# Patient Record
Sex: FEMALE | HISPANIC OR LATINO | URBAN - METROPOLITAN AREA
[De-identification: names, ages, dates, MRNs, and addresses within clinical notes are randomized per-mention and may not be internally consistent; named-entity substitution may affect disease eponyms.]

---

## 2017-04-13 ENCOUNTER — EMERGENCY (EMERGENCY)
Facility: HOSPITAL | Age: 57
LOS: 1 days | Discharge: HOME | End: 2017-04-13
Attending: EMERGENCY MEDICINE | Admitting: EMERGENCY MEDICINE
Payer: COMMERCIAL

## 2017-04-13 VITALS — WEIGHT: 220.02 LBS

## 2017-04-13 DIAGNOSIS — M79.601 PAIN IN RIGHT ARM: ICD-10-CM

## 2017-04-13 DIAGNOSIS — M25.431 EFFUSION, RIGHT WRIST: ICD-10-CM

## 2017-04-13 LAB
ALBUMIN SERPL ELPH-MCNC: 3.7 G/DL — SIGNIFICANT CHANGE UP (ref 3.3–5)
ALP SERPL-CCNC: 95 U/L — SIGNIFICANT CHANGE UP (ref 40–120)
ALT FLD-CCNC: 29 U/L — SIGNIFICANT CHANGE UP (ref 12–78)
ANION GAP SERPL CALC-SCNC: 8 MMOL/L — SIGNIFICANT CHANGE UP (ref 5–17)
AST SERPL-CCNC: 18 U/L — SIGNIFICANT CHANGE UP (ref 15–37)
BASOPHILS # BLD AUTO: 0.1 K/UL — SIGNIFICANT CHANGE UP (ref 0–0.2)
BASOPHILS NFR BLD AUTO: 1.3 % — SIGNIFICANT CHANGE UP (ref 0–2)
BILIRUB SERPL-MCNC: 0.4 MG/DL — SIGNIFICANT CHANGE UP (ref 0.2–1.2)
BUN SERPL-MCNC: 28 MG/DL — HIGH (ref 7–23)
CALCIUM SERPL-MCNC: 9.2 MG/DL — SIGNIFICANT CHANGE UP (ref 8.5–10.1)
CHLORIDE SERPL-SCNC: 103 MMOL/L — SIGNIFICANT CHANGE UP (ref 96–108)
CO2 SERPL-SCNC: 28 MMOL/L — SIGNIFICANT CHANGE UP (ref 22–31)
CREAT SERPL-MCNC: 0.82 MG/DL — SIGNIFICANT CHANGE UP (ref 0.5–1.3)
EOSINOPHIL # BLD AUTO: 0.1 K/UL — SIGNIFICANT CHANGE UP (ref 0–0.5)
EOSINOPHIL NFR BLD AUTO: 0.9 % — SIGNIFICANT CHANGE UP (ref 0–6)
GLUCOSE SERPL-MCNC: 117 MG/DL — HIGH (ref 70–99)
HCT VFR BLD CALC: 33.2 % — LOW (ref 34.5–45)
HGB BLD-MCNC: 11 G/DL — LOW (ref 11.5–15.5)
LYMPHOCYTES # BLD AUTO: 2.8 K/UL — SIGNIFICANT CHANGE UP (ref 1–3.3)
LYMPHOCYTES # BLD AUTO: 26.1 % — SIGNIFICANT CHANGE UP (ref 13–44)
MCHC RBC-ENTMCNC: 27.4 PG — SIGNIFICANT CHANGE UP (ref 27–34)
MCHC RBC-ENTMCNC: 33.1 GM/DL — SIGNIFICANT CHANGE UP (ref 32–36)
MCV RBC AUTO: 82.7 FL — SIGNIFICANT CHANGE UP (ref 80–100)
MONOCYTES # BLD AUTO: 1 K/UL — HIGH (ref 0–0.9)
MONOCYTES NFR BLD AUTO: 9.6 % — SIGNIFICANT CHANGE UP (ref 2–14)
NEUTROPHILS # BLD AUTO: 6.6 K/UL — SIGNIFICANT CHANGE UP (ref 1.8–7.4)
NEUTROPHILS NFR BLD AUTO: 62 % — SIGNIFICANT CHANGE UP (ref 43–77)
PLATELET # BLD AUTO: 218 K/UL — SIGNIFICANT CHANGE UP (ref 150–400)
POTASSIUM SERPL-MCNC: 4.5 MMOL/L — SIGNIFICANT CHANGE UP (ref 3.5–5.3)
POTASSIUM SERPL-SCNC: 4.5 MMOL/L — SIGNIFICANT CHANGE UP (ref 3.5–5.3)
PROT SERPL-MCNC: 7.8 GM/DL — SIGNIFICANT CHANGE UP (ref 6–8.3)
RBC # BLD: 4.01 M/UL — SIGNIFICANT CHANGE UP (ref 3.8–5.2)
RBC # FLD: 13.6 % — SIGNIFICANT CHANGE UP (ref 10.3–14.5)
SODIUM SERPL-SCNC: 139 MMOL/L — SIGNIFICANT CHANGE UP (ref 135–145)
WBC # BLD: 10.7 K/UL — HIGH (ref 3.8–10.5)
WBC # FLD AUTO: 10.7 K/UL — HIGH (ref 3.8–10.5)

## 2017-04-13 PROCEDURE — 93971 EXTREMITY STUDY: CPT | Mod: 26,RT

## 2017-04-13 PROCEDURE — 99285 EMERGENCY DEPT VISIT HI MDM: CPT | Mod: 25

## 2017-04-13 PROCEDURE — 73110 X-RAY EXAM OF WRIST: CPT | Mod: 26,RT

## 2017-04-13 RX ORDER — KETOROLAC TROMETHAMINE 30 MG/ML
15 SYRINGE (ML) INJECTION ONCE
Qty: 0 | Refills: 0 | Status: DISCONTINUED | OUTPATIENT
Start: 2017-04-13 | End: 2017-04-13

## 2017-04-13 RX ORDER — MORPHINE SULFATE 50 MG/1
4 CAPSULE, EXTENDED RELEASE ORAL ONCE
Qty: 0 | Refills: 0 | Status: DISCONTINUED | OUTPATIENT
Start: 2017-04-13 | End: 2017-04-13

## 2017-04-13 RX ORDER — SODIUM CHLORIDE 9 MG/ML
1000 INJECTION INTRAMUSCULAR; INTRAVENOUS; SUBCUTANEOUS ONCE
Qty: 0 | Refills: 0 | Status: COMPLETED | OUTPATIENT
Start: 2017-04-13 | End: 2017-04-13

## 2017-04-13 RX ADMIN — MORPHINE SULFATE 4 MILLIGRAM(S): 50 CAPSULE, EXTENDED RELEASE ORAL at 23:18

## 2017-04-13 RX ADMIN — Medication 15 MILLIGRAM(S): at 23:19

## 2017-04-13 RX ADMIN — MORPHINE SULFATE 4 MILLIGRAM(S): 50 CAPSULE, EXTENDED RELEASE ORAL at 23:35

## 2017-04-13 RX ADMIN — Medication 15 MILLIGRAM(S): at 23:35

## 2017-04-13 RX ADMIN — SODIUM CHLORIDE 1000 MILLILITER(S): 9 INJECTION INTRAMUSCULAR; INTRAVENOUS; SUBCUTANEOUS at 23:18

## 2017-04-13 NOTE — ED PROVIDER NOTE - DETAILS:
I, Jj Lu DO,  performed the initial face to face bedside interview with this patient regarding history of present illness, review of symptoms and relevant past medical, social and family history.  I completed an independent physical examination.  I was the initial provider who evaluated this patient.    The history, relevant review of systems, past medical and surgical history, medical decision making, and physical examination was documented by the scribe in my presence and I attest to the accuracy of the documentation. I, Jj Lu DO,  performed the initial face to face bedside interview with this patient regarding history of present illness, review of symptoms and relevant past medical, social and family history.  I completed an independent physical examination.  I was the initial provider who evaluated this patient.  I agree with scribe documentation -Dr. Hankins   The history, relevant review of systems, past medical and surgical history, medical decision making, and physical examination was documented by the scribe in my presence and I attest to the accuracy of the documentation. I, Dr. Hankins, agree with the scribe documentation on my behalf of this chart

## 2017-04-13 NOTE — ED PROVIDER NOTE - OBJECTIVE STATEMENT
58 y/o female with a h/o PM, DM, CAD, c/o right arm pain x2 days, atraumatic. Pt reports chills and feeling hot. She states the pain in her RUE is mostly in her wrist down to her fingers.

## 2017-04-13 NOTE — ED PROVIDER NOTE - PROGRESS NOTE DETAILS
ED attending Dr. Hankins evaluated patient at bedside. Pt c/o right hand pain x 2 days, unable to sleep. On exam pt is exquisitely tender from the wrist to the fingers. I, Go Jeffries, was the scribe for and in the presence of Dr. Hankins for this patient. Ct with a distal radius lesion, dicsussed case with Dr. White from ortho who states no immediate action but agrees concerning for CA, and pt needs Onc FU.  Pt without complaints otherwise.  Will give pain meds.  Pt resting but with severe pain in the right forearm.  Will plan to DC with pain meds and pt will FU with PMD and Onc. Pt stable for DC home, understands the severity of her findings and will FU with onc and return for any change or worsening.  Stable for DC home. Pt stable for DC home, understands the severity of her findings and will FU with onc and return for any change or worsening.  Stable for DC home.  at bedside to take the pt home and will help with FU Yumiko: I, Mae Calderon, performed the initial face to face bedside interview with this patient regarding history of present illness, review of symptoms and relevant past medical, social and family history.  I completed an independent physical examination.  I was the initial provider who evaluated this patient in Intake and transferred patient to main ED for further evaluation.  The history, relevant review of systems, past medical and surgical history, medical decision making, and physical examination was documented by the scribe in my presence and I attest to the accuracy of the documentation.

## 2017-04-13 NOTE — ED PROVIDER NOTE - NS ED MD SCRIBE ATTENDING SCRIBE SECTIONS
REVIEW OF SYSTEMS/PROGRESS NOTE/PAST MEDICAL/SURGICAL/SOCIAL HISTORY/RESULTS/PHYSICAL EXAM/HISTORY OF PRESENT ILLNESS/DISPOSITION

## 2017-04-14 VITALS
RESPIRATION RATE: 15 BRPM | TEMPERATURE: 98 F | OXYGEN SATURATION: 100 % | SYSTOLIC BLOOD PRESSURE: 130 MMHG | DIASTOLIC BLOOD PRESSURE: 86 MMHG | HEART RATE: 64 BPM

## 2017-04-14 LAB
CRP SERPL-MCNC: 0.76 MG/DL — HIGH (ref 0–0.4)
ERYTHROCYTE [SEDIMENTATION RATE] IN BLOOD: 19 MM/HR — SIGNIFICANT CHANGE UP (ref 0–20)

## 2017-04-14 PROCEDURE — 73200 CT UPPER EXTREMITY W/O DYE: CPT | Mod: 26,RT

## 2017-04-14 RX ORDER — OXYCODONE HYDROCHLORIDE 5 MG/1
5 TABLET ORAL ONCE
Qty: 0 | Refills: 0 | Status: DISCONTINUED | OUTPATIENT
Start: 2017-04-14 | End: 2017-04-14

## 2017-04-14 RX ORDER — OXYCODONE HYDROCHLORIDE 5 MG/1
1 TABLET ORAL
Qty: 10 | Refills: 0 | OUTPATIENT
Start: 2017-04-14

## 2017-04-14 RX ADMIN — OXYCODONE HYDROCHLORIDE 5 MILLIGRAM(S): 5 TABLET ORAL at 05:57

## 2017-04-14 RX ADMIN — OXYCODONE HYDROCHLORIDE 5 MILLIGRAM(S): 5 TABLET ORAL at 06:45

## 2017-04-14 NOTE — ED ADULT NURSE REASSESSMENT NOTE - NS ED NURSE REASSESS COMMENT FT1
Patient taken to US
patient resting comfortably. communicated with [facility] for CT results to be faxed. VSS. will continue to monitor.
patient resting comfortably. pain managed. no distress noted. patient updated on plan of care by Dr. Lu. will continue to monitor.
patient states " my pain is better". VSS. CT scan pending. will continue to monitor.
patient taken to CT scan

## 2017-04-18 ENCOUNTER — APPOINTMENT (OUTPATIENT)
Dept: ORTHOPEDIC SURGERY | Facility: CLINIC | Age: 57
End: 2017-04-18

## 2017-04-18 DIAGNOSIS — Z78.9 OTHER SPECIFIED HEALTH STATUS: ICD-10-CM

## 2017-04-18 DIAGNOSIS — Z86.39 PERSONAL HISTORY OF OTHER ENDOCRINE, NUTRITIONAL AND METABOLIC DISEASE: ICD-10-CM

## 2017-04-18 DIAGNOSIS — Z87.09 PERSONAL HISTORY OF OTHER DISEASES OF THE RESPIRATORY SYSTEM: ICD-10-CM

## 2017-04-18 DIAGNOSIS — Z80.9 FAMILY HISTORY OF MALIGNANT NEOPLASM, UNSPECIFIED: ICD-10-CM

## 2017-04-18 DIAGNOSIS — Z87.39 PERSONAL HISTORY OF OTHER DISEASES OF THE MUSCULOSKELETAL SYSTEM AND CONNECTIVE TISSUE: ICD-10-CM

## 2017-04-18 DIAGNOSIS — E78.00 PURE HYPERCHOLESTEROLEMIA, UNSPECIFIED: ICD-10-CM

## 2017-05-05 ENCOUNTER — APPOINTMENT (OUTPATIENT)
Dept: ORTHOPEDIC SURGERY | Facility: CLINIC | Age: 57
End: 2017-05-05

## 2017-05-05 DIAGNOSIS — M89.9 DISORDER OF BONE, UNSPECIFIED: ICD-10-CM

## 2017-05-30 ENCOUNTER — EMERGENCY (EMERGENCY)
Facility: HOSPITAL | Age: 57
LOS: 0 days | Discharge: HOME | End: 2017-05-30
Attending: EMERGENCY MEDICINE | Admitting: EMERGENCY MEDICINE
Payer: COMMERCIAL

## 2017-05-30 VITALS
HEART RATE: 66 BPM | RESPIRATION RATE: 18 BRPM | DIASTOLIC BLOOD PRESSURE: 68 MMHG | TEMPERATURE: 98 F | OXYGEN SATURATION: 98 % | SYSTOLIC BLOOD PRESSURE: 104 MMHG

## 2017-05-30 VITALS — WEIGHT: 199.96 LBS | HEIGHT: 60 IN

## 2017-05-30 DIAGNOSIS — M79.603 PAIN IN ARM, UNSPECIFIED: ICD-10-CM

## 2017-05-30 DIAGNOSIS — J45.909 UNSPECIFIED ASTHMA, UNCOMPLICATED: ICD-10-CM

## 2017-05-30 DIAGNOSIS — Z95.0 PRESENCE OF CARDIAC PACEMAKER: Chronic | ICD-10-CM

## 2017-05-30 DIAGNOSIS — M79.601 PAIN IN RIGHT ARM: ICD-10-CM

## 2017-05-30 DIAGNOSIS — I25.10 ATHEROSCLEROTIC HEART DISEASE OF NATIVE CORONARY ARTERY WITHOUT ANGINA PECTORIS: ICD-10-CM

## 2017-05-30 DIAGNOSIS — I10 ESSENTIAL (PRIMARY) HYPERTENSION: ICD-10-CM

## 2017-05-30 DIAGNOSIS — E78.5 HYPERLIPIDEMIA, UNSPECIFIED: ICD-10-CM

## 2017-05-30 DIAGNOSIS — Z95.0 PRESENCE OF CARDIAC PACEMAKER: ICD-10-CM

## 2017-05-30 DIAGNOSIS — E11.9 TYPE 2 DIABETES MELLITUS WITHOUT COMPLICATIONS: ICD-10-CM

## 2017-05-30 PROCEDURE — 99283 EMERGENCY DEPT VISIT LOW MDM: CPT | Mod: 25

## 2017-05-30 RX ORDER — TRAMADOL HYDROCHLORIDE 50 MG/1
50 TABLET ORAL ONCE
Qty: 0 | Refills: 0 | Status: DISCONTINUED | OUTPATIENT
Start: 2017-05-30 | End: 2017-05-30

## 2017-05-30 RX ORDER — TRAMADOL HYDROCHLORIDE 50 MG/1
1 TABLET ORAL
Qty: 12 | Refills: 0 | OUTPATIENT
Start: 2017-05-30 | End: 2017-06-03

## 2017-05-30 RX ADMIN — TRAMADOL HYDROCHLORIDE 50 MILLIGRAM(S): 50 TABLET ORAL at 23:35

## 2017-05-30 NOTE — ED STATDOCS - OBJECTIVE STATEMENT
56 yo female c/o right wrist pain, had been here and seen for right wrist pain in april and had a ct showing a lesion suspsious for cancer.  the remeinder of eloina was obtained with  #[id number]. Pt states she followed up with a dcotor in [place] but does not know his name and was told to come back inn 6 months, not given any medication. she could not have an mri due to her ppm. called  to confirm this, spoke to  and he does not know doctors name but he assures me he knows about the possibility of cancer but he was told it was not cancer and that she will follow up . Pt states she had too many doctors to remember, dm,  Dr. Campbell,  cardiology Dr. Stevens, PPM, Dr. Singer.

## 2017-05-30 NOTE — ED STATDOCS - MEDICAL DECISION MAKING DETAILS
pain,meds will follow up with her miguel for her arm pain,meds will follow up with her miguel for her arm. spoke to her  via telephone to make certain he and family and pt understand need for follow up

## 2017-05-30 NOTE — ED ADULT TRIAGE NOTE - CHIEF COMPLAINT QUOTE
right arm pain x 3 days. Difficulty sleeping. Reports treated for same issue in past. Has not taken any meds for pain. Reports need for surgery in 6 months.

## 2017-06-05 ENCOUNTER — EMERGENCY (EMERGENCY)
Facility: HOSPITAL | Age: 57
LOS: 0 days | Discharge: HOME | End: 2017-06-06
Attending: EMERGENCY MEDICINE | Admitting: EMERGENCY MEDICINE
Payer: COMMERCIAL

## 2017-06-05 VITALS
OXYGEN SATURATION: 100 % | HEART RATE: 81 BPM | WEIGHT: 164.91 LBS | TEMPERATURE: 99 F | SYSTOLIC BLOOD PRESSURE: 104 MMHG | DIASTOLIC BLOOD PRESSURE: 73 MMHG | RESPIRATION RATE: 16 BRPM | HEIGHT: 60 IN

## 2017-06-05 DIAGNOSIS — Z95.0 PRESENCE OF CARDIAC PACEMAKER: Chronic | ICD-10-CM

## 2017-06-05 LAB
ALBUMIN SERPL ELPH-MCNC: 3.7 G/DL — SIGNIFICANT CHANGE UP (ref 3.3–5)
ALP SERPL-CCNC: 102 U/L — SIGNIFICANT CHANGE UP (ref 40–120)
ALT FLD-CCNC: 33 U/L — SIGNIFICANT CHANGE UP (ref 12–78)
ANION GAP SERPL CALC-SCNC: 5 MMOL/L — SIGNIFICANT CHANGE UP (ref 5–17)
APTT BLD: 29.7 SEC — SIGNIFICANT CHANGE UP (ref 27.5–37.4)
AST SERPL-CCNC: 25 U/L — SIGNIFICANT CHANGE UP (ref 15–37)
BASOPHILS # BLD AUTO: 0.2 K/UL — SIGNIFICANT CHANGE UP (ref 0–0.2)
BASOPHILS NFR BLD AUTO: 1.4 % — SIGNIFICANT CHANGE UP (ref 0–2)
BILIRUB SERPL-MCNC: 0.7 MG/DL — SIGNIFICANT CHANGE UP (ref 0.2–1.2)
BUN SERPL-MCNC: 19 MG/DL — SIGNIFICANT CHANGE UP (ref 7–23)
CALCIUM SERPL-MCNC: 9 MG/DL — SIGNIFICANT CHANGE UP (ref 8.5–10.1)
CHLORIDE SERPL-SCNC: 105 MMOL/L — SIGNIFICANT CHANGE UP (ref 96–108)
CO2 SERPL-SCNC: 30 MMOL/L — SIGNIFICANT CHANGE UP (ref 22–31)
CREAT SERPL-MCNC: 0.97 MG/DL — SIGNIFICANT CHANGE UP (ref 0.5–1.3)
EOSINOPHIL # BLD AUTO: 0.6 K/UL — HIGH (ref 0–0.5)
EOSINOPHIL NFR BLD AUTO: 5.1 % — SIGNIFICANT CHANGE UP (ref 0–6)
GLUCOSE SERPL-MCNC: 121 MG/DL — HIGH (ref 70–99)
HCT VFR BLD CALC: 43.2 % — SIGNIFICANT CHANGE UP (ref 34.5–45)
HGB BLD-MCNC: 13.3 G/DL — SIGNIFICANT CHANGE UP (ref 11.5–15.5)
INR BLD: 1.07 RATIO — SIGNIFICANT CHANGE UP (ref 0.88–1.16)
LYMPHOCYTES # BLD AUTO: 2.9 K/UL — SIGNIFICANT CHANGE UP (ref 1–3.3)
LYMPHOCYTES # BLD AUTO: 25.6 % — SIGNIFICANT CHANGE UP (ref 13–44)
MCHC RBC-ENTMCNC: 26.7 PG — LOW (ref 27–34)
MCHC RBC-ENTMCNC: 30.9 GM/DL — LOW (ref 32–36)
MCV RBC AUTO: 86.5 FL — SIGNIFICANT CHANGE UP (ref 80–100)
MONOCYTES # BLD AUTO: 1.1 K/UL — HIGH (ref 0–0.9)
MONOCYTES NFR BLD AUTO: 9.8 % — SIGNIFICANT CHANGE UP (ref 2–14)
NEUTROPHILS # BLD AUTO: 6.4 K/UL — SIGNIFICANT CHANGE UP (ref 1.8–7.4)
NEUTROPHILS NFR BLD AUTO: 57.6 % — SIGNIFICANT CHANGE UP (ref 43–77)
NT-PROBNP SERPL-SCNC: 1180 PG/ML — HIGH (ref 0–125)
PLATELET # BLD AUTO: 266 K/UL — SIGNIFICANT CHANGE UP (ref 150–400)
POTASSIUM SERPL-MCNC: 4.1 MMOL/L — SIGNIFICANT CHANGE UP (ref 3.5–5.3)
POTASSIUM SERPL-SCNC: 4.1 MMOL/L — SIGNIFICANT CHANGE UP (ref 3.5–5.3)
PROT SERPL-MCNC: 7.4 GM/DL — SIGNIFICANT CHANGE UP (ref 6–8.3)
PROTHROM AB SERPL-ACNC: 11.6 SEC — SIGNIFICANT CHANGE UP (ref 9.8–12.7)
RBC # BLD: 4.99 M/UL — SIGNIFICANT CHANGE UP (ref 3.8–5.2)
RBC # FLD: 14.5 % — SIGNIFICANT CHANGE UP (ref 10.3–14.5)
SODIUM SERPL-SCNC: 140 MMOL/L — SIGNIFICANT CHANGE UP (ref 135–145)
TROPONIN I SERPL-MCNC: <0.015 NG/ML — SIGNIFICANT CHANGE UP (ref 0.01–0.04)
WBC # BLD: 11.1 K/UL — HIGH (ref 3.8–10.5)
WBC # FLD AUTO: 11.1 K/UL — HIGH (ref 3.8–10.5)

## 2017-06-05 PROCEDURE — 99285 EMERGENCY DEPT VISIT HI MDM: CPT | Mod: 25

## 2017-06-05 PROCEDURE — 93010 ELECTROCARDIOGRAM REPORT: CPT

## 2017-06-05 PROCEDURE — 71010: CPT | Mod: 26

## 2017-06-05 RX ORDER — FUROSEMIDE 40 MG
80 TABLET ORAL ONCE
Qty: 0 | Refills: 0 | Status: COMPLETED | OUTPATIENT
Start: 2017-06-05 | End: 2017-06-05

## 2017-06-05 RX ADMIN — Medication 80 MILLIGRAM(S): at 22:50

## 2017-06-05 NOTE — ED ADULT NURSE REASSESSMENT NOTE - NS ED NURSE REASSESS COMMENT FT1
Pt updated as to current plan of care and results. Pt medicated as per MD order. Pt no long appears in respiratory distress. Pt has no cough at this time. Call bell within reach and pt laying in stretcehr watching TV. Pt on bedside monitor. Pt given blanket and pillow. Pt has no questions at this time. WIll continue to monitor.

## 2017-06-05 NOTE — ED PROVIDER NOTE - MEDICAL DECISION MAKING DETAILS
patient with 2 urinary voids in ED, feels well and is requesting discharge home. patient states she is no longer orthopneic. she will f/u with her PMD in the morning. no active chest pain. precautions when to return ot the ED given and understood.

## 2017-06-05 NOTE — ED PROVIDER NOTE - OBJECTIVE STATEMENT
58 y/o F with a h/o CAD, DM, CHF on lasix, c/o SOB since yesterday evening. Denies CP. +orthopnea. 56 y/o F with a h/o CAD, DM, CHF on lasix, c/o SOB since yesterday evening. Denies CP. +orthopnea. Dr. Stevens (cardio).

## 2017-06-05 NOTE — ED PROVIDER NOTE - NS ED MD SCRIBE ATTENDING SCRIBE SECTIONS
PHYSICAL EXAM/RESULTS/REVIEW OF SYSTEMS/PAST MEDICAL/SURGICAL/SOCIAL HISTORY/DISPOSITION/HISTORY OF PRESENT ILLNESS/PROGRESS NOTE

## 2017-06-05 NOTE — ED ADULT NURSE NOTE - OBJECTIVE STATEMENT
Pt presents to the ED with complaints of shortness of breath x2 days. Pt states she has a h/o CHF and feels like she is overloaded, pt also has a h/o asthma. Upon assessment wheezes and crackles are heard at the bases bilaterally. Pt has a nonproductive cough and states she feels pain when she coughs intermittently.

## 2017-06-06 ENCOUNTER — INPATIENT (INPATIENT)
Facility: HOSPITAL | Age: 57
LOS: 0 days | Discharge: HOME | End: 2017-06-07
Attending: INTERNAL MEDICINE | Admitting: HOSPITALIST
Payer: COMMERCIAL

## 2017-06-06 VITALS — HEIGHT: 61 IN | WEIGHT: 229.94 LBS

## 2017-06-06 VITALS
OXYGEN SATURATION: 100 % | HEART RATE: 81 BPM | DIASTOLIC BLOOD PRESSURE: 63 MMHG | TEMPERATURE: 98 F | RESPIRATION RATE: 16 BRPM | SYSTOLIC BLOOD PRESSURE: 110 MMHG

## 2017-06-06 DIAGNOSIS — Z95.0 PRESENCE OF CARDIAC PACEMAKER: Chronic | ICD-10-CM

## 2017-06-06 LAB
ADD ON TEST-SPECIMEN IN LAB: SIGNIFICANT CHANGE UP
ALBUMIN SERPL ELPH-MCNC: 3.5 G/DL — SIGNIFICANT CHANGE UP (ref 3.3–5)
ALP SERPL-CCNC: 102 U/L — SIGNIFICANT CHANGE UP (ref 40–120)
ALT FLD-CCNC: 27 U/L — SIGNIFICANT CHANGE UP (ref 12–78)
ANION GAP SERPL CALC-SCNC: 6 MMOL/L — SIGNIFICANT CHANGE UP (ref 5–17)
AST SERPL-CCNC: 23 U/L — SIGNIFICANT CHANGE UP (ref 15–37)
BASOPHILS # BLD AUTO: 0.1 K/UL — SIGNIFICANT CHANGE UP (ref 0–0.2)
BASOPHILS NFR BLD AUTO: 0.7 % — SIGNIFICANT CHANGE UP (ref 0–2)
BILIRUB SERPL-MCNC: 1.3 MG/DL — HIGH (ref 0.2–1.2)
BUN SERPL-MCNC: 15 MG/DL — SIGNIFICANT CHANGE UP (ref 7–23)
CALCIUM SERPL-MCNC: 8.8 MG/DL — SIGNIFICANT CHANGE UP (ref 8.5–10.1)
CHLORIDE SERPL-SCNC: 104 MMOL/L — SIGNIFICANT CHANGE UP (ref 96–108)
CO2 SERPL-SCNC: 32 MMOL/L — HIGH (ref 22–31)
CREAT SERPL-MCNC: 0.92 MG/DL — SIGNIFICANT CHANGE UP (ref 0.5–1.3)
EOSINOPHIL # BLD AUTO: 0.3 K/UL — SIGNIFICANT CHANGE UP (ref 0–0.5)
EOSINOPHIL NFR BLD AUTO: 1.9 % — SIGNIFICANT CHANGE UP (ref 0–6)
GLUCOSE SERPL-MCNC: 121 MG/DL — HIGH (ref 70–99)
HCT VFR BLD CALC: 42.9 % — SIGNIFICANT CHANGE UP (ref 34.5–45)
HGB BLD-MCNC: 13.7 G/DL — SIGNIFICANT CHANGE UP (ref 11.5–15.5)
LYMPHOCYTES # BLD AUTO: 1.8 K/UL — SIGNIFICANT CHANGE UP (ref 1–3.3)
LYMPHOCYTES # BLD AUTO: 12.9 % — LOW (ref 13–44)
MAGNESIUM SERPL-MCNC: 2.3 MG/DL — SIGNIFICANT CHANGE UP (ref 1.6–2.6)
MAGNESIUM SERPL-MCNC: 2.4 MG/DL — SIGNIFICANT CHANGE UP (ref 1.6–2.6)
MCHC RBC-ENTMCNC: 27 PG — SIGNIFICANT CHANGE UP (ref 27–34)
MCHC RBC-ENTMCNC: 31.9 GM/DL — LOW (ref 32–36)
MCV RBC AUTO: 84.8 FL — SIGNIFICANT CHANGE UP (ref 80–100)
MONOCYTES # BLD AUTO: 1.2 K/UL — HIGH (ref 0–0.9)
MONOCYTES NFR BLD AUTO: 8.1 % — SIGNIFICANT CHANGE UP (ref 2–14)
NEUTROPHILS # BLD AUTO: 10.9 K/UL — HIGH (ref 1.8–7.4)
NEUTROPHILS NFR BLD AUTO: 76.3 % — SIGNIFICANT CHANGE UP (ref 43–77)
NT-PROBNP SERPL-SCNC: 1667 PG/ML — HIGH (ref 0–125)
PLATELET # BLD AUTO: 249 K/UL — SIGNIFICANT CHANGE UP (ref 150–400)
POTASSIUM SERPL-MCNC: 3.9 MMOL/L — SIGNIFICANT CHANGE UP (ref 3.5–5.3)
POTASSIUM SERPL-SCNC: 3.9 MMOL/L — SIGNIFICANT CHANGE UP (ref 3.5–5.3)
PROT SERPL-MCNC: 7.4 GM/DL — SIGNIFICANT CHANGE UP (ref 6–8.3)
RBC # BLD: 5.06 M/UL — SIGNIFICANT CHANGE UP (ref 3.8–5.2)
RBC # FLD: 14 % — SIGNIFICANT CHANGE UP (ref 10.3–14.5)
SODIUM SERPL-SCNC: 142 MMOL/L — SIGNIFICANT CHANGE UP (ref 135–145)
TROPONIN I SERPL-MCNC: <0.015 NG/ML — SIGNIFICANT CHANGE UP (ref 0.01–0.04)
WBC # BLD: 14.3 K/UL — HIGH (ref 3.8–10.5)
WBC # FLD AUTO: 14.3 K/UL — HIGH (ref 3.8–10.5)

## 2017-06-06 PROCEDURE — 71010: CPT | Mod: 26

## 2017-06-06 PROCEDURE — 99285 EMERGENCY DEPT VISIT HI MDM: CPT

## 2017-06-06 PROCEDURE — 93010 ELECTROCARDIOGRAM REPORT: CPT

## 2017-06-06 RX ORDER — IPRATROPIUM/ALBUTEROL SULFATE 18-103MCG
3 AEROSOL WITH ADAPTER (GRAM) INHALATION EVERY 6 HOURS
Qty: 0 | Refills: 0 | Status: DISCONTINUED | OUTPATIENT
Start: 2017-06-06 | End: 2017-06-07

## 2017-06-06 RX ORDER — INSULIN LISPRO 100/ML
VIAL (ML) SUBCUTANEOUS
Qty: 0 | Refills: 0 | Status: DISCONTINUED | OUTPATIENT
Start: 2017-06-06 | End: 2017-06-07

## 2017-06-06 RX ORDER — LISINOPRIL 2.5 MG/1
40 TABLET ORAL DAILY
Qty: 0 | Refills: 0 | Status: DISCONTINUED | OUTPATIENT
Start: 2017-06-06 | End: 2017-06-06

## 2017-06-06 RX ORDER — FUROSEMIDE 40 MG
40 TABLET ORAL ONCE
Qty: 0 | Refills: 0 | Status: COMPLETED | OUTPATIENT
Start: 2017-06-06 | End: 2017-06-06

## 2017-06-06 RX ORDER — HEPARIN SODIUM 5000 [USP'U]/ML
5000 INJECTION INTRAVENOUS; SUBCUTANEOUS EVERY 8 HOURS
Qty: 0 | Refills: 0 | Status: DISCONTINUED | OUTPATIENT
Start: 2017-06-06 | End: 2017-06-07

## 2017-06-06 RX ORDER — INSULIN LISPRO 100/ML
VIAL (ML) SUBCUTANEOUS AT BEDTIME
Qty: 0 | Refills: 0 | Status: DISCONTINUED | OUTPATIENT
Start: 2017-06-06 | End: 2017-06-07

## 2017-06-06 RX ORDER — FUROSEMIDE 40 MG
40 TABLET ORAL
Qty: 0 | Refills: 0 | Status: DISCONTINUED | OUTPATIENT
Start: 2017-06-06 | End: 2017-06-07

## 2017-06-06 RX ORDER — IPRATROPIUM/ALBUTEROL SULFATE 18-103MCG
3 AEROSOL WITH ADAPTER (GRAM) INHALATION
Qty: 0 | Refills: 0 | Status: COMPLETED | OUTPATIENT
Start: 2017-06-06 | End: 2017-06-06

## 2017-06-06 RX ORDER — GLUCAGON INJECTION, SOLUTION 0.5 MG/.1ML
1 INJECTION, SOLUTION SUBCUTANEOUS ONCE
Qty: 0 | Refills: 0 | Status: DISCONTINUED | OUTPATIENT
Start: 2017-06-06 | End: 2017-06-07

## 2017-06-06 RX ORDER — SODIUM CHLORIDE 9 MG/ML
1000 INJECTION, SOLUTION INTRAVENOUS
Qty: 0 | Refills: 0 | Status: DISCONTINUED | OUTPATIENT
Start: 2017-06-06 | End: 2017-06-07

## 2017-06-06 RX ORDER — POTASSIUM CHLORIDE 20 MEQ
20 PACKET (EA) ORAL DAILY
Qty: 0 | Refills: 0 | Status: DISCONTINUED | OUTPATIENT
Start: 2017-06-06 | End: 2017-06-07

## 2017-06-06 RX ORDER — DEXTROSE 50 % IN WATER 50 %
25 SYRINGE (ML) INTRAVENOUS ONCE
Qty: 0 | Refills: 0 | Status: DISCONTINUED | OUTPATIENT
Start: 2017-06-06 | End: 2017-06-07

## 2017-06-06 RX ORDER — SIMVASTATIN 20 MG/1
40 TABLET, FILM COATED ORAL AT BEDTIME
Qty: 0 | Refills: 0 | Status: DISCONTINUED | OUTPATIENT
Start: 2017-06-06 | End: 2017-06-07

## 2017-06-06 RX ORDER — CARVEDILOL PHOSPHATE 80 MG/1
25 CAPSULE, EXTENDED RELEASE ORAL EVERY 12 HOURS
Qty: 0 | Refills: 0 | Status: DISCONTINUED | OUTPATIENT
Start: 2017-06-06 | End: 2017-06-07

## 2017-06-06 RX ORDER — DEXTROSE 50 % IN WATER 50 %
12.5 SYRINGE (ML) INTRAVENOUS ONCE
Qty: 0 | Refills: 0 | Status: DISCONTINUED | OUTPATIENT
Start: 2017-06-06 | End: 2017-06-07

## 2017-06-06 RX ORDER — DEXTROSE 50 % IN WATER 50 %
1 SYRINGE (ML) INTRAVENOUS ONCE
Qty: 0 | Refills: 0 | Status: DISCONTINUED | OUTPATIENT
Start: 2017-06-06 | End: 2017-06-07

## 2017-06-06 RX ADMIN — Medication 10 MILLIGRAM(S): at 16:00

## 2017-06-06 RX ADMIN — Medication 100 MILLIGRAM(S): at 01:06

## 2017-06-06 RX ADMIN — Medication: at 18:26

## 2017-06-06 RX ADMIN — Medication 40 MILLIGRAM(S): at 21:43

## 2017-06-06 RX ADMIN — HEPARIN SODIUM 5000 UNIT(S): 5000 INJECTION INTRAVENOUS; SUBCUTANEOUS at 21:43

## 2017-06-06 RX ADMIN — Medication 3 MILLILITER(S): at 12:30

## 2017-06-06 RX ADMIN — SIMVASTATIN 40 MILLIGRAM(S): 20 TABLET, FILM COATED ORAL at 22:03

## 2017-06-06 RX ADMIN — Medication 3 MILLILITER(S): at 12:50

## 2017-06-06 RX ADMIN — Medication 1: at 21:43

## 2017-06-06 RX ADMIN — Medication 50 MILLIGRAM(S): at 13:06

## 2017-06-06 RX ADMIN — Medication 3 MILLILITER(S): at 20:05

## 2017-06-06 RX ADMIN — Medication 3 MILLILITER(S): at 13:20

## 2017-06-06 NOTE — ED ADULT NURSE NOTE - CHPI ED SYMPTOMS NEG
no dizziness/no numbness/no weakness/no fever/no tingling/no decreased eating/drinking/no vomiting/no pain/no chills

## 2017-06-06 NOTE — ED PROVIDER NOTE - PMH
Asthma    CAD (coronary artery disease)    DM (diabetes mellitus)    HLD (hyperlipidemia)    HTN (hypertension)

## 2017-06-06 NOTE — H&P ADULT - NSHPPHYSICALEXAM_GEN_ALL_CORE
PHYSICAL EXAM:    Constitutional: NAD, awake and alert, well-developed  HEENT: PERR, EOMI, Normal Hearing, MMM  Neck: Soft and supple, No LAD, No JVD  Respiratory: expiratory wheezing and rales at bases  Cardiovascular: S1 and S2, regular rate and rhythm, no Murmurs, gallops or rubs  Gastrointestinal: Bowel Sounds present, soft, nontender, nondistended, no guarding, no rebound  Extremities: + pitting edema  Vascular: 2+ peripheral pulses  Neurological: A/O x 3, no focal deficits  Musculoskeletal: 5/5 strength b/l upper and lower extremities  Skin: No rashes

## 2017-06-06 NOTE — H&P ADULT - HISTORY OF PRESENT ILLNESS
56yo female with pmh CAD, NIIDM, hyperlipidemia, PPM, asthma, CHF (type unknown) presented 2 days ago to ED with respiratory distress and was dc from ED. Now presenting with wheezing, dyspnea 4 pillow orthopnea, lower ext edema for 3 days. Not using some of her meds because she ran out. Also admits to dietary indiscretions Not on any maintenance or rescue inhalers. Denies any sick contacts. No recent travel.

## 2017-06-06 NOTE — ED ADULT NURSE REASSESSMENT NOTE - NS ED NURSE REASSESS COMMENT FT1
received pt at 1215 from [facility], pt aaox4, pt was here yesterday with same symptoms, asthma exacerbations, denies fever, chills, cp, nvd, no s/sx of distress noted.  iv initiated, blood obtained and sent, ekg done, pt on monitor- nsr, pt tolerated po meds, holding lasix d/t low bp, 101/73, notified md subramanian, agreed with the plan, will con't to monitor
notified md maria with low bp, and PVCs from cardiac monitor, no orders received.

## 2017-06-06 NOTE — H&P ADULT - NSHPREVIEWOFSYSTEMS_GEN_ALL_CORE
REVIEW OF SYSTEMS:    CONSTITUTIONAL: No weakness, fevers or chills  EYES/ENT: No visual changes;  No vertigo or throat pain   NECK: No pain or stiffness  RESPIRATORY: sob  CARDIOVASCULAR: No chest pain or palpitations  GASTROINTESTINAL: No abdominal or epigastric pain. No nausea, vomiting, or hematemesis; No diarrhea or constipation. No melena or hematochezia.  GENITOURINARY: No dysuria, frequency or hematuria  NEUROLOGICAL: No numbness or weakness  SKIN: No itching, burning, rashes, or lesions   All other review of systems is negative unless indicated above

## 2017-06-06 NOTE — ED PROVIDER NOTE - NS ED MD SCRIBE ATTENDING SCRIBE SECTIONS
DISPOSITION/HISTORY OF PRESENT ILLNESS/REVIEW OF SYSTEMS/PAST MEDICAL/SURGICAL/SOCIAL HISTORY/PHYSICAL EXAM/VITAL SIGNS( Pullset)/PROGRESS NOTE/RESULTS

## 2017-06-06 NOTE — H&P ADULT - ASSESSMENT
56 yo F with hx above presenting with sob/wheezing and elevated bnp      #Acute hypoxic resp failure  #Acute asthma exacerbation  #acute decompensated CHF (EF unknown) - mild  admit to tele for continuous O2  Nebs  Solumedrol  Will cont PO lasix for now- needs diet counseling for her indescretions - low sodium diet  Cont coreg/ACE-I  ISS, check A1C    DVT px

## 2017-06-06 NOTE — ED PROVIDER NOTE - OBJECTIVE STATEMENT
56 y/o female with PMHx of HLD, on Lasix, HTN, DM, CAD with pacemaker, asthma, presents to the ED c/o SOB, wheezing and dry cough. Pt states it feels like previous asthma attacks. Pt was here yesterday and was d/c after feeling better. Denies fever, pleuritic CP. Pt is not on home oxygen.

## 2017-06-06 NOTE — ED STATDOCS - MEDICAL DECISION MAKING DETAILS
CAD, CHF, PPM, presenting with increased SOB, cough.  Was here yesterday for same, d/c home.  Worsening today.  Associated recent weight gain, lower extremity edema.  Not urinating well with Lasix.  Hypoxic to 93% here, with crackles/wheezing.  Will sent to [facility] for further evaluation. 58 yo F hx of CAD, CHF, PPM, asthma, presenting with increased SOB, cough.  Was here yesterday for same, d/c home.  Worsening today.  Associated recent weight gain, lower extremity edema.  Not urinating well with Lasix.  Hypoxic to 93% here, with crackles/wheezing.  Will sent to [facility] for further evaluation.

## 2017-06-06 NOTE — H&P ADULT - NSHPLABSRESULTS_GEN_ALL_CORE
LABS: All Labs Reviewed:                        13.7   14.3  )-----------( 249      ( 06 Jun 2017 12:49 )             42.9     06-06    142  |  104  |  15  ----------------------------<  121<H>  3.9   |  32<H>  |  0.92    Ca    8.8      06 Jun 2017 12:49  Mg     2.3     06-06    TPro  7.4  /  Alb  3.5  /  TBili  1.3<H>  /  DBili  x   /  AST  23  /  ALT  27  /  AlkPhos  102  06-06    PT/INR - ( 05 Jun 2017 21:34 )   PT: 11.6 sec;   INR: 1.07 ratio         PTT - ( 05 Jun 2017 21:34 )  PTT:29.7 sec  CARDIAC MARKERS ( 06 Jun 2017 15:29 )  <0.015 ng/mL / x     / x     / x     / x      CARDIAC MARKERS ( 06 Jun 2017 12:49 )  <0.015 ng/mL / x     / x     / x     / x      CARDIAC MARKERS ( 05 Jun 2017 21:34 )  <0.015 ng/mL / x     / x     / x     / x              Blood Culture:           CXR:  IMPRESSION:     There is no acute consolidation.  There are no pleural effusion. There is   cardiomegaly and subsegmental atelectasis left lung base.   Pacemaker/defibrillator with 2 leads in the region of right ventricle   unchanged.    There are degenerative changes.

## 2017-06-07 VITALS — WEIGHT: 170.2 LBS

## 2017-06-07 DIAGNOSIS — I50.9 HEART FAILURE, UNSPECIFIED: ICD-10-CM

## 2017-06-07 DIAGNOSIS — I10 ESSENTIAL (PRIMARY) HYPERTENSION: ICD-10-CM

## 2017-06-07 DIAGNOSIS — Z95.0 PRESENCE OF CARDIAC PACEMAKER: ICD-10-CM

## 2017-06-07 DIAGNOSIS — E78.5 HYPERLIPIDEMIA, UNSPECIFIED: ICD-10-CM

## 2017-06-07 DIAGNOSIS — I25.10 ATHEROSCLEROTIC HEART DISEASE OF NATIVE CORONARY ARTERY WITHOUT ANGINA PECTORIS: ICD-10-CM

## 2017-06-07 LAB — HBA1C BLD-MCNC: 6.5 % — HIGH (ref 4–5.6)

## 2017-06-07 PROCEDURE — 93306 TTE W/DOPPLER COMPLETE: CPT | Mod: 26

## 2017-06-07 PROCEDURE — 93010 ELECTROCARDIOGRAM REPORT: CPT

## 2017-06-07 RX ORDER — ALBUTEROL 90 UG/1
2 AEROSOL, METERED ORAL
Qty: 1 | Refills: 0 | OUTPATIENT
Start: 2017-06-07

## 2017-06-07 RX ORDER — LISINOPRIL 2.5 MG/1
1 TABLET ORAL
Qty: 0 | Refills: 0 | COMMUNITY

## 2017-06-07 RX ORDER — BUDESONIDE AND FORMOTEROL FUMARATE DIHYDRATE 160; 4.5 UG/1; UG/1
1 AEROSOL RESPIRATORY (INHALATION)
Qty: 60 | Refills: 0 | OUTPATIENT
Start: 2017-06-07

## 2017-06-07 RX ORDER — CARVEDILOL PHOSPHATE 80 MG/1
1 CAPSULE, EXTENDED RELEASE ORAL
Qty: 0 | Refills: 0 | COMMUNITY

## 2017-06-07 RX ORDER — LISINOPRIL 2.5 MG/1
1 TABLET ORAL
Qty: 30 | Refills: 0 | OUTPATIENT
Start: 2017-06-07

## 2017-06-07 RX ORDER — CARVEDILOL PHOSPHATE 80 MG/1
1 CAPSULE, EXTENDED RELEASE ORAL
Qty: 60 | Refills: 0 | OUTPATIENT
Start: 2017-06-07

## 2017-06-07 RX ADMIN — CARVEDILOL PHOSPHATE 25 MILLIGRAM(S): 80 CAPSULE, EXTENDED RELEASE ORAL at 05:58

## 2017-06-07 RX ADMIN — Medication 40 MILLIGRAM(S): at 05:58

## 2017-06-07 RX ADMIN — Medication 1: at 08:10

## 2017-06-07 RX ADMIN — Medication 20 MILLIEQUIVALENT(S): at 11:41

## 2017-06-07 RX ADMIN — Medication 2: at 11:41

## 2017-06-07 RX ADMIN — Medication 40 MILLIGRAM(S): at 14:28

## 2017-06-07 RX ADMIN — HEPARIN SODIUM 5000 UNIT(S): 5000 INJECTION INTRAVENOUS; SUBCUTANEOUS at 05:58

## 2017-06-07 RX ADMIN — Medication 3 MILLILITER(S): at 02:12

## 2017-06-07 RX ADMIN — Medication 3 MILLILITER(S): at 13:28

## 2017-06-07 NOTE — CONSULT NOTE ADULT - SUBJECTIVE AND OBJECTIVE BOX
Chief complaint of dyspnea    HPI:  56yo female with pmh CAD, NIIDM, hyperlipidemia, PPM, asthma, CHF (type unknown) presented 2 days ago to ED with respiratory distress and was dc from ED after initial treatment. She had been out of her asthma medications. Now presenting with wheezing, dyspnea and 4 pillow orthopnea, lower ext edema for 3 days. Not using some of her meds because she ran out. Also admits to dietary indiscretions.      PAST MEDICAL & SURGICAL HISTORY:  HTN (hypertension)  HLD (hyperlipidemia)  Asthma  DM (diabetes mellitus)  CAD (coronary artery disease) - non obstructive  Chr systolic CHF  Non ischemic CMP    ALLERGIES:    No Known Allergies       MEDICATIONS  (STANDING):  simvastatin 40milliGRAM(s) Oral at bedtime  carvedilol 25milliGRAM(s) Oral every 12 hours  potassium chloride    Tablet ER 20milliEquivalent(s) Oral daily  heparin  Injectable 5000Unit(s) SubCutaneous every 8 hours  insulin lispro (HumaLOG) corrective regimen sliding scale  SubCutaneous three times a day before meals  insulin lispro (HumaLOG) corrective regimen sliding scale  SubCutaneous at bedtime  dextrose 5%. 1000milliLiter(s) IV Continuous <Continuous>  dextrose 50% Injectable 12.5Gram(s) IV Push once  dextrose 50% Injectable 25Gram(s) IV Push once  dextrose 50% Injectable 25Gram(s) IV Push once  methylPREDNISolone sodium succinate Injectable 40milliGRAM(s) IV Push three times a day  ALBUTerol/ipratropium for Nebulization 3milliLiter(s) Nebulizer every 6 hours  furosemide    Tablet 40milliGRAM(s) Oral two times a day    MEDICATIONS  (PRN):  dextrose Gel 1Dose(s) Oral once PRN Blood Glucose LESS THAN 70 milliGRAM(s)/deciliter  glucagon  Injectable 1milliGRAM(s) IntraMuscular once PRN Glucose LESS THAN 70 milligrams/deciliter      FAMILY HISTORY:  No pertinent family history in first degree relatives        SOCIAL HISTORY:  Nonsmoker. No ETOH abuse. No illicit drugs.     ROS:     Detailed ten system ROS was performed and was negative except for history as eluded to above.    no fever  no chills  no nausea  no vomiting  no diarrhea  no constipation  no melena  no hematochezia  no chest pain  no palpitations  + sob  + dyspnea  no cough  + wheezing  no anorexia  no headache  no dizziness  no syncope  no weakness  no myalgia  no dysuria  no polyuria  no hematuria      Vital Signs Last 24 Hrs  T(C): 36.8, Max: 37.4 (06-06 @ 12:19)  T(F): 98.2, Max: 99.3 (06-06 @ 12:19)  HR: 81 (74 - 91)  BP: 108/60 (100/49 - 113/91)  BP(mean): --  RR: 18 (16 - 18)  SpO2: 93% (93% - 100%)    I&O's Summary      PHYSICAL EXAM:    General Appearance:    Comfortable, AAO X 3, in no distress.   HEENT:                       Atraumatic, PERRLA, EOMI, conjunctiva clear.   Neck:                          Supple, no adenopathy, no thyromegaly, no JVD, no carotid bruit  Back:                          Symmetric, no CV angle fullness or tenderness, no soft tissue tenderness.  Chest:                         + wheezing, symmetric air entry, no tachypnea, retractions or cyanosis  Heart:                          S1, S2 normal, no gallop, no murmur.  Abdomen:                    Soft, non-tender, bowel sounds active. No palpable masses.   Extremities:                 no cyanosis, no edema, no joint swelling. Peripheral pulses normal  Skin:                           Skin color, texture normal. No rashes   Neurologic:                  Grossly cranial nerves intact, sensory and motor normal.      TELEMETRY:  Normal sinus rhythm with no tachy or lillian events     ECG:  NSR, no ST T changes of ischemia or infarction.     LABS:                          13.7   14.3  )-----------( 249      ( 06 Jun 2017 12:49 )             42.9     06-06    142  |  104  |  15  ----------------------------<  121<H>  3.9   |  32<H>  |  0.92    Ca    8.8      06 Jun 2017 12:49  Mg     2.4     06-06    TPro  7.4  /  Alb  3.5  /  TBili  1.3<H>  /  DBili  x   /  AST  23  /  ALT  27  /  AlkPhos  102  06-06 06-06 @ 15:29  Trop-I  <0.015  CK      --  CK-MB   --    06-06 @ 12:49  Trop-I  <0.015  CK      --  CK-MB   --    06-05 @ 21:34  Trop-I  <0.015  CK      --  CK-MB   --    Pro BNP  1667 06-06 @ 12:49  D Dimer  -- 06-06 @ 12:49  Pro BNP  1180 06-05 @ 21:34  D Dimer  -- 06-05 @ 21:34    PT/INR - ( 05 Jun 2017 21:34 )   PT: 11.6 sec;   INR: 1.07 ratio         PTT - ( 05 Jun 2017 21:34 )  PTT:29.7 sec    RADIOLOGY & ADDITIONAL STUDIES:    CXR: Cardiomegaly, subsegmental atelectasis left lung base.

## 2017-06-07 NOTE — CONSULT NOTE ADULT - ASSESSMENT
Asthma exacerbation sec to non compliance to meds  Ch sys CHF with acute exacerbation sec to dietary non compliance  Non ischemic cardiomyopathy  Obesity  DM    Suggest:    Resume asthma medications. compliance to medications    Low sodium, low cholesterol, ADA diet.  Fluid restriction 1.5 lit/day  monitor Intake & output  Daily weights.  Follow up electrolytes, renal function.   Out pt echocardiogram to evaluate left ventricular ejection fraction and valves.  Follow up cardiac enzymes  Treat with Diuretics, ACEi/ARBs and beta blcokers.    Cardiac status is stable for discharge.

## 2017-06-07 NOTE — DISCHARGE NOTE ADULT - PLAN OF CARE
Improvement in breathing Follow up with your Cardiologist (PCP) in 1 week of discharge. Follow up with your Cardiologist (PCP) in 1 week of discharge.  appointment with Makenna Sandra NP at June 14 at 11 am

## 2017-06-07 NOTE — DISCHARGE NOTE ADULT - CARE PROVIDER_API CALL
Juanita Stevens), Cardiology; Interventional Cardiology  [address] [place] [address]  Phone: [phone]  Fax: [phone]

## 2017-06-07 NOTE — DISCHARGE NOTE ADULT - MEDICATION SUMMARY - MEDICATIONS TO TAKE
I will START or STAY ON the medications listed below when I get home from the hospital:    predniSONE 10 mg oral tablet  -- Ioana 3 tabletas para un ladi then dos tabletas X un ladi y maisha tableta para un ladi. (Take 3 tabs X 1 day, then 2 tabs X1 day then 1 tabX 1 day  -- It is very important that you take or use this exactly as directed.  Do not skip doses or discontinue unless directed by your doctor.  Obtain medical advice before taking any non-prescription drugs as some may affect the action of this medication.  Take with food or milk.    -- Indication: For Asthma    lisinopril 40 mg oral tablet  -- 1 tab(s) by mouth once a day  -- Indication: For HTN (hypertension)    metFORMIN 500 mg oral tablet  -- 1 tab(s) by mouth 2 times a day  -- Indication: For Diabetes    simvastatin 40 mg oral tablet  -- 1 tab(s) by mouth once a day (at bedtime)  -- Indication: For High cholesterol    carvedilol 25 mg oral tablet  -- 1 tab(s) by mouth 2 times a day  -- Indication: For HTN (hypertension)    budesonide-formoterol 160 mcg-4.5 mcg/inh inhalation aerosol  -- 1 puff(s) inhaled 2 times a day for asthma  -- Check with your doctor before becoming pregnant.  For inhalation only.  Rinse mouth thoroughly after use.    -- Indication: For Asthma- take this even if your breathing is well    albuterol 90 mcg/inh inhalation aerosol  -- 2 puff(s) inhaled 4 times a day, As Needed for asthma shortness of breath or wheezing  -- For inhalation only.  It is very important that you take or use this exactly as directed.  Do not skip doses or discontinue unless directed by your doctor.  Obtain medical advice before taking any non-prescription drugs as some may affect the action of this medication.  Shake well before use.    -- Indication: For Asthma as needed     furosemide 40 mg oral tablet  -- 1 tab(s) by mouth once a day  -- Indication: For CHF    potassium chloride 20 mEq oral granule, extended release  -- 20 milliequivalent(s) by mouth once a day  -- Indication: For Potassium

## 2017-06-07 NOTE — DISCHARGE NOTE ADULT - PATIENT PORTAL LINK FT
“You can access the [employer] Patient Portal, offered by [facility], by registering with the following website: [email or web address]”

## 2017-06-07 NOTE — DISCHARGE NOTE ADULT - HOSPITAL COURSE
CC: asthma    HPI: This is a 58yo female with pmh CAD, NIIDM, hyperlipidemia, PPM, asthma, CHF (type unknown) presented 2 days ago to ED with respiratory distress and was dc from ED then admitted for asthma exacerbation upon 2nd presentation. Patient has a hx of noncompliance and does not have bronchodilators at home.     Patient feeling better. Less dyspneic. Ambulatory pulse ox 90%, no CP. Eager for discharge.       Vital Signs Last 24 Hrs  T(C): 36.8, Max: 37 (06-06 @ 19:24)  T(F): 98.2, Max: 98.6 (06-06 @ 19:24)  HR: 81 (74 - 91)  BP: 108/60 (100/49 - 113/91)  BP(mean): --  RR: 18 (16 - 18)  SpO2: 90% (90% - 100%)    PHYSICAL EXAM:  Constitutional: NAD, awake and alert, well-developed comfortable.   HEENT: PERR, EOMI, Normal Hearing, MMM  Neck: Soft and supple, No LAD, No JVD  Respiratory: Breath sounds are clear bilaterally, mild wheeze no rhonchi.   Cardiovascular: S1 and S2, regular rate and rhythm, no Murmurs, gallops or rubs  Gastrointestinal: Bowel Sounds present, soft, nontender, nondistended, no guarding, no rebound  Extremities: No peripheral edema  Vascular: 2+ peripheral pulses  Neurological: A/O x 3, no focal deficits  Musculoskeletal: 5/5 strength b/l upper and lower extremities  Skin: No rashes    MEDICATIONS  (STANDING):  simvastatin 40milliGRAM(s) Oral at bedtime  carvedilol 25milliGRAM(s) Oral every 12 hours  potassium chloride    Tablet ER 20milliEquivalent(s) Oral daily  heparin  Injectable 5000Unit(s) SubCutaneous every 8 hours  insulin lispro (HumaLOG) corrective regimen sliding scale  SubCutaneous three times a day before meals  insulin lispro (HumaLOG) corrective regimen sliding scale  SubCutaneous at bedtime  dextrose 5%. 1000milliLiter(s) IV Continuous <Continuous>  dextrose 50% Injectable 12.5Gram(s) IV Push once  dextrose 50% Injectable 25Gram(s) IV Push once  dextrose 50% Injectable 25Gram(s) IV Push once  methylPREDNISolone sodium succinate Injectable 40milliGRAM(s) IV Push three times a day  ALBUTerol/ipratropium for Nebulization 3milliLiter(s) Nebulizer every 6 hours  furosemide    Tablet 40milliGRAM(s) Oral two times a day      LABS: All Labs Reviewed:                        13.7   14.3  )-----------( 249      ( 06 Jun 2017 12:49 )             42.9     06-06    142  |  104  |  15  ----------------------------<  121<H>  3.9   |  32<H>  |  0.92    Ca    8.8      06 Jun 2017 12:49  Mg     2.4     06-06    TPro  7.4  /  Alb  3.5  /  TBili  1.3<H>  /  DBili  x   /  AST  23  /  ALT  27  /  AlkPhos  102  06-06    PT/INR - ( 05 Jun 2017 21:34 )   PT: 11.6 sec;   INR: 1.07 ratio         PTT - ( 05 Jun 2017 21:34 )  PTT:29.7 sec  CARDIAC MARKERS ( 06 Jun 2017 15:29 )  <0.015 ng/mL / x     / x     / x     / x      CARDIAC MARKERS ( 06 Jun 2017 12:49 )  <0.015 ng/mL / x     / x     / x     / x      CARDIAC MARKERS ( 05 Jun 2017 21:34 )  <0.015 ng/mL / x     / x     / x     / x            CXR: There is no acute consolidation.  There are no pleural effusion. There is   cardiomegaly and subsegmental atelectasis left lung base.   Pacemaker/defibrillator with 2 leads in the region of right ventricle   unchanged.    There are degenerative changes.       58 yo F with hx above presenting with sob/wheezing admitted for acute asthma exacerbation:       #Acute hypoxic resp failure  #Acute asthma exacerbation  #acute decompensated CHF (EF unknown) - mild - s/p lasix in ED IV -> back on PO.   - overall improved, not hypoxic on ambulation.   - will dc home on Symbicort for maintenance therapy, Albuterol inhaler prn.   - refilled Cardiac meds: carvedilol and Lisinopril.     # Diabetes - continue metformin.     # CAD, PPM - stable, seen by PCP Dr. Stevens this admission.    Total time > 35 mins.

## 2017-06-07 NOTE — DISCHARGE NOTE ADULT - CARE PLAN
Principal Discharge DX:	Uncomplicated asthma, unspecified asthma severity  Goal:	Improvement in breathing  Instructions for follow-up, activity and diet:	Follow up with your Cardiologist (PCP) in 1 week of discharge. Principal Discharge DX:	Uncomplicated asthma, unspecified asthma severity  Goal:	Improvement in breathing  Instructions for follow-up, activity and diet:	Follow up with your Cardiologist (PCP) in 1 week of discharge.  appointment with Makenna Sandra NP at June 14 at 11 am

## 2017-06-10 DIAGNOSIS — I25.10 ATHEROSCLEROTIC HEART DISEASE OF NATIVE CORONARY ARTERY WITHOUT ANGINA PECTORIS: ICD-10-CM

## 2017-06-10 DIAGNOSIS — E11.9 TYPE 2 DIABETES MELLITUS WITHOUT COMPLICATIONS: ICD-10-CM

## 2017-06-10 DIAGNOSIS — E78.5 HYPERLIPIDEMIA, UNSPECIFIED: ICD-10-CM

## 2017-06-10 DIAGNOSIS — J96.01 ACUTE RESPIRATORY FAILURE WITH HYPOXIA: ICD-10-CM

## 2017-06-10 DIAGNOSIS — I42.8 OTHER CARDIOMYOPATHIES: ICD-10-CM

## 2017-06-10 DIAGNOSIS — I11.0 HYPERTENSIVE HEART DISEASE WITH HEART FAILURE: ICD-10-CM

## 2017-06-10 DIAGNOSIS — Z95.0 PRESENCE OF CARDIAC PACEMAKER: ICD-10-CM

## 2017-06-10 DIAGNOSIS — Z91.14 PATIENT'S OTHER NONCOMPLIANCE WITH MEDICATION REGIMEN: ICD-10-CM

## 2017-06-10 DIAGNOSIS — I50.23 ACUTE ON CHRONIC SYSTOLIC (CONGESTIVE) HEART FAILURE: ICD-10-CM

## 2017-06-10 DIAGNOSIS — Z91.11 PATIENT'S NONCOMPLIANCE WITH DIETARY REGIMEN: ICD-10-CM

## 2017-06-10 DIAGNOSIS — R06.2 WHEEZING: ICD-10-CM

## 2017-06-10 DIAGNOSIS — E66.9 OBESITY, UNSPECIFIED: ICD-10-CM

## 2017-06-10 DIAGNOSIS — J45.901 UNSPECIFIED ASTHMA WITH (ACUTE) EXACERBATION: ICD-10-CM

## 2017-08-03 PROBLEM — I10 ESSENTIAL (PRIMARY) HYPERTENSION: Chronic | Status: ACTIVE | Noted: 2017-06-06

## 2017-08-03 PROBLEM — J45.909 UNSPECIFIED ASTHMA, UNCOMPLICATED: Chronic | Status: ACTIVE | Noted: 2017-06-06

## 2017-08-03 PROBLEM — E78.5 HYPERLIPIDEMIA, UNSPECIFIED: Chronic | Status: ACTIVE | Noted: 2017-06-06

## 2017-08-03 NOTE — ED ADULT NURSE NOTE - NS ED NURSE DISCH DISPOSITION
Problem: Patient Care Overview (Adult)  Goal: Plan of Care Review  Outcome: Ongoing (interventions implemented as appropriate)    08/03/17 1550   Coping/Psychosocial Response Interventions   Plan Of Care Reviewed With patient   Outcome Evaluation   Outcome Summary/Follow up Plan OT evaluation completed. Pt is able to manage bed mobility with conditional independence (using bed rails). Pt is able to manage adl activity from bed with supervision after set up assistance (with exception of assistance for Right foot due to external fixator). Pt is strict non-weight bearing on RLE which has been difficult to manage per patient due to his BLE neuropathy. Therefore, the recommendation is to continue with adl performance from bed level to decrease risk of non-compliance with the NWB status. Pt can benefit from education with use of a wheelchair to increase independence with mobility within his room and on the unit. Rec OT services 3x/week x 1 week for education with w/c safety/mobility and for education with BUE strengthening program.          Problem: Inpatient Occupational Therapy  Goal: Patient Education Goal STG- OT  Outcome: Ongoing (interventions implemented as appropriate)    08/03/17 0930   Patient Education OT STG   Patient Education OT STG, Date Established 08/03/17   Patient Education OT STG, Time to Achieve 1 wk   Patient Education OT STG, Education Type HEP  (theraband )   Patient Education OT STG, Education Understanding demonstrates adequately;independent       Goal: Functional Mobility Goal STG- OT  Outcome: Ongoing (interventions implemented as appropriate)    08/03/17 0930   Functional Mobility OT STG   Functional Mobility Goal OT STG, Date Established 08/03/17   Functional Mobility Goal OT STG, Time to Achieve 1 wk   Functional Mobility Goal OT STG, [place] Level supervision   Functional Mobility Goal OT STG, Assist Device (wheelchair)   Functional Mobility Goal OT STG, Additional Goal Pt to verbalize  w/c safety and demonstrate ability to manuever w/c within his room and on the unit.            Discharged

## 2017-08-11 ENCOUNTER — APPOINTMENT (OUTPATIENT)
Dept: ELECTROPHYSIOLOGY | Facility: CLINIC | Age: 57
End: 2017-08-11

## 2017-08-21 ENCOUNTER — INPATIENT (INPATIENT)
Facility: HOSPITAL | Age: 57
LOS: 2 days | Discharge: HOME | End: 2017-08-24
Attending: STUDENT IN AN ORGANIZED HEALTH CARE EDUCATION/TRAINING PROGRAM | Admitting: FAMILY MEDICINE
Payer: COMMERCIAL

## 2017-08-21 VITALS
OXYGEN SATURATION: 99 % | WEIGHT: 173.06 LBS | RESPIRATION RATE: 16 BRPM | SYSTOLIC BLOOD PRESSURE: 113 MMHG | HEIGHT: 66 IN | DIASTOLIC BLOOD PRESSURE: 62 MMHG | TEMPERATURE: 99 F | HEART RATE: 87 BPM

## 2017-08-21 DIAGNOSIS — Z95.0 PRESENCE OF CARDIAC PACEMAKER: Chronic | ICD-10-CM

## 2017-08-21 LAB
ALBUMIN SERPL ELPH-MCNC: 3.6 G/DL — SIGNIFICANT CHANGE UP (ref 3.3–5)
ALP SERPL-CCNC: 109 U/L — SIGNIFICANT CHANGE UP (ref 40–120)
ALT FLD-CCNC: 29 U/L — SIGNIFICANT CHANGE UP (ref 12–78)
ANION GAP SERPL CALC-SCNC: 7 MMOL/L — SIGNIFICANT CHANGE UP (ref 5–17)
APPEARANCE UR: CLEAR — SIGNIFICANT CHANGE UP
AST SERPL-CCNC: 29 U/L — SIGNIFICANT CHANGE UP (ref 15–37)
BILIRUB SERPL-MCNC: 0.6 MG/DL — SIGNIFICANT CHANGE UP (ref 0.2–1.2)
BILIRUB UR-MCNC: NEGATIVE — SIGNIFICANT CHANGE UP
BUN SERPL-MCNC: 32 MG/DL — HIGH (ref 7–23)
CALCIUM SERPL-MCNC: 9.4 MG/DL — SIGNIFICANT CHANGE UP (ref 8.5–10.1)
CHLORIDE SERPL-SCNC: 98 MMOL/L — SIGNIFICANT CHANGE UP (ref 96–108)
CO2 SERPL-SCNC: 32 MMOL/L — HIGH (ref 22–31)
COLOR SPEC: YELLOW — SIGNIFICANT CHANGE UP
CREAT SERPL-MCNC: 1.14 MG/DL — SIGNIFICANT CHANGE UP (ref 0.5–1.3)
DIFF PNL FLD: NEGATIVE — SIGNIFICANT CHANGE UP
GLUCOSE SERPL-MCNC: 186 MG/DL — HIGH (ref 70–99)
GLUCOSE UR QL: NEGATIVE MG/DL — SIGNIFICANT CHANGE UP
HCT VFR BLD CALC: 43.5 % — SIGNIFICANT CHANGE UP (ref 34.5–45)
HGB BLD-MCNC: 14.1 G/DL — SIGNIFICANT CHANGE UP (ref 11.5–15.5)
KETONES UR-MCNC: NEGATIVE — SIGNIFICANT CHANGE UP
LEUKOCYTE ESTERASE UR-ACNC: NEGATIVE — SIGNIFICANT CHANGE UP
MCHC RBC-ENTMCNC: 26.8 PG — LOW (ref 27–34)
MCHC RBC-ENTMCNC: 32.4 GM/DL — SIGNIFICANT CHANGE UP (ref 32–36)
MCV RBC AUTO: 82.6 FL — SIGNIFICANT CHANGE UP (ref 80–100)
NITRITE UR-MCNC: NEGATIVE — SIGNIFICANT CHANGE UP
PH UR: 6.5 — SIGNIFICANT CHANGE UP (ref 5–8)
PLATELET # BLD AUTO: 190 K/UL — SIGNIFICANT CHANGE UP (ref 150–400)
POTASSIUM SERPL-MCNC: 3.6 MMOL/L — SIGNIFICANT CHANGE UP (ref 3.5–5.3)
POTASSIUM SERPL-SCNC: 3.6 MMOL/L — SIGNIFICANT CHANGE UP (ref 3.5–5.3)
PROT SERPL-MCNC: 7.7 GM/DL — SIGNIFICANT CHANGE UP (ref 6–8.3)
PROT UR-MCNC: 30 MG/DL
RBC # BLD: 5.26 M/UL — HIGH (ref 3.8–5.2)
RBC # FLD: 14.4 % — SIGNIFICANT CHANGE UP (ref 10.3–14.5)
SODIUM SERPL-SCNC: 137 MMOL/L — SIGNIFICANT CHANGE UP (ref 135–145)
SP GR SPEC: 1.01 — SIGNIFICANT CHANGE UP (ref 1.01–1.02)
TROPONIN I SERPL-MCNC: 0.02 NG/ML — SIGNIFICANT CHANGE UP (ref 0.01–0.04)
UROBILINOGEN FLD QL: 1 MG/DL
WBC # BLD: 9.4 K/UL — SIGNIFICANT CHANGE UP (ref 3.8–10.5)
WBC # FLD AUTO: 9.4 K/UL — SIGNIFICANT CHANGE UP (ref 3.8–10.5)

## 2017-08-21 PROCEDURE — 99291 CRITICAL CARE FIRST HOUR: CPT

## 2017-08-21 PROCEDURE — 93010 ELECTROCARDIOGRAM REPORT: CPT

## 2017-08-21 RX ORDER — SODIUM CHLORIDE 9 MG/ML
3 INJECTION INTRAMUSCULAR; INTRAVENOUS; SUBCUTANEOUS EVERY 8 HOURS
Qty: 0 | Refills: 0 | Status: DISCONTINUED | OUTPATIENT
Start: 2017-08-21 | End: 2017-08-24

## 2017-08-21 RX ORDER — MECLIZINE HCL 12.5 MG
25 TABLET ORAL ONCE
Qty: 0 | Refills: 0 | Status: COMPLETED | OUTPATIENT
Start: 2017-08-21 | End: 2017-08-21

## 2017-08-21 RX ORDER — SODIUM CHLORIDE 9 MG/ML
1000 INJECTION INTRAMUSCULAR; INTRAVENOUS; SUBCUTANEOUS
Qty: 0 | Refills: 0 | Status: DISCONTINUED | OUTPATIENT
Start: 2017-08-21 | End: 2017-08-22

## 2017-08-21 RX ADMIN — SODIUM CHLORIDE 3 MILLILITER(S): 9 INJECTION INTRAMUSCULAR; INTRAVENOUS; SUBCUTANEOUS at 19:08

## 2017-08-21 RX ADMIN — SODIUM CHLORIDE 100 MILLILITER(S): 9 INJECTION INTRAMUSCULAR; INTRAVENOUS; SUBCUTANEOUS at 19:08

## 2017-08-21 RX ADMIN — Medication 25 MILLIGRAM(S): at 19:08

## 2017-08-21 NOTE — ED PROVIDER NOTE - MEDICAL DECISION MAKING DETAILS
56 y/o female with dizziness. Will EKG, Meds, labs, monitor, likely admit and consult Dr. Singer for pacemaker interrogation.

## 2017-08-21 NOTE — ED ADULT NURSE NOTE - OBJECTIVE STATEMENT
presents to the ED s/p syncope. states that she felt dizzy prior to the event. reports having a similar episode a few years back where she had a pacemaker placed. IVL placed, labs drawn. placed on cardiac monitor. EKG done. no complaints at this time. will monitor.

## 2017-08-21 NOTE — ED PROVIDER NOTE - MUSCULOSKELETAL, MLM
Spine appears normal, range of motion is not limited, no muscle or joint tenderness no obvious signs of trauma or injuries.

## 2017-08-21 NOTE — ED PROVIDER NOTE - PROGRESS NOTE DETAILS
Shanelle Umanzor, on behalf of Attending, Dr. Rothman. Paged Dr. Singer. Pt was seen will need EP to interrogate pace as pt was having episodes of Vtach.

## 2017-08-22 DIAGNOSIS — Z90.49 ACQUIRED ABSENCE OF OTHER SPECIFIED PARTS OF DIGESTIVE TRACT: Chronic | ICD-10-CM

## 2017-08-22 DIAGNOSIS — J45.909 UNSPECIFIED ASTHMA, UNCOMPLICATED: ICD-10-CM

## 2017-08-22 DIAGNOSIS — E78.5 HYPERLIPIDEMIA, UNSPECIFIED: ICD-10-CM

## 2017-08-22 DIAGNOSIS — R55 SYNCOPE AND COLLAPSE: ICD-10-CM

## 2017-08-22 DIAGNOSIS — Z95.0 PRESENCE OF CARDIAC PACEMAKER: ICD-10-CM

## 2017-08-22 DIAGNOSIS — I25.10 ATHEROSCLEROTIC HEART DISEASE OF NATIVE CORONARY ARTERY WITHOUT ANGINA PECTORIS: ICD-10-CM

## 2017-08-22 DIAGNOSIS — I10 ESSENTIAL (PRIMARY) HYPERTENSION: ICD-10-CM

## 2017-08-22 DIAGNOSIS — I42.9 CARDIOMYOPATHY, UNSPECIFIED: ICD-10-CM

## 2017-08-22 DIAGNOSIS — E11.9 TYPE 2 DIABETES MELLITUS WITHOUT COMPLICATIONS: ICD-10-CM

## 2017-08-22 DIAGNOSIS — Z29.9 ENCOUNTER FOR PROPHYLACTIC MEASURES, UNSPECIFIED: ICD-10-CM

## 2017-08-22 LAB
ANION GAP SERPL CALC-SCNC: 5 MMOL/L — SIGNIFICANT CHANGE UP (ref 5–17)
BUN SERPL-MCNC: 23 MG/DL — SIGNIFICANT CHANGE UP (ref 7–23)
CALCIUM SERPL-MCNC: 8.7 MG/DL — SIGNIFICANT CHANGE UP (ref 8.5–10.1)
CHLORIDE SERPL-SCNC: 101 MMOL/L — SIGNIFICANT CHANGE UP (ref 96–108)
CK SERPL-CCNC: 82 U/L — SIGNIFICANT CHANGE UP (ref 26–192)
CO2 SERPL-SCNC: 31 MMOL/L — SIGNIFICANT CHANGE UP (ref 22–31)
CREAT SERPL-MCNC: 0.82 MG/DL — SIGNIFICANT CHANGE UP (ref 0.5–1.3)
DIGOXIN SERPL-MCNC: 0.66 NG/ML — LOW (ref 0.8–2)
GLUCOSE SERPL-MCNC: 114 MG/DL — HIGH (ref 70–99)
HCT VFR BLD CALC: 42.5 % — SIGNIFICANT CHANGE UP (ref 34.5–45)
HGB BLD-MCNC: 13.7 G/DL — SIGNIFICANT CHANGE UP (ref 11.5–15.5)
MCHC RBC-ENTMCNC: 26.4 PG — LOW (ref 27–34)
MCHC RBC-ENTMCNC: 32.2 GM/DL — SIGNIFICANT CHANGE UP (ref 32–36)
MCV RBC AUTO: 82 FL — SIGNIFICANT CHANGE UP (ref 80–100)
PLATELET # BLD AUTO: 184 K/UL — SIGNIFICANT CHANGE UP (ref 150–400)
POTASSIUM SERPL-MCNC: 3.4 MMOL/L — LOW (ref 3.5–5.3)
POTASSIUM SERPL-SCNC: 3.4 MMOL/L — LOW (ref 3.5–5.3)
RBC # BLD: 5.19 M/UL — SIGNIFICANT CHANGE UP (ref 3.8–5.2)
RBC # FLD: 14.6 % — HIGH (ref 10.3–14.5)
SODIUM SERPL-SCNC: 137 MMOL/L — SIGNIFICANT CHANGE UP (ref 135–145)
TROPONIN I SERPL-MCNC: 0.03 NG/ML — SIGNIFICANT CHANGE UP (ref 0.01–0.04)
TROPONIN I SERPL-MCNC: 0.04 NG/ML — SIGNIFICANT CHANGE UP (ref 0.01–0.04)
WBC # BLD: 10.3 K/UL — SIGNIFICANT CHANGE UP (ref 3.8–10.5)
WBC # FLD AUTO: 10.3 K/UL — SIGNIFICANT CHANGE UP (ref 3.8–10.5)

## 2017-08-22 PROCEDURE — 71010: CPT | Mod: 26

## 2017-08-22 PROCEDURE — 70450 CT HEAD/BRAIN W/O DYE: CPT | Mod: 26

## 2017-08-22 PROCEDURE — 93282 PRGRMG EVAL IMPLANTABLE DFB: CPT | Mod: 26

## 2017-08-22 RX ORDER — HEPARIN SODIUM 5000 [USP'U]/ML
5000 INJECTION INTRAVENOUS; SUBCUTANEOUS EVERY 8 HOURS
Qty: 0 | Refills: 0 | Status: DISCONTINUED | OUTPATIENT
Start: 2017-08-22 | End: 2017-08-24

## 2017-08-22 RX ORDER — SIMVASTATIN 20 MG/1
40 TABLET, FILM COATED ORAL AT BEDTIME
Qty: 0 | Refills: 0 | Status: DISCONTINUED | OUTPATIENT
Start: 2017-08-22 | End: 2017-08-23

## 2017-08-22 RX ORDER — BUDESONIDE AND FORMOTEROL FUMARATE DIHYDRATE 160; 4.5 UG/1; UG/1
1 AEROSOL RESPIRATORY (INHALATION)
Qty: 0 | Refills: 0 | Status: DISCONTINUED | OUTPATIENT
Start: 2017-08-22 | End: 2017-08-24

## 2017-08-22 RX ORDER — SENNA PLUS 8.6 MG/1
2 TABLET ORAL AT BEDTIME
Qty: 0 | Refills: 0 | Status: DISCONTINUED | OUTPATIENT
Start: 2017-08-22 | End: 2017-08-24

## 2017-08-22 RX ORDER — ACETAMINOPHEN 500 MG
650 TABLET ORAL ONCE
Qty: 0 | Refills: 0 | Status: COMPLETED | OUTPATIENT
Start: 2017-08-22 | End: 2017-08-22

## 2017-08-22 RX ORDER — IBUPROFEN 200 MG
400 TABLET ORAL EVERY 6 HOURS
Qty: 0 | Refills: 0 | Status: DISCONTINUED | OUTPATIENT
Start: 2017-08-22 | End: 2017-08-24

## 2017-08-22 RX ORDER — FUROSEMIDE 40 MG
40 TABLET ORAL DAILY
Qty: 0 | Refills: 0 | Status: DISCONTINUED | OUTPATIENT
Start: 2017-08-22 | End: 2017-08-22

## 2017-08-22 RX ORDER — DEXTROSE 50 % IN WATER 50 %
12.5 SYRINGE (ML) INTRAVENOUS ONCE
Qty: 0 | Refills: 0 | Status: DISCONTINUED | OUTPATIENT
Start: 2017-08-22 | End: 2017-08-24

## 2017-08-22 RX ORDER — ALBUTEROL 90 UG/1
2 AEROSOL, METERED ORAL EVERY 6 HOURS
Qty: 0 | Refills: 0 | Status: DISCONTINUED | OUTPATIENT
Start: 2017-08-22 | End: 2017-08-24

## 2017-08-22 RX ORDER — POTASSIUM CHLORIDE 20 MEQ
20 PACKET (EA) ORAL
Qty: 0 | Refills: 0 | Status: COMPLETED | OUTPATIENT
Start: 2017-08-22 | End: 2017-08-22

## 2017-08-22 RX ORDER — DEXTROSE 50 % IN WATER 50 %
1 SYRINGE (ML) INTRAVENOUS ONCE
Qty: 0 | Refills: 0 | Status: DISCONTINUED | OUTPATIENT
Start: 2017-08-22 | End: 2017-08-24

## 2017-08-22 RX ORDER — ZOLPIDEM TARTRATE 10 MG/1
5 TABLET ORAL AT BEDTIME
Qty: 0 | Refills: 0 | Status: DISCONTINUED | OUTPATIENT
Start: 2017-08-22 | End: 2017-08-24

## 2017-08-22 RX ORDER — METOPROLOL TARTRATE 50 MG
50 TABLET ORAL
Qty: 0 | Refills: 0 | Status: DISCONTINUED | OUTPATIENT
Start: 2017-08-22 | End: 2017-08-24

## 2017-08-22 RX ORDER — LISINOPRIL 2.5 MG/1
20 TABLET ORAL DAILY
Qty: 0 | Refills: 0 | Status: DISCONTINUED | OUTPATIENT
Start: 2017-08-22 | End: 2017-08-22

## 2017-08-22 RX ORDER — INSULIN LISPRO 100/ML
VIAL (ML) SUBCUTANEOUS
Qty: 0 | Refills: 0 | Status: DISCONTINUED | OUTPATIENT
Start: 2017-08-22 | End: 2017-08-24

## 2017-08-22 RX ORDER — AMIODARONE HYDROCHLORIDE 400 MG/1
400 TABLET ORAL
Qty: 0 | Refills: 0 | Status: DISCONTINUED | OUTPATIENT
Start: 2017-08-22 | End: 2017-08-24

## 2017-08-22 RX ORDER — SODIUM CHLORIDE 9 MG/ML
1000 INJECTION, SOLUTION INTRAVENOUS
Qty: 0 | Refills: 0 | Status: DISCONTINUED | OUTPATIENT
Start: 2017-08-22 | End: 2017-08-24

## 2017-08-22 RX ORDER — ASPIRIN/CALCIUM CARB/MAGNESIUM 324 MG
325 TABLET ORAL ONCE
Qty: 0 | Refills: 0 | Status: COMPLETED | OUTPATIENT
Start: 2017-08-22 | End: 2017-08-22

## 2017-08-22 RX ORDER — SPIRONOLACTONE 25 MG/1
25 TABLET, FILM COATED ORAL DAILY
Qty: 0 | Refills: 0 | Status: DISCONTINUED | OUTPATIENT
Start: 2017-08-22 | End: 2017-08-24

## 2017-08-22 RX ORDER — CARVEDILOL PHOSPHATE 80 MG/1
25 CAPSULE, EXTENDED RELEASE ORAL EVERY 12 HOURS
Qty: 0 | Refills: 0 | Status: DISCONTINUED | OUTPATIENT
Start: 2017-08-22 | End: 2017-08-22

## 2017-08-22 RX ORDER — LISINOPRIL 2.5 MG/1
40 TABLET ORAL DAILY
Qty: 0 | Refills: 0 | Status: DISCONTINUED | OUTPATIENT
Start: 2017-08-22 | End: 2017-08-22

## 2017-08-22 RX ORDER — POTASSIUM CHLORIDE 20 MEQ
20 PACKET (EA) ORAL DAILY
Qty: 0 | Refills: 0 | Status: DISCONTINUED | OUTPATIENT
Start: 2017-08-22 | End: 2017-08-22

## 2017-08-22 RX ORDER — ACETAMINOPHEN 500 MG
650 TABLET ORAL EVERY 6 HOURS
Qty: 0 | Refills: 0 | Status: DISCONTINUED | OUTPATIENT
Start: 2017-08-22 | End: 2017-08-24

## 2017-08-22 RX ORDER — GLUCAGON INJECTION, SOLUTION 0.5 MG/.1ML
1 INJECTION, SOLUTION SUBCUTANEOUS ONCE
Qty: 0 | Refills: 0 | Status: DISCONTINUED | OUTPATIENT
Start: 2017-08-22 | End: 2017-08-24

## 2017-08-22 RX ADMIN — Medication 325 MILLIGRAM(S): at 12:42

## 2017-08-22 RX ADMIN — AMIODARONE HYDROCHLORIDE 400 MILLIGRAM(S): 400 TABLET ORAL at 18:13

## 2017-08-22 RX ADMIN — ZOLPIDEM TARTRATE 5 MILLIGRAM(S): 10 TABLET ORAL at 23:11

## 2017-08-22 RX ADMIN — SODIUM CHLORIDE 100 MILLILITER(S): 9 INJECTION INTRAMUSCULAR; INTRAVENOUS; SUBCUTANEOUS at 07:12

## 2017-08-22 RX ADMIN — Medication 20 MILLIEQUIVALENT(S): at 18:13

## 2017-08-22 RX ADMIN — SPIRONOLACTONE 25 MILLIGRAM(S): 25 TABLET, FILM COATED ORAL at 09:18

## 2017-08-22 RX ADMIN — SODIUM CHLORIDE 3 MILLILITER(S): 9 INJECTION INTRAMUSCULAR; INTRAVENOUS; SUBCUTANEOUS at 23:14

## 2017-08-22 RX ADMIN — Medication 40 MILLIGRAM(S): at 06:49

## 2017-08-22 RX ADMIN — HEPARIN SODIUM 5000 UNIT(S): 5000 INJECTION INTRAVENOUS; SUBCUTANEOUS at 06:49

## 2017-08-22 RX ADMIN — SIMVASTATIN 40 MILLIGRAM(S): 20 TABLET, FILM COATED ORAL at 23:11

## 2017-08-22 RX ADMIN — BUDESONIDE AND FORMOTEROL FUMARATE DIHYDRATE 1 PUFF(S): 160; 4.5 AEROSOL RESPIRATORY (INHALATION) at 08:01

## 2017-08-22 RX ADMIN — HEPARIN SODIUM 5000 UNIT(S): 5000 INJECTION INTRAVENOUS; SUBCUTANEOUS at 23:11

## 2017-08-22 RX ADMIN — Medication 650 MILLIGRAM(S): at 00:35

## 2017-08-22 RX ADMIN — HEPARIN SODIUM 5000 UNIT(S): 5000 INJECTION INTRAVENOUS; SUBCUTANEOUS at 13:13

## 2017-08-22 RX ADMIN — LISINOPRIL 20 MILLIGRAM(S): 2.5 TABLET ORAL at 09:18

## 2017-08-22 RX ADMIN — CARVEDILOL PHOSPHATE 25 MILLIGRAM(S): 80 CAPSULE, EXTENDED RELEASE ORAL at 06:49

## 2017-08-22 RX ADMIN — BUDESONIDE AND FORMOTEROL FUMARATE DIHYDRATE 1 PUFF(S): 160; 4.5 AEROSOL RESPIRATORY (INHALATION) at 19:44

## 2017-08-22 RX ADMIN — SODIUM CHLORIDE 3 MILLILITER(S): 9 INJECTION INTRAMUSCULAR; INTRAVENOUS; SUBCUTANEOUS at 06:49

## 2017-08-22 RX ADMIN — Medication 20 MILLIEQUIVALENT(S): at 09:18

## 2017-08-22 NOTE — PROCEDURE NOTE - ADDITIONAL PROCEDURE DETAILS
Multiple episodes of NSVT recorded with one shock 25 J on 8/21/17 when pt c/o dizziness and had LOC.   If pt cannot tolerate beta blockers with asthma she may be candidate for antiarrhythmics. Multiple episodes of NSVT recorded with one shock 25 J on 8/21/17 when pt c/o dizziness and had LOC.   If pt cannot tolerate beta blockers with asthma she may be candidate for antiarrhythmics.  Addendum:  VT.  Plan: Start Amiodarone 400 mg PO BID with meals.  For cardiac cath with Dr. Tucker.  Plan d/w Dr. Singer.

## 2017-08-22 NOTE — H&P ADULT - PROBLEM SELECTOR PLAN 5
Continue home medications - carvedilol, lasix, lisinopril, Continue home medications - carvedilol, lasix, lisinopril  Will monitor BUN/Cr and decrease lasix if signs of worsening kidney injury

## 2017-08-22 NOTE — PROVIDER CONTACT NOTE (OTHER) - SITUATION
Syncope w/ CHF and bivent ICD    left message with answering service;  aware of consult
Spoke with Twila
Consult called in to service. Spoke with Dylon
Consult called in to service. Spoke with Kathi

## 2017-08-22 NOTE — CONSULT NOTE ADULT - SUBJECTIVE AND OBJECTIVE BOX
Patient is a 57y old  Female who presents with a chief complaint of Dizziness and loss of consciousness (22 Aug 2017 02:29)  HPI:  58yo female with pmh  non obstructive CAD, NIIDM, hyperlipidemia,s/p AICD placement, asthma, systolic  CHF LVEF 20% by an echocardiogram in 2017 presented with complains of dizziness and syncope. Patient states she stood up and she felt dizzy and passed out briefly. She denies any trauma, no tongue bite, fecal or urinary incontinence. She is not sure how she fell on the floor. Since admission to the hospital she has been alert and oriented. She denies any chest pain, headache, blurred vision, orthopnea, PND or ankle edema. She is in normal sinus rhythm on telemetry with frequent premature ventricle beats and ventricular couplets. She denies any recent nausea, vomiting, diarrhea, dysuria or fever. Currently she is alert and oriented and in no acute distress. She is being followed up by Dr Stevens.      PAST MEDICAL & SURGICAL HISTORY:  HTN (hypertension)  HLD (hyperlipidemia)  Asthma  DM (diabetes mellitus)  CAD (coronary artery disease) - non obstructive  Chr systolic CHF  Non ischemic CMPHPI:  58yo female with pmh CAD, NIIDM, hyperlipidemia, PPM, asthma, CHF (type unknown) presented 2 days ago to ED with respiratory distress and was dc from ED after initial treatment. She had been out of her asthma medications. Now presenting with wheezing, dyspnea and 4 pillow orthopnea, lower ext edema for 3 days. Not using some of her meds because she ran out. Also admits to dietary indiscretions.        Echo < from: Transthoracic Echocardiogram (17 @ 09:49) >   Summary     The left ventricle is severely dilated with severe global hypokinesis.   Estimated left ventricular ejection fraction is 20 % via bi-plane method.   Left atriumis moderately dilated.   The right atrium appears moderately dilated. A device wire is seen in the   RV and RA.   The right ventricle is normal in size and contractility.   The aortic valve appears normal.   There is thickening of both mitral valve leaflet tips. The leaflet   opening   is normal. Moderate mitral regurgitation is present based on a calculated   ERO of .32cm2.   The tricuspid valve leaflets are thin and pliable. Moderate (2+)   tricuspid   valve regurgitation is present. Moderate pulmonary hypertension.   Trace pericardial effusion is present.            PAST MEDICAL & SURGICAL HISTORY:  HTN (hypertension)  HLD (hyperlipidemia)  Asthma  DM (diabetes mellitus)  CAD (coronary artery disease) - non obstructive  Chr systolic CHF  Non ischemic CMPHPI:  Severe LV dysfunction LVEF 20%.      MEDICATIONS  (STANDING):  sodium chloride 0.9% lock flush 3 milliLiter(s) IV Push every 8 hours  simvastatin 40 milliGRAM(s) Oral at bedtime  buDESOnide 160 MICROgram(s)/formoterol 4.5 MICROgram(s) Inhaler 1 Puff(s) Inhalation two times a day  heparin  Injectable 5000 Unit(s) SubCutaneous every 8 hours  insulin lispro (HumaLOG) corrective regimen sliding scale   SubCutaneous three times a day before meals  dextrose 5%. 1000 milliLiter(s) (50 mL/Hr) IV Continuous <Continuous>  dextrose 50% Injectable 12.5 Gram(s) IV Push once  aspirin 325 milliGRAM(s) Oral once  lisinopril 20 milliGRAM(s) Oral daily  potassium chloride    Tablet ER 20 milliEquivalent(s) Oral two times a day  spironolactone 25 milliGRAM(s) Oral daily    MEDICATIONS  (PRN):  ALBUTerol    90 MICROgram(s) HFA Inhaler 2 Puff(s) Inhalation every 6 hours PRN Shortness of Breath  acetaminophen   Tablet. 650 milliGRAM(s) Oral every 6 hours PRN Moderate Pain (4 - 6)  ibuprofen  Tablet 400 milliGRAM(s) Oral every 6 hours PRN Mild pain  senna 2 Tablet(s) Oral at bedtime PRN Constipation  zolpidem 5 milliGRAM(s) Oral at bedtime PRN Insomnia  dextrose Gel 1 Dose(s) Oral once PRN Blood Glucose LESS THAN 70 milliGRAM(s)/deciliter  glucagon  Injectable 1 milliGRAM(s) IntraMuscular once PRN Glucose LESS THAN 70 milligrams/deciliter      FAMILY HISTORY:  Family history of other heart disease (Mother)  Father is 75    SOCIAL HISTORY:  she denies any history of smoking or any alcohol consumption.    REVIEW OF SYSTEM:  Pertinent items are noted in HPI.  Constitutional	Negative for chills, fevers, sweats.    Eyes: 	Negative for visual disturbance.  Ears, nose, mouth, throat, and face: Negative for epistaxis, nasal congestion, sore throat and tinnitus.  Neck:	Negative for enlargement, pain and difficulty in swallowing  Respiration : Negative for cough,  pleuritic chest pain and wheezing , she has dyspnea on exertion.  Cardiovascular: Negative for chest pain  and palpitations    Gastrointestinal : Negative for abdominal pain, diarrhea, nausea and vomiting  Genitourinary: Negative for dysuria, frequency and urinary incontinence .  Skin: Negative for  rash, pruritus, swelling, dryness .  	  Hematologic/lymphatic: Negative for bleeding and easy bruising  Musculoskeletal: Negative for arthralgias, back pain and muscle weakness.  Neurological: Negative for  headaches, seizures and tremors . she had dizziness and an episode of syncope.  Behavioral/Psych: Negative for mood change, depression.  Endocrine:	Negative for blurry vision, polydipsia and polyuria, diaphoresis.   Allergic/Immunologic:	Negative for anaphylaxis, angioedema and urticaria.      Vital Signs Last 24 Hrs  T(C): 37 (22 Aug 2017 09:48), Max: 37.2 (21 Aug 2017 18:06)  T(F): 98.6 (22 Aug 2017 09:48), Max: 98.9 (21 Aug 2017 18:06)  HR: 82 (22 Aug 2017 09:15) (69 - 87)  BP: 105/72 (22 Aug 2017 09:15) (88/49 - 122/49)  BP(mean): 60 (21 Aug 2017 19:04) (60 - 60)  RR: 17 (22 Aug 2017 04:13) (16 - 26)  SpO2: 98% (22 Aug 2017 04:13) (96% - 99%)        PHYSICAL EXAM  General Appearance: cooperative, no acute distress,   HEENT: PERRL, conjunctiva clear, EOM's intact, non injected pharynx, no exudate.  Neck: Supple, , no adenopathy, thyroid: not enlarged, no carotid bruit or JVD  Back: Symmetric, no  tenderness,no soft tissue tenderness  Lungs: Clear to auscultation bilateral,no adventitious breath sounds, normal   expiratory phase  Heart: Regular rate and rhythm, S1, S2 normal, grade 1/6 holosystolic  murmur,no  rub or gallop  Abdomen: Soft, non-tender, bowel sounds active , no hepatosplenomegaly  Extremities: no cyanosis or edema, no joint swelling  Skin: Skin color, texture normal, no rashes   Neurologic: Alert and oriented X3 , cranial nerves intact, sensory and motor normal,        INTERPRETATION OF TELEMETRY: NSR PVCS    ECG:NSR PVCs IVCD , PRWP        LABS:                          13.7   10.3  )-----------( 184      ( 22 Aug 2017 04:48 )             42.5         137  |  101  |  23  ----------------------------<  114<H>  3.4<L>   |  31  |  0.82    Ca    8.7      22 Aug 2017 04:48    TPro  7.7  /  Alb  3.6  /  TBili  0.6  /  DBili  x   /  AST  29  /  ALT  29  /  AlkPhos  109  08-21    CARDIAC MARKERS ( 22 Aug 2017 07:15 )  0.034 ng/mL / x     / 82 U/L / x     / x      CARDIAC MARKERS ( 22 Aug 2017 04:48 )  0.040 ng/mL / x     / x     / x     / x      CARDIAC MARKERS ( 21 Aug 2017 18:59 )  0.023 ng/mL / x     / x     / x     / x                Urinalysis Basic - ( 21 Aug 2017 18:59 )    Color: Yellow / Appearance: Clear / S.010 / pH: x  Gluc: x / Ketone: Negative  / Bili: Negative / Urobili: 1 mg/dL   Blood: x / Protein: 30 mg/dL / Nitrite: Negative   Leuk Esterase: Negative / RBC: Negative /HPF / WBC 0-2   Sq Epi: x / Non Sq Epi: x / Bacteria: x            RADIOLOGY & ADDITIONAL STUDIES:

## 2017-08-22 NOTE — CONSULT NOTE ADULT - ASSESSMENT
Syncope, probably due to dehydration and medications.  Nonischemic cardiomyopathy with severe LV dysfunction left ventricular ejection fraction 20% by an echocardiogram in June 2017.  Nonobstructive coronary artery disease.  Chronic systolic congestive heart failure.  Hypertension.  Cardiac arrhythmias status post AICD placement in the past.  Suggest  Observe on telemetry.  Due to dizziness and syncope decrease diuretics and decrease lisinopril to 20 mg by mouth once daily.  Will check AICD.  Check for orthostasis.  Follow-up with electrolytes and digoxin level.  Gradual ambulation.  Discussed with Hospitalist.

## 2017-08-22 NOTE — H&P ADULT - NSHPPHYSICALEXAM_GEN_ALL_CORE
Vital Signs Last 24 Hrs  T(F): 98.1 (08-21-17 @ 19:26), Max: 98.9 (08-21-17 @ 18:06)  HR: 85 (08-22-17 @ 00:39) (85 - 87)  BP: 122/49 (08-22-17 @ 00:39)  BP(mean): 60 (08-21-17 @ 19:04) (60 - 60)  RR: 23 (08-22-17 @ 00:39) (16 - 26)  SpO2: 97% (08-22-17 @ 00:39) (96% - 99%)

## 2017-08-22 NOTE — H&P ADULT - PROBLEM SELECTOR PLAN 8
DVT prophylaxis - SCDs, hep SQ  DASH diet  OOB/ambulate with assistance  PT evaluation DVT prophylaxis - SCDs, hep SQ  DASH diet w/ 1L fluid restriction  OOB/ambulate with assistance  PT evaluation

## 2017-08-22 NOTE — H&P ADULT - RS GEN PE MLT RESP DETAILS PC
clear to auscultation bilaterally/no wheezes/no rales/good air movement/no rhonchi/airway patent/respirations non-labored/breath sounds equal

## 2017-08-22 NOTE — H&P ADULT - PROBLEM SELECTOR PLAN 1
Likely due to PPM malfunction vs CHF exacerbation.    Admit to telemetry  Cardiology consult for pacemaker interrogation  Fall precautions  AM BNP level Likely due to ICD malfunction vs CHF exacerbation. Should rule out intracranial pathology given neurological finding of pinpoint pupils    Admit to telemetry  Stat head CT non-con  Cardiology and Cardiac EP consults for ICD evaluation  Fall precautions  f/u stat trop/ck, repeat q3 x2 R/O Arhythmia/PPM malfunction vs r/o vertebrobasilar TIA which is less likely ,other differential includes seizure although less likely     Admit to telemetry  Stat head CT non-con- negative for acute pathology  Cardiology and Cardiac EP consults for ppm interrogation  ARNOLDO,continue statin  Patient not on antiplatelet agents at home -reason unknown- consult with cardiology  Fall precautions  neuro consult   [id number] po x1

## 2017-08-22 NOTE — H&P ADULT - ASSESSMENT
57F w/ PMH as above comes in w/ dizziness and syncope. Given patients extensive cardiac history likely due to CHF exacerbation or ICD malfunction. Troponin negative, less likely ischemic event.

## 2017-08-22 NOTE — H&P ADULT - HISTORY OF PRESENT ILLNESS
57F w/ PMH of HTN, HLD, DM, multiple MIs, systolic HF (EF 20% June 2017) s/p AICD comes in with episode of dizziness and loss of consciousness. Pt reports she was at home when she suddenly felt dizzy and short of breath, then fell down, and doesn't remember what happened next. She woke up on the floor. When she woke up her tongue hurt, but her breathing was improved and nothing else was bothering her. Patient had similar episode in the past in which her doctors told her "she was down for 10 minutes and the only reason she's alive is bc of the PPM". She has orthopnea and intermittent b/l lower extremity swelling. Pt can walk 2 city blocks before having to stop bc she gets tired. She denies blurry vision, history of seizures, chest pain, fever/chills, nausea/vomiting, change in bowel habits or urinary habits.

## 2017-08-22 NOTE — H&P ADULT - PROBLEM SELECTOR PLAN 2
For systolic CHF w/ EF 20% as of June 2017.    AICD interrogation to assess for need of battery replacement vs malfunction For systolic CHF w/ EF 20% as of June 2017.    AICD interrogation to assess for need of battery replacement vs malfunction  f/u CXR

## 2017-08-22 NOTE — H&P ADULT - PROBLEM SELECTOR PLAN 9
hx of ischemic cardiomypathy with EF 20%,clinically not in acute heart failure  #may continue po lasix  # monitor BUn/Cr ,and bicarb if any further increase in bicarb or BUn ,may need to hold lasix transiently

## 2017-08-22 NOTE — PROGRESS NOTE ADULT - SUBJECTIVE AND OBJECTIVE BOX
Patient is a 57y old  Female who presents with a chief complaint of Dizziness and loss of consciousness (22 Aug 2017 02:29)    56yo female with pmh  non obstructive CAD, NIIDM, hyperlipidemia,s/p AICD placement, asthma, systolic  CHF LVEF 20% by an echocardiogram in June 2017 presented with complains of dizziness and syncope. Patient states she stood up and she felt dizzy and passed out briefly. Adm to tele AICD interrogation reveled NSVT and shock at the time of the event.    Echo < from: Transthoracic Echocardiogram (06.07.17 @ 09:49) >   Summary     The left ventricle is severely dilated with severe global hypokinesis.   Estimated left ventricular ejection fraction is 20 % via bi-plane method.   Left atriumis moderately dilated.   The right atrium appears moderately dilated. A device wire is seen in the   RV and RA.   The right ventricle is normal in size and contractility.   The aortic valve appears normal.   There is thickening of both mitral valve leaflet tips. The leaflet   opening   is normal. Moderate mitral regurgitation is present based on a calculated   ERO of .32cm2.   The tricuspid valve leaflets are thin and pliable. Moderate (2+)   tricuspid   valve regurgitation is present. Moderate pulmonary hypertension.   Trace pericardial effusion is present.      Vital Signs Last 24 Hrs  T(C): 37 (22 Aug 2017 09:48), Max: 37.2 (21 Aug 2017 18:06)  T(F): 98.6 (22 Aug 2017 09:48), Max: 98.9 (21 Aug 2017 18:06)  HR: 82 (22 Aug 2017 09:15) (69 - 87)  BP: 105/72 (22 Aug 2017 09:15) (88/49 - 122/49)  BP(mean): 60 (21 Aug 2017 19:04) (60 - 60)  RR: 17 (22 Aug 2017 04:13) (16 - 26)  SpO2: 98% (22 Aug 2017 04:13) (96% - 99%)        PHYSICAL EXAM  General Appearance: cooperative, no acute distress,   HEENT: PERRL, conjunctiva clear, EOM's intact, non injected pharynx, no exudate.  Neck: Supple, , no adenopathy, thyroid: not enlarged, no carotid bruit or JVD  Back: Symmetric, no  tenderness,no soft tissue tenderness  Lungs: Clear to auscultation bilateral,no adventitious breath sounds, normal   expiratory phase  Heart: Regular rate and rhythm, S1, S2 normal, grade 1/6 holosystolic  murmur,no  rub or gallop  Abdomen: Soft, non-tender, bowel sounds active , no hepatosplenomegaly  Extremities: no cyanosis or edema, no joint swelling  Skin: Skin color, texture normal, no rashes   Neurologic: Alert and oriented X3 , cranial nerves intact, sensory and motor normal,                              13.7   10.3  )-----------( 184      ( 22 Aug 2017 04:48 )             42.5     08-22    137  |  101  |  23  ----------------------------<  114<H>  3.4<L>   |  31  |  0.82    Ca    8.7      22 Aug 2017 04:48    TPro  7.7  /  Alb  3.6  /  TBili  0.6  /  DBili  x   /  AST  29  /  ALT  29  /  AlkPhos  109  08-21    CARDIAC MARKERS ( 22 Aug 2017 07:15 )  0.034 ng/mL / x     / 82 U/L / x     / x      CARDIAC MARKERS ( 22 Aug 2017 04:48 )  0.040 ng/mL / x     / x     / x     / x      CARDIAC MARKERS ( 21 Aug 2017 18:59 )  0.023 ng/mL / x     / x     / x     / x                * Syncope:  - sec to NSVT  - dc coreg and start Metoprolol 50 BID    * Nonischemic cardiomyopathy with severe LV dysfunction left ventricular ejection fraction 20% by an echocardiogram in June 2017.  - dc ACEi and plan to start entresto if BP allows    * Chronic systolic congestive heart failure  - no evid of exacerbation

## 2017-08-22 NOTE — CONSULT NOTE ADULT - SUBJECTIVE AND OBJECTIVE BOX
Neurology Consult requested by:   Patient is a 57y old  Female who presents with a chief complaint of Dizziness and loss of consciousness (22 Aug 2017 02:29)     HPI:  57F w/ PMH of HTN, HLD, DM, multiple MIs, systolic HF (EF 20% June 2017) s/p AICD comes in with episode of dizziness and loss of consciousness. Pt reports she was at home when she suddenly felt dizzy and short of breath, then fell down, and doesn't remember what happened next. She woke up on the floor. When she woke up her tongue hurt, denies unilateral weakness or numbness, no headache. had similar episode previously when she had her second PPM placed. Recently has been feeling intermittently dizzy.  She denies blurry vision, history of seizures, chest pain, fever/chills, nausea/vomiting, change in bowel habits or urinary habits. (22 Aug 2017 02:29)      PAST MEDICAL & SURGICAL HISTORY:  HTN (hypertension)  HLD (hyperlipidemia)  Asthma  DM (diabetes mellitus)  CAD (coronary artery disease)  History of cholecystectomy  Artificial cardiac pacemaker    FAMILY HISTORY:  Family history of other heart disease (Mother)    Social Hx:  Nonsmoker, no drug or alcohol use  Medications and Allergies ReviewedMEDICATIONS  (STANDING):  sodium chloride 0.9% lock flush 3 milliLiter(s) IV Push every 8 hours  simvastatin 40 milliGRAM(s) Oral at bedtime  buDESOnide 160 MICROgram(s)/formoterol 4.5 MICROgram(s) Inhaler 1 Puff(s) Inhalation two times a day  heparin  Injectable 5000 Unit(s) SubCutaneous every 8 hours  insulin lispro (HumaLOG) corrective regimen sliding scale   SubCutaneous three times a day before meals  dextrose 5%. 1000 milliLiter(s) (50 mL/Hr) IV Continuous <Continuous>  dextrose 50% Injectable 12.5 Gram(s) IV Push once  aspirin 325 milliGRAM(s) Oral once  lisinopril 20 milliGRAM(s) Oral daily  potassium chloride    Tablet ER 20 milliEquivalent(s) Oral two times a day  spironolactone 25 milliGRAM(s) Oral daily     ROS: Pertinent positives in HPI, all other ROS were reviewed and are negative.      Examination:   Vital Signs Last 24 Hrs  T(C): 36.5 (22 Aug 2017 10:38), Max: 37.2 (21 Aug 2017 18:06)  T(F): 97.7 (22 Aug 2017 10:38), Max: 98.9 (21 Aug 2017 18:06)  HR: 92 (22 Aug 2017 10:38) (69 - 92)  BP: 103/81 (22 Aug 2017 10:38) (88/49 - 122/49)  BP(mean): 60 (21 Aug 2017 19:04) (60 - 60)  RR: 17 (22 Aug 2017 10:38) (16 - 26)  SpO2: 100% (22 Aug 2017 10:38) (96% - 100%)  General: Cooperative, NAD   NECK: supple, no masses  ENT: Normal hearing   Vascular : no carotid bruits,   Lungs: CTAB  Chest: RRR, no murmurs  Extremities: nontender, no edema  Musculoskeletal: no adventitious movements, no joint stiffness  Skin: no rash    Neurological Examination:  MS: AOx3. Appropriately interactive, normal affect. Speech fluent w/o paraphasic error, repetition, naming is intact   CN: PERLL, EOMI, V1-3 sensation intact, face symmetric, hearing intact, palate elevates symmetrically, tongue midline, SCM equal bilaterally  Motor: normal bulk and tone, no tremor, rigidity or bradykinesia.  5/5 all over   Sens: Intact to light touch.    Reflexes: 1/4 all over, downgoing toes b/l  Coord:  No dysmetria, LEONARDO intact   Gait: not tested    Imaging: Reviewed  < from: CT Head No Cont (08.22.17 @ 04:24) >  FINDINGS:    There is no intracranial hemorrhage, abnormal extra-axial fluid   collection, mass effect, midline shift, or large acute cortical infarct.    Gray-white differentiation is maintained.  The ventricles and   perimesencephalic cisterns are unremarkable.      The mastoid air cells and visualized paranasal sinuses are well aerated.    The visualized osseous structures are unremarkable.    IMPRESSION:    No intracranial hemorrhage, mass effect or large acute cortical infarct.    < end of copied text >

## 2017-08-22 NOTE — CONSULT NOTE ADULT - ASSESSMENT
57 year old woman hx of CAD, arrythmias, s/p PPM. passed out, with quick recovery. non focal CT of head shows no acute findings.  Doubt CNS ischemia, or seizure event.

## 2017-08-22 NOTE — ED ADULT NURSE REASSESSMENT NOTE - NS ED NURSE REASSESS COMMENT FT1
Pt A&O x4, received pt from RODERICK Garibay sitting up in bed with rails up, cardiac monitor in place, IV fluids infusing, VSS. Pending admission orders. safety maintained, will monitor.

## 2017-08-22 NOTE — PHYSICAL THERAPY INITIAL EVALUATION ADULT - PERTINENT HX OF CURRENT PROBLEM, REHAB EVAL
56yo F admotted post syncopal episode at home.  Patient c/o "blurry vision".  Denied other stroke like symptoms.  CT Head (-),

## 2017-08-23 LAB
ANION GAP SERPL CALC-SCNC: 6 MMOL/L — SIGNIFICANT CHANGE UP (ref 5–17)
BUN SERPL-MCNC: 26 MG/DL — HIGH (ref 7–23)
CALCIUM SERPL-MCNC: 9.6 MG/DL — SIGNIFICANT CHANGE UP (ref 8.5–10.1)
CHLORIDE SERPL-SCNC: 102 MMOL/L — SIGNIFICANT CHANGE UP (ref 96–108)
CO2 SERPL-SCNC: 31 MMOL/L — SIGNIFICANT CHANGE UP (ref 22–31)
CREAT SERPL-MCNC: 1 MG/DL — SIGNIFICANT CHANGE UP (ref 0.5–1.3)
GLUCOSE SERPL-MCNC: 162 MG/DL — HIGH (ref 70–99)
HCT VFR BLD CALC: 45.3 % — HIGH (ref 34.5–45)
HGB BLD-MCNC: 14.4 G/DL — SIGNIFICANT CHANGE UP (ref 11.5–15.5)
MCHC RBC-ENTMCNC: 26.2 PG — LOW (ref 27–34)
MCHC RBC-ENTMCNC: 31.8 GM/DL — LOW (ref 32–36)
MCV RBC AUTO: 82.5 FL — SIGNIFICANT CHANGE UP (ref 80–100)
PLATELET # BLD AUTO: 219 K/UL — SIGNIFICANT CHANGE UP (ref 150–400)
POTASSIUM SERPL-MCNC: 4.2 MMOL/L — SIGNIFICANT CHANGE UP (ref 3.5–5.3)
POTASSIUM SERPL-SCNC: 4.2 MMOL/L — SIGNIFICANT CHANGE UP (ref 3.5–5.3)
RBC # BLD: 5.49 M/UL — HIGH (ref 3.8–5.2)
RBC # FLD: 14.4 % — SIGNIFICANT CHANGE UP (ref 10.3–14.5)
SODIUM SERPL-SCNC: 139 MMOL/L — SIGNIFICANT CHANGE UP (ref 135–145)
WBC # BLD: 10.8 K/UL — HIGH (ref 3.8–10.5)
WBC # FLD AUTO: 10.8 K/UL — HIGH (ref 3.8–10.5)

## 2017-08-23 PROCEDURE — 93458 L HRT ARTERY/VENTRICLE ANGIO: CPT | Mod: 26

## 2017-08-23 RX ORDER — ATORVASTATIN CALCIUM 80 MG/1
20 TABLET, FILM COATED ORAL AT BEDTIME
Qty: 0 | Refills: 0 | Status: DISCONTINUED | OUTPATIENT
Start: 2017-08-23 | End: 2017-08-24

## 2017-08-23 RX ADMIN — HEPARIN SODIUM 5000 UNIT(S): 5000 INJECTION INTRAVENOUS; SUBCUTANEOUS at 21:30

## 2017-08-23 RX ADMIN — SODIUM CHLORIDE 3 MILLILITER(S): 9 INJECTION INTRAMUSCULAR; INTRAVENOUS; SUBCUTANEOUS at 18:03

## 2017-08-23 RX ADMIN — ZOLPIDEM TARTRATE 5 MILLIGRAM(S): 10 TABLET ORAL at 21:35

## 2017-08-23 RX ADMIN — AMIODARONE HYDROCHLORIDE 400 MILLIGRAM(S): 400 TABLET ORAL at 06:04

## 2017-08-23 RX ADMIN — ALBUTEROL 2 PUFF(S): 90 AEROSOL, METERED ORAL at 14:02

## 2017-08-23 RX ADMIN — ATORVASTATIN CALCIUM 20 MILLIGRAM(S): 80 TABLET, FILM COATED ORAL at 21:30

## 2017-08-23 RX ADMIN — BUDESONIDE AND FORMOTEROL FUMARATE DIHYDRATE 1 PUFF(S): 160; 4.5 AEROSOL RESPIRATORY (INHALATION) at 20:22

## 2017-08-23 RX ADMIN — Medication 50 MILLIGRAM(S): at 07:43

## 2017-08-23 RX ADMIN — BUDESONIDE AND FORMOTEROL FUMARATE DIHYDRATE 1 PUFF(S): 160; 4.5 AEROSOL RESPIRATORY (INHALATION) at 08:16

## 2017-08-23 RX ADMIN — AMIODARONE HYDROCHLORIDE 400 MILLIGRAM(S): 400 TABLET ORAL at 18:08

## 2017-08-23 RX ADMIN — SODIUM CHLORIDE 3 MILLILITER(S): 9 INJECTION INTRAMUSCULAR; INTRAVENOUS; SUBCUTANEOUS at 06:05

## 2017-08-23 RX ADMIN — Medication 50 MILLIGRAM(S): at 18:08

## 2017-08-23 RX ADMIN — SODIUM CHLORIDE 3 MILLILITER(S): 9 INJECTION INTRAMUSCULAR; INTRAVENOUS; SUBCUTANEOUS at 21:30

## 2017-08-23 NOTE — PROGRESS NOTE ADULT - ASSESSMENT
Syncope, due  to ventricular tachycardia and fibrillation.  Nonischemic cardiomyopathy with severe LV dysfunction left ventricular ejection fraction 20% by an echocardiogram in June 2017. ,  she has normal coronaries by cardiac catheterization yesterday.  Chronic systolic congestive heart failure.  Cardiac arrhythmias status post AICD discharge on admission.  T2DM     - start Entresto    Amiodarone 400 mg y mouth twice a day for 5 days and then 400 mg once daily..  Due to amiodarone digoxin has been stopped.

## 2017-08-23 NOTE — PROGRESS NOTE ADULT - ASSESSMENT
Syncope, due  to ventricular tachycardia and fibrillation.  Nonischemic cardiomyopathy with severe LV dysfunction left ventricular ejection fraction 20% by an echocardiogram in June 2017.  Nonobstructive coronary artery disease.  Chronic systolic congestive heart failure.  Hypertension.  Cardiac arrhythmias status post AICD discharge on admission.  Dyspnea on mild exertion.  Suggest  Observe on telemetry.  Continue current cardiac medications.  We will hold ACE inhibitor today and start Entresto tomorrow.  Due to ventricular tachycardia and fibrillation she was started on amiodarone.  Due to dyspnea on exertion and episodes of ventricular tachycardia and fibrillation patient is for cardiac catheterization today. All risks options discussed at length with patient and her  and they consent for the procedure.  Discussed with interventional cardiologist Dr Medrano & electrophysiologist Dr Wu  Discussed with the hospitalist.  Follow-up with electrolytes/TSH.  Due to amiodarone digoxin has been stopped.

## 2017-08-23 NOTE — PROGRESS NOTE ADULT - SUBJECTIVE AND OBJECTIVE BOX
HPI:  58yo female with pmh  non obstructive CAD, NIIDM, hyperlipidemia,s/p AICD placement, asthma, systolic  CHF LVEF 20% by an echocardiogram in June 2017 presented with complains of dizziness and syncope. Patient's AICD evaluation demonstrated episodes of ventricular tachycardia and ventricular  fibrillation, she is status post AICD discharged on admission. She was evaluated by electrophysiologist and patient was started on amiodarone. Due to history of shortness of breath, episode of ventricular tachycardia and fibrillation  , She underwent cardiac catheterization and she has normal coronaries. She denies any chest pain or shortness of breath. She continues to be normal sinus rhythm with premature ventricular beats on telemetry. She is being loaded with amiodarone.,     8/23: for cath today        PHYSICAL EXAM  General Appearance: cooperative, no acute distress,   HEENT: PERRL, conjunctiva clear, EOM's intact, non injected pharynx, no exudate.  Neck: Supple, , no adenopathy, thyroid: not enlarged, no carotid bruit or JVD  Back: Symmetric, no  tenderness,no soft tissue tenderness  Lungs: Clear to auscultation bilateral,no adventitious breath sounds, normal   expiratory phase  Heart: Regular rate and rhythm, S1, S2 normal, grade 1/6 holosystolic murmur,  no rub or gallop  Abdomen: Soft, non-tender, bowel sounds active , no hepatosplenomegaly  Extremities: no cyanosis or edema, no joint swelling , right wrist normal  Skin: Skin color, texture normal, no rashes   Neurologic: Alert and oriented X3 , cranial nerves intact, sensory and motor normal,        INTERPRETATION OF TELEMETRY: NSR PVCS          LABS:                          14.2   12.2  )-----------( 223      ( 24 Aug 2017 05:56 )             43.6     08-24    139  |  103  |  26<H>  ----------------------------<  148<H>  4.7   |  27  |  0.86    Ca    9.9      24 Aug 2017 05:56

## 2017-08-23 NOTE — CHART NOTE - NSCHARTNOTEFT_GEN_A_CORE
Nurse Practitioner Progress note:     HPI:  57F w/ PMH of HTN, HLD, T2DM, multiple MIs, systolic HF (EF 20% June 2017) s/p AICD comes in with episode of dizziness and loss of consciousness. Pt reports she was at home when she suddenly felt dizzy and short of breath, then fell down, and doesn't remember what happened next. She woke up on the floor. When she woke up her tongue hurt, but her breathing was improved and nothing else was bothering her. Patient had similar episode in the past in which her doctors told her "she was down for 10 minutes and the only reason she's alive is because of the PPM". She has orthopnea and intermittent b/l lower extremity swelling. Pt can walk 2 city blocks before having to stop bc she gets tired. She denies blurry vision, history of seizures, chest pain, fever/chills, nausea/vomiting, change in bowel habits or urinary habits. Pt. now referred for [facility]       T(C): 36.2 (08-23-17 @ 15:43), Max: 36.8 (08-22-17 @ 21:47)  HR: 76 (08-23-17 @ 16:50) (63 - 95)  BP: 124/81 (08-23-17 @ 16:50) (98/81 - 135/87)  RR: 16 (08-23-17 @ 16:50) (15 - 18)  SpO2: 97% (08-23-17 @ 16:50) (81% - 98%)  Wt(kg): --    PHYSICAL EXAM:  Neurologic: Non-focal, AxOx3.  No neuro deficits  Vascular: Peripheral pulses palpable 2+ bilaterally  Procedure Site: Rt. radial band removed manual pressure applied x20 minutes site benign soft no bleeding no hematoma +1 radial pulse pressure dressing applied no c/o numbness/tingling <3sec cap re-fill fingers/hand warm to touch     PROCEDURE RESULTS: S/P normal coronaries very elevated LVEDP    ASSESSMENT/PLAN: 	  -VS, labs, diet, activity as per post cath orders  -IV hydration  -Encourage PO fluids  -Continue current medications  -Plan of care D/W pt. and MD  -Post cath instructions reviewed with pt., pt. verbalizes and understands instructions  -Follow-up with attending

## 2017-08-23 NOTE — PROGRESS NOTE ADULT - SUBJECTIVE AND OBJECTIVE BOX
Patient is a 57y old  Female who presents with a chief complaint of Dizziness and loss of consciousness (22 Aug 2017 02:29)  HPI:  56yo female with pmh  non obstructive CAD, NIIDM, hyperlipidemia,s/p AICD placement, asthma, systolic  CHF LVEF 20% by an echocardiogram in 2017 presented with complains of dizziness and syncope. Patient's AICD evaluation demonstrated episodes of ventricular tachycardia and ventricular  fibrillation, she is status post AICD discharged on admission. She was evaluated by electrophysiologist and patient was started on amiodarone. Due to history of shortness of breath, episode of ventricular tachycardia and fibrillation she is for cardiac catheterization today. I had lengthy discussion with patient and her , risks benefits and nocturnal cardiac catheterization were discussed and the consent for the procedure. Nurse taking care of the patient speaks Hungarian. Patient's  speaks and understands English. Since admission she has been comfortable and is alert, she denies any chest pain. But she gets short of breath with mild exertion. She denies any cough or any fever. She is in normal sinus rhythm on telemetry and has frequent premature ventricular beats.      PAST MEDICAL & SURGICAL HISTORY:  HTN (hypertension)  HLD (hyperlipidemia)  Asthma  DM (diabetes mellitus)  CAD (coronary artery disease) - non obstructive  Chr systolic CHF  Non ischemic CMPHPI:  56yo female with pmh CAD, NIIDM, hyperlipidemia, PPM, asthma, CHF (type unknown) presented 2 days ago to ED with respiratory distress and was dc from ED after initial treatment. She had been out of her asthma medications. Now presenting with wheezing, dyspnea and 4 pillow orthopnea, lower ext edema for 3 days. Not using some of her meds because she ran out. Also admits to dietary indiscretions.        Echo < from: Transthoracic Echocardiogram (17 @ 09:49) >   Summary     The left ventricle is severely dilated with severe global hypokinesis.   Estimated left ventricular ejection fraction is 20 % via bi-plane method.   Left atriumis moderately dilated.   The right atrium appears moderately dilated. A device wire is seen in the   RV and RA.   The right ventricle is normal in size and contractility.   The aortic valve appears normal.   There is thickening of both mitral valve leaflet tips. The leaflet   opening   is normal. Moderate mitral regurgitation is present based on a calculated   ERO of .32cm2.   The tricuspid valve leaflets are thin and pliable. Moderate (2+)   tricuspid   valve regurgitation is present. Moderate pulmonary hypertension.   Trace pericardial effusion is present.      MEDICATIONS  (STANDING):  sodium chloride 0.9% lock flush 3 milliLiter(s) IV Push every 8 hours  buDESOnide 160 MICROgram(s)/formoterol 4.5 MICROgram(s) Inhaler 1 Puff(s) Inhalation two times a day  heparin  Injectable 5000 Unit(s) SubCutaneous every 8 hours  insulin lispro (HumaLOG) corrective regimen sliding scale   SubCutaneous three times a day before meals  dextrose 5%. 1000 milliLiter(s) (50 mL/Hr) IV Continuous <Continuous>  dextrose 50% Injectable 12.5 Gram(s) IV Push once  spironolactone 25 milliGRAM(s) Oral daily  metoprolol 50 milliGRAM(s) Oral two times a day  amiodarone    Tablet 400 milliGRAM(s) Oral two times a day  atorvastatin 20 milliGRAM(s) Oral at bedtime    MEDICATIONS  (PRN):  ALBUTerol    90 MICROgram(s) HFA Inhaler 2 Puff(s) Inhalation every 6 hours PRN Shortness of Breath  acetaminophen   Tablet. 650 milliGRAM(s) Oral every 6 hours PRN Moderate Pain (4 - 6)  ibuprofen  Tablet 400 milliGRAM(s) Oral every 6 hours PRN Mild pain  senna 2 Tablet(s) Oral at bedtime PRN Constipation  zolpidem 5 milliGRAM(s) Oral at bedtime PRN Insomnia  dextrose Gel 1 Dose(s) Oral once PRN Blood Glucose LESS THAN 70 milliGRAM(s)/deciliter  glucagon  Injectable 1 milliGRAM(s) IntraMuscular once PRN Glucose LESS THAN 70 milligrams/deciliter        REVIEW OF SYSTEM:  Pertinent items are noted in HPI.  Constitutional	Negative for chills, fevers, sweats.    Eyes: 	Negative for visual disturbance.  Ears, nose, mouth, throat, and face: Negative for epistaxis, nasal congestion, sore throat and tinnitus.  Neck:	Negative for enlargement, pain and difficulty in swallowing  Respiration : Negative for cough,  pleuritic chest pain and wheezing , she has dyspnea on exertion  Cardiovascular: Negative for chest pain,and palpitations    Gastrointestinal : Negative for abdominal pain, diarrhea, nausea and vomiting  Genitourinary: Negative for dysuria, frequency and urinary incontinence .  Skin: Negative for  rash, pruritus, swelling, dryness .  	  Hematologic/lymphatic: Negative for bleeding and easy bruising  Musculoskeletal: Negative for arthralgias, back pain and muscle weakness.  Neurological: Negative for dizziness, headaches, seizures and tremors  Behavioral/Psych: Negative for mood change, depression.  Endocrine:	Negative for blurry vision, polydipsia and polyuria, diaphoresis.   Allergic/Immunologic:	Negative for anaphylaxis, angioedema and urticaria.      Vital Signs Last 24 Hrs  T(C): 36.4 (23 Aug 2017 10:10), Max: 36.8 (22 Aug 2017 17:01)  T(F): 97.6 (23 Aug 2017 10:10), Max: 98.3 (22 Aug 2017 17:01)  HR: 87 (23 Aug 2017 10:10) (63 - 95)  BP: 104/67 (23 Aug 2017 10:10) (98/81 - 121/83)  BP(mean): --  RR: 16 (23 Aug 2017 10:10) (16 - 18)  SpO2: 98% (23 Aug 2017 10:10) (81% - 98%)        PHYSICAL EXAM  General Appearance: cooperative, no acute distress,   HEENT: PERRL, conjunctiva clear, EOM's intact, non injected pharynx, no exudate.   Neck: Supple, , no adenopathy, thyroid: not enlarged, no carotid bruit or JVD  Back: Symmetric, no  tenderness,no soft tissue tenderness  Lungs: Clear to auscultation bilateral,no adventitious breath sounds, normal   expiratory phase  Heart: Regular rate and rhythm, S1, S2 normal,grade 1/6 holodydtolic   murmur,no  rub or gallop  Abdomen: Soft, non-tender, bowel sounds active , no hepatosplenomegaly  Extremities: no cyanosis or edema, no joint swelling  Skin: Skin color, texture normal, no rashes   Neurologic: Alert and oriented X3 , cranial nerves intact, sensory and motor normal,        INTERPRETATION OF TELEMETRY: NSR PVCs            LABS:                          14.4   10.8  )-----------( 219      ( 23 Aug 2017 09:37 )             45.3     08-    139  |  102  |  26<H>  ----------------------------<  162<H>  4.2   |  31  |  1.00    Ca    9.6      23 Aug 2017 09:37    TPro  7.7  /  Alb  3.6  /  TBili  0.6  /  DBili  x   /  AST  29  /  ALT  29  /  AlkPhos  109  08-21    CARDIAC MARKERS ( 22 Aug 2017 07:15 )  0.034 ng/mL / x     / 82 U/L / x     / x      CARDIAC MARKERS ( 22 Aug 2017 04:48 )  0.040 ng/mL / x     / x     / x     / x      CARDIAC MARKERS ( 21 Aug 2017 18:59 )  0.023 ng/mL / x     / x     / x     / x                Urinalysis Basic - ( 21 Aug 2017 18:59 )    Color: Yellow / Appearance: Clear / S.010 / pH: x  Gluc: x / Ketone: Negative  / Bili: Negative / Urobili: 1 mg/dL   Blood: x / Protein: 30 mg/dL / Nitrite: Negative   Leuk Esterase: Negative / RBC: Negative /HPF / WBC 0-2   Sq Epi: x / Non Sq Epi: x / Bacteria: x

## 2017-08-24 ENCOUNTER — TRANSCRIPTION ENCOUNTER (OUTPATIENT)
Age: 57
End: 2017-08-24

## 2017-08-24 VITALS — OXYGEN SATURATION: 96 %

## 2017-08-24 LAB
ANION GAP SERPL CALC-SCNC: 9 MMOL/L — SIGNIFICANT CHANGE UP (ref 5–17)
BUN SERPL-MCNC: 26 MG/DL — HIGH (ref 7–23)
CALCIUM SERPL-MCNC: 9.9 MG/DL — SIGNIFICANT CHANGE UP (ref 8.5–10.1)
CHLORIDE SERPL-SCNC: 103 MMOL/L — SIGNIFICANT CHANGE UP (ref 96–108)
CO2 SERPL-SCNC: 27 MMOL/L — SIGNIFICANT CHANGE UP (ref 22–31)
CREAT SERPL-MCNC: 0.86 MG/DL — SIGNIFICANT CHANGE UP (ref 0.5–1.3)
GLUCOSE SERPL-MCNC: 148 MG/DL — HIGH (ref 70–99)
HCT VFR BLD CALC: 43.6 % — SIGNIFICANT CHANGE UP (ref 34.5–45)
HGB BLD-MCNC: 14.2 G/DL — SIGNIFICANT CHANGE UP (ref 11.5–15.5)
MCHC RBC-ENTMCNC: 26.9 PG — LOW (ref 27–34)
MCHC RBC-ENTMCNC: 32.6 GM/DL — SIGNIFICANT CHANGE UP (ref 32–36)
MCV RBC AUTO: 82.4 FL — SIGNIFICANT CHANGE UP (ref 80–100)
PLATELET # BLD AUTO: 223 K/UL — SIGNIFICANT CHANGE UP (ref 150–400)
POTASSIUM SERPL-MCNC: 4.7 MMOL/L — SIGNIFICANT CHANGE UP (ref 3.5–5.3)
POTASSIUM SERPL-SCNC: 4.7 MMOL/L — SIGNIFICANT CHANGE UP (ref 3.5–5.3)
RBC # BLD: 5.29 M/UL — HIGH (ref 3.8–5.2)
RBC # FLD: 15.1 % — HIGH (ref 10.3–14.5)
SODIUM SERPL-SCNC: 139 MMOL/L — SIGNIFICANT CHANGE UP (ref 135–145)
WBC # BLD: 12.2 K/UL — HIGH (ref 3.8–10.5)
WBC # FLD AUTO: 12.2 K/UL — HIGH (ref 3.8–10.5)

## 2017-08-24 RX ORDER — SACUBITRIL AND VALSARTAN 24; 26 MG/1; MG/1
1 TABLET, FILM COATED ORAL
Qty: 60 | Refills: 0 | OUTPATIENT
Start: 2017-08-24 | End: 2017-09-23

## 2017-08-24 RX ORDER — METOPROLOL TARTRATE 50 MG
1 TABLET ORAL
Qty: 60 | Refills: 0 | OUTPATIENT
Start: 2017-08-24 | End: 2017-09-23

## 2017-08-24 RX ORDER — SACUBITRIL AND VALSARTAN 24; 26 MG/1; MG/1
1 TABLET, FILM COATED ORAL
Qty: 0 | Refills: 0 | Status: DISCONTINUED | OUTPATIENT
Start: 2017-08-24 | End: 2017-08-24

## 2017-08-24 RX ORDER — AMIODARONE HYDROCHLORIDE 400 MG/1
1 TABLET ORAL
Qty: 30 | Refills: 0 | OUTPATIENT
Start: 2017-08-24 | End: 2017-09-23

## 2017-08-24 RX ORDER — AMIODARONE HYDROCHLORIDE 400 MG/1
2 TABLET ORAL
Qty: 20 | Refills: 0 | OUTPATIENT
Start: 2017-08-24 | End: 2017-08-29

## 2017-08-24 RX ADMIN — BUDESONIDE AND FORMOTEROL FUMARATE DIHYDRATE 1 PUFF(S): 160; 4.5 AEROSOL RESPIRATORY (INHALATION) at 08:28

## 2017-08-24 RX ADMIN — Medication 50 MILLIGRAM(S): at 05:04

## 2017-08-24 RX ADMIN — HEPARIN SODIUM 5000 UNIT(S): 5000 INJECTION INTRAVENOUS; SUBCUTANEOUS at 05:04

## 2017-08-24 RX ADMIN — SPIRONOLACTONE 25 MILLIGRAM(S): 25 TABLET, FILM COATED ORAL at 05:04

## 2017-08-24 RX ADMIN — SODIUM CHLORIDE 3 MILLILITER(S): 9 INJECTION INTRAMUSCULAR; INTRAVENOUS; SUBCUTANEOUS at 13:13

## 2017-08-24 RX ADMIN — AMIODARONE HYDROCHLORIDE 400 MILLIGRAM(S): 400 TABLET ORAL at 05:04

## 2017-08-24 RX ADMIN — HEPARIN SODIUM 5000 UNIT(S): 5000 INJECTION INTRAVENOUS; SUBCUTANEOUS at 13:17

## 2017-08-24 RX ADMIN — SODIUM CHLORIDE 3 MILLILITER(S): 9 INJECTION INTRAMUSCULAR; INTRAVENOUS; SUBCUTANEOUS at 05:05

## 2017-08-24 NOTE — DISCHARGE NOTE ADULT - MEDICATION SUMMARY - MEDICATIONS TO TAKE
I will START or STAY ON the medications listed below when I get home from the hospital:    predniSONE 10 mg oral tablet  -- Ioana 3 tabletas para un ladi then dos tabletas X un ladi y maisha tableta para un ladi. (Take 3 tabs X 1 day, then 2 tabs X1 day then 1 tabX 1 day  -- It is very important that you take or use this exactly as directed.  Do not skip doses or discontinue unless directed by your doctor.  Obtain medical advice before taking any non-prescription drugs as some may affect the action of this medication.  Take with food or milk.    -- Indication: For this is home med not from hospital    sacubitril-valsartan 24 mg-26 mg oral tablet  -- 1 tab(s) by mouth 2 times a day  -- Indication: For new    amiodarone 200 mg oral tablet  -- 2 tab(s) by mouth 2 times a day  -- Indication: For Heart    amiodarone 400 mg oral tablet  -- 1 tab(s) by mouth once a day after you finishe the 5 days   -- Indication: For Heart    metFORMIN 500 mg oral tablet  -- 1 tab(s) by mouth 2 times a day  -- Indication: For .    simvastatin 40 mg oral tablet  -- 1 tab(s) by mouth once a day (at bedtime)  -- Indication: For .    metoprolol tartrate 50 mg oral tablet  -- 1 tab(s) by mouth 2 times a day  -- Indication: For .    budesonide-formoterol 160 mcg-4.5 mcg/inh inhalation aerosol  -- 1 puff(s) inhaled 2 times a day for asthma  -- Check with your doctor before becoming pregnant.  For inhalation only.  Rinse mouth thoroughly after use.    -- Indication: For .    albuterol 90 mcg/inh inhalation aerosol  -- 2 puff(s) inhaled 4 times a day, As Needed for asthma shortness of breath or wheezing  -- For inhalation only.  It is very important that you take or use this exactly as directed.  Do not skip doses or discontinue unless directed by your doctor.  Obtain medical advice before taking any non-prescription drugs as some may affect the action of this medication.  Shake well before use.    -- Indication: For .    furosemide 40 mg oral tablet  -- 1 tab(s) by mouth once a day  -- Indication: For .    potassium chloride 20 mEq oral granule, extended release  -- 20 milliequivalent(s) by mouth once a day  -- Indication: For . I will START or STAY ON the medications listed below when I get home from the hospital:    predniSONE 10 mg oral tablet  -- Ioana 3 tabletas para un ladi then dos tabletas X un ladi y maisha tableta para un ladi. (Take 3 tabs X 1 day, then 2 tabs X1 day then 1 tabX 1 day  -- It is very important that you take or use this exactly as directed.  Do not skip doses or discontinue unless directed by your doctor.  Obtain medical advice before taking any non-prescription drugs as some may affect the action of this medication.  Take with food or milk.    -- Indication: For this is home med not from hospital    sacubitril-valsartan 24 mg-26 mg oral tablet  -- 1 tab(s) by mouth 2 times a day  -- Indication: For new    amiodarone 200 mg oral tablet  -- 2 tab(s) by mouth 2 times a day  -- Indication: For Heart    amiodarone 400 mg oral tablet  -- 1 tab(s) by mouth once a day after you finishe the 5 days   -- Indication: For Heart    metFORMIN 500 mg oral tablet  -- 1 tab(s) by mouth 2 times a day  -- Indication: For resume it on 8/26    simvastatin 40 mg oral tablet  -- 1 tab(s) by mouth once a day (at bedtime)  -- Indication: For .    metoprolol tartrate 50 mg oral tablet  -- 1 tab(s) by mouth 2 times a day  -- Indication: For .    budesonide-formoterol 160 mcg-4.5 mcg/inh inhalation aerosol  -- 1 puff(s) inhaled 2 times a day for asthma  -- Check with your doctor before becoming pregnant.  For inhalation only.  Rinse mouth thoroughly after use.    -- Indication: For .    albuterol 90 mcg/inh inhalation aerosol  -- 2 puff(s) inhaled 4 times a day, As Needed for asthma shortness of breath or wheezing  -- For inhalation only.  It is very important that you take or use this exactly as directed.  Do not skip doses or discontinue unless directed by your doctor.  Obtain medical advice before taking any non-prescription drugs as some may affect the action of this medication.  Shake well before use.    -- Indication: For .    furosemide 40 mg oral tablet  -- 1 tab(s) by mouth once a day  -- Indication: For .    potassium chloride 20 mEq oral granule, extended release  -- 20 milliequivalent(s) by mouth once a day  -- Indication: For .

## 2017-08-24 NOTE — PROVIDER CONTACT NOTE (CHANGE IN STATUS NOTIFICATION) - SITUATION
58yo female c/o dizzyness. cath today negative. upon return, 9 beats V-tach. pt just received her routine doses of lopressor and amiodarone. pt asymptomatic.
Pt  st " hard to Breath'"

## 2017-08-24 NOTE — DISCHARGE NOTE ADULT - CARE PLAN
Principal Discharge DX:	Syncope  Goal:	resolved  Instructions for follow-up, activity and diet:	same as before  Secondary Diagnosis:	Cardiomyopathy  Goal:	stable  Instructions for follow-up, activity and diet:	2 new prescriptions for you to take Principal Discharge DX:	Syncope  Goal:	resolved  Instructions for follow-up, activity and diet:	same as before  Secondary Diagnosis:	Cardiomyopathy  Goal:	stable  Instructions for follow-up, activity and diet:	2 new prescriptions for you to take; strict diabetic diet; off metformin for 2 days because of the contrast you received; if glucose elevated>300 come to ER

## 2017-08-24 NOTE — DISCHARGE NOTE ADULT - MEDICATION SUMMARY - MEDICATIONS TO STOP TAKING
I will STOP taking the medications listed below when I get home from the hospital:    carvedilol 25 mg oral tablet  -- 1 tab(s) by mouth 2 times a day    lisinopril 40 mg oral tablet  -- 1 tab(s) by mouth once a day

## 2017-08-24 NOTE — CHART NOTE - NSCHARTNOTEFT_GEN_A_CORE
MD contacted overnight for pt complaining of SOB. Pt's vitals wnl while on NC. Pt seen and examined bedside, NAD, clear S1/S2, lungs CTA, no wheezes/crackles/rales. Recommended albuterol treatment (already ordered PRN).    Discussed w/ senior resident

## 2017-08-24 NOTE — DISCHARGE NOTE ADULT - CARE PROVIDER_API CALL
Juanita Stevens), Cardiology; Interventional Cardiology  [address] [place] [address]  Phone: [phone]  Fax: [phone]

## 2017-08-24 NOTE — DISCHARGE NOTE ADULT - HOSPITAL COURSE
56yo female with pmh  non obstructive CAD, NIIDM, hyperlipidemia,s/p AICD placement, asthma, systolic  CHF LVEF 20% by an echocardiogram in June 2017 presented with complains of dizziness and syncope. Patient's AICD evaluation demonstrated episodes of ventricular tachycardia and ventricular  fibrillation, she is status post AICD discharged on admission. She was evaluated by electrophysiologist and patient was started on amiodarone. Due to history of shortness of breath, episode of ventricular tachycardia and fibrillation  , She underwent cardiac catheterization and she has normal coronaries. She denies any chest pain or shortness of breath. She continues to be normal sinus rhythm with premature ventricular beats on telemetry. She is being loaded with amiodarone.,         PHYSICAL EXAM  General Appearance: cooperative, no acute distress,   HEENT: PERRL, conjunctiva clear, EOM's intact, non injected pharynx, no exudate.  Neck: Supple, , no adenopathy, thyroid: not enlarged, no carotid bruit or JVD  Back: Symmetric, no  tenderness,no soft tissue tenderness  Lungs: Clear to auscultation bilateral,no adventitious breath sounds, normal   expiratory phase  Heart: Regular rate and rhythm, S1, S2 normal, grade 1/6 holosystolic murmur,  no rub or gallop  Abdomen: Soft, non-tender, bowel sounds active , no hepatosplenomegaly  Extremities: no cyanosis or edema, no joint swelling , right wrist normal  Skin: Skin color, texture normal, no rashes   Neurologic: Alert and oriented X3 , cranial nerves intact, sensory and motor normal,      Syncope, due  to ventricular tachycardia and fibrillation.  Nonischemic cardiomyopathy with severe LV dysfunction left ventricular ejection fraction 20% by an echocardiogram in June 2017. ,  she has normal coronaries by cardiac catheterization yesterday.  Chronic systolic congestive heart failure.  Cardiac arrhythmias status post AICD discharge on admission.   - started on  Entresto    - started on Amiodarone 400 mg y mouth twice a day for 5 days and then 400 mg once daily..  Due to amiodarone digoxin has been stopped.  patient was educated on medication and diet and she was advised to follow-up with Dr. Stevens; has AICD    case d/w ; cleared for dc

## 2017-08-24 NOTE — DISCHARGE NOTE ADULT - PATIENT PORTAL LINK FT
“You can access the [facility] Patient Portal, offered by [facility], by registering with the following website: [email or web address]”

## 2017-08-24 NOTE — PROGRESS NOTE ADULT - ASSESSMENT
Syncope, due  to ventricular tachycardia and fibrillation.  Nonischemic cardiomyopathy with severe LV dysfunction left ventricular ejection fraction 20% by an echocardiogram in June 2017. ,  she has normal coronaries by cardiac catheterization yesterday.  Chronic systolic congestive heart failure.  Cardiac arrhythmias status post AICD discharge on admission.  Dyspnea on mild exertion.  Suggest  Observe on telemetry.  Continue current cardiac medications.   start Entresto  today.  Amiodarone 400 mg y mouth twice a day for 5 days and then 400 mg once daily..  Discussed with the hospitalist.  Follow-up with electrolytes/TSH.  Due to amiodarone digoxin has been stopped.  Gradual ambulation.  patient was educated on medication and diet and she was advised to follow-up with Dr. Stevens  & Dr Wu.

## 2017-08-28 DIAGNOSIS — I25.2 OLD MYOCARDIAL INFARCTION: ICD-10-CM

## 2017-08-28 DIAGNOSIS — Z79.84 LONG TERM (CURRENT) USE OF ORAL HYPOGLYCEMIC DRUGS: ICD-10-CM

## 2017-08-28 DIAGNOSIS — E86.0 DEHYDRATION: ICD-10-CM

## 2017-08-28 DIAGNOSIS — Z90.49 ACQUIRED ABSENCE OF OTHER SPECIFIED PARTS OF DIGESTIVE TRACT: ICD-10-CM

## 2017-08-28 DIAGNOSIS — R55 SYNCOPE AND COLLAPSE: ICD-10-CM

## 2017-08-28 DIAGNOSIS — E11.9 TYPE 2 DIABETES MELLITUS WITHOUT COMPLICATIONS: ICD-10-CM

## 2017-08-28 DIAGNOSIS — I47.2 VENTRICULAR TACHYCARDIA: ICD-10-CM

## 2017-08-28 DIAGNOSIS — Z95.810 PRESENCE OF AUTOMATIC (IMPLANTABLE) CARDIAC DEFIBRILLATOR: ICD-10-CM

## 2017-08-28 DIAGNOSIS — J45.909 UNSPECIFIED ASTHMA, UNCOMPLICATED: ICD-10-CM

## 2017-08-28 DIAGNOSIS — I25.10 ATHEROSCLEROTIC HEART DISEASE OF NATIVE CORONARY ARTERY WITHOUT ANGINA PECTORIS: ICD-10-CM

## 2017-08-28 DIAGNOSIS — E78.5 HYPERLIPIDEMIA, UNSPECIFIED: ICD-10-CM

## 2017-08-28 DIAGNOSIS — I42.8 OTHER CARDIOMYOPATHIES: ICD-10-CM

## 2017-08-28 DIAGNOSIS — I50.22 CHRONIC SYSTOLIC (CONGESTIVE) HEART FAILURE: ICD-10-CM

## 2017-08-28 DIAGNOSIS — I49.01 VENTRICULAR FIBRILLATION: ICD-10-CM

## 2017-08-28 DIAGNOSIS — Z79.52 LONG TERM (CURRENT) USE OF SYSTEMIC STEROIDS: ICD-10-CM

## 2017-08-28 DIAGNOSIS — I11.0 HYPERTENSIVE HEART DISEASE WITH HEART FAILURE: ICD-10-CM

## 2017-09-06 ENCOUNTER — INPATIENT (INPATIENT)
Facility: HOSPITAL | Age: 57
LOS: 5 days | Discharge: HOME | End: 2017-09-12
Attending: INTERNAL MEDICINE | Admitting: INTERNAL MEDICINE
Payer: COMMERCIAL

## 2017-09-06 VITALS
HEART RATE: 59 BPM | OXYGEN SATURATION: 96 % | SYSTOLIC BLOOD PRESSURE: 127 MMHG | RESPIRATION RATE: 16 BRPM | DIASTOLIC BLOOD PRESSURE: 68 MMHG | WEIGHT: 190.04 LBS | TEMPERATURE: 98 F

## 2017-09-06 DIAGNOSIS — Z90.49 ACQUIRED ABSENCE OF OTHER SPECIFIED PARTS OF DIGESTIVE TRACT: Chronic | ICD-10-CM

## 2017-09-06 DIAGNOSIS — Z95.0 PRESENCE OF CARDIAC PACEMAKER: Chronic | ICD-10-CM

## 2017-09-06 LAB
ALBUMIN SERPL ELPH-MCNC: 3.2 G/DL — LOW (ref 3.3–5)
ALP SERPL-CCNC: 103 U/L — SIGNIFICANT CHANGE UP (ref 40–120)
ALT FLD-CCNC: 34 U/L — SIGNIFICANT CHANGE UP (ref 12–78)
ANION GAP SERPL CALC-SCNC: 8 MMOL/L — SIGNIFICANT CHANGE UP (ref 5–17)
APTT BLD: 28.4 SEC — SIGNIFICANT CHANGE UP (ref 27.5–37.4)
AST SERPL-CCNC: 21 U/L — SIGNIFICANT CHANGE UP (ref 15–37)
BASOPHILS # BLD AUTO: 0.1 K/UL — SIGNIFICANT CHANGE UP (ref 0–0.2)
BASOPHILS NFR BLD AUTO: 1.1 % — SIGNIFICANT CHANGE UP (ref 0–2)
BILIRUB SERPL-MCNC: 0.7 MG/DL — SIGNIFICANT CHANGE UP (ref 0.2–1.2)
BUN SERPL-MCNC: 38 MG/DL — HIGH (ref 7–23)
CALCIUM SERPL-MCNC: 9.2 MG/DL — SIGNIFICANT CHANGE UP (ref 8.5–10.1)
CHLORIDE SERPL-SCNC: 94 MMOL/L — LOW (ref 96–108)
CK SERPL-CCNC: 59 U/L — SIGNIFICANT CHANGE UP (ref 26–192)
CO2 SERPL-SCNC: 35 MMOL/L — HIGH (ref 22–31)
CREAT SERPL-MCNC: 1.32 MG/DL — HIGH (ref 0.5–1.3)
EOSINOPHIL # BLD AUTO: 0 K/UL — SIGNIFICANT CHANGE UP (ref 0–0.5)
EOSINOPHIL NFR BLD AUTO: 0 % — SIGNIFICANT CHANGE UP (ref 0–6)
GLUCOSE SERPL-MCNC: 148 MG/DL — HIGH (ref 70–99)
HCT VFR BLD CALC: 38.8 % — SIGNIFICANT CHANGE UP (ref 34.5–45)
HGB BLD-MCNC: 12.5 G/DL — SIGNIFICANT CHANGE UP (ref 11.5–15.5)
INR BLD: 1.17 RATIO — HIGH (ref 0.88–1.16)
LYMPHOCYTES # BLD AUTO: 2.2 K/UL — SIGNIFICANT CHANGE UP (ref 1–3.3)
LYMPHOCYTES # BLD AUTO: 22.7 % — SIGNIFICANT CHANGE UP (ref 13–44)
MAGNESIUM SERPL-MCNC: 2 MG/DL — SIGNIFICANT CHANGE UP (ref 1.6–2.6)
MCHC RBC-ENTMCNC: 26.4 PG — LOW (ref 27–34)
MCHC RBC-ENTMCNC: 32.1 GM/DL — SIGNIFICANT CHANGE UP (ref 32–36)
MCV RBC AUTO: 82.3 FL — SIGNIFICANT CHANGE UP (ref 80–100)
MONOCYTES # BLD AUTO: 0.9 K/UL — SIGNIFICANT CHANGE UP (ref 0–0.9)
MONOCYTES NFR BLD AUTO: 9.8 % — SIGNIFICANT CHANGE UP (ref 2–14)
NEUTROPHILS # BLD AUTO: 6.3 K/UL — SIGNIFICANT CHANGE UP (ref 1.8–7.4)
NEUTROPHILS NFR BLD AUTO: 66.3 % — SIGNIFICANT CHANGE UP (ref 43–77)
NT-PROBNP SERPL-SCNC: 1100 PG/ML — HIGH (ref 0–125)
PHOSPHATE SERPL-MCNC: 3.7 MG/DL — SIGNIFICANT CHANGE UP (ref 2.5–4.5)
PLATELET # BLD AUTO: 227 K/UL — SIGNIFICANT CHANGE UP (ref 150–400)
POTASSIUM SERPL-MCNC: 3.3 MMOL/L — LOW (ref 3.5–5.3)
POTASSIUM SERPL-SCNC: 3.3 MMOL/L — LOW (ref 3.5–5.3)
PROT SERPL-MCNC: 6.8 GM/DL — SIGNIFICANT CHANGE UP (ref 6–8.3)
PROTHROM AB SERPL-ACNC: 12.7 SEC — SIGNIFICANT CHANGE UP (ref 9.8–12.7)
RBC # BLD: 4.72 M/UL — SIGNIFICANT CHANGE UP (ref 3.8–5.2)
RBC # FLD: 15 % — HIGH (ref 10.3–14.5)
SODIUM SERPL-SCNC: 137 MMOL/L — SIGNIFICANT CHANGE UP (ref 135–145)
TROPONIN I SERPL-MCNC: 0.08 NG/ML — HIGH (ref 0.01–0.04)
WBC # BLD: 9.5 K/UL — SIGNIFICANT CHANGE UP (ref 3.8–10.5)
WBC # FLD AUTO: 9.5 K/UL — SIGNIFICANT CHANGE UP (ref 3.8–10.5)

## 2017-09-06 PROCEDURE — 71010: CPT | Mod: 26

## 2017-09-06 PROCEDURE — 93010 ELECTROCARDIOGRAM REPORT: CPT

## 2017-09-06 PROCEDURE — 99291 CRITICAL CARE FIRST HOUR: CPT

## 2017-09-06 RX ORDER — POTASSIUM CHLORIDE 20 MEQ
10 PACKET (EA) ORAL ONCE
Qty: 0 | Refills: 0 | Status: DISCONTINUED | OUTPATIENT
Start: 2017-09-06 | End: 2017-09-06

## 2017-09-06 RX ORDER — POTASSIUM CHLORIDE 20 MEQ
10 PACKET (EA) ORAL
Qty: 0 | Refills: 0 | Status: COMPLETED | OUTPATIENT
Start: 2017-09-06 | End: 2017-09-06

## 2017-09-06 RX ORDER — AMIODARONE HYDROCHLORIDE 400 MG/1
0.5 TABLET ORAL
Qty: 900 | Refills: 0 | Status: COMPLETED | OUTPATIENT
Start: 2017-09-07 | End: 2017-09-07

## 2017-09-06 RX ORDER — SODIUM CHLORIDE 9 MG/ML
3 INJECTION INTRAMUSCULAR; INTRAVENOUS; SUBCUTANEOUS ONCE
Qty: 0 | Refills: 0 | Status: COMPLETED | OUTPATIENT
Start: 2017-09-06 | End: 2017-09-06

## 2017-09-06 RX ORDER — AMIODARONE HYDROCHLORIDE 400 MG/1
1 TABLET ORAL
Qty: 900 | Refills: 0 | Status: DISCONTINUED | OUTPATIENT
Start: 2017-09-06 | End: 2017-09-11

## 2017-09-06 RX ORDER — SODIUM CHLORIDE 9 MG/ML
250 INJECTION INTRAMUSCULAR; INTRAVENOUS; SUBCUTANEOUS ONCE
Qty: 0 | Refills: 0 | Status: COMPLETED | OUTPATIENT
Start: 2017-09-06 | End: 2017-09-06

## 2017-09-06 RX ORDER — ASPIRIN/CALCIUM CARB/MAGNESIUM 324 MG
325 TABLET ORAL ONCE
Qty: 0 | Refills: 0 | Status: COMPLETED | OUTPATIENT
Start: 2017-09-06 | End: 2017-09-06

## 2017-09-06 RX ORDER — AMIODARONE HYDROCHLORIDE 400 MG/1
150 TABLET ORAL ONCE
Qty: 0 | Refills: 0 | Status: COMPLETED | OUTPATIENT
Start: 2017-09-06 | End: 2017-09-06

## 2017-09-06 RX ADMIN — AMIODARONE HYDROCHLORIDE 33.33 MG/MIN: 400 TABLET ORAL at 22:13

## 2017-09-06 RX ADMIN — Medication 100 MILLIEQUIVALENT(S): at 22:23

## 2017-09-06 RX ADMIN — SODIUM CHLORIDE 3 MILLILITER(S): 9 INJECTION INTRAMUSCULAR; INTRAVENOUS; SUBCUTANEOUS at 20:01

## 2017-09-06 RX ADMIN — SODIUM CHLORIDE 250 MILLILITER(S): 9 INJECTION INTRAMUSCULAR; INTRAVENOUS; SUBCUTANEOUS at 22:00

## 2017-09-06 RX ADMIN — AMIODARONE HYDROCHLORIDE 600 MILLIGRAM(S): 400 TABLET ORAL at 22:00

## 2017-09-06 NOTE — ED ADULT TRIAGE NOTE - CHIEF COMPLAINT QUOTE
pt was sitting down and had a syncopal episode approx 20 min PTA, pt was admitted to  1 week ago due to an MI, pt has a PPM and Defib complaints of feeling lightheaded and dizzy now

## 2017-09-06 NOTE — ED PROVIDER NOTE - NS_ ATTENDINGSCRIBEDETAILS _ED_A_ED_FT
I, Juan Carr, performed the initial face to face bedside interview with this patient regarding history of present illness, review of symptoms and relevant past medical, social and family history.  I completed an independent physical examination.  I was the initial provider who evaluated this patient.  The history, relevant review of systems, past medical and surgical history, medical decision making, and physical examination was documented by the scribe in my presence and I attest to the accuracy of the documentation.

## 2017-09-06 NOTE — ED ADULT NURSE NOTE - OBJECTIVE STATEMENT
Pt presents to the ED s/p syncopal episode. Pt states that approx one week ago she had the same incident when she was dizzy, head an extreme headache and then a syncopal episode. Pt had a pacemaker and AICD placed in the hospital last week. Pt denies having any chest pain or palpitations at this time. Pt states she lost consciousness for approx 3 minutes at home as per pt . Pt states she has generalized weakness as well.

## 2017-09-06 NOTE — ED PROVIDER NOTE - OBJECTIVE STATEMENT
(Translate [id number]) 58 y/o F with a PMHx of asthma, HTN, HLD, DM, CAD with PPM AICD presents to the ED c/o lightheadedness, dizziness with syncopal episode that occurred earlier today PTA. Pt states that she was recently here in ED after experiencing dizziness with syncopal episode and released this past Thursday. Pt currently calm, sitting comfortably and denies CP, SOB, NVD or any other acute c/o at this time. (Translate [id number]) 56 y/o F with a PMHx of asthma, HTN, HLD, DM, Non obstructive cardiomyopathy w/ LVEF of 20% with PPM AICD presents to the ED c/o lightheadedness, dizziness with syncopal episode that occurred earlier today PTA. Pt states that she was recently here in ED after experiencing dizziness with syncopal episode and released this past Thursday and says her symptoms are the same. Pt currently calm, sitting comfortably and denies CP, SOB, NVD or any other acute c/o at this time.

## 2017-09-06 NOTE — ED PROVIDER NOTE - MEDICAL DECISION MAKING DETAILS
56 y/o F c/o lightheadedness, dizziness, syncopal episode PTA with plans to receive EKG, labs, CBC, CE, cardio consult and admission. 56 y/o F c/o lightheadedness, dizziness, syncopal episode PTA with plans to receive EKG, labs, CBC, CE, cardio consult and admission.  Monitor shows runs of NS VT.  A lot of ectopy.  BP low but even when pt in sinus rhythm. Possibly from poor EF.  S/w Cardio.

## 2017-09-06 NOTE — ED ADULT NURSE REASSESSMENT NOTE - NS ED NURSE REASSESS COMMENT FT1
Pt updated as to plan of care, pt medicated as per MD order, two EKG strips printed as pt has runs of vtach. MD Aware. Pt updated as to plan for admission. Pt verbalized udnerstadning of plan of care and reason for needing admission. Pt continues to deny having any chest pain at this time, denies shortness of breath. Pt on pacer pads with monitor at bedside as precaution. Pt remains on bedside monitor. Call bell within reach. Pt given pillows for comfort. Pt has no questions at this time. Awaiting hospitalist evaluation and admission orders at this time. Will continue to monitor.

## 2017-09-07 DIAGNOSIS — Z29.9 ENCOUNTER FOR PROPHYLACTIC MEASURES, UNSPECIFIED: ICD-10-CM

## 2017-09-07 DIAGNOSIS — R55 SYNCOPE AND COLLAPSE: ICD-10-CM

## 2017-09-07 DIAGNOSIS — E78.5 HYPERLIPIDEMIA, UNSPECIFIED: ICD-10-CM

## 2017-09-07 DIAGNOSIS — E11.9 TYPE 2 DIABETES MELLITUS WITHOUT COMPLICATIONS: ICD-10-CM

## 2017-09-07 DIAGNOSIS — I50.21 ACUTE SYSTOLIC (CONGESTIVE) HEART FAILURE: ICD-10-CM

## 2017-09-07 DIAGNOSIS — J45.909 UNSPECIFIED ASTHMA, UNCOMPLICATED: ICD-10-CM

## 2017-09-07 DIAGNOSIS — I25.10 ATHEROSCLEROTIC HEART DISEASE OF NATIVE CORONARY ARTERY WITHOUT ANGINA PECTORIS: ICD-10-CM

## 2017-09-07 DIAGNOSIS — I10 ESSENTIAL (PRIMARY) HYPERTENSION: ICD-10-CM

## 2017-09-07 LAB
ALBUMIN SERPL ELPH-MCNC: 3.3 G/DL — SIGNIFICANT CHANGE UP (ref 3.3–5)
ALP SERPL-CCNC: 106 U/L — SIGNIFICANT CHANGE UP (ref 40–120)
ALT FLD-CCNC: 33 U/L — SIGNIFICANT CHANGE UP (ref 12–78)
ANION GAP SERPL CALC-SCNC: 7 MMOL/L — SIGNIFICANT CHANGE UP (ref 5–17)
AST SERPL-CCNC: 21 U/L — SIGNIFICANT CHANGE UP (ref 15–37)
BASOPHILS # BLD AUTO: 0.1 K/UL — SIGNIFICANT CHANGE UP (ref 0–0.2)
BASOPHILS NFR BLD AUTO: 1.2 % — SIGNIFICANT CHANGE UP (ref 0–2)
BILIRUB SERPL-MCNC: 0.8 MG/DL — SIGNIFICANT CHANGE UP (ref 0.2–1.2)
BUN SERPL-MCNC: 33 MG/DL — HIGH (ref 7–23)
CALCIUM SERPL-MCNC: 9.2 MG/DL — SIGNIFICANT CHANGE UP (ref 8.5–10.1)
CHLORIDE SERPL-SCNC: 95 MMOL/L — LOW (ref 96–108)
CO2 SERPL-SCNC: 32 MMOL/L — HIGH (ref 22–31)
CREAT SERPL-MCNC: 1.13 MG/DL — SIGNIFICANT CHANGE UP (ref 0.5–1.3)
EOSINOPHIL # BLD AUTO: 0.1 K/UL — SIGNIFICANT CHANGE UP (ref 0–0.5)
EOSINOPHIL NFR BLD AUTO: 1.1 % — SIGNIFICANT CHANGE UP (ref 0–6)
GLUCOSE SERPL-MCNC: 131 MG/DL — HIGH (ref 70–99)
HBA1C BLD-MCNC: 7.7 % — HIGH (ref 4–5.6)
HCT VFR BLD CALC: 41.9 % — SIGNIFICANT CHANGE UP (ref 34.5–45)
HGB BLD-MCNC: 13.4 G/DL — SIGNIFICANT CHANGE UP (ref 11.5–15.5)
LYMPHOCYTES # BLD AUTO: 2.7 K/UL — SIGNIFICANT CHANGE UP (ref 1–3.3)
LYMPHOCYTES # BLD AUTO: 26.2 % — SIGNIFICANT CHANGE UP (ref 13–44)
MAGNESIUM SERPL-MCNC: 2.4 MG/DL — SIGNIFICANT CHANGE UP (ref 1.6–2.6)
MAGNESIUM SERPL-MCNC: 2.4 MG/DL — SIGNIFICANT CHANGE UP (ref 1.6–2.6)
MCHC RBC-ENTMCNC: 26.8 PG — LOW (ref 27–34)
MCHC RBC-ENTMCNC: 32 GM/DL — SIGNIFICANT CHANGE UP (ref 32–36)
MCV RBC AUTO: 83.6 FL — SIGNIFICANT CHANGE UP (ref 80–100)
MONOCYTES # BLD AUTO: 1.1 K/UL — HIGH (ref 0–0.9)
MONOCYTES NFR BLD AUTO: 11.3 % — SIGNIFICANT CHANGE UP (ref 2–14)
NEUTROPHILS # BLD AUTO: 6.1 K/UL — SIGNIFICANT CHANGE UP (ref 1.8–7.4)
NEUTROPHILS NFR BLD AUTO: 60.2 % — SIGNIFICANT CHANGE UP (ref 43–77)
PHOSPHATE SERPL-MCNC: 4.2 MG/DL — SIGNIFICANT CHANGE UP (ref 2.5–4.5)
PLATELET # BLD AUTO: 246 K/UL — SIGNIFICANT CHANGE UP (ref 150–400)
POTASSIUM SERPL-MCNC: 3.9 MMOL/L — SIGNIFICANT CHANGE UP (ref 3.5–5.3)
POTASSIUM SERPL-MCNC: 3.9 MMOL/L — SIGNIFICANT CHANGE UP (ref 3.5–5.3)
POTASSIUM SERPL-SCNC: 3.9 MMOL/L — SIGNIFICANT CHANGE UP (ref 3.5–5.3)
POTASSIUM SERPL-SCNC: 3.9 MMOL/L — SIGNIFICANT CHANGE UP (ref 3.5–5.3)
PROT SERPL-MCNC: 6.9 GM/DL — SIGNIFICANT CHANGE UP (ref 6–8.3)
RBC # BLD: 5.01 M/UL — SIGNIFICANT CHANGE UP (ref 3.8–5.2)
RBC # FLD: 15.5 % — HIGH (ref 10.3–14.5)
SODIUM SERPL-SCNC: 134 MMOL/L — LOW (ref 135–145)
TROPONIN I SERPL-MCNC: 0.07 NG/ML — HIGH (ref 0.01–0.04)
TSH SERPL-MCNC: 0.84 UIU/ML — SIGNIFICANT CHANGE UP (ref 0.36–3.74)
WBC # BLD: 10.2 K/UL — SIGNIFICANT CHANGE UP (ref 3.8–10.5)
WBC # FLD AUTO: 10.2 K/UL — SIGNIFICANT CHANGE UP (ref 3.8–10.5)

## 2017-09-07 PROCEDURE — 93283 PRGRMG EVAL IMPLANTABLE DFB: CPT | Mod: 26

## 2017-09-07 PROCEDURE — 99223 1ST HOSP IP/OBS HIGH 75: CPT

## 2017-09-07 PROCEDURE — 70450 CT HEAD/BRAIN W/O DYE: CPT | Mod: 26

## 2017-09-07 PROCEDURE — 93010 ELECTROCARDIOGRAM REPORT: CPT

## 2017-09-07 RX ORDER — FUROSEMIDE 40 MG
40 TABLET ORAL DAILY
Qty: 0 | Refills: 0 | Status: DISCONTINUED | OUTPATIENT
Start: 2017-09-07 | End: 2017-09-12

## 2017-09-07 RX ORDER — INSULIN LISPRO 100/ML
VIAL (ML) SUBCUTANEOUS
Qty: 0 | Refills: 0 | Status: DISCONTINUED | OUTPATIENT
Start: 2017-09-07 | End: 2017-09-12

## 2017-09-07 RX ORDER — ACETAMINOPHEN 500 MG
650 TABLET ORAL EVERY 6 HOURS
Qty: 0 | Refills: 0 | Status: DISCONTINUED | OUTPATIENT
Start: 2017-09-07 | End: 2017-09-12

## 2017-09-07 RX ORDER — SACUBITRIL AND VALSARTAN 24; 26 MG/1; MG/1
1 TABLET, FILM COATED ORAL
Qty: 0 | Refills: 0 | Status: DISCONTINUED | OUTPATIENT
Start: 2017-09-07 | End: 2017-09-12

## 2017-09-07 RX ORDER — DEXTROSE 50 % IN WATER 50 %
25 SYRINGE (ML) INTRAVENOUS ONCE
Qty: 0 | Refills: 0 | Status: DISCONTINUED | OUTPATIENT
Start: 2017-09-07 | End: 2017-09-12

## 2017-09-07 RX ORDER — POTASSIUM CHLORIDE 20 MEQ
40 PACKET (EA) ORAL ONCE
Qty: 0 | Refills: 0 | Status: COMPLETED | OUTPATIENT
Start: 2017-09-07 | End: 2017-09-07

## 2017-09-07 RX ORDER — ALBUTEROL 90 UG/1
2 AEROSOL, METERED ORAL EVERY 6 HOURS
Qty: 0 | Refills: 0 | Status: DISCONTINUED | OUTPATIENT
Start: 2017-09-07 | End: 2017-09-12

## 2017-09-07 RX ORDER — SODIUM CHLORIDE 9 MG/ML
1000 INJECTION, SOLUTION INTRAVENOUS
Qty: 0 | Refills: 0 | Status: DISCONTINUED | OUTPATIENT
Start: 2017-09-07 | End: 2017-09-12

## 2017-09-07 RX ORDER — METOPROLOL TARTRATE 50 MG
50 TABLET ORAL
Qty: 0 | Refills: 0 | Status: DISCONTINUED | OUTPATIENT
Start: 2017-09-07 | End: 2017-09-09

## 2017-09-07 RX ORDER — FUROSEMIDE 40 MG
40 TABLET ORAL ONCE
Qty: 0 | Refills: 0 | Status: COMPLETED | OUTPATIENT
Start: 2017-09-07 | End: 2017-09-07

## 2017-09-07 RX ORDER — POTASSIUM CHLORIDE 20 MEQ
40 PACKET (EA) ORAL EVERY 4 HOURS
Qty: 0 | Refills: 0 | Status: COMPLETED | OUTPATIENT
Start: 2017-09-07 | End: 2017-09-07

## 2017-09-07 RX ORDER — DEXTROSE 50 % IN WATER 50 %
1 SYRINGE (ML) INTRAVENOUS ONCE
Qty: 0 | Refills: 0 | Status: DISCONTINUED | OUTPATIENT
Start: 2017-09-07 | End: 2017-09-12

## 2017-09-07 RX ORDER — HEPARIN SODIUM 5000 [USP'U]/ML
5000 INJECTION INTRAVENOUS; SUBCUTANEOUS EVERY 8 HOURS
Qty: 0 | Refills: 0 | Status: DISCONTINUED | OUTPATIENT
Start: 2017-09-07 | End: 2017-09-12

## 2017-09-07 RX ORDER — GLUCAGON INJECTION, SOLUTION 0.5 MG/.1ML
1 INJECTION, SOLUTION SUBCUTANEOUS ONCE
Qty: 0 | Refills: 0 | Status: DISCONTINUED | OUTPATIENT
Start: 2017-09-07 | End: 2017-09-12

## 2017-09-07 RX ORDER — DEXTROSE 50 % IN WATER 50 %
12.5 SYRINGE (ML) INTRAVENOUS ONCE
Qty: 0 | Refills: 0 | Status: DISCONTINUED | OUTPATIENT
Start: 2017-09-07 | End: 2017-09-12

## 2017-09-07 RX ORDER — BUDESONIDE AND FORMOTEROL FUMARATE DIHYDRATE 160; 4.5 UG/1; UG/1
2 AEROSOL RESPIRATORY (INHALATION)
Qty: 0 | Refills: 0 | Status: DISCONTINUED | OUTPATIENT
Start: 2017-09-07 | End: 2017-09-12

## 2017-09-07 RX ORDER — SIMVASTATIN 20 MG/1
40 TABLET, FILM COATED ORAL AT BEDTIME
Qty: 0 | Refills: 0 | Status: DISCONTINUED | OUTPATIENT
Start: 2017-09-07 | End: 2017-09-12

## 2017-09-07 RX ADMIN — BUDESONIDE AND FORMOTEROL FUMARATE DIHYDRATE 2 PUFF(S): 160; 4.5 AEROSOL RESPIRATORY (INHALATION) at 08:26

## 2017-09-07 RX ADMIN — SACUBITRIL AND VALSARTAN 1 TABLET(S): 24; 26 TABLET, FILM COATED ORAL at 06:30

## 2017-09-07 RX ADMIN — SACUBITRIL AND VALSARTAN 1 TABLET(S): 24; 26 TABLET, FILM COATED ORAL at 18:27

## 2017-09-07 RX ADMIN — Medication 40 MILLIEQUIVALENT(S): at 01:45

## 2017-09-07 RX ADMIN — Medication 40 MILLIEQUIVALENT(S): at 18:27

## 2017-09-07 RX ADMIN — Medication 50 MILLIGRAM(S): at 18:27

## 2017-09-07 RX ADMIN — Medication 50 MILLIGRAM(S): at 06:30

## 2017-09-07 RX ADMIN — Medication 1: at 12:01

## 2017-09-07 RX ADMIN — Medication 325 MILLIGRAM(S): at 01:47

## 2017-09-07 RX ADMIN — AMIODARONE HYDROCHLORIDE 16.67 MG/MIN: 400 TABLET ORAL at 23:21

## 2017-09-07 RX ADMIN — SIMVASTATIN 40 MILLIGRAM(S): 20 TABLET, FILM COATED ORAL at 22:09

## 2017-09-07 RX ADMIN — Medication 40 MILLIGRAM(S): at 06:30

## 2017-09-07 RX ADMIN — HEPARIN SODIUM 5000 UNIT(S): 5000 INJECTION INTRAVENOUS; SUBCUTANEOUS at 06:30

## 2017-09-07 RX ADMIN — HEPARIN SODIUM 5000 UNIT(S): 5000 INJECTION INTRAVENOUS; SUBCUTANEOUS at 18:26

## 2017-09-07 RX ADMIN — Medication 40 MILLIEQUIVALENT(S): at 22:09

## 2017-09-07 NOTE — CONSULT NOTE ADULT - ATTENDING COMMENTS
This patient is a 57 yr old, Salvadorean speaking, female w/ a history of cardiomyopathy and EF 20% w/ single chamber ICD who has had recurrent VT.  She had been on Amiodarone but there may have been non-compliance.  She has had documented pause dependent PMVT with ICD shocks.    Her QRS is narrow and she has good conduction to the ventricle from the atrium.    I have recommended continued IV amiodarone over the next couple of days with tentative plan for upgrade to Dual Chamber ICD from Single Chamber.  The risks and benefits were explained to the patient and her daughter in detail and they are willing to proceed.

## 2017-09-07 NOTE — H&P ADULT - PROBLEM SELECTOR PLAN 1
most likely due to ventricular tachyarrhythmia, but given the presence of headache prior to LOC r/o neurologic etiology  Admit to CCU for monitoring for ventricular tachyarrythmia  -continue amiodarone drip  - currently sinus lillian on telemonitor with 8 beats of vtach earlier on arrival.  - consult cardio Dr. Tucker  - consult EP - Maccaro for AICD interrogation  - CXR - cardiomegaly, no acute disease  - CT head - report pending. most likely due to ventricular tachyarrhythmia, but given the presence of headache prior to LOC r/o neurologic etiology  Admit to CCU for monitoring for ventricular tachyarrhythmia  -continue amiodarone drip  - currently sinus lillian on tele monitor with 8 beats of vtach earlier on arrival.  - consult cardio Dr. Tucker   - consult EP - Maccaro for AICD interrogation  - CXR - cardiomegaly, no acute disease  - CT head - report pending. most likely due to ventricular tachyarrhythmia, but given the presence of headache prior to LOC r/o neurologic etiology  Admit to CCU for monitoring for ventricular tachyarrhythmia  -continue amiodarone drip  - currently sinus lillian on tele monitor with 8 beats of vtach earlier on arrival.  - consult cardio Dr. Tucker   - consult EP - Maccaro for AICD interrogation  - CXR - cardiomegaly, no acute disease  - CT head - neg for intracranial hemorrhage A) 8 beat run of VT while in ED x 2  B) syncope at home most likely due to ventricular tachyarrhythmia, but given the presence of headache prior to LOC r/o neurologic etiology  Admit to CCU for monitoring for ventricular tachyarrhythmia  -continue amiodarone drip  - currently sinus lillian on tele monitor with 8 beats of vtach earlier on arrival.  - consult cardio Dr. Tucker   - consult EP - Maccaro for AICD interrogation  - CXR - cardiomegaly, no acute disease  - CT head - neg for intracranial hemorrhage  - keep K >4 ( current replacement changed from IV as too painful to po which was given after 2nd run of VT)  keep Mg >2  Once replaced, check K and Mg again  If repeat runs of VT, give Mg 1 gram IV empirically  pacer pads

## 2017-09-07 NOTE — CONSULT NOTE ADULT - ASSESSMENT
VT, NICM with EF 20%, s/p ICD shocks in the setting of medication non-compliance.  It is unclear when she last took Amiodarone.    Continue CCU monitoring.  Amiodarone IV loading.  ICD lower rate increased to 60 bpm.  Keep lytes repleted.  Eventual change to PO Amio.  Continue current meds.

## 2017-09-07 NOTE — H&P ADULT - ATTENDING COMMENTS
I spoke briefly w pt in [place].  hx and exam and A/P as edited above  CT head neg and CXR + CM    Dizziness now and this c/o dizziness preceded her syncope at home    VT, on IV amio and electrolyte management, check TSH and EP to interrogate device to see if AICD fired ( likely given c/o pain that family responded to)

## 2017-09-07 NOTE — PROCEDURE NOTE - ADDITIONAL PROCEDURE DETAILS
2 episodes of Torsades that were terminated with 24.8 J shock, first episode on 9/6/2017 and the second episode was on 9/7/2017.  Pt is now on Amiodarone drip 2 episodes of Torsades that were terminated with 24.8 J shock Had ATP first with both evetns, first episode on 9/6/2017 and the second episode was on 9/7/2017.  Pt is now on Amiodarone drip

## 2017-09-07 NOTE — H&P ADULT - HISTORY OF PRESENT ILLNESS
57F w/ PMH of HTN, HLD, DM, nonobstructive CAD, systolic HF (EF 20% June 2017) s/p AICD, s/p recent cardiac cath 8/2017 with normal coronaries, comes in with episode of dizziness and loss of consciousness at home earlier today.  Patient states that she was laying in bed and she screamed out "it hurts!." and then lost consciousness.  Her daughter and her  heard her scream and they ran upstairs.  As per the patient they told her that she lost consciousness for approx 3-4 minutes.  She reports that she had a bad headache prior to losing consiousness.  She denies chest pain and sob, but also c/o occasional palpitations.  She thinks that she felt the AICD fire.  Patient was recently admitted and discharged on 8/24 for the the same complaints.  While in the ED, patient was noted on the tele monitor to have 8 beats of vtach. Dr Granados was contacted and the patient was started on an amiodarone drip.  Patient reports that she feels better now, and currently denies headache. 57F w/ PMH of HTN, HLD, DM, nonobstructive CAD, systolic HF (EF 20% June 2017) s/p AICD, s/p recent cardiac cath 8/2017 with normal coronaries, comes in with episode of dizziness and loss of consciousness at home earlier today.    Patient states that she was laying in bed and she screamed out "it hurts!." and then lost consciousness.  Her daughter and her  heard her scream and they ran upstairs.  As per the patient they told her that she lost consciousness for approx 3-4 minutes.  She reports that she had a bad headache prior to losing consciousness.  She denies chest pain and sob, but also c/o occasional palpitations.  She thinks that she felt the AICD fire.  Patient was recently admitted and discharged on 8/24 for the the same complaints.  While in the ED, patient was noted on the tele monitor to have 8 beats of vtach. Dr Granados was contacted and the patient was started on an amiodarone drip.  Patient reports that she feels better now, and currently denies headache.      Later at time of my exam at 1:30 Am she c/o feeling dizzy.  No pain  As we were placing her on oxygen, she had another 8 beat run of VT

## 2017-09-07 NOTE — CONSULT NOTE ADULT - SUBJECTIVE AND OBJECTIVE BOX
Chief complaint of Dizziness, loss of consciousness (07 Sep 2017 00:22)    HPI:  56 yo female with dizziness, syncope and AICD shocks. Similar admit in August, cath was non obstructive. She was non compliant to amiodarone that was started. Today, while examining her  she had similar dizziness VT on monitor and AICD shock.     PAST MEDICAL & SURGICAL HISTORY:  HTN (hypertension)  HLD (hyperlipidemia)  Asthma  DM (diabetes mellitus)  CAD (coronary artery disease)  History of cholecystectomy  Artificial cardiac pacemaker Chief complaint of Dizziness, loss of consciousness (07 Sep 2017 00:22)    HPI:  58 yo female with dizziness, syncope and AICD shocks. Similar admit in August, cath was non obstructive. She was non compliant to amiodarone that was started. Today, while examining her  she had similar dizziness, VT on monitor and AICD shock.     9/7/17: EP asked to consult s/p ICD shock.  Appropriate shock delivered.  TELE: sinus lillian 50's with frequent ectopy, NSVT. See ICD interrogation.    PAST MEDICAL & SURGICAL HISTORY:  cardiomyopathy s/p AICD  HTN (hypertension)  HLD (hyperlipidemia)  Asthma  DM (diabetes mellitus)  CAD (coronary artery disease)    History of cholecystectomy  AICD    MEDICATIONS  (STANDING):  amiodarone Infusion 1 mG/Min (33.333 mL/Hr) IV Continuous <Continuous>  amiodarone Infusion 0.5 mG/Min (16.667 mL/Hr) IV Continuous <Continuous>  sacubitril 24 mG/valsartan 26 mG 1 Tablet(s) Oral two times a day  simvastatin 40 milliGRAM(s) Oral at bedtime  metoprolol 50 milliGRAM(s) Oral two times a day  buDESOnide 160 MICROgram(s)/formoterol 4.5 MICROgram(s) Inhaler 2 Puff(s) Inhalation two times a day  furosemide    Tablet 40 milliGRAM(s) Oral daily  heparin  Injectable 5000 Unit(s) SubCutaneous every 8 hours  insulin lispro (HumaLOG) corrective regimen sliding scale   SubCutaneous three times a day before meals  dextrose 5%. 1000 milliLiter(s) (50 mL/Hr) IV Continuous <Continuous>  dextrose 50% Injectable 12.5 Gram(s) IV Push once  dextrose 50% Injectable 25 Gram(s) IV Push once  dextrose 50% Injectable 25 Gram(s) IV Push once  furosemide   Injectable 40 milliGRAM(s) IV Push once  potassium chloride    Tablet ER 40 milliEquivalent(s) Oral every 4 hours    MEDICATIONS  (PRN):  ALBUTerol    90 MICROgram(s) HFA Inhaler 2 Puff(s) Inhalation every 6 hours PRN Shortness of Breath and/or Wheezing  acetaminophen   Tablet 650 milliGRAM(s) Oral every 6 hours PRN For Temp greater than 38 C (100.4 F)  acetaminophen   Tablet. 650 milliGRAM(s) Oral every 6 hours PRN Mild Pain (1 - 3)  dextrose Gel 1 Dose(s) Oral once PRN Blood Glucose LESS THAN 70 milliGRAM(s)/deciliter  glucagon  Injectable 1 milliGRAM(s) IntraMuscular once PRN Glucose LESS THAN 70 milligrams/deciliter    Allergies    No Known Allergies      PREVIOUS CARDIAC WORKUP:      Echo: 6/2017 Severely reduced LVEF of 20%.    Cardiac Cath:  8/24/17 - Non obstructive    ICU Vital Signs Last 24 Hrs  T(C): 36.5 (07 Sep 2017 08:57), Max: 36.9 (06 Sep 2017 19:19)  T(F): 97.7 (07 Sep 2017 08:57), Max: 98.4 (06 Sep 2017 19:19)  HR: 70 (07 Sep 2017 08:26) (56 - 72)  BP: 153/137 (07 Sep 2017 08:00) (89/74 - 153/137)  BP(mean): 141 (07 Sep 2017 08:00) (76 - 141)  ABP: --  ABP(mean): --  RR: 20 (07 Sep 2017 08:00) (16 - 20)  SpO2: 100% (07 Sep 2017 06:00) (96% - 100%)                          13.4   10.2  )-----------( 246      ( 07 Sep 2017 05:00 )             41.9     09-07    134<L>  |  95<L>  |  33<H>  ----------------------------<  131<H>  3.9   |  32<H>  |  1.13    Ca    9.2      07 Sep 2017 05:00  Phos  4.2     09-07  Mg     2.4     09-07    TPro  6.9  /  Alb  3.3  /  TBili  0.8  /  DBili  x   /  AST  21  /  ALT  33  /  AlkPhos  106  09-07  CARDIAC MARKERS ( 06 Sep 2017 23:40 )  0.075 ng/mL / x     / x     / x     / x      CARDIAC MARKERS ( 06 Sep 2017 20:15 )  0.077 ng/mL / x     / 59 U/L / x     / x            REVIEW OF SYSTEMS:    CONSTITUTIONAL: No weakness, fevers or chills  EYES/ENT: No visual changes;  No vertigo or throat pain   NECK: No pain or stiffness  RESPIRATORY: No cough, wheezing, hemoptysis; +shortness of breath  CARDIOVASCULAR: No chest pain or palpitations  GASTROINTESTINAL: No abdominal or epigastric pain. No nausea, vomiting, or hematemesis; No diarrhea or constipation. No melena or hematochezia.  GENITOURINARY: No dysuria, frequency or hematuria  NEUROLOGICAL: +dizziness,  No numbness or weakness  SKIN: No itching, burning, rashes, or lesions   All other review of systems is negative unless indicated above    PHYSICAL EXAM:  General: WN/WD NAD  HEENT: NC/AT, neck supple, no JVD, EOMI, PERRLA  Neurology: A&Ox3, nonfocal, ROBERTS x 4  Respiratory: CTA B/L  CV: S1S2, no murmurs, rubs or gallops  Abdominal: Soft, NT, ND +BS  Extremities: No edema, + peripheral pulses

## 2017-09-07 NOTE — H&P ADULT - ASSESSMENT
57F w/ PMH of HTN, HLD, DM, nonobstructive CAD, systolic HF (EF 20% June 2017) s/p AICD, s/p recent cardiac cath 8/2017 with normal coronaries, comes in with episode of dizziness and loss of consciousness at home earlier today.

## 2017-09-07 NOTE — H&P ADULT - NSHPPHYSICALEXAM_GEN_ALL_CORE
Vital Signs Last 24 Hrs  T(C): 36.9 (06 Sep 2017 19:19), Max: 36.9 (06 Sep 2017 19:19)  T(F): 98.4 (06 Sep 2017 19:19), Max: 98.4 (06 Sep 2017 19:19)  HR: 57 (06 Sep 2017 22:15) (57 - 67)  BP: 89/74 (06 Sep 2017 22:15) (89/74 - 127/68)  BP(mean): --  RR: 18 (06 Sep 2017 22:15) (16 - 20)  SpO2: 99% (06 Sep 2017 22:15) (96% - 99%)

## 2017-09-07 NOTE — H&P ADULT - PROBLEM SELECTOR PLAN 3
s/p cardiac cath 8/2107 with normal coronaries, no stents placed  hold metoprolol for now secondary to sinus bradycardia

## 2017-09-07 NOTE — CONSULT NOTE ADULT - SUBJECTIVE AND OBJECTIVE BOX
Patient is a 57y old  Female who presents with a chief complaint of Dizziness, loss of consciousness (07 Sep 2017 00:22)      HPI:  57F w/ PMH of HTN, HLD, DM, nonobstructive CAD, systolic HF (EF 20% June 2017) s/p AICD, s/p recent cardiac cath 8/2017 with normal coronaries, comes in with episode of dizziness and loss of consciousness at home earlier today.    Patient states that she was laying in bed and she screamed out "it hurts!." and then lost consciousness.  Her daughter and her  heard her scream and they ran upstairs.  As per the patient they told her that she lost consciousness for approx 3-4 minutes.  She reports that she had a bad headache prior to losing consciousness.  She denies chest pain and sob, but also c/o occasional palpitations.  She thinks that she felt the AICD fire.  Patient was recently admitted and discharged on 8/24 for the the same complaints.  While in the ED, patient was noted on the tele monitor to have 8 beats of vtach. Dr Granados was contacted and the patient was started on an amiodarone drip.  Patient reports that she feels better now, and currently denies headache.      Later at time of my exam at 1:30 Am she c/o feeling dizzy.  No pain  As we were placing her on oxygen, she had another 8 beat run of VT (07 Sep 2017 00:22)      PAST MEDICAL & SURGICAL HISTORY:  HTN (hypertension)  HLD (hyperlipidemia)  Asthma  DM (diabetes mellitus)  CAD (coronary artery disease)  History of cholecystectomy  Artificial cardiac pacemaker      PREVIOUS CARDIAC WORKUP:      Echo:  Stress Test:  Cardiac Cath:    ALLERGIES:    No Known Allergies       MEDICATIONS  (STANDING):  amiodarone Infusion 1 mG/Min (33.333 mL/Hr) IV Continuous <Continuous>  amiodarone Infusion 0.5 mG/Min (16.667 mL/Hr) IV Continuous <Continuous>  sacubitril 24 mG/valsartan 26 mG 1 Tablet(s) Oral two times a day  simvastatin 40 milliGRAM(s) Oral at bedtime  metoprolol 50 milliGRAM(s) Oral two times a day  buDESOnide 160 MICROgram(s)/formoterol 4.5 MICROgram(s) Inhaler 2 Puff(s) Inhalation two times a day  furosemide    Tablet 40 milliGRAM(s) Oral daily  heparin  Injectable 5000 Unit(s) SubCutaneous every 8 hours  insulin lispro (HumaLOG) corrective regimen sliding scale   SubCutaneous three times a day before meals  dextrose 5%. 1000 milliLiter(s) (50 mL/Hr) IV Continuous <Continuous>  dextrose 50% Injectable 12.5 Gram(s) IV Push once  dextrose 50% Injectable 25 Gram(s) IV Push once  dextrose 50% Injectable 25 Gram(s) IV Push once    MEDICATIONS  (PRN):  ALBUTerol    90 MICROgram(s) HFA Inhaler 2 Puff(s) Inhalation every 6 hours PRN Shortness of Breath and/or Wheezing  acetaminophen   Tablet 650 milliGRAM(s) Oral every 6 hours PRN For Temp greater than 38 C (100.4 F)  acetaminophen   Tablet. 650 milliGRAM(s) Oral every 6 hours PRN Mild Pain (1 - 3)  dextrose Gel 1 Dose(s) Oral once PRN Blood Glucose LESS THAN 70 milliGRAM(s)/deciliter  glucagon  Injectable 1 milliGRAM(s) IntraMuscular once PRN Glucose LESS THAN 70 milligrams/deciliter      FAMILY HISTORY:  Family history of other heart disease (Mother)        SOCIAL HISTORY:  .scl     ROS:     .ros2    Vital Signs Last 24 Hrs  T(C): 36.5 (07 Sep 2017 08:57), Max: 36.9 (06 Sep 2017 19:19)  T(F): 97.7 (07 Sep 2017 08:57), Max: 98.4 (06 Sep 2017 19:19)  HR: 70 (07 Sep 2017 08:26) (56 - 72)  BP: 153/137 (07 Sep 2017 08:00) (89/74 - 153/137)  BP(mean): 141 (07 Sep 2017 08:00) (76 - 141)  RR: 20 (07 Sep 2017 08:00) (16 - 20)  SpO2: 100% (07 Sep 2017 06:00) (96% - 100%)    I&O's Summary      PHYSICAL EXAM:    General Appearance:    Comfortable, AAO X 3, in no distress.   HEENT:                       Atraumatic, PERRLA, EOMI, conjunctiva clear.   Neck:                          Supple, no adenopathy, no thyromegaly, no JVD, no carotid bruit  Back:                          Symmetric, no CV angle fullness or tenderness, no soft tissue tenderness.  Chest:                         Clear to auscultation, no wheezes, rales or rhonchi, symmetric air entry, no tachypnea, retractions or cyanosis  Heart:                          S1, S2 normal, no gallop, no murmur.  Abdomen:                    Soft, non-tender, bowel sounds active. No palpable masses.   Extremities:                 no cyanosis, no edema, no joint swelling. Peripheral pulses normal  Skin:                           Skin color, texture normal. No rashes   Neurologic:                  Grossly cranial nerves intact, sensory and motor normal.      TELEMETRY:    ECG:    LABS:                          13.4   10.2  )-----------( 246      ( 07 Sep 2017 05:00 )             41.9     09-07    134<L>  |  95<L>  |  33<H>  ----------------------------<  131<H>  3.9   |  32<H>  |  1.13    Ca    9.2      07 Sep 2017 05:00  Phos  4.2     09-07  Mg     2.4     09-07    TPro  6.9  /  Alb  3.3  /  TBili  0.8  /  DBili  x   /  AST  21  /  ALT  33  /  AlkPhos  106  09-07        09-06 @ 23:40  Trop-I  0.075  CK      --  CK-MB   --    09-06 @ 20:15  Trop-I  0.077  CK      59  CK-MB   --    Pro BNP  1100 09-06 @ 20:15  D Dimer  -- 09-06 @ 20:15    PT/INR - ( 06 Sep 2017 20:15 )   PT: 12.7 sec;   INR: 1.17 ratio         PTT - ( 06 Sep 2017 20:15 )  PTT:28.4 sec          RADIOLOGY & ADDITIONAL STUDIES: Chief complaint of Dizziness, loss of consciousness (07 Sep 2017 00:22)    HPI:  56 yo female with dizziness, syncope and AICD shocks. Similar admit in August, cath was non obstructive. She was non compliant to amiodarone that was started. Today, while examining her  she had similar dizziness VT on monitor and AICD shock.       PAST MEDICAL & SURGICAL HISTORY:  HTN (hypertension)  HLD (hyperlipidemia)  Asthma  DM (diabetes mellitus)  Non obst CAD (coronary artery disease)  History of cholecystectomy  Artificial cardiac pacemaker      PREVIOUS CARDIAC WORKUP:      Echo:  Severe reduced EF    Cardiac Cath:  8/24/17 - Non obstructive    ALLERGIES:    No Known Allergies       MEDICATIONS  (STANDING):  amiodarone Infusion 1 mG/Min (33.333 mL/Hr) IV Continuous <Continuous>  amiodarone Infusion 0.5 mG/Min (16.667 mL/Hr) IV Continuous <Continuous>  sacubitril 24 mG/valsartan 26 mG 1 Tablet(s) Oral two times a day  simvastatin 40 milliGRAM(s) Oral at bedtime  metoprolol 50 milliGRAM(s) Oral two times a day  buDESOnide 160 MICROgram(s)/formoterol 4.5 MICROgram(s) Inhaler 2 Puff(s) Inhalation two times a day  furosemide    Tablet 40 milliGRAM(s) Oral daily  heparin  Injectable 5000 Unit(s) SubCutaneous every 8 hours  insulin lispro (HumaLOG) corrective regimen sliding scale   SubCutaneous three times a day before meals  dextrose 5%. 1000 milliLiter(s) (50 mL/Hr) IV Continuous <Continuous>  dextrose 50% Injectable 12.5 Gram(s) IV Push once  dextrose 50% Injectable 25 Gram(s) IV Push once  dextrose 50% Injectable 25 Gram(s) IV Push once    MEDICATIONS  (PRN):  ALBUTerol    90 MICROgram(s) HFA Inhaler 2 Puff(s) Inhalation every 6 hours PRN Shortness of Breath and/or Wheezing  acetaminophen   Tablet 650 milliGRAM(s) Oral every 6 hours PRN For Temp greater than 38 C (100.4 F)  acetaminophen   Tablet. 650 milliGRAM(s) Oral every 6 hours PRN Mild Pain (1 - 3)  dextrose Gel 1 Dose(s) Oral once PRN Blood Glucose LESS THAN 70 milliGRAM(s)/deciliter  glucagon  Injectable 1 milliGRAM(s) IntraMuscular once PRN Glucose LESS THAN 70 milligrams/deciliter      FAMILY HISTORY:  Family history of other heart disease (Mother)        SOCIAL HISTORY:  Nonsmoker. No ETOH abuse. No illicit drugs.     ROS:     Detailed ten system ROS was performed and was negative except for history as eluded to above.    no fever  no chills  no nausea  no vomiting  no diarrhea  no constipation  no melena  no hematochezia  no chest pain  no palpitations  + sob  + dyspnea  no cough  no wheezing  no anorexia  no headache  + dizziness  + syncope  no weakness  no myalgia  no dysuria  no polyuria  no hematuria       Vital Signs Last 24 Hrs  T(C): 36.5 (07 Sep 2017 08:57), Max: 36.9 (06 Sep 2017 19:19)  T(F): 97.7 (07 Sep 2017 08:57), Max: 98.4 (06 Sep 2017 19:19)  HR: 70 (07 Sep 2017 08:26) (56 - 72)  BP: 153/137 (07 Sep 2017 08:00) (89/74 - 153/137)  BP(mean): 141 (07 Sep 2017 08:00) (76 - 141)  RR: 20 (07 Sep 2017 08:00) (16 - 20)  SpO2: 100% (07 Sep 2017 06:00) (96% - 100%)    I&O's Summary      PHYSICAL EXAM:    General Appearance:    Comfortable, AAO X 3, in no distress. Obese  HEENT:                       Atraumatic, PERRLA, EOMI, conjunctiva clear.   Neck:                          Supple, no adenopathy, no thyromegaly, no JVD, no carotid bruit  Back:                          Symmetric, no CV angle fullness or tenderness, no soft tissue tenderness.  Chest:                         Basal rales or rhonchi, symmetric air entry, no tachypnea, retractions or cyanosis  Heart:                          S1, S2 normal, no gallop, no murmur.  Abdomen:                    Soft, non-tender, bowel sounds active. No palpable masses.   Extremities:                 no cyanosis, no edema, no joint swelling. Peripheral pulses normal  Skin:                           Skin color, texture normal. No rashes   Neurologic:                  Grossly cranial nerves intact, sensory and motor normal.      TELEMETRY:  NSVT    ECG:  NSR, IVCD    LABS:                        13.4   10.2  )-----------( 246      ( 07 Sep 2017 05:00 )             41.9     09-07    134<L>  |  95<L>  |  33<H>  ----------------------------<  131<H>  3.9   |  32<H>  |  1.13    Ca    9.2      07 Sep 2017 05:00  Phos  4.2     09-07  Mg     2.4     09-07    TPro  6.9  /  Alb  3.3  /  TBili  0.8  /  DBili  x   /  AST  21  /  ALT  33  /  AlkPhos  106  09-07 09-06 @ 23:40  Trop-I  0.075  CK      --  CK-MB   --    09-06 @ 20:15  Trop-I  0.077  CK      59  CK-MB   --    Pro BNP  1100 09-06 @ 20:15  D Dimer  -- 09-06 @ 20:15    PT/INR - ( 06 Sep 2017 20:15 )   PT: 12.7 sec;   INR: 1.17 ratio         PTT - ( 06 Sep 2017 20:15 )  PTT:28.4 sec    RADIOLOGY & ADDITIONAL STUDIES:    CXR: Cardiomegaly

## 2017-09-07 NOTE — ED ADULT NURSE REASSESSMENT NOTE - NS ED NURSE REASSESS COMMENT FT1
Hospitalist at pt bedside at this time evaluating pt for admission. Pt aware as to plan for admission and reason for admission. Pt remains on bedside monitor with call bell within reach. Awaiting admission orders at this time. Will continue to monitor.

## 2017-09-07 NOTE — ED ADULT NURSE REASSESSMENT NOTE - NS ED NURSE REASSESS COMMENT FT1
Pt taken to and returned from CT on monitor, pt updated as to plan of care and status, pt on bedside monitor at this time. Pt awaiting admission orders to be completed at this time. Pt has no questions or complaints at this time. Call bell within reach. Will continue to monitor.

## 2017-09-07 NOTE — CONSULT NOTE ADULT - ASSESSMENT
VT  AICD shock  NICM    Suggest:    Amio load  EP f/u  Keep K > 4  Check mag  Compliance to meds VT  AICD shock  NICM    Suggest:    IV Amio load  EP f/u  Keep K > 4  Check mag and replete.  Compliance to meds  D/W  at bedside.

## 2017-09-08 LAB
ANION GAP SERPL CALC-SCNC: 5 MMOL/L — SIGNIFICANT CHANGE UP (ref 5–17)
BUN SERPL-MCNC: 30 MG/DL — HIGH (ref 7–23)
CALCIUM SERPL-MCNC: 9 MG/DL — SIGNIFICANT CHANGE UP (ref 8.5–10.1)
CHLORIDE SERPL-SCNC: 99 MMOL/L — SIGNIFICANT CHANGE UP (ref 96–108)
CO2 SERPL-SCNC: 33 MMOL/L — HIGH (ref 22–31)
CREAT SERPL-MCNC: 0.99 MG/DL — SIGNIFICANT CHANGE UP (ref 0.5–1.3)
GLUCOSE SERPL-MCNC: 144 MG/DL — HIGH (ref 70–99)
MAGNESIUM SERPL-MCNC: 2.4 MG/DL — SIGNIFICANT CHANGE UP (ref 1.6–2.6)
POTASSIUM SERPL-MCNC: 4.3 MMOL/L — SIGNIFICANT CHANGE UP (ref 3.5–5.3)
POTASSIUM SERPL-SCNC: 4.3 MMOL/L — SIGNIFICANT CHANGE UP (ref 3.5–5.3)
SODIUM SERPL-SCNC: 137 MMOL/L — SIGNIFICANT CHANGE UP (ref 135–145)

## 2017-09-08 RX ORDER — AMIODARONE HYDROCHLORIDE 400 MG/1
400 TABLET ORAL EVERY 8 HOURS
Qty: 0 | Refills: 0 | Status: DISCONTINUED | OUTPATIENT
Start: 2017-09-08 | End: 2017-09-12

## 2017-09-08 RX ADMIN — BUDESONIDE AND FORMOTEROL FUMARATE DIHYDRATE 2 PUFF(S): 160; 4.5 AEROSOL RESPIRATORY (INHALATION) at 08:48

## 2017-09-08 RX ADMIN — Medication 1: at 17:52

## 2017-09-08 RX ADMIN — BUDESONIDE AND FORMOTEROL FUMARATE DIHYDRATE 2 PUFF(S): 160; 4.5 AEROSOL RESPIRATORY (INHALATION) at 20:15

## 2017-09-08 RX ADMIN — SIMVASTATIN 40 MILLIGRAM(S): 20 TABLET, FILM COATED ORAL at 21:58

## 2017-09-08 RX ADMIN — SACUBITRIL AND VALSARTAN 1 TABLET(S): 24; 26 TABLET, FILM COATED ORAL at 06:33

## 2017-09-08 RX ADMIN — AMIODARONE HYDROCHLORIDE 400 MILLIGRAM(S): 400 TABLET ORAL at 21:57

## 2017-09-08 RX ADMIN — SACUBITRIL AND VALSARTAN 1 TABLET(S): 24; 26 TABLET, FILM COATED ORAL at 17:35

## 2017-09-08 RX ADMIN — HEPARIN SODIUM 5000 UNIT(S): 5000 INJECTION INTRAVENOUS; SUBCUTANEOUS at 21:57

## 2017-09-08 RX ADMIN — Medication 1: at 12:20

## 2017-09-08 RX ADMIN — Medication 50 MILLIGRAM(S): at 06:33

## 2017-09-08 RX ADMIN — HEPARIN SODIUM 5000 UNIT(S): 5000 INJECTION INTRAVENOUS; SUBCUTANEOUS at 06:33

## 2017-09-08 RX ADMIN — HEPARIN SODIUM 5000 UNIT(S): 5000 INJECTION INTRAVENOUS; SUBCUTANEOUS at 17:34

## 2017-09-08 RX ADMIN — Medication 50 MILLIGRAM(S): at 17:35

## 2017-09-08 NOTE — PROGRESS NOTE ADULT - SUBJECTIVE AND OBJECTIVE BOX
56 yo female with dizziness, syncope and AICD shocks. Similar admit in August, cath was non obstructive. She was non compliant to amiodarone that was started. On 9/7/17, while examining her  she had similar dizziness VT on monitor and AICD shock.     Today, she feels well. No new events. no further VT / AICD shocks.    PAST MEDICAL & SURGICAL HISTORY:  HTN (hypertension)  HLD (hyperlipidemia)  Asthma  DM (diabetes mellitus)  Non obst CAD (coronary artery disease)  History of cholecystectomy  Artificial cardiac pacemaker    PREVIOUS CARDIAC WORKUP:      Echo:  Severe reduced EF    Cardiac Cath:  8/24/17 - Non obstructive    Allergies:   No Known Allergies      MEDICATIONS  (STANDING):  amiodarone Infusion 1 mG/Min (33.333 mL/Hr) IV Continuous <Continuous>  sacubitril 24 mG/valsartan 26 mG 1 Tablet(s) Oral two times a day  simvastatin 40 milliGRAM(s) Oral at bedtime  metoprolol 50 milliGRAM(s) Oral two times a day  buDESOnide 160 MICROgram(s)/formoterol 4.5 MICROgram(s) Inhaler 2 Puff(s) Inhalation two times a day  furosemide    Tablet 40 milliGRAM(s) Oral daily  heparin  Injectable 5000 Unit(s) SubCutaneous every 8 hours  insulin lispro (HumaLOG) corrective regimen sliding scale   SubCutaneous three times a day before meals  dextrose 5%. 1000 milliLiter(s) (50 mL/Hr) IV Continuous <Continuous>  dextrose 50% Injectable 12.5 Gram(s) IV Push once  dextrose 50% Injectable 25 Gram(s) IV Push once  dextrose 50% Injectable 25 Gram(s) IV Push once    MEDICATIONS  (PRN):  ALBUTerol    90 MICROgram(s) HFA Inhaler 2 Puff(s) Inhalation every 6 hours PRN Shortness of Breath and/or Wheezing  acetaminophen   Tablet 650 milliGRAM(s) Oral every 6 hours PRN For Temp greater than 38 C (100.4 F)  acetaminophen   Tablet. 650 milliGRAM(s) Oral every 6 hours PRN Mild Pain (1 - 3)  dextrose Gel 1 Dose(s) Oral once PRN Blood Glucose LESS THAN 70 milliGRAM(s)/deciliter  glucagon  Injectable 1 milliGRAM(s) IntraMuscular once PRN Glucose LESS THAN 70 milligrams/deciliter      ROS:     Detailed ten system ROS was performed and was negative except for history as eluded to above.    no fever  no chills  no nausea  no vomiting  no diarrhea  no constipation  no melena  no hematochezia  no chest pain  no palpitations  no sob  no dyspnea  no cough  no wheezing  no anorexia  no headache  no dizziness  no syncope  no weakness  no myalgia  no dysuria  no polyuria  no hematuria     Vital Signs Last 24 Hrs  T(C): 36.7 (08 Sep 2017 08:54), Max: 36.9 (08 Sep 2017 01:00)  T(F): 98.1 (08 Sep 2017 08:54), Max: 98.4 (08 Sep 2017 01:00)  HR: 60 (08 Sep 2017 08:48) (57 - 66)  BP: 90/61 (08 Sep 2017 08:00) (83/69 - 149/100)  BP(mean): 69 (08 Sep 2017 08:00) (56 - 110)  RR: 20 (08 Sep 2017 08:00) (18 - 26)  SpO2: 96% (08 Sep 2017 08:00) (95% - 100%)    I&O's Summary    07 Sep 2017 07:01  -  08 Sep 2017 07:00  --------------------------------------------------------  IN: 1210 mL / OUT: 3550 mL / NET: -2340 mL    08 Sep 2017 07:01  -  08 Sep 2017 10:26  --------------------------------------------------------  IN: 0 mL / OUT: 200 mL / NET: -200 mL        PHYSICAL EXAM:    General Appearance:    Comfortable, AAO X 3, in no distress. Obese  HEENT:                       Atraumatic, PERRLA, EOMI, conjunctiva clear.   Neck:                          Supple, no adenopathy, no thyromegaly, no JVD, no carotid bruit  Back:                          Symmetric, no CV angle fullness or tenderness, no soft tissue tenderness.  Chest:                         Basal rales or rhonchi, symmetric air entry, no tachypnea, retractions or cyanosis  Heart:                          S1, S2 normal, no gallop, no murmur.  Abdomen:                    Soft, non-tender, bowel sounds active. No palpable masses.   Extremities:                 no cyanosis, no edema, no joint swelling. Peripheral pulses normal  Skin:                           Skin color, texture normal. No rashes   Neurologic:                  Grossly cranial nerves intact, sensory and motor normal.      TELEMETRY:  No further NSVT    ECG:  NSR, IVCD    LABS:                        13.4   10.2  )-----------( 246      ( 07 Sep 2017 05:00 )             41.9     09-08    137  |  99  |  30<H>  ----------------------------<  144<H>  4.3   |  33<H>  |  0.99    Ca    9.0      08 Sep 2017 05:42  Phos  4.2     09-07  Mg     2.4     09-08    TPro  6.9  /  Alb  3.3  /  TBili  0.8  /  DBili  x   /  AST  21  /  ALT  33  /  AlkPhos  106  09-07        PT/INR - ( 06 Sep 2017 20:15 )   PT: 12.7 sec;   INR: 1.17 ratio       PTT - ( 06 Sep 2017 20:15 )  PTT:28.4 sec      RADIOLOGY & ADDITIONAL STUDIES:    CXR: Cardiomegaly

## 2017-09-08 NOTE — PROGRESS NOTE ADULT - SUBJECTIVE AND OBJECTIVE BOX
58 yo female with PMH of HTN, HLD, DM, nonobstructive CAD, systolic HF (EF 20% June 2017) s/p AICD, s/p recent cardiac cath 8/2017 with normal coronaries, comes in with episode of dizziness and loss of consciousness at home earlier today.    Patient states that she was laying in bed and she screamed out "it hurts!." and then lost consciousness for approx 3-4 minutes.    While in the ED, patient was noted on the tele monitor to have 8 beats of VT. Patient has been non compliant with her Amiodarone treatment.  -started on Amiodarone loading IV    9.8: no distress  dyspnea improved, no shocks    REVIEW OF SYSTEMS:    CONSTITUTIONAL: No weakness, No fevers or chills  ENT: No ear ache, No sorethroat  NECK: No pain, No stiffness  RESPIRATORY: No cough, No wheezing, No hemoptysis; No dyspnea  CARDIOVASCULAR: No chest pain, No palpitations  GASTROINTESTINAL: No abd pain, No nausea, No vomiting, No hematemesis, No diarrhea or constipation. No melena, No hematochezia.  GENITOURINARY: No dysuria, No  hematuria  NEUROLOGICAL: No diplopia, No paresthesia, No motor dysfunction  MUSCULOSKELETAL: No arthralgia, No myalgia  SKIN: No rashes, or lesions   PSYCH: no anxiety, no suicidal ideation    All other review of systems is negative unless indicated above    Vital Signs Last 24 Hrs  T(C): 37.3 (08 Sep 2017 15:37), Max: 37.3 (08 Sep 2017 15:37)  T(F): 99.2 (08 Sep 2017 15:37), Max: 99.2 (08 Sep 2017 15:37)  HR: 63 (08 Sep 2017 15:17) (60 - 66)  BP: 104/71 (08 Sep 2017 15:17) (86/46 - 108/63)  BP(mean): 78 (08 Sep 2017 15:17) (54 - 78)  RR: 23 (08 Sep 2017 15:17) (16 - 26)  SpO2: 98% (08 Sep 2017 11:07) (95% - 98%)    PHYSICAL EXAM:    GENERAL: NAD, Well nourished  HEENT:  NC/AT, EOMI, PERRLA, No scleral icterus, Moist mucous membranes  NECK: Supple, No JVD  CNS:  Alert & Oriented X3, Motor Strength 5/5 B/L upper and lower extremities; DTRs 2+ intact   LUNG: Normal Breath sounds, +mild rales, No rhonchi, No wheezing  HEART: RRR; No murmurs, No rubs  ABDOMEN: +BS, ST/ND/NT  GENITOURINARY: Voiding, Bladder not distended  EXTREMITIES:  2+ Peripheral Pulses, No clubbing, No cyanosis, No tibial edema  MUSCULOSKELTAL: Joints normal ROM, No TTP, No effusion  VAGINAL: deferred  SKIN: no rashes  RECTAL: deferred, not indicated  BREAST: deferred                          13.4   10.2  )-----------( 246      ( 07 Sep 2017 05:00 )             41.9     09-08    137  |  99  |  30<H>  ----------------------------<  144<H>  4.3   |  33<H>  |  0.99    Ca    9.0      08 Sep 2017 05:42  Phos  4.2     09-07  Mg     2.4     09-08    TPro  6.9  /  Alb  3.3  /  TBili  0.8  /  DBili  x   /  AST  21  /  ALT  33  /  AlkPhos  106  09-07      MEDICATIONS  (STANDING):  amiodarone Infusion 1 mG/Min (33.333 mL/Hr) IV Continuous <Continuous>  sacubitril 24 mG/valsartan 26 mG 1 Tablet(s) Oral two times a day  simvastatin 40 milliGRAM(s) Oral at bedtime  metoprolol 50 milliGRAM(s) Oral two times a day  buDESOnide 160 MICROgram(s)/formoterol 4.5 MICROgram(s) Inhaler 2 Puff(s) Inhalation two times a day  furosemide    Tablet 40 milliGRAM(s) Oral daily  heparin  Injectable 5000 Unit(s) SubCutaneous every 8 hours  insulin lispro (HumaLOG) corrective regimen sliding scale   SubCutaneous three times a day before meals    MEDICATIONS  (PRN):  ALBUTerol    90 MICROgram(s) HFA Inhaler 2 Puff(s) Inhalation every 6 hours PRN Shortness of Breath and/or Wheezing  acetaminophen   Tablet 650 milliGRAM(s) Oral every 6 hours PRN For Temp greater than 38 C (100.4 F)  acetaminophen   Tablet. 650 milliGRAM(s) Oral every 6 hours PRN Mild Pain (1 - 3)  dextrose Gel 1 Dose(s) Oral once PRN Blood Glucose LESS THAN 70 milliGRAM(s)/deciliter  glucagon  Injectable 1 milliGRAM(s) IntraMuscular once PRN Glucose LESS THAN 70 milligrams/deciliter      all labs reviewed  all imaging reviewed    Assessment and Plan:    1. VT:  s/p AICD shocks  Amiodarone reloading  EP eval noted: for BIV upgrade monday    2. Acute on chronic systolic left Chf:  Lasix IV given  cw oral Lasix  cw BBlockers and ARB    3. DM:   Novolog SS    4. Asthma: stable
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
## 2017-09-09 RX ORDER — METOPROLOL TARTRATE 50 MG
25 TABLET ORAL EVERY 8 HOURS
Qty: 0 | Refills: 0 | Status: DISCONTINUED | OUTPATIENT
Start: 2017-09-10 | End: 2017-09-12

## 2017-09-09 RX ADMIN — Medication: at 13:07

## 2017-09-09 RX ADMIN — AMIODARONE HYDROCHLORIDE 400 MILLIGRAM(S): 400 TABLET ORAL at 13:14

## 2017-09-09 RX ADMIN — SACUBITRIL AND VALSARTAN 1 TABLET(S): 24; 26 TABLET, FILM COATED ORAL at 12:38

## 2017-09-09 RX ADMIN — Medication 50 MILLIGRAM(S): at 17:42

## 2017-09-09 RX ADMIN — AMIODARONE HYDROCHLORIDE 400 MILLIGRAM(S): 400 TABLET ORAL at 12:37

## 2017-09-09 RX ADMIN — Medication 50 MILLIGRAM(S): at 12:38

## 2017-09-09 RX ADMIN — BUDESONIDE AND FORMOTEROL FUMARATE DIHYDRATE 2 PUFF(S): 160; 4.5 AEROSOL RESPIRATORY (INHALATION) at 19:36

## 2017-09-09 RX ADMIN — SIMVASTATIN 40 MILLIGRAM(S): 20 TABLET, FILM COATED ORAL at 22:24

## 2017-09-09 RX ADMIN — HEPARIN SODIUM 5000 UNIT(S): 5000 INJECTION INTRAVENOUS; SUBCUTANEOUS at 22:24

## 2017-09-09 RX ADMIN — HEPARIN SODIUM 5000 UNIT(S): 5000 INJECTION INTRAVENOUS; SUBCUTANEOUS at 06:00

## 2017-09-09 RX ADMIN — AMIODARONE HYDROCHLORIDE 400 MILLIGRAM(S): 400 TABLET ORAL at 22:24

## 2017-09-09 RX ADMIN — SACUBITRIL AND VALSARTAN 1 TABLET(S): 24; 26 TABLET, FILM COATED ORAL at 17:41

## 2017-09-09 RX ADMIN — HEPARIN SODIUM 5000 UNIT(S): 5000 INJECTION INTRAVENOUS; SUBCUTANEOUS at 13:13

## 2017-09-09 NOTE — PROGRESS NOTE ADULT - SUBJECTIVE AND OBJECTIVE BOX
c/c: dizziness, loc    HPI: 58 yo female with PMH of HTN, HLD, DM, nonobstructive CAD, systolic HF (EF 20% June 2017) s/p AICD, s/p recent cardiac cath 8/2017 with normal coronaries, comes in with episode of dizziness and loss of consciousness at home earlier today.    She had AICD shocks and then lost consciousness for approx 3-4 minutes.    While in the ED, patient was noted on the tele monitor to have 8 beats of VT. Patient has been non compliant with her Amiodarone treatment.  -started on Amiodarone loading IV      9/9: pt seen and examined this am. Above reviewed. Was feeling dizzy. SBP was 80. No cp. No sob.    REVIEW OF SYSTEMS:all 10 systems reviewed and is as above otherwise negative.  Vital Signs Last 24 Hrs  T(C): 37.3 (08 Sep 2017 15:37), Max: 37.3 (08 Sep 2017 15:37)  T(F): 99.2 (08 Sep 2017 15:37), Max: 99.2 (08 Sep 2017 15:37)  HR: 61 (08 Sep 2017 20:16) (60 - 66)  BP: 107/58 (08 Sep 2017 20:00) (91/57 - 107/58)  BP(mean): 70 (08 Sep 2017 20:00) (61 - 78)  RR: 27 (08 Sep 2017 20:00) (17 - 27)  SpO2: 98% (08 Sep 2017 18:00) (98% - 98%)SpO2: 98% (08 Sep 2017 11:07) (95% - 98%)    PHYSICAL EXAM:    GENERAL: NAD, Well nourished  HEENT:  NC/AT, EOMI, PERRLA, No scleral icterus, Moist mucous membranes  NECK: Supple, No JVD  CNS:  Alert & Oriented X3, Motor Strength 5/5 B/L upper and lower extremities; DTRs 2+ intact   LUNG: Normal Breath sounds,decreased bs @ bases  HEART: RRR; No murmurs, No rubs  ABDOMEN: +BS, ST/ND/NT  GENITOURINARY: Voiding, Bladder not distended  EXTREMITIES:  2+ Peripheral Pulses, No clubbing, No cyanosis, No tibial edema  MUSCULOSKELTAL: Joints normal ROM, No TTP, No effusion  VAGINAL: deferred  SKIN: no rashes  RECTAL: deferred, not indicated  BREAST: deferred                          13.4   10.2  )-----------( 246      ( 07 Sep 2017 05:00 )             41.9     09-08    137  |  99  |  30<H>  ----------------------------<  144<H>  4.3   |  33<H>  |  0.99    Ca    9.0      08 Sep 2017 05:42  Phos  4.2     09-07  Mg     2.4     09-08    TPro  6.9  /  Alb  3.3  /  TBili  0.8  /  DBili  x   /  AST  21  /  ALT  33  /  AlkPhos  106  09-07      MEDICATIONS  (STANDING):  amiodarone Infusion 1 mG/Min (33.333 mL/Hr) IV Continuous <Continuous>  sacubitril 24 mG/valsartan 26 mG 1 Tablet(s) Oral two times a day  simvastatin 40 milliGRAM(s) Oral at bedtime  metoprolol 50 milliGRAM(s) Oral two times a day  buDESOnide 160 MICROgram(s)/formoterol 4.5 MICROgram(s) Inhaler 2 Puff(s) Inhalation two times a day  furosemide    Tablet 40 milliGRAM(s) Oral daily  heparin  Injectable 5000 Unit(s) SubCutaneous every 8 hours  insulin lispro (HumaLOG) corrective regimen sliding scale   SubCutaneous three times a day before meals    MEDICATIONS  (PRN):  ALBUTerol    90 MICROgram(s) HFA Inhaler 2 Puff(s) Inhalation every 6 hours PRN Shortness of Breath and/or Wheezing  acetaminophen   Tablet 650 milliGRAM(s) Oral every 6 hours PRN For Temp greater than 38 C (100.4 F)  acetaminophen   Tablet. 650 milliGRAM(s) Oral every 6 hours PRN Mild Pain (1 - 3)  dextrose Gel 1 Dose(s) Oral once PRN Blood Glucose LESS THAN 70 milliGRAM(s)/deciliter  glucagon  Injectable 1 milliGRAM(s) IntraMuscular once PRN Glucose LESS THAN 70 milligrams/deciliter      all labs reviewed  all imaging reviewed    Assessment and Plan:    1. VT:  s/p AICD shocks  Amiodarone reloading  EP eval noted: for BIV upgrade monday    2. Acute on chronic systolic left Chf:  -lasix held today for low bp  -overall seems to have improved  -d/w cardiology, will decrease bb to 25 tid due to symptomatic hypotension  -continue entresto    3. DM:   Novolog SS    4. Asthma: stable

## 2017-09-10 LAB
ANION GAP SERPL CALC-SCNC: 8 MMOL/L — SIGNIFICANT CHANGE UP (ref 5–17)
BUN SERPL-MCNC: 24 MG/DL — HIGH (ref 7–23)
CALCIUM SERPL-MCNC: 8.7 MG/DL — SIGNIFICANT CHANGE UP (ref 8.5–10.1)
CHLORIDE SERPL-SCNC: 100 MMOL/L — SIGNIFICANT CHANGE UP (ref 96–108)
CO2 SERPL-SCNC: 28 MMOL/L — SIGNIFICANT CHANGE UP (ref 22–31)
CREAT SERPL-MCNC: 0.93 MG/DL — SIGNIFICANT CHANGE UP (ref 0.5–1.3)
GLUCOSE SERPL-MCNC: 138 MG/DL — HIGH (ref 70–99)
MAGNESIUM SERPL-MCNC: 2.4 MG/DL — SIGNIFICANT CHANGE UP (ref 1.6–2.6)
PHOSPHATE SERPL-MCNC: 3.4 MG/DL — SIGNIFICANT CHANGE UP (ref 2.5–4.5)
POTASSIUM SERPL-MCNC: 4.3 MMOL/L — SIGNIFICANT CHANGE UP (ref 3.5–5.3)
POTASSIUM SERPL-SCNC: 4.3 MMOL/L — SIGNIFICANT CHANGE UP (ref 3.5–5.3)
SODIUM SERPL-SCNC: 136 MMOL/L — SIGNIFICANT CHANGE UP (ref 135–145)

## 2017-09-10 RX ORDER — DOCUSATE SODIUM 100 MG
100 CAPSULE ORAL THREE TIMES A DAY
Qty: 0 | Refills: 0 | Status: DISCONTINUED | OUTPATIENT
Start: 2017-09-10 | End: 2017-09-12

## 2017-09-10 RX ORDER — MUPIROCIN 20 MG/G
1 OINTMENT TOPICAL
Qty: 0 | Refills: 0 | Status: DISCONTINUED | OUTPATIENT
Start: 2017-09-10 | End: 2017-09-12

## 2017-09-10 RX ADMIN — Medication 100 MILLIGRAM(S): at 06:11

## 2017-09-10 RX ADMIN — Medication 25 MILLIGRAM(S): at 06:12

## 2017-09-10 RX ADMIN — Medication 100 MILLIGRAM(S): at 15:47

## 2017-09-10 RX ADMIN — Medication 650 MILLIGRAM(S): at 23:13

## 2017-09-10 RX ADMIN — AMIODARONE HYDROCHLORIDE 400 MILLIGRAM(S): 400 TABLET ORAL at 22:35

## 2017-09-10 RX ADMIN — MUPIROCIN 1 APPLICATION(S): 20 OINTMENT TOPICAL at 22:34

## 2017-09-10 RX ADMIN — Medication 25 MILLIGRAM(S): at 15:46

## 2017-09-10 RX ADMIN — Medication 25 MILLIGRAM(S): at 22:35

## 2017-09-10 RX ADMIN — HEPARIN SODIUM 5000 UNIT(S): 5000 INJECTION INTRAVENOUS; SUBCUTANEOUS at 15:46

## 2017-09-10 RX ADMIN — SACUBITRIL AND VALSARTAN 1 TABLET(S): 24; 26 TABLET, FILM COATED ORAL at 18:02

## 2017-09-10 RX ADMIN — HEPARIN SODIUM 5000 UNIT(S): 5000 INJECTION INTRAVENOUS; SUBCUTANEOUS at 06:12

## 2017-09-10 RX ADMIN — AMIODARONE HYDROCHLORIDE 400 MILLIGRAM(S): 400 TABLET ORAL at 06:11

## 2017-09-10 RX ADMIN — Medication 100 MILLIGRAM(S): at 22:35

## 2017-09-10 RX ADMIN — HEPARIN SODIUM 5000 UNIT(S): 5000 INJECTION INTRAVENOUS; SUBCUTANEOUS at 22:35

## 2017-09-10 RX ADMIN — SIMVASTATIN 40 MILLIGRAM(S): 20 TABLET, FILM COATED ORAL at 22:35

## 2017-09-10 RX ADMIN — BUDESONIDE AND FORMOTEROL FUMARATE DIHYDRATE 2 PUFF(S): 160; 4.5 AEROSOL RESPIRATORY (INHALATION) at 20:07

## 2017-09-10 RX ADMIN — SACUBITRIL AND VALSARTAN 1 TABLET(S): 24; 26 TABLET, FILM COATED ORAL at 06:11

## 2017-09-10 RX ADMIN — AMIODARONE HYDROCHLORIDE 400 MILLIGRAM(S): 400 TABLET ORAL at 15:45

## 2017-09-10 RX ADMIN — Medication 40 MILLIGRAM(S): at 11:08

## 2017-09-10 NOTE — PROGRESS NOTE ADULT - SUBJECTIVE AND OBJECTIVE BOX
c/c: dizziness, loc    HPI: 58 yo female with PMH of HTN, HLD, DM, nonobstructive CAD, systolic HF (EF 20% June 2017) s/p AICD, s/p recent cardiac cath 8/2017 with normal coronaries, comes in with episode of dizziness and loss of consciousness at home earlier today.    She had AICD shocks and then lost consciousness for approx 3-4 minutes.    While in the ED, patient was noted on the tele monitor to have 8 beats of VT. Patient has been non compliant with her Amiodarone treatment.  -started on Amiodarone loading IV  9/10: pt seen and examined this am. Dizziness improvement with improvement in bp  no acute overnight events. no cp/sob.    REVIEW OF SYSTEMS:all 10 systems reviewed and is as above otherwise negative.    Vital Signs Last 24 Hrs  T(C): 36.9 (10 Sep 2017 09:20), Max: 36.9 (09 Sep 2017 21:11)  T(F): 98.4 (10 Sep 2017 09:20), Max: 98.5 (09 Sep 2017 21:11)  HR: 60 (10 Sep 2017 11:00) (60 - 70)  BP: 139/108 (10 Sep 2017 08:00) (89/52 - 155/75)  BP(mean): 116 (10 Sep 2017 08:00) (58 - 116)  RR: 18 (10 Sep 2017 11:00) (18 - 24)  SpO2: 95% (10 Sep 2017 11:00) (92% - 100%)  PHYSICAL EXAM:    GENERAL: NAD, Well nourished  HEENT:  NC/AT, EOMI, PERRLA, No scleral icterus, Moist mucous membranes  NECK: Supple, No JVD  CNS:  Alert & Oriented X3, Motor Strength 5/5 B/L upper and lower extremities; DTRs 2+ intact   LUNG: Normal Breath sounds,decreased bs @ bases  HEART: RRR; No murmurs, No rubs  ABDOMEN: +BS, ST/ND/NT  GENITOURINARY: Voiding, Bladder not distended  EXTREMITIES:  2+ Peripheral Pulses, No clubbing, No cyanosis, No tibial edema  MUSCULOSKELTAL: Joints normal ROM, No TTP, No effusion  VAGINAL: deferred  SKIN: no rashes  RECTAL: deferred, not indicated  BREAST: deferred                  LABS:    09-10    136  |  100  |  24<H>  ----------------------------<  138<H>  4.3   |  28  |  0.93    Ca    8.7      10 Sep 2017 05:22  Phos  3.4     09-10  Mg     2.4     09-10        MEDICATIONS  (STANDING):  amiodarone Infusion 1 mG/Min (33.333 mL/Hr) IV Continuous <Continuous>  sacubitril 24 mG/valsartan 26 mG 1 Tablet(s) Oral two times a day  simvastatin 40 milliGRAM(s) Oral at bedtime  metoprolol 50 milliGRAM(s) Oral two times a day  buDESOnide 160 MICROgram(s)/formoterol 4.5 MICROgram(s) Inhaler 2 Puff(s) Inhalation two times a day  furosemide    Tablet 40 milliGRAM(s) Oral daily  heparin  Injectable 5000 Unit(s) SubCutaneous every 8 hours  insulin lispro (HumaLOG) corrective regimen sliding scale   SubCutaneous three times a day before meals    MEDICATIONS  (PRN):  ALBUTerol    90 MICROgram(s) HFA Inhaler 2 Puff(s) Inhalation every 6 hours PRN Shortness of Breath and/or Wheezing  acetaminophen   Tablet 650 milliGRAM(s) Oral every 6 hours PRN For Temp greater than 38 C (100.4 F)  acetaminophen   Tablet. 650 milliGRAM(s) Oral every 6 hours PRN Mild Pain (1 - 3)  dextrose Gel 1 Dose(s) Oral once PRN Blood Glucose LESS THAN 70 milliGRAM(s)/deciliter  glucagon  Injectable 1 milliGRAM(s) IntraMuscular once PRN Glucose LESS THAN 70 milligrams/deciliter      all labs reviewed  all imaging reviewed    Assessment and Plan:    1. VT:  s/p AICD shocks  Amiodarone reloading  EP eval noted: for BIV upgrade monday    2. Acute on chronic systolic left Chf:  -overall seems to have improved  -d/w cardiology, decreased bb to 25 tid due to symptomatic hypotension  -continue entresto    3. DM:   Novolog SS    4. Asthma: stable

## 2017-09-10 NOTE — PROGRESS NOTE ADULT - SUBJECTIVE AND OBJECTIVE BOX
HPI:  58yo female with pmh  non ischemic cardiomyopathy  type 2 DM hyperlipidemia, s/p AICD placement, asthma, systolic  CHF LVEF 20% by an echocardiogram in June 2017 she was readmitted with V Tach. Since admission she has been on Amiodarone. She is now comfortable & is in NSR . No further V tach.  at bedside. Discussed again with pt to be compliant with meds & diet.      PAST MEDICAL & SURGICAL HISTORY:  HTN (hypertension)  HLD (hyperlipidemia)  Asthma  DM (diabetes mellitus)  CAD (coronary artery disease) - non obstructive  Chr systolic CHF       Cardiac Cath Lab - Adult 08.23.17    Angiographic Findings     Cardiac Arteries and Lesion Findings    LMCA: Normal.    LAD: Normal.    LCx: Normal.    RCA: Normal.     Impression     Diagnostic Conclusions     Normal coronaries. Very elevated LVEDP    < end of copied text >      Echo  from: Transthoracic Echocardiogram 06.07.17    Summary     The left ventricle is severely dilated with severe global hypokinesis.   Estimated left ventricular ejection fraction is 20 % via bi-plane method.   Left atriumis moderately dilated.   The right atrium appears moderately dilated. A device wire is seen in the   RV and RA.   The right ventricle is normal in size and contractility.   The aortic valve appears normal.    MEDICATIONS  (STANDING):  amiodarone Infusion 1 mG/Min (33.333 mL/Hr) IV Continuous <Continuous>  sacubitril 24 mG/valsartan 26 mG 1 Tablet(s) Oral two times a day  simvastatin 40 milliGRAM(s) Oral at bedtime  buDESOnide 160 MICROgram(s)/formoterol 4.5 MICROgram(s) Inhaler 2 Puff(s) Inhalation two times a day  furosemide    Tablet 40 milliGRAM(s) Oral daily  heparin  Injectable 5000 Unit(s) SubCutaneous every 8 hours  insulin lispro (HumaLOG) corrective regimen sliding scale   SubCutaneous three times a day before meals  dextrose 5%. 1000 milliLiter(s) (50 mL/Hr) IV Continuous <Continuous>  dextrose 50% Injectable 12.5 Gram(s) IV Push once  dextrose 50% Injectable 25 Gram(s) IV Push once  dextrose 50% Injectable 25 Gram(s) IV Push once  amiodarone    Tablet 400 milliGRAM(s) Oral every 8 hours  metoprolol 25 milliGRAM(s) Oral every 8 hours  docusate sodium 100 milliGRAM(s) Oral three times a day    MEDICATIONS  (PRN):  ALBUTerol    90 MICROgram(s) HFA Inhaler 2 Puff(s) Inhalation every 6 hours PRN Shortness of Breath and/or Wheezing  acetaminophen   Tablet 650 milliGRAM(s) Oral every 6 hours PRN For Temp greater than 38 C (100.4 F)  acetaminophen   Tablet. 650 milliGRAM(s) Oral every 6 hours PRN Mild Pain (1 - 3)  dextrose Gel 1 Dose(s) Oral once PRN Blood Glucose LESS THAN 70 milliGRAM(s)/deciliter  glucagon  Injectable 1 milliGRAM(s) IntraMuscular once PRN Glucose LESS THAN 70 milligrams/deciliter          REVIEW OF SYSTEM:  Pertinent items are noted in HPI.  Constitutional	Negative for chills, fevers, sweats.    Eyes: 	Negative for visual disturbance.  Ears, nose, mouth, throat, and face: Negative for epistaxis, nasal congestion, sore throat and tinnitus.  Neck:	Negative for enlargement, pain and difficulty in swallowing  Respiration : Negative for cough, dyspnea on exertion, pleuritic chest pain and wheezing  Cardiovascular: Negative for chest pain, dyspnea and palpitations    Gastrointestinal : Negative for abdominal pain, diarrhea, nausea and vomiting  Genitourinary: Negative for dysuria, frequency and urinary incontinence .  Skin: Negative for  rash, pruritus, swelling, dryness .  	  Hematologic/lymphatic: Negative for bleeding and easy bruising  Musculoskeletal: Negative for arthralgias, back pain and muscle weakness.  Neurological: Negative for dizziness, headaches, seizures and tremors  Behavioral/Psych: Negative for mood change, depression.  Endocrine:	Negative for blurry vision, polydipsia and polyuria, diaphoresis.   Allergic/Immunologic:	Negative for anaphylaxis, angioedema and urticaria.      Vital Signs Last 24 Hrs  T(C): 36.4 (10 Sep 2017 06:00), Max: 36.9 (09 Sep 2017 21:11)  T(F): 97.6 (10 Sep 2017 06:00), Max: 98.5 (09 Sep 2017 21:11)  HR: 61 (10 Sep 2017 08:00) (60 - 71)  BP: 104/54 (10 Sep 2017 06:42) (89/52 - 155/75)  BP(mean): 67 (10 Sep 2017 06:42) (58 - 92)  RR: 19 (10 Sep 2017 08:00) (18 - 24)  SpO2: 100% (10 Sep 2017 06:42) (92% - 100%)    I&O's Summary    09 Sep 2017 07:01  -  10 Sep 2017 07:00  --------------------------------------------------------  IN: 1160 mL / OUT: 450 mL / NET: 710 mL      PHYSICAL EXAM  General Appearance: cooperative, no acute distress,   HEENT: PERRL, conjunctiva clear, EOM's intact, non injected pharynx .  Neck: Supple, , no adenopathy, thyroid: not enlarged, no carotid bruit or JVD  Back: Symmetric, no  tenderness,no soft tissue tenderness  Lungs: Clear to auscultation bilateral,no adventitious breath sounds, normal   expiratory phase  Heart: Regular rate and rhythm, S1, S2 normal, no murmur, rub or gallop  Abdomen: Soft, non-tender, bowel sounds active , no hepatosplenomegaly  Extremities: no cyanosis or edema, no joint swelling  Skin: Skin color, texture normal, no rashes   Neurologic: Alert and oriented X3 , cranial nerves intact, sensory and motor normal,        INTERPRETATION OF TELEMETRY: NSR PVC            LABS:      09-10    136  |  100  |  24<H>  ----------------------------<  138<H>  4.3   |  28  |  0.93    Ca    8.7      10 Sep 2017 05:22  Phos  3.4     09-10  Mg     2.4     09-10            Pro BNP  1100 09-06 @ 20:15  D Dimer  -- 09-06 @ 20:15              RADIOLOGY & ADDITIONAL STUDIES:

## 2017-09-11 LAB
ANION GAP SERPL CALC-SCNC: 7 MMOL/L — SIGNIFICANT CHANGE UP (ref 5–17)
BASOPHILS # BLD AUTO: 0.2 K/UL — SIGNIFICANT CHANGE UP (ref 0–0.2)
BASOPHILS NFR BLD AUTO: 1.3 % — SIGNIFICANT CHANGE UP (ref 0–2)
BUN SERPL-MCNC: 25 MG/DL — HIGH (ref 7–23)
CALCIUM SERPL-MCNC: 8.8 MG/DL — SIGNIFICANT CHANGE UP (ref 8.5–10.1)
CHLORIDE SERPL-SCNC: 99 MMOL/L — SIGNIFICANT CHANGE UP (ref 96–108)
CO2 SERPL-SCNC: 29 MMOL/L — SIGNIFICANT CHANGE UP (ref 22–31)
CREAT SERPL-MCNC: 1.06 MG/DL — SIGNIFICANT CHANGE UP (ref 0.5–1.3)
EOSINOPHIL # BLD AUTO: 0.1 K/UL — SIGNIFICANT CHANGE UP (ref 0–0.5)
EOSINOPHIL NFR BLD AUTO: 0.5 % — SIGNIFICANT CHANGE UP (ref 0–6)
GLUCOSE SERPL-MCNC: 157 MG/DL — HIGH (ref 70–99)
HCT VFR BLD CALC: 45.1 % — HIGH (ref 34.5–45)
HGB BLD-MCNC: 14.4 G/DL — SIGNIFICANT CHANGE UP (ref 11.5–15.5)
LYMPHOCYTES # BLD AUTO: 28.1 % — SIGNIFICANT CHANGE UP (ref 13–44)
LYMPHOCYTES # BLD AUTO: 3.3 K/UL — SIGNIFICANT CHANGE UP (ref 1–3.3)
MAGNESIUM SERPL-MCNC: 2.5 MG/DL — SIGNIFICANT CHANGE UP (ref 1.6–2.6)
MCHC RBC-ENTMCNC: 26.6 PG — LOW (ref 27–34)
MCHC RBC-ENTMCNC: 32 GM/DL — SIGNIFICANT CHANGE UP (ref 32–36)
MCV RBC AUTO: 83.4 FL — SIGNIFICANT CHANGE UP (ref 80–100)
MONOCYTES # BLD AUTO: 1.2 K/UL — HIGH (ref 0–0.9)
MONOCYTES NFR BLD AUTO: 10.3 % — SIGNIFICANT CHANGE UP (ref 2–14)
NEUTROPHILS # BLD AUTO: 7.1 K/UL — SIGNIFICANT CHANGE UP (ref 1.8–7.4)
NEUTROPHILS NFR BLD AUTO: 59.8 % — SIGNIFICANT CHANGE UP (ref 43–77)
PHOSPHATE SERPL-MCNC: 3.4 MG/DL — SIGNIFICANT CHANGE UP (ref 2.5–4.5)
PLATELET # BLD AUTO: 248 K/UL — SIGNIFICANT CHANGE UP (ref 150–400)
POTASSIUM SERPL-MCNC: 4.4 MMOL/L — SIGNIFICANT CHANGE UP (ref 3.5–5.3)
POTASSIUM SERPL-SCNC: 4.4 MMOL/L — SIGNIFICANT CHANGE UP (ref 3.5–5.3)
RBC # BLD: 5.41 M/UL — HIGH (ref 3.8–5.2)
RBC # FLD: 16.2 % — HIGH (ref 10.3–14.5)
SODIUM SERPL-SCNC: 135 MMOL/L — SIGNIFICANT CHANGE UP (ref 135–145)
WBC # BLD: 11.9 K/UL — HIGH (ref 3.8–10.5)
WBC # FLD AUTO: 11.9 K/UL — HIGH (ref 3.8–10.5)

## 2017-09-11 PROCEDURE — 93010 ELECTROCARDIOGRAM REPORT: CPT

## 2017-09-11 PROCEDURE — 71010: CPT | Mod: 26

## 2017-09-11 PROCEDURE — 33263 RMVL & RPLCMT DFB GEN 2 LEAD: CPT

## 2017-09-11 RX ORDER — CEFAZOLIN SODIUM 1 G
2000 VIAL (EA) INJECTION ONCE
Qty: 0 | Refills: 0 | Status: COMPLETED | OUTPATIENT
Start: 2017-09-11 | End: 2017-09-11

## 2017-09-11 RX ORDER — CEFAZOLIN SODIUM 1 G
1000 VIAL (EA) INJECTION EVERY 8 HOURS
Qty: 0 | Refills: 0 | Status: COMPLETED | OUTPATIENT
Start: 2017-09-11 | End: 2017-09-12

## 2017-09-11 RX ADMIN — HEPARIN SODIUM 5000 UNIT(S): 5000 INJECTION INTRAVENOUS; SUBCUTANEOUS at 14:12

## 2017-09-11 RX ADMIN — Medication 100 MILLIGRAM(S): at 21:47

## 2017-09-11 RX ADMIN — MUPIROCIN 1 APPLICATION(S): 20 OINTMENT TOPICAL at 05:50

## 2017-09-11 RX ADMIN — Medication 100 MILLIGRAM(S): at 14:12

## 2017-09-11 RX ADMIN — AMIODARONE HYDROCHLORIDE 400 MILLIGRAM(S): 400 TABLET ORAL at 21:49

## 2017-09-11 RX ADMIN — Medication 100 MILLIGRAM(S): at 21:48

## 2017-09-11 RX ADMIN — AMIODARONE HYDROCHLORIDE 400 MILLIGRAM(S): 400 TABLET ORAL at 14:12

## 2017-09-11 RX ADMIN — Medication 25 MILLIGRAM(S): at 21:48

## 2017-09-11 RX ADMIN — SIMVASTATIN 40 MILLIGRAM(S): 20 TABLET, FILM COATED ORAL at 21:48

## 2017-09-11 RX ADMIN — AMIODARONE HYDROCHLORIDE 400 MILLIGRAM(S): 400 TABLET ORAL at 05:49

## 2017-09-11 RX ADMIN — MUPIROCIN 1 APPLICATION(S): 20 OINTMENT TOPICAL at 17:30

## 2017-09-11 RX ADMIN — Medication 25 MILLIGRAM(S): at 05:49

## 2017-09-11 RX ADMIN — Medication 650 MILLIGRAM(S): at 18:20

## 2017-09-11 RX ADMIN — Medication 100 MILLIGRAM(S): at 05:49

## 2017-09-11 RX ADMIN — Medication 25 MILLIGRAM(S): at 14:12

## 2017-09-11 RX ADMIN — BUDESONIDE AND FORMOTEROL FUMARATE DIHYDRATE 2 PUFF(S): 160; 4.5 AEROSOL RESPIRATORY (INHALATION) at 20:45

## 2017-09-11 RX ADMIN — Medication 100 MILLIGRAM(S): at 10:38

## 2017-09-11 RX ADMIN — Medication 40 MILLIGRAM(S): at 14:12

## 2017-09-11 RX ADMIN — SACUBITRIL AND VALSARTAN 1 TABLET(S): 24; 26 TABLET, FILM COATED ORAL at 05:50

## 2017-09-11 RX ADMIN — SACUBITRIL AND VALSARTAN 1 TABLET(S): 24; 26 TABLET, FILM COATED ORAL at 17:30

## 2017-09-11 RX ADMIN — HEPARIN SODIUM 5000 UNIT(S): 5000 INJECTION INTRAVENOUS; SUBCUTANEOUS at 21:49

## 2017-09-11 NOTE — PROGRESS NOTE ADULT - SUBJECTIVE AND OBJECTIVE BOX
58 yo female with dizziness, syncope and AICD shocks. Similar admit in August, cath was non obstructive. She was non compliant to amiodarone that was started. On 9/7/17, while examining her  she had similar dizziness VT on monitor and AICD shock.     Today, she feels well. No new events. no further VT / AICD shocks.    PAST MEDICAL & SURGICAL HISTORY:  HTN (hypertension)  HLD (hyperlipidemia)  Asthma  DM (diabetes mellitus)  Non obst CAD (coronary artery disease)  History of cholecystectomy  Artificial cardiac pacemaker    CARDIAC WORKUP:      Echo:  Severe reduced EF    Cardiac Cath:  8/24/17 - Non obstructive      Allergies:   No Known Allergies      MEDICATIONS  (STANDING):  amiodarone Infusion 1 mG/Min (33.333 mL/Hr) IV Continuous <Continuous>  sacubitril 24 mG/valsartan 26 mG 1 Tablet(s) Oral two times a day  simvastatin 40 milliGRAM(s) Oral at bedtime  buDESOnide 160 MICROgram(s)/formoterol 4.5 MICROgram(s) Inhaler 2 Puff(s) Inhalation two times a day  furosemide    Tablet 40 milliGRAM(s) Oral daily  heparin  Injectable 5000 Unit(s) SubCutaneous every 8 hours  insulin lispro (HumaLOG) corrective regimen sliding scale   SubCutaneous three times a day before meals  dextrose 5%. 1000 milliLiter(s) (50 mL/Hr) IV Continuous <Continuous>  dextrose 50% Injectable 12.5 Gram(s) IV Push once  dextrose 50% Injectable 25 Gram(s) IV Push once  dextrose 50% Injectable 25 Gram(s) IV Push once  amiodarone    Tablet 400 milliGRAM(s) Oral every 8 hours  metoprolol 25 milliGRAM(s) Oral every 8 hours  docusate sodium 100 milliGRAM(s) Oral three times a day  mupirocin 2% Ointment 1 Application(s) Topical two times a day  ceFAZolin   IVPB 2000 milliGRAM(s) IV Intermittent once    MEDICATIONS  (PRN):  ALBUTerol    90 MICROgram(s) HFA Inhaler 2 Puff(s) Inhalation every 6 hours PRN Shortness of Breath and/or Wheezing  acetaminophen   Tablet 650 milliGRAM(s) Oral every 6 hours PRN For Temp greater than 38 C (100.4 F)  acetaminophen   Tablet. 650 milliGRAM(s) Oral every 6 hours PRN Mild Pain (1 - 3)  dextrose Gel 1 Dose(s) Oral once PRN Blood Glucose LESS THAN 70 milliGRAM(s)/deciliter  glucagon  Injectable 1 milliGRAM(s) IntraMuscular once PRN Glucose LESS THAN 70 milligrams/deciliter      ROS:     Detailed ten system ROS was performed and was negative except for history as eluded to above.    no fever  no chills  no nausea  no vomiting  no diarrhea  no constipation  no melena  no hematochezia  no chest pain  no palpitations  no sob  no dyspnea  no cough  no wheezing  no anorexia  no headache  no dizziness  no syncope  no weakness  no myalgia  no dysuria  no polyuria  no hematuria       Vital Signs Last 24 Hrs  T(C): 36.9 (11 Sep 2017 07:47), Max: 37.1 (10 Sep 2017 23:37)  T(F): 98.5 (11 Sep 2017 07:47), Max: 98.7 (10 Sep 2017 23:37)  HR: 60 (11 Sep 2017 07:37) (60 - 65)  BP: 118/78 (11 Sep 2017 07:37) (95/70 - 118/78)  BP(mean): 84 (11 Sep 2017 05:37) (27 - 84)  RR: 18 (11 Sep 2017 07:37) (17 - 27)  SpO2: 94% (11 Sep 2017 07:37) (92% - 100%)    I&O's Summary    10 Sep 2017 07:01  -  11 Sep 2017 07:00  --------------------------------------------------------  IN: 990 mL / OUT: 2050 mL / NET: -1060 mL        PHYSICAL EXAM:    General Appearance:    Comfortable, AAO X 3, in no distress. Obese  HEENT:                       Atraumatic, PERRLA, EOMI, conjunctiva clear.   Neck:                          Supple, no adenopathy, no thyromegaly, no JVD, no carotid bruit  Back:                          Symmetric, no CV angle fullness or tenderness, no soft tissue tenderness.  Chest:                         Basal rales or rhonchi, symmetric air entry, no tachypnea, retractions or cyanosis  Heart:                          S1, S2 normal, no gallop, no murmur.  Abdomen:                    Soft, non-tender, bowel sounds active. No palpable masses.   Extremities:                 no cyanosis, no edema, no joint swelling. Peripheral pulses normal  Skin:                           Skin color, texture normal. No rashes   Neurologic:                  Grossly cranial nerves intact, sensory and motor normal.      TELEMETRY:  No further NSVT    ECG:  NSR, IVCD    LABS:                        14.4   11.9  )-----------( 248      ( 11 Sep 2017 05:58 )             45.1     09-11    135  |  99  |  25<H>  ----------------------------<  157<H>  4.4   |  29  |  1.06    Ca    8.8      11 Sep 2017 05:58  Phos  3.4     09-11  Mg     2.5     09-11 56 yo female with dizziness, syncope and AICD shocks. Similar admit in August, cath was non obstructive. She was non compliant to amiodarone that was started. On 9/7/17, while examining her  she had similar dizziness VT on monitor and AICD shock.     Today, she feels well. No new events. no further VT / AICD shocks. For AICD upgrade today    PAST MEDICAL & SURGICAL HISTORY:  HTN (hypertension)  HLD (hyperlipidemia)  Asthma  DM (diabetes mellitus)  Non obst CAD (coronary artery disease)  History of cholecystectomy  Artificial cardiac pacemaker    CARDIAC WORKUP:      Echo:  Severe reduced EF    Cardiac Cath:  8/24/17 - Non obstructive      Allergies:   No Known Allergies      MEDICATIONS  (STANDING):  amiodarone Infusion 1 mG/Min (33.333 mL/Hr) IV Continuous <Continuous>  sacubitril 24 mG/valsartan 26 mG 1 Tablet(s) Oral two times a day  simvastatin 40 milliGRAM(s) Oral at bedtime  buDESOnide 160 MICROgram(s)/formoterol 4.5 MICROgram(s) Inhaler 2 Puff(s) Inhalation two times a day  furosemide    Tablet 40 milliGRAM(s) Oral daily  heparin  Injectable 5000 Unit(s) SubCutaneous every 8 hours  insulin lispro (HumaLOG) corrective regimen sliding scale   SubCutaneous three times a day before meals  dextrose 5%. 1000 milliLiter(s) (50 mL/Hr) IV Continuous <Continuous>  dextrose 50% Injectable 12.5 Gram(s) IV Push once  dextrose 50% Injectable 25 Gram(s) IV Push once  dextrose 50% Injectable 25 Gram(s) IV Push once  amiodarone    Tablet 400 milliGRAM(s) Oral every 8 hours  metoprolol 25 milliGRAM(s) Oral every 8 hours  docusate sodium 100 milliGRAM(s) Oral three times a day  mupirocin 2% Ointment 1 Application(s) Topical two times a day  ceFAZolin   IVPB 2000 milliGRAM(s) IV Intermittent once    MEDICATIONS  (PRN):  ALBUTerol    90 MICROgram(s) HFA Inhaler 2 Puff(s) Inhalation every 6 hours PRN Shortness of Breath and/or Wheezing  acetaminophen   Tablet 650 milliGRAM(s) Oral every 6 hours PRN For Temp greater than 38 C (100.4 F)  acetaminophen   Tablet. 650 milliGRAM(s) Oral every 6 hours PRN Mild Pain (1 - 3)  dextrose Gel 1 Dose(s) Oral once PRN Blood Glucose LESS THAN 70 milliGRAM(s)/deciliter  glucagon  Injectable 1 milliGRAM(s) IntraMuscular once PRN Glucose LESS THAN 70 milligrams/deciliter      ROS:     Detailed ten system ROS was performed and was negative except for history as eluded to above.    no fever  no chills  no nausea  no vomiting  no diarrhea  no constipation  no melena  no hematochezia  no chest pain  no palpitations  no sob  no dyspnea  no cough  no wheezing  no anorexia  no headache  no dizziness  no syncope  no weakness  no myalgia  no dysuria  no polyuria  no hematuria       Vital Signs Last 24 Hrs  T(C): 36.9 (11 Sep 2017 07:47), Max: 37.1 (10 Sep 2017 23:37)  T(F): 98.5 (11 Sep 2017 07:47), Max: 98.7 (10 Sep 2017 23:37)  HR: 60 (11 Sep 2017 07:37) (60 - 65)  BP: 118/78 (11 Sep 2017 07:37) (95/70 - 118/78)  BP(mean): 84 (11 Sep 2017 05:37) (27 - 84)  RR: 18 (11 Sep 2017 07:37) (17 - 27)  SpO2: 94% (11 Sep 2017 07:37) (92% - 100%)    I&O's Summary    10 Sep 2017 07:01  -  11 Sep 2017 07:00  --------------------------------------------------------  IN: 990 mL / OUT: 2050 mL / NET: -1060 mL        PHYSICAL EXAM:    General Appearance:    Comfortable, AAO X 3, in no distress. Obese  HEENT:                       Atraumatic, PERRLA, EOMI, conjunctiva clear.   Neck:                          Supple, no adenopathy, no thyromegaly, no JVD, no carotid bruit  Back:                          Symmetric, no CV angle fullness or tenderness, no soft tissue tenderness.  Chest:                         Basal rales or rhonchi, symmetric air entry, no tachypnea, retractions or cyanosis  Heart:                          S1, S2 normal, no gallop, no murmur.  Abdomen:                    Soft, non-tender, bowel sounds active. No palpable masses.   Extremities:                 no cyanosis, no edema, no joint swelling. Peripheral pulses normal  Skin:                           Skin color, texture normal. No rashes   Neurologic:                  Grossly cranial nerves intact, sensory and motor normal.      TELEMETRY:  No further NSVT    ECG:  NSR, IVCD    LABS:                        14.4   11.9  )-----------( 248      ( 11 Sep 2017 05:58 )             45.1     09-11    135  |  99  |  25<H>  ----------------------------<  157<H>  4.4   |  29  |  1.06    Ca    8.8      11 Sep 2017 05:58  Phos  3.4     09-11  Mg     2.5     09-11

## 2017-09-11 NOTE — PROGRESS NOTE ADULT - SUBJECTIVE AND OBJECTIVE BOX
c/c: dizziness, loc    HPI: 58 yo female with PMH of HTN, HLD, DM, nonobstructive CAD, systolic HF (EF 20% June 2017) s/p AICD, s/p recent cardiac cath 8/2017 with normal coronaries, comes in with episode of dizziness and loss of consciousness at home earlier today.    She had AICD shocks and then lost consciousness for approx 3-4 minutes.    While in the ED, patient was noted on the tele monitor to have 8 beats of VT. Patient has been non compliant with her Amiodarone treatment.  Was started on amio load. Seen by EP and underwent upgrade to BIV AICD    9/11: pt seen and examined earlier today. Feeling well. No dizziness/lightheadedness/sob/chest pain.    REVIEW OF SYSTEMS:all 10 systems reviewed and is as above otherwise negative.    Vital Signs Last 24 Hrs  T(C): 36.9 (11 Sep 2017 07:47), Max: 37.1 (10 Sep 2017 23:37)  T(F): 98.5 (11 Sep 2017 07:47), Max: 98.7 (10 Sep 2017 23:37)  HR: 74 (11 Sep 2017 14:42) (60 - 74)  BP: 103/61 (11 Sep 2017 14:42) (95/70 - 118/78)  BP(mean): 84 (11 Sep 2017 05:37) (27 - 84)  RR: 23 (11 Sep 2017 14:42) (16 - 27)  SpO2: 96% (11 Sep 2017 14:00) (92% - 100%)  PHYSICAL EXAM:    GENERAL: NAD, Well nourished  HEENT:  NC/AT, EOMI, PERRLA, No scleral icterus, Moist mucous membranes  NECK: Supple, No JVD  CNS:  Alert & Oriented X3, Motor Strength 5/5 B/L upper and lower extremities; DTRs 2+ intact   LUNG: Normal Breath sounds,decreased bs @ bases  HEART: RRR; No murmurs, No rubs  ABDOMEN: +BS, ST/ND/NT  GENITOURINARY: Voiding, Bladder not distended  EXTREMITIES:  2+ Peripheral Pulses, No clubbing, No cyanosis, No tibial edema  MUSCULOSKELETAL: Joints normal ROM, No TTP, No effusion  SKIN: no rashes    LABS:                        14.4   11.9  )-----------( 248      ( 11 Sep 2017 05:58 )             45.1     09-11    135  |  99  |  25<H>  ----------------------------<  157<H>  4.4   |  29  |  1.06    Ca    8.8      11 Sep 2017 05:58  Phos  3.4     09-11  Mg     2.5     09-11        MEDICATIONS  (STANDING):  amiodarone Infusion 1 mG/Min (33.333 mL/Hr) IV Continuous <Continuous>  sacubitril 24 mG/valsartan 26 mG 1 Tablet(s) Oral two times a day  simvastatin 40 milliGRAM(s) Oral at bedtime  metoprolol 50 milliGRAM(s) Oral two times a day  buDESOnide 160 MICROgram(s)/formoterol 4.5 MICROgram(s) Inhaler 2 Puff(s) Inhalation two times a day  furosemide    Tablet 40 milliGRAM(s) Oral daily  heparin  Injectable 5000 Unit(s) SubCutaneous every 8 hours  insulin lispro (HumaLOG) corrective regimen sliding scale   SubCutaneous three times a day before meals    MEDICATIONS  (PRN):  ALBUTerol    90 MICROgram(s) HFA Inhaler 2 Puff(s) Inhalation every 6 hours PRN Shortness of Breath and/or Wheezing  acetaminophen   Tablet 650 milliGRAM(s) Oral every 6 hours PRN For Temp greater than 38 C (100.4 F)  acetaminophen   Tablet. 650 milliGRAM(s) Oral every 6 hours PRN Mild Pain (1 - 3)  dextrose Gel 1 Dose(s) Oral once PRN Blood Glucose LESS THAN 70 milliGRAM(s)/deciliter  glucagon  Injectable 1 milliGRAM(s) IntraMuscular once PRN Glucose LESS THAN 70 milligrams/deciliter      all labs reviewed  all imaging reviewed    Assessment and Plan:    1. VT:  s/p AICD shocks  Amiodarone reloading  now s/p BIV upgrade    2. Acute on chronic systolic left Chf:  -overall seems to have improved  -d/w cardiology, decreased bb to 25 tid due to symptomatic hypotension  -continue entresto    3. DM:   Novolog SS    4. Asthma: stable    5. Dispo:   dc planning for tomorrow if stable.

## 2017-09-11 NOTE — CHART NOTE - NSCHARTNOTEFT_GEN_A_CORE
[place] [address]  Electrophysiology Lab  ICD Upgrade to Dual Chamber    Patient Information    Patient Name		Kaylynn Hurst  Procedure Date		2017  MR	 #		[id number]  			1960  Age			57yrs  Gender			Female  Referring		Rolly Stevens MD  Electrophysiologist	Isael Mercedes MD    Patient History  Kaylynn Anderson is a very pleasant 57 yr old female with a non-ischemic cardiomyopathy and LVEF of 20% who has a single chamber ICD and has been having pause dependent PMVT.    Indication: Chronic systolic left ventricular dysfunction and congestive heart failure (I 50.22)   with sinus node dysfunction (I49.5) and Paroxysmal Ventricular Tachycardia (I47.2)    Procedure:   ICD Upgrade – new right  atrial ICD lead  placed  Anesthesia:		1% Lidocaine and moderate sedation (see anesthesiologist’s note)  Methods:   Ancef 2gm IV was given pre-procedure.  The patient was brought to the EP Lab in a fasting state.       The left chest was prepped with chlorhexidine solution and draped in a sterile fashion.   An incision was made in the left infraclavicular fossa.  The incision was carried down to the level of the pulse generator and the capsule was opened with a blade.  The pulse generator was disconnected from the leads.  Axillary venous access was obtained.  A terumo wire was passed down and with the use of a sheath and Seldinger technique  a 7 Latvian sheath was placed over the terumo and the terumo was removed.  An atrial lead was placed in through the 7 [place]vian sheath and then placed in the right atrial appendage by active fixation.  It was tested and noted to be functioning adequately.   The sheath was then peeled away and the lead was secured in place.     A new pulse generator  was connected to the leads.  The pocket was irrigated with copious amounts of antibiotic solution and the leads were connected to the new pulse generator and the pulse generator was then placed in the pocket. The pocket was closed with a layer of 2.0 Vicryl followed by a running layer of 3.0 Vicryl.  Finally a running layer of 4.0 Monocryl followed by Dermabond to seal the skin.       Device Information:        Pulse Generator –Medtronic [id number] / Serial [id number]         Leads - Right Atrial – Medtronic 5076-52cm / Serial [id number]                    Right Ventricular (10-)– Medtronic 6947-62cm  / Serial [id number]    Measured Data:     Right Atrial - Voltage  1.5/ Pulse width 0.5ms / Impedance 634 ohms / P wave 1.8      Right Ventricular - Voltage  1.25V / Pulse width 0.4ms / Impedance 475ohms / R                                   Settings:       Fam:  AAIR/DDDR - 	      Tachy: VF		 240bpm, 35J x6, ATP During Charging   	 VT		 188bpm, Burst 88% x3, 35J x5   	 VT Monitor	 150bpm         Summary:  Successful Revision of ICD.  (New RA   lead  placed)    Recommendations:    •	Post op antibiotic X 1 dose prescribed.   •	Amiodarone 200mg Qd   •	Follow-up wound and device check in 1-2 weeks           Isael Mercedes MD, FHRS, FACC    of Cardiology [facility]  ABIM Certification in Cardiac EP / Cardiovascular Disease & Internal Medicine  [facility]

## 2017-09-12 ENCOUNTER — TRANSCRIPTION ENCOUNTER (OUTPATIENT)
Age: 57
End: 2017-09-12

## 2017-09-12 VITALS — WEIGHT: 187.39 LBS

## 2017-09-12 LAB
ANION GAP SERPL CALC-SCNC: 8 MMOL/L — SIGNIFICANT CHANGE UP (ref 5–17)
BASOPHILS # BLD AUTO: 0.1 K/UL — SIGNIFICANT CHANGE UP (ref 0–0.2)
BASOPHILS NFR BLD AUTO: 1.1 % — SIGNIFICANT CHANGE UP (ref 0–2)
BUN SERPL-MCNC: 25 MG/DL — HIGH (ref 7–23)
CALCIUM SERPL-MCNC: 8.4 MG/DL — LOW (ref 8.5–10.1)
CHLORIDE SERPL-SCNC: 100 MMOL/L — SIGNIFICANT CHANGE UP (ref 96–108)
CO2 SERPL-SCNC: 26 MMOL/L — SIGNIFICANT CHANGE UP (ref 22–31)
CREAT SERPL-MCNC: 1.02 MG/DL — SIGNIFICANT CHANGE UP (ref 0.5–1.3)
EOSINOPHIL # BLD AUTO: 0 K/UL — SIGNIFICANT CHANGE UP (ref 0–0.5)
EOSINOPHIL NFR BLD AUTO: 0.1 % — SIGNIFICANT CHANGE UP (ref 0–6)
GLUCOSE SERPL-MCNC: 190 MG/DL — HIGH (ref 70–99)
HCT VFR BLD CALC: 41.6 % — SIGNIFICANT CHANGE UP (ref 34.5–45)
HGB BLD-MCNC: 13.4 G/DL — SIGNIFICANT CHANGE UP (ref 11.5–15.5)
LYMPHOCYTES # BLD AUTO: 2.2 K/UL — SIGNIFICANT CHANGE UP (ref 1–3.3)
LYMPHOCYTES # BLD AUTO: 20.3 % — SIGNIFICANT CHANGE UP (ref 13–44)
MAGNESIUM SERPL-MCNC: 2.4 MG/DL — SIGNIFICANT CHANGE UP (ref 1.6–2.6)
MCHC RBC-ENTMCNC: 26.9 PG — LOW (ref 27–34)
MCHC RBC-ENTMCNC: 32.2 GM/DL — SIGNIFICANT CHANGE UP (ref 32–36)
MCV RBC AUTO: 83.5 FL — SIGNIFICANT CHANGE UP (ref 80–100)
MONOCYTES # BLD AUTO: 1 K/UL — HIGH (ref 0–0.9)
MONOCYTES NFR BLD AUTO: 8.8 % — SIGNIFICANT CHANGE UP (ref 2–14)
NEUTROPHILS # BLD AUTO: 7.7 K/UL — HIGH (ref 1.8–7.4)
NEUTROPHILS NFR BLD AUTO: 69.8 % — SIGNIFICANT CHANGE UP (ref 43–77)
PHOSPHATE SERPL-MCNC: 3.4 MG/DL — SIGNIFICANT CHANGE UP (ref 2.5–4.5)
PLATELET # BLD AUTO: 209 K/UL — SIGNIFICANT CHANGE UP (ref 150–400)
POTASSIUM SERPL-MCNC: 4.3 MMOL/L — SIGNIFICANT CHANGE UP (ref 3.5–5.3)
POTASSIUM SERPL-SCNC: 4.3 MMOL/L — SIGNIFICANT CHANGE UP (ref 3.5–5.3)
RBC # BLD: 4.99 M/UL — SIGNIFICANT CHANGE UP (ref 3.8–5.2)
RBC # FLD: 16.2 % — HIGH (ref 10.3–14.5)
SODIUM SERPL-SCNC: 134 MMOL/L — LOW (ref 135–145)
WBC # BLD: 11.1 K/UL — HIGH (ref 3.8–10.5)
WBC # FLD AUTO: 11.1 K/UL — HIGH (ref 3.8–10.5)

## 2017-09-12 PROCEDURE — 93010 ELECTROCARDIOGRAM REPORT: CPT

## 2017-09-12 RX ORDER — ALBUTEROL 90 UG/1
2 AEROSOL, METERED ORAL
Qty: 1 | Refills: 0 | OUTPATIENT
Start: 2017-09-12

## 2017-09-12 RX ORDER — FUROSEMIDE 40 MG
1 TABLET ORAL
Qty: 0 | Refills: 0 | COMMUNITY
Start: 2017-09-12

## 2017-09-12 RX ORDER — FUROSEMIDE 40 MG
1 TABLET ORAL
Qty: 0 | Refills: 0 | COMMUNITY

## 2017-09-12 RX ORDER — BUDESONIDE AND FORMOTEROL FUMARATE DIHYDRATE 160; 4.5 UG/1; UG/1
1 AEROSOL RESPIRATORY (INHALATION)
Qty: 1 | Refills: 0 | OUTPATIENT
Start: 2017-09-12

## 2017-09-12 RX ORDER — SACUBITRIL AND VALSARTAN 24; 26 MG/1; MG/1
1 TABLET, FILM COATED ORAL
Qty: 60 | Refills: 0 | OUTPATIENT
Start: 2017-09-12 | End: 2017-10-12

## 2017-09-12 RX ORDER — METOPROLOL TARTRATE 50 MG
1 TABLET ORAL
Qty: 90 | Refills: 0 | OUTPATIENT
Start: 2017-09-12 | End: 2017-10-12

## 2017-09-12 RX ORDER — AMIODARONE HYDROCHLORIDE 400 MG/1
1 TABLET ORAL
Qty: 14 | Refills: 0 | OUTPATIENT
Start: 2017-09-12 | End: 2017-09-26

## 2017-09-12 RX ADMIN — HEPARIN SODIUM 5000 UNIT(S): 5000 INJECTION INTRAVENOUS; SUBCUTANEOUS at 05:40

## 2017-09-12 RX ADMIN — Medication 40 MILLIGRAM(S): at 05:40

## 2017-09-12 RX ADMIN — BUDESONIDE AND FORMOTEROL FUMARATE DIHYDRATE 2 PUFF(S): 160; 4.5 AEROSOL RESPIRATORY (INHALATION) at 08:57

## 2017-09-12 RX ADMIN — AMIODARONE HYDROCHLORIDE 400 MILLIGRAM(S): 400 TABLET ORAL at 05:40

## 2017-09-12 RX ADMIN — Medication 25 MILLIGRAM(S): at 05:40

## 2017-09-12 RX ADMIN — SACUBITRIL AND VALSARTAN 1 TABLET(S): 24; 26 TABLET, FILM COATED ORAL at 05:40

## 2017-09-12 RX ADMIN — MUPIROCIN 1 APPLICATION(S): 20 OINTMENT TOPICAL at 05:41

## 2017-09-12 RX ADMIN — Medication 100 MILLIGRAM(S): at 04:30

## 2017-09-12 RX ADMIN — Medication 100 MILLIGRAM(S): at 05:40

## 2017-09-12 NOTE — DISCHARGE NOTE ADULT - CARE PROVIDERS DIRECT ADDRESSES
[email or web address]

## 2017-09-12 NOTE — PROGRESS NOTE ADULT - SUBJECTIVE AND OBJECTIVE BOX
HPI:  56 yo female with dizziness, syncope and AICD shocks. Similar admit in August, cath was non obstructive. She was non compliant to amiodarone that was started. s/p ICD shock while in CCU this admission due to pause dependent PMVT.    9/12/17: s/p ugrade to dual chamber ICD POD #1.  TELE: atrial paced @ 70 bpm  EKG: atrial pacing with ventricualr sensing 71 bpm, QRS 116ms    MEDICATIONS  (STANDING):  sacubitril 24 mG/valsartan 26 mG 1 Tablet(s) Oral two times a day  simvastatin 40 milliGRAM(s) Oral at bedtime  buDESOnide 160 MICROgram(s)/formoterol 4.5 MICROgram(s) Inhaler 2 Puff(s) Inhalation two times a day  furosemide    Tablet 40 milliGRAM(s) Oral daily  heparin  Injectable 5000 Unit(s) SubCutaneous every 8 hours  insulin lispro (HumaLOG) corrective regimen sliding scale   SubCutaneous three times a day before meals  dextrose 5%. 1000 milliLiter(s) (50 mL/Hr) IV Continuous <Continuous>  dextrose 50% Injectable 12.5 Gram(s) IV Push once  dextrose 50% Injectable 25 Gram(s) IV Push once  dextrose 50% Injectable 25 Gram(s) IV Push once  amiodarone    Tablet 400 milliGRAM(s) Oral every 8 hours  metoprolol 25 milliGRAM(s) Oral every 8 hours  docusate sodium 100 milliGRAM(s) Oral three times a day  mupirocin 2% Ointment 1 Application(s) Topical two times a day    MEDICATIONS  (PRN):  ALBUTerol    90 MICROgram(s) HFA Inhaler 2 Puff(s) Inhalation every 6 hours PRN Shortness of Breath and/or Wheezing  acetaminophen   Tablet 650 milliGRAM(s) Oral every 6 hours PRN For Temp greater than 38 C (100.4 F)  acetaminophen   Tablet. 650 milliGRAM(s) Oral every 6 hours PRN Mild Pain (1 - 3)  dextrose Gel 1 Dose(s) Oral once PRN Blood Glucose LESS THAN 70 milliGRAM(s)/deciliter  glucagon  Injectable 1 milliGRAM(s) IntraMuscular once PRN Glucose LESS THAN 70 milligrams/deciliter    Allergies    No Known Allergies    ICU Vital Signs Last 24 Hrs  T(C): 36.6 (12 Sep 2017 07:55), Max: 37.1 (12 Sep 2017 05:00)  T(F): 97.9 (12 Sep 2017 07:55), Max: 98.7 (12 Sep 2017 05:00)  HR: 71 (12 Sep 2017 08:59) (70 - 81)  BP: 117/68 (12 Sep 2017 06:00) (96/61 - 119/81)  BP(mean): 79 (12 Sep 2017 06:00) (67 - 90)  ABP: --  ABP(mean): --  RR: 26 (12 Sep 2017 06:00) (11 - 30)  SpO2: 98% (11 Sep 2017 22:00) (95% - 100%)                          13.4   11.1  )-----------( 209      ( 12 Sep 2017 05:55 )             41.6     09-12    134<L>  |  100  |  25<H>  ----------------------------<  190<H>  4.3   |  26  |  1.02    Ca    8.4<L>      12 Sep 2017 05:55  Phos  3.4     09-12  Mg     2.4     09-12    REVIEW OF SYSTEMS:    CONSTITUTIONAL: No weakness, fevers or chills  EYES/ENT: No visual changes;  No vertigo or throat pain   NECK: No pain or stiffness  RESPIRATORY: No cough, wheezing, hemoptysis; No shortness of breath  CARDIOVASCULAR: No chest pain or palpitations  GASTROINTESTINAL: No abdominal or epigastric pain. No nausea, vomiting, or hematemesis; No diarrhea or constipation. No melena or hematochezia.  GENITOURINARY: No dysuria, frequency or hematuria  NEUROLOGICAL: No numbness or weakness  SKIN: No itching, burning, rashes, or lesions   All other review of systems is negative unless indicated above    PHYSICAL EXAM:    General: WN/WD NAD  Neurology: A&Ox3, nonfocal, ROBERTS x 4  HEENT:NC/AT, neck supple, no JVD, EOMI, PERRLA  Respiratory: CTA B/L  CV: RRR, S1S2, no murmurs, rubs or gallops  Abdominal: Soft, NT, ND +BS  Extremities: No edema, + peripheral pulses        < from: Xray Chest 1 View AP/PA. (09.11.17 @ 14:22) >    EXAM:  CHEST SINGLE VIEW FRONTAL                            PROCEDURE DATE:  09/11/2017          INTERPRETATION:  History: Status post AICD placement    Chest:  one view.      Comparison: 9/6/2017    AP radiograph of the chest demonstrates no evidence of infiltrate,   pleural effusion or vascular congestion. No atelectasis is seen. The   cardiac silhouette is enlarged in size. Pacemaker intact. Osseous   structures are intact.    Impression: No active pulmonary disease. Cardiomegaly.      BAL DELEON M.D., ATTENDING RADIOLOGIST  This document has been electronically signed. Sep 11 2017  4:34PM

## 2017-09-12 NOTE — PROGRESS NOTE ADULT - ASSESSMENT
No further ventricular tachycardia .  Nonischemic cardiomyopathy with severe LV dysfunction left ventricular ejection fraction 20% by an echocardiogram in June 2017. ,  she has normal coronaries by cardiac catheterization.  Chronic systolic congestive heart failure.  Cardiac arrhythmias status post AICD discharge .  Dyspnea on mild exertion.  Suggest  Observe on telemetry.  Continue current cardiac medications.  Discussed with the hospitalist.  Follow-up with electrolytes/TSH.  Discussed with pts .   Dr Stevens will follow up tomorrow.
This patient is a 57 yr old, Tongan speaking, female w/ a history of cardiomyopathy and EF 20% w/ single chamber ICD who has had recurrent VT.  She had been on Amiodarone but there may have been non-compliance.  She has had documented pause dependent PMVT with ICD shocks.    Her QRS is narrow and she has good conduction to the ventricle from the atrium.    s/p IV amiodarone load and upgrade to Dual Chamber ICD POD #1.  Continue PO Amiodarone 200 mg daily, metoprolol.  F/U in outpatient clinic in 2 weeks.  Device interrogation reveals normal function.  Stable from EP standpoint.
VT, AICD shock  NICM  Non obstructive CAD  HTN    Suggest:    Change to PO Amio load  EP f/u appreciated. Change to Dual chamber device on 9/11/17  Keep K > 4  Compliance to meds  Continue current meds
VT, s/p AICD shock  NICM  Non obstructive CAD  HTN    Suggest:    Continue PO Amio load, switch to maintenance dose tomorrow.  Scheduled for a dual chamber AICD today  Keep K > 4  Compliance to meds  Continue current meds
VT, s/p AICD shock  NICM  Non obstructive CAD  HTN  AICD upgraded to dual chamber device.    Suggest:    Continue PO Amio for now. Change to maintenance dose.  Continue other current meds  Compliance to meds  Discharge planning.    Amio to be changed to Mexilitine at out pt follow up in 2 weeks. Discussed with EP MD Dr Mercedes.

## 2017-09-12 NOTE — DISCHARGE NOTE ADULT - PATIENT PORTAL LINK FT
“You can access the Follow[employer] Patient Portal, offered by [facility], by registering with the following website: [email or web address]”

## 2017-09-12 NOTE — DISCHARGE NOTE ADULT - CARE PROVIDER_API CALL
Isael Mercedes (MD), Cardiac Electrophysiology; Cardiovascular Disease; Internal Medicine  [address]  [address] [place] [address]  Phone: [phone]  Fax: [phone]    Dharmesh Tucker), Cardiovascular Disease; Internal Medicine  [address] [place] [address]  Phone: [phone]  Fax: [phone]

## 2017-09-12 NOTE — PROGRESS NOTE ADULT - SUBJECTIVE AND OBJECTIVE BOX
CURRENT CARDIAC WORKUP:       Echo:  Stress Test:  Cardiac Cath:    Allergies:   No Known Allergies      MEDICATIONS  (STANDING):  sacubitril 24 mG/valsartan 26 mG 1 Tablet(s) Oral two times a day  simvastatin 40 milliGRAM(s) Oral at bedtime  buDESOnide 160 MICROgram(s)/formoterol 4.5 MICROgram(s) Inhaler 2 Puff(s) Inhalation two times a day  furosemide    Tablet 40 milliGRAM(s) Oral daily  heparin  Injectable 5000 Unit(s) SubCutaneous every 8 hours  insulin lispro (HumaLOG) corrective regimen sliding scale   SubCutaneous three times a day before meals  dextrose 5%. 1000 milliLiter(s) (50 mL/Hr) IV Continuous <Continuous>  dextrose 50% Injectable 12.5 Gram(s) IV Push once  dextrose 50% Injectable 25 Gram(s) IV Push once  dextrose 50% Injectable 25 Gram(s) IV Push once  amiodarone    Tablet 400 milliGRAM(s) Oral every 8 hours  metoprolol 25 milliGRAM(s) Oral every 8 hours  docusate sodium 100 milliGRAM(s) Oral three times a day  mupirocin 2% Ointment 1 Application(s) Topical two times a day    MEDICATIONS  (PRN):  ALBUTerol    90 MICROgram(s) HFA Inhaler 2 Puff(s) Inhalation every 6 hours PRN Shortness of Breath and/or Wheezing  acetaminophen   Tablet 650 milliGRAM(s) Oral every 6 hours PRN For Temp greater than 38 C (100.4 F)  acetaminophen   Tablet. 650 milliGRAM(s) Oral every 6 hours PRN Mild Pain (1 - 3)  dextrose Gel 1 Dose(s) Oral once PRN Blood Glucose LESS THAN 70 milliGRAM(s)/deciliter  glucagon  Injectable 1 milliGRAM(s) IntraMuscular once PRN Glucose LESS THAN 70 milligrams/deciliter      ROS:     .ros      Vital Signs Last 24 Hrs  T(C): 36.6 (12 Sep 2017 07:55), Max: 37.1 (12 Sep 2017 05:00)  T(F): 97.9 (12 Sep 2017 07:55), Max: 98.7 (12 Sep 2017 05:00)  HR: 71 (12 Sep 2017 09:00) (70 - 81)  BP: 104/65 (12 Sep 2017 09:00) (96/61 - 119/81)  BP(mean): 75 (12 Sep 2017 09:00) (67 - 90)  RR: 19 (12 Sep 2017 09:00) (11 - 30)  SpO2: 96% (12 Sep 2017 09:00) (95% - 100%)    I&O's Summary    11 Sep 2017 07:01  -  12 Sep 2017 07:00  --------------------------------------------------------  IN: 50 mL / OUT: 50 mL / NET: 0 mL        PHYSICAL EXAM:    General Appearance:    Comfortable, AAO X 3, in no distress.   HEENT:                       Atraumatic, PERRLA, EOMI, conjunctiva clear.   Neck:                          Supple, no adenopathy, no thyromegaly, no JVD, no carotid bruit  Back:                          Symmetric, no CV angle fullness or tenderness, no soft tissue tenderness.  Chest:                         Clear to auscultation, no wheezes, rales or rhonchi, symmetric air entry, no tachypnea, retractions or cyanosis  Heart:                          S1, S2 normal, no gallop, no murmur.  Abdomen:                    Soft, non-tender, bowel sounds active. No palpable masses.   Extremities:                 no cyanosis, no edema, no joint swelling. Peripheral pulses normal  Skin:                           Skin color, texture normal. No rashes   Neurologic:                  Grossly cranial nerves intact, sensory and motor normal.      INTERPRETATION OF TELEMETRY:    ECG:    LABS:                        13.4   11.1  )-----------( 209      ( 12 Sep 2017 05:55 )             41.6     09-12    134<L>  |  100  |  25<H>  ----------------------------<  190<H>  4.3   |  26  |  1.02    Ca    8.4<L>      12 Sep 2017 05:55  Phos  3.4     09-12  Mg     2.4     09-12 09-06 @ 23:40  Trop-I 0.075  CK     --  CK MB  --    09-06 @ 20:15  Trop-I 0.077  CK     59  CK MB  --    Pro BNP  1100 09-06 @ 20:15  D Dimer  -- 09-06 @ 20:15              RADIOLOGY & ADDITIONAL STUDIES: 56 yo female with dizziness, syncope and AICD shocks. Similar admit in August, cath was non obstructive. She was non compliant to amiodarone that was started. On 9/7/17, while examining her  she had similar dizziness VT on monitor and AICD shock.     Today, she feels well. no new events. Wants to go home.     PAST MEDICAL & SURGICAL HISTORY:  HTN (hypertension)  HLD (hyperlipidemia)  Asthma  DM (diabetes mellitus)  Non obst CAD (coronary artery disease)  History of cholecystectomy  Artificial cardiac pacemaker    CARDIAC WORKUP:    Echo:  Severe reduced EF  Cardiac Cath:  8/24/17 - Non obstructive    Allergies:   No Known Allergies      MEDICATIONS  (STANDING):  sacubitril 24 mG/valsartan 26 mG 1 Tablet(s) Oral two times a day  simvastatin 40 milliGRAM(s) Oral at bedtime  buDESOnide 160 MICROgram(s)/formoterol 4.5 MICROgram(s) Inhaler 2 Puff(s) Inhalation two times a day  furosemide    Tablet 40 milliGRAM(s) Oral daily  heparin  Injectable 5000 Unit(s) SubCutaneous every 8 hours  insulin lispro (HumaLOG) corrective regimen sliding scale   SubCutaneous three times a day before meals  dextrose 5%. 1000 milliLiter(s) (50 mL/Hr) IV Continuous <Continuous>  dextrose 50% Injectable 12.5 Gram(s) IV Push once  dextrose 50% Injectable 25 Gram(s) IV Push once  dextrose 50% Injectable 25 Gram(s) IV Push once  amiodarone    Tablet 400 milliGRAM(s) Oral every 8 hours  metoprolol 25 milliGRAM(s) Oral every 8 hours  docusate sodium 100 milliGRAM(s) Oral three times a day  mupirocin 2% Ointment 1 Application(s) Topical two times a day    MEDICATIONS  (PRN):  ALBUTerol    90 MICROgram(s) HFA Inhaler 2 Puff(s) Inhalation every 6 hours PRN Shortness of Breath and/or Wheezing  acetaminophen   Tablet 650 milliGRAM(s) Oral every 6 hours PRN For Temp greater than 38 C (100.4 F)  acetaminophen   Tablet. 650 milliGRAM(s) Oral every 6 hours PRN Mild Pain (1 - 3)  dextrose Gel 1 Dose(s) Oral once PRN Blood Glucose LESS THAN 70 milliGRAM(s)/deciliter  glucagon  Injectable 1 milliGRAM(s) IntraMuscular once PRN Glucose LESS THAN 70 milligrams/deciliter      ROS:     Detailed ten system ROS was performed and was negative except for history as eluded to above.    no fever  no chills  no nausea  no vomiting  no diarrhea  no constipation  no melena  no hematochezia  no chest pain  no palpitations  no sob  no dyspnea  no cough  no wheezing  no anorexia  no headache  no dizziness  no syncope  no weakness  no myalgia  no dysuria  no polyuria  no hematuria       Vital Signs Last 24 Hrs  T(C): 36.6 (12 Sep 2017 07:55), Max: 37.1 (12 Sep 2017 05:00)  T(F): 97.9 (12 Sep 2017 07:55), Max: 98.7 (12 Sep 2017 05:00)  HR: 71 (12 Sep 2017 09:00) (70 - 81)  BP: 104/65 (12 Sep 2017 09:00) (96/61 - 119/81)  BP(mean): 75 (12 Sep 2017 09:00) (67 - 90)  RR: 19 (12 Sep 2017 09:00) (11 - 30)  SpO2: 96% (12 Sep 2017 09:00) (95% - 100%)    I&O's Summary    11 Sep 2017 07:01  -  12 Sep 2017 07:00  --------------------------------------------------------  IN: 50 mL / OUT: 50 mL / NET: 0 mL        PHYSICAL EXAM:    General Appearance:    Comfortable, AAO X 3, in no distress. Obese  HEENT:                       Atraumatic, PERRLA, EOMI, conjunctiva clear.   Neck:                          Supple, no adenopathy, no thyromegaly, no JVD, no carotid bruit  Back:                          Symmetric, no CV angle fullness or tenderness, no soft tissue tenderness.  Chest:                         Basal rales or rhonchi, symmetric air entry, no tachypnea, retractions or cyanosis  Heart:                          S1, S2 normal, no gallop, no murmur.  Abdomen:                    Soft, non-tender, bowel sounds active. No palpable masses.   Extremities:                 no cyanosis, no edema, no joint swelling. Peripheral pulses normal  Skin:                           Skin color, texture normal. No rashes   Neurologic:                  Grossly cranial nerves intact, sensory and motor normal.      TELEMETRY:  No further NSVT    ECG:  NSR, IVCD    LABS:                        13.4   11.1  )-----------( 209      ( 12 Sep 2017 05:55 )             41.6     09-12    134<L>  |  100  |  25<H>  ----------------------------<  190<H>  4.3   |  26  |  1.02    Ca    8.4<L>      12 Sep 2017 05:55  Phos  3.4     09-12  Mg     2.4     09-12      RADIOLOGY & ADDITIONAL STUDIES:    < from: Xray Chest 1 View AP/PA. (09.11.17 @ 14:22) > Impression: No active pulmonary disease. Cardiomegaly. Dual chamber ICD.

## 2017-09-12 NOTE — DISCHARGE NOTE ADULT - HOSPITAL COURSE
56 yo female with PMH of HTN, HLD, DMII, nonobstructive CAD, systolic HF (EF 20% June 2017) s/p AICD, s/p recent cardiac cath 8/2017 with normal coronaries, comes in with episode of dizziness and loss of consciousness at home earlier today.    She had AICD shocks and then lost consciousness for approx 3-4 minutes.    While in the ED, patient was noted on the tele monitor to have 8 beats of VT. Patient has been non compliant with her Amiodarone treatment.  Was started on amio load. Seen by EP and underwent upgrade to BIV AICD. Case d/w EP, plan is to dc on amiodarone 200mg po daily and to f/u as outpt in 2 weeks.   She has been advised medication compliance and close f/u with cardiology.    Vital Signs Last 24 Hrs  T(C): 36.6 (12 Sep 2017 07:55), Max: 37.1 (12 Sep 2017 05:00)  T(F): 97.9 (12 Sep 2017 07:55), Max: 98.7 (12 Sep 2017 05:00)  HR: 71 (12 Sep 2017 09:00) (70 - 81)  BP: 104/65 (12 Sep 2017 09:00) (96/61 - 119/81)  BP(mean): 75 (12 Sep 2017 09:00) (67 - 90)  RR: 19 (12 Sep 2017 09:00) (11 - 30)  SpO2: 96% (12 Sep 2017 09:00) (95% - 100%)    PHYSICAL EXAM:    GENERAL: Comfortable, no acute distress , obese  HEAD:  Normocephalic, atraumatic  EYES: EOMI, PERRLA  HEENT: Moist mucous membranes  NECK: Supple, No JVD  NERVOUS SYSTEM:  Alert & Oriented X3, Motor Strength 5/5 B/L upper and lower extremities  CHEST/LUNG: Clear to auscultation bilaterally, +AICD site intact.  HEART: Regular rate and rhythm  ABDOMEN: Soft, Nontender, Nondistended, Bowel sounds present  GENITOURINARY: Voiding, no palpable bladder  EXTREMITIES:   No clubbing, cyanosis, or edema  MUSCULOSKELTAL- No muscle tenderness, no joint tenderness  SKIN-no rash    LABS:                        13.4   11.1  )-----------( 209      ( 12 Sep 2017 05:55 )             41.6     09-12    134<L>  |  100  |  25<H>  ----------------------------<  190<H>  4.3   |  26  |  1.02    Ca    8.4<L>      12 Sep 2017 05:55  Phos  3.4     09-12  Mg     2.4     09-12    Final diagnosis:    1. VT:  now s/p BIV upgrade  2. Acute on chronic systolic left Chf:  3. DMII  4. Mild intermittent Asthma  5. HLD    time taken in preparation for dc 75 min.   left message for pcp re: dc.

## 2017-09-12 NOTE — DISCHARGE NOTE ADULT - PLAN OF CARE
prevent recurrence take meds as advised. DO NOT SKIP DOSES  follow up with cardiology and electrophysiology.

## 2017-09-12 NOTE — DISCHARGE NOTE ADULT - CARE PLAN
Principal Discharge DX:	Ventricular tachyarrhythmia  Goal:	prevent recurrence  Instructions for follow-up, activity and diet:	take meds as advised. DO NOT SKIP DOSES  follow up with cardiology and electrophysiology.

## 2017-09-12 NOTE — DISCHARGE NOTE ADULT - ADDITIONAL INSTRUCTIONS
1. follow up with dr. navarro regarding recent aicd upgrade in 2 weeks.  2. Follow upwith dr. bridges in 3-5 days regarding chf, medication adjustment.

## 2017-09-12 NOTE — DISCHARGE NOTE ADULT - MEDICATION SUMMARY - MEDICATIONS TO TAKE
I will START or STAY ON the medications listed below when I get home from the hospital:    sacubitril-valsartan 24 mg-26 mg oral tablet  -- 1 tab(s) by mouth 2 times a day  -- Indication: For for chf (NEW MEDICINE)    amiodarone 200 mg oral tablet  -- 1 tab(s) by mouth once a day x 14 days   -- Avoid grapefruit and grapefruit juice while taking this medication.  Avoid prolonged or excessive exposure to direct and/or artificial sunlight while taking this medication.  Do not take this drug if you are pregnant.  It is very important that you take or use this exactly as directed.  Do not skip doses or discontinue unless directed by your doctor.  May cause drowsiness or dizziness.    -- Indication: For for ventricular tachycardia (NEW MEDICINE)    metFORMIN 500 mg oral tablet  -- 1 tab(s) by mouth 2 times a day  -- Indication: For for diabetes (NO CHANGE)    simvastatin 40 mg oral tablet  -- 1 tab(s) by mouth once a day (at bedtime)  -- Indication: For for cholesterol (NO CHANGE)    metoprolol tartrate 25 mg oral tablet  -- 1 tab(s) by mouth every 8 hours   -- It is very important that you take or use this exactly as directed.  Do not skip doses or discontinue unless directed by your doctor.  May cause drowsiness.  Alcohol may intensify this effect.  Use care when operating dangerous machinery.  Some non-prescription drugs may aggravate your condition.  Read all labels carefully.  If a warning appears, check with your doctor before taking.  Take with food or milk.  This drug may impair the ability to drive or operate machinery.  Use care until you become familiar with its effects.    -- Indication: For for heart failure (DOSE WAS CHANGED)    albuterol 90 mcg/inh inhalation aerosol  -- 2 puff(s) inhaled every 4 hours, As Needed -Shortness of Breath and/or Wheezing   -- Indication: For Asthma    budesonide-formoterol 160 mcg-4.5 mcg/inh inhalation aerosol  -- 1 puff(s) inhaled 2 times a day   -- Indication: For Asthma    furosemide 40 mg oral tablet  -- 1 tab(s) by mouth once a day  -- Indication: For Chf (NO CHANGE)    potassium chloride 20 mEq oral granule, extended release  -- 20 milliequivalent(s) by mouth once a day  -- Indication: For to take with lasix for low potassium. Stop DIGITEK, METOLAZONE, COREG, LISINOPRIL

## 2017-09-15 DIAGNOSIS — Z91.14 PATIENT'S OTHER NONCOMPLIANCE WITH MEDICATION REGIMEN: ICD-10-CM

## 2017-09-15 DIAGNOSIS — Z79.84 LONG TERM (CURRENT) USE OF ORAL HYPOGLYCEMIC DRUGS: ICD-10-CM

## 2017-09-15 DIAGNOSIS — I95.2 HYPOTENSION DUE TO DRUGS: ICD-10-CM

## 2017-09-15 DIAGNOSIS — R42 DIZZINESS AND GIDDINESS: ICD-10-CM

## 2017-09-15 DIAGNOSIS — I47.1 SUPRAVENTRICULAR TACHYCARDIA: ICD-10-CM

## 2017-09-15 DIAGNOSIS — I11.0 HYPERTENSIVE HEART DISEASE WITH HEART FAILURE: ICD-10-CM

## 2017-09-15 DIAGNOSIS — Z95.810 PRESENCE OF AUTOMATIC (IMPLANTABLE) CARDIAC DEFIBRILLATOR: ICD-10-CM

## 2017-09-15 DIAGNOSIS — I44.0 ATRIOVENTRICULAR BLOCK, FIRST DEGREE: ICD-10-CM

## 2017-09-15 DIAGNOSIS — I45.81 LONG QT SYNDROME: ICD-10-CM

## 2017-09-15 DIAGNOSIS — I42.9 CARDIOMYOPATHY, UNSPECIFIED: ICD-10-CM

## 2017-09-15 DIAGNOSIS — J45.20 MILD INTERMITTENT ASTHMA, UNCOMPLICATED: ICD-10-CM

## 2017-09-15 DIAGNOSIS — E11.9 TYPE 2 DIABETES MELLITUS WITHOUT COMPLICATIONS: ICD-10-CM

## 2017-09-15 DIAGNOSIS — I50.23 ACUTE ON CHRONIC SYSTOLIC (CONGESTIVE) HEART FAILURE: ICD-10-CM

## 2017-09-15 DIAGNOSIS — I47.2 VENTRICULAR TACHYCARDIA: ICD-10-CM

## 2017-09-15 DIAGNOSIS — E78.5 HYPERLIPIDEMIA, UNSPECIFIED: ICD-10-CM

## 2017-09-15 DIAGNOSIS — Z90.49 ACQUIRED ABSENCE OF OTHER SPECIFIED PARTS OF DIGESTIVE TRACT: ICD-10-CM

## 2017-09-20 ENCOUNTER — EMERGENCY (EMERGENCY)
Facility: HOSPITAL | Age: 57
LOS: 0 days | Discharge: HOME | End: 2017-09-21
Attending: EMERGENCY MEDICINE | Admitting: EMERGENCY MEDICINE
Payer: COMMERCIAL

## 2017-09-20 VITALS — WEIGHT: 186.07 LBS | HEIGHT: 66 IN

## 2017-09-20 DIAGNOSIS — R60.1 GENERALIZED EDEMA: ICD-10-CM

## 2017-09-20 DIAGNOSIS — R60.0 LOCALIZED EDEMA: ICD-10-CM

## 2017-09-20 DIAGNOSIS — Z90.49 ACQUIRED ABSENCE OF OTHER SPECIFIED PARTS OF DIGESTIVE TRACT: Chronic | ICD-10-CM

## 2017-09-20 DIAGNOSIS — Z95.0 PRESENCE OF CARDIAC PACEMAKER: Chronic | ICD-10-CM

## 2017-09-20 LAB
ALBUMIN SERPL ELPH-MCNC: 2.9 G/DL — LOW (ref 3.3–5)
ALP SERPL-CCNC: 135 U/L — HIGH (ref 40–120)
ALT FLD-CCNC: 41 U/L — SIGNIFICANT CHANGE UP (ref 12–78)
ANION GAP SERPL CALC-SCNC: 10 MMOL/L — SIGNIFICANT CHANGE UP (ref 5–17)
APTT BLD: 25.2 SEC — LOW (ref 27.5–37.4)
AST SERPL-CCNC: 33 U/L — SIGNIFICANT CHANGE UP (ref 15–37)
BASOPHILS # BLD AUTO: 0.1 K/UL — SIGNIFICANT CHANGE UP (ref 0–0.2)
BASOPHILS NFR BLD AUTO: 1.1 % — SIGNIFICANT CHANGE UP (ref 0–2)
BILIRUB SERPL-MCNC: 0.6 MG/DL — SIGNIFICANT CHANGE UP (ref 0.2–1.2)
BUN SERPL-MCNC: 24 MG/DL — HIGH (ref 7–23)
CALCIUM SERPL-MCNC: 8.4 MG/DL — LOW (ref 8.5–10.1)
CHLORIDE SERPL-SCNC: 103 MMOL/L — SIGNIFICANT CHANGE UP (ref 96–108)
CO2 SERPL-SCNC: 28 MMOL/L — SIGNIFICANT CHANGE UP (ref 22–31)
CREAT SERPL-MCNC: 1.06 MG/DL — SIGNIFICANT CHANGE UP (ref 0.5–1.3)
EOSINOPHIL # BLD AUTO: 0 K/UL — SIGNIFICANT CHANGE UP (ref 0–0.5)
EOSINOPHIL NFR BLD AUTO: 0 % — SIGNIFICANT CHANGE UP (ref 0–6)
GLUCOSE SERPL-MCNC: 148 MG/DL — HIGH (ref 70–99)
HCT VFR BLD CALC: 40 % — SIGNIFICANT CHANGE UP (ref 34.5–45)
HGB BLD-MCNC: 12.5 G/DL — SIGNIFICANT CHANGE UP (ref 11.5–15.5)
INR BLD: 1.37 RATIO — HIGH (ref 0.88–1.16)
LYMPHOCYTES # BLD AUTO: 1.9 K/UL — SIGNIFICANT CHANGE UP (ref 1–3.3)
LYMPHOCYTES # BLD AUTO: 20.6 % — SIGNIFICANT CHANGE UP (ref 13–44)
MCHC RBC-ENTMCNC: 26.6 PG — LOW (ref 27–34)
MCHC RBC-ENTMCNC: 31.2 GM/DL — LOW (ref 32–36)
MCV RBC AUTO: 85.2 FL — SIGNIFICANT CHANGE UP (ref 80–100)
MONOCYTES # BLD AUTO: 0.9 K/UL — SIGNIFICANT CHANGE UP (ref 0–0.9)
MONOCYTES NFR BLD AUTO: 9.5 % — SIGNIFICANT CHANGE UP (ref 2–14)
NEUTROPHILS # BLD AUTO: 6.4 K/UL — SIGNIFICANT CHANGE UP (ref 1.8–7.4)
NEUTROPHILS NFR BLD AUTO: 68.8 % — SIGNIFICANT CHANGE UP (ref 43–77)
NT-PROBNP SERPL-SCNC: 1659 PG/ML — HIGH (ref 0–125)
PLATELET # BLD AUTO: 239 K/UL — SIGNIFICANT CHANGE UP (ref 150–400)
POTASSIUM SERPL-MCNC: 3.6 MMOL/L — SIGNIFICANT CHANGE UP (ref 3.5–5.3)
POTASSIUM SERPL-SCNC: 3.6 MMOL/L — SIGNIFICANT CHANGE UP (ref 3.5–5.3)
PROT SERPL-MCNC: 6.5 GM/DL — SIGNIFICANT CHANGE UP (ref 6–8.3)
PROTHROM AB SERPL-ACNC: 14.9 SEC — HIGH (ref 9.8–12.7)
RBC # BLD: 4.7 M/UL — SIGNIFICANT CHANGE UP (ref 3.8–5.2)
RBC # FLD: 16 % — HIGH (ref 10.3–14.5)
SODIUM SERPL-SCNC: 141 MMOL/L — SIGNIFICANT CHANGE UP (ref 135–145)
TROPONIN I SERPL-MCNC: 0.03 NG/ML — SIGNIFICANT CHANGE UP (ref 0.01–0.04)
WBC # BLD: 9.3 K/UL — SIGNIFICANT CHANGE UP (ref 3.8–10.5)
WBC # FLD AUTO: 9.3 K/UL — SIGNIFICANT CHANGE UP (ref 3.8–10.5)

## 2017-09-20 PROCEDURE — 99284 EMERGENCY DEPT VISIT MOD MDM: CPT | Mod: 25

## 2017-09-20 PROCEDURE — 71010: CPT | Mod: 26

## 2017-09-20 PROCEDURE — 93010 ELECTROCARDIOGRAM REPORT: CPT

## 2017-09-20 RX ORDER — FUROSEMIDE 40 MG
40 TABLET ORAL ONCE
Qty: 0 | Refills: 0 | Status: COMPLETED | OUTPATIENT
Start: 2017-09-20 | End: 2017-09-20

## 2017-09-20 RX ORDER — IPRATROPIUM/ALBUTEROL SULFATE 18-103MCG
3 AEROSOL WITH ADAPTER (GRAM) INHALATION ONCE
Qty: 0 | Refills: 0 | Status: COMPLETED | OUTPATIENT
Start: 2017-09-20 | End: 2017-09-20

## 2017-09-20 RX ORDER — SODIUM CHLORIDE 9 MG/ML
3 INJECTION INTRAMUSCULAR; INTRAVENOUS; SUBCUTANEOUS ONCE
Qty: 0 | Refills: 0 | Status: COMPLETED | OUTPATIENT
Start: 2017-09-20 | End: 2017-09-20

## 2017-09-20 RX ADMIN — Medication 3 MILLILITER(S): at 23:56

## 2017-09-20 RX ADMIN — SODIUM CHLORIDE 3 MILLILITER(S): 9 INJECTION INTRAMUSCULAR; INTRAVENOUS; SUBCUTANEOUS at 23:56

## 2017-09-20 RX ADMIN — Medication 40 MILLIGRAM(S): at 23:56

## 2017-09-20 NOTE — ED PROVIDER NOTE - MEDICAL DECISION MAKING DETAILS
58 yo female with complex medical hx c/o increased edema and SOB will check labs, cxr, give IV lasix and reevaluate

## 2017-09-20 NOTE — ED PROVIDER NOTE - CONSTITUTIONAL, MLM
normal... Well appearing, well nourished, awake, alert, oriented to person, place, time/situation and in no apparent distress.  Able to speak in complete sentences with no respiratory distress

## 2017-09-20 NOTE — ED PROVIDER NOTE - OBJECTIVE STATEMENT
58 yo female with h/o CAD, HTN, HLD, DM, AICD, COPD and CHF c/o increased swelling to the lower extremities and increased SOB over the past few days. Pt had chest xray on Monday showing a pneumonia and started on Ceftin. Pt states she has only urinated once a day for the past 3 days which is unusual for her.  No fever.  No chest pain.

## 2017-09-21 ENCOUNTER — APPOINTMENT (OUTPATIENT)
Dept: ELECTROPHYSIOLOGY | Facility: CLINIC | Age: 57
End: 2017-09-21
Payer: COMMERCIAL

## 2017-09-21 VITALS
HEIGHT: 66 IN | HEART RATE: 80 BPM | WEIGHT: 186 LBS | BODY MASS INDEX: 29.89 KG/M2 | DIASTOLIC BLOOD PRESSURE: 74 MMHG | SYSTOLIC BLOOD PRESSURE: 111 MMHG

## 2017-09-21 VITALS
TEMPERATURE: 98 F | DIASTOLIC BLOOD PRESSURE: 73 MMHG | OXYGEN SATURATION: 99 % | SYSTOLIC BLOOD PRESSURE: 110 MMHG | RESPIRATION RATE: 19 BRPM | HEART RATE: 82 BPM

## 2017-09-21 DIAGNOSIS — I42.0 DILATED CARDIOMYOPATHY: ICD-10-CM

## 2017-09-21 LAB — TROPONIN I SERPL-MCNC: 0.03 NG/ML — SIGNIFICANT CHANGE UP (ref 0.01–0.04)

## 2017-09-21 PROCEDURE — 99024 POSTOP FOLLOW-UP VISIT: CPT

## 2017-09-21 RX ORDER — FUROSEMIDE 40 MG
20 TABLET ORAL ONCE
Qty: 0 | Refills: 0 | Status: COMPLETED | OUTPATIENT
Start: 2017-09-21 | End: 2017-09-21

## 2017-09-21 RX ADMIN — Medication 20 MILLIGRAM(S): at 02:20

## 2017-09-21 NOTE — ED ADULT NURSE REASSESSMENT NOTE - NS ED NURSE REASSESS COMMENT FT1
Pt medicated as per MD order, pt had two episodes of urination with a total output of 525mL. Pt remains on bedside monitor at this time and has no questions at this time. Will continue to monitor.

## 2017-09-21 NOTE — ED ADULT NURSE NOTE - OBJECTIVE STATEMENT
Pt presents to the ED with SOB. Pt states she went to [facility] on 9/19 who sent her to [facility] and was dx with pneumonia, pt has a h/o CHF and states she has been taking her lasix as prescribed. Pt states that she has had decreased urination the last two days with increasing edema of the lower extremities bilaterally. Pt states she has had a nonproductive cough for the last few days.

## 2017-09-21 NOTE — ED ADULT NURSE NOTE - NS ED NURSE DC INFO COMPLEXITY
Returned Demonstration/Simple: Patient demonstrates quick and easy understanding/Patient asked questions/Verbalized Understanding/Straightforward: Basic instructions, no meds, no home treatment

## 2017-10-01 ENCOUNTER — EMERGENCY (EMERGENCY)
Facility: HOSPITAL | Age: 57
LOS: 0 days | Discharge: HOME | End: 2017-10-01
Attending: EMERGENCY MEDICINE | Admitting: EMERGENCY MEDICINE
Payer: COMMERCIAL

## 2017-10-01 VITALS
DIASTOLIC BLOOD PRESSURE: 60 MMHG | TEMPERATURE: 98 F | SYSTOLIC BLOOD PRESSURE: 101 MMHG | OXYGEN SATURATION: 95 % | HEART RATE: 80 BPM | RESPIRATION RATE: 18 BRPM

## 2017-10-01 VITALS — HEIGHT: 66 IN | WEIGHT: 169.98 LBS

## 2017-10-01 DIAGNOSIS — I25.10 ATHEROSCLEROTIC HEART DISEASE OF NATIVE CORONARY ARTERY WITHOUT ANGINA PECTORIS: ICD-10-CM
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
## 2017-10-01 DIAGNOSIS — E11.9 TYPE 2 DIABETES MELLITUS WITHOUT COMPLICATIONS: ICD-10-CM

## 2017-10-01 DIAGNOSIS — R94.31 ABNORMAL ELECTROCARDIOGRAM [ECG] [EKG]: ICD-10-CM

## 2017-10-01 DIAGNOSIS — T82.119A BREAKDOWN (MECHANICAL) OF UNSPECIFIED CARDIAC ELECTRONIC DEVICE, INITIAL ENCOUNTER: ICD-10-CM

## 2017-10-01 DIAGNOSIS — E78.5 HYPERLIPIDEMIA, UNSPECIFIED: ICD-10-CM

## 2017-10-01 DIAGNOSIS — Z95.0 PRESENCE OF CARDIAC PACEMAKER: Chronic | ICD-10-CM

## 2017-10-01 DIAGNOSIS — Z90.49 ACQUIRED ABSENCE OF OTHER SPECIFIED PARTS OF DIGESTIVE TRACT: Chronic | ICD-10-CM

## 2017-10-01 DIAGNOSIS — I10 ESSENTIAL (PRIMARY) HYPERTENSION: ICD-10-CM

## 2017-10-01 LAB
ALBUMIN SERPL ELPH-MCNC: 3.3 G/DL — SIGNIFICANT CHANGE UP (ref 3.3–5)
ALP SERPL-CCNC: 144 U/L — HIGH (ref 40–120)
ALT FLD-CCNC: 58 U/L — SIGNIFICANT CHANGE UP (ref 12–78)
ANION GAP SERPL CALC-SCNC: 9 MMOL/L — SIGNIFICANT CHANGE UP (ref 5–17)
AST SERPL-CCNC: 53 U/L — HIGH (ref 15–37)
BASOPHILS # BLD AUTO: 0.1 K/UL — SIGNIFICANT CHANGE UP (ref 0–0.2)
BASOPHILS NFR BLD AUTO: 1 % — SIGNIFICANT CHANGE UP (ref 0–2)
BILIRUB SERPL-MCNC: 0.8 MG/DL — SIGNIFICANT CHANGE UP (ref 0.2–1.2)
BUN SERPL-MCNC: 39 MG/DL — HIGH (ref 7–23)
CALCIUM SERPL-MCNC: 9.4 MG/DL — SIGNIFICANT CHANGE UP (ref 8.5–10.1)
CHLORIDE SERPL-SCNC: 90 MMOL/L — LOW (ref 96–108)
CO2 SERPL-SCNC: 37 MMOL/L — HIGH (ref 22–31)
CREAT SERPL-MCNC: 1.41 MG/DL — HIGH (ref 0.5–1.3)
EOSINOPHIL # BLD AUTO: 0 K/UL — SIGNIFICANT CHANGE UP (ref 0–0.5)
EOSINOPHIL NFR BLD AUTO: 0 % — SIGNIFICANT CHANGE UP (ref 0–6)
GLUCOSE SERPL-MCNC: 116 MG/DL — HIGH (ref 70–99)
HCT VFR BLD CALC: 44.1 % — SIGNIFICANT CHANGE UP (ref 34.5–45)
HGB BLD-MCNC: 13.8 G/DL — SIGNIFICANT CHANGE UP (ref 11.5–15.5)
LYMPHOCYTES # BLD AUTO: 1.9 K/UL — SIGNIFICANT CHANGE UP (ref 1–3.3)
LYMPHOCYTES # BLD AUTO: 19.7 % — SIGNIFICANT CHANGE UP (ref 13–44)
MCHC RBC-ENTMCNC: 26.4 PG — LOW (ref 27–34)
MCHC RBC-ENTMCNC: 31.3 GM/DL — LOW (ref 32–36)
MCV RBC AUTO: 84.4 FL — SIGNIFICANT CHANGE UP (ref 80–100)
MONOCYTES # BLD AUTO: 1 K/UL — HIGH (ref 0–0.9)
MONOCYTES NFR BLD AUTO: 10 % — SIGNIFICANT CHANGE UP (ref 2–14)
NEUTROPHILS # BLD AUTO: 6.6 K/UL — SIGNIFICANT CHANGE UP (ref 1.8–7.4)
NEUTROPHILS NFR BLD AUTO: 69.2 % — SIGNIFICANT CHANGE UP (ref 43–77)
PLATELET # BLD AUTO: 300 K/UL — SIGNIFICANT CHANGE UP (ref 150–400)
POTASSIUM SERPL-MCNC: 3.3 MMOL/L — LOW (ref 3.5–5.3)
POTASSIUM SERPL-SCNC: 3.3 MMOL/L — LOW (ref 3.5–5.3)
PROT SERPL-MCNC: 7.4 GM/DL — SIGNIFICANT CHANGE UP (ref 6–8.3)
RBC # BLD: 5.22 M/UL — HIGH (ref 3.8–5.2)
RBC # FLD: 15.5 % — HIGH (ref 10.3–14.5)
SODIUM SERPL-SCNC: 136 MMOL/L — SIGNIFICANT CHANGE UP (ref 135–145)
TROPONIN I SERPL-MCNC: 0.02 NG/ML — SIGNIFICANT CHANGE UP (ref 0.01–0.04)
WBC # BLD: 9.5 K/UL — SIGNIFICANT CHANGE UP (ref 3.8–10.5)
WBC # FLD AUTO: 9.5 K/UL — SIGNIFICANT CHANGE UP (ref 3.8–10.5)

## 2017-10-01 PROCEDURE — 93010 ELECTROCARDIOGRAM REPORT: CPT

## 2017-10-01 PROCEDURE — 71010: CPT | Mod: 26

## 2017-10-01 PROCEDURE — 99285 EMERGENCY DEPT VISIT HI MDM: CPT

## 2017-10-01 RX ORDER — SODIUM CHLORIDE 9 MG/ML
3 INJECTION INTRAMUSCULAR; INTRAVENOUS; SUBCUTANEOUS ONCE
Qty: 0 | Refills: 0 | Status: COMPLETED | OUTPATIENT
Start: 2017-10-01 | End: 2017-10-01

## 2017-10-01 RX ADMIN — SODIUM CHLORIDE 3 MILLILITER(S): 9 INJECTION INTRAMUSCULAR; INTRAVENOUS; SUBCUTANEOUS at 11:00

## 2017-10-01 NOTE — ED ADULT TRIAGE NOTE - CHIEF COMPLAINT QUOTE
pt Croatian speaking only. states her pacemaker is making noise. denies palpations, SOB, CP or diff breathing.

## 2017-10-01 NOTE — ED PROVIDER NOTE - PMH
Asthma    CAD (coronary artery disease)    DM (diabetes mellitus)    HLD (hyperlipidemia)    HTN (hypertension)    Pacemaker

## 2017-10-01 NOTE — ED PROVIDER NOTE - NS_ ATTENDINGSCRIBEDETAILS _ED_A_ED_FT
I, Greg Rothman MD,  performed the initial face to face bedside interview with this patient regarding history of present illness, review of symptoms and relevant past medical, social and family history.  I completed an independent physical examination.    The history, relevant review of systems, past medical and surgical history, medical decision making, and physical examination was documented by the scribe in my presence and I attest to the accuracy of the documentation.

## 2017-10-01 NOTE — ED PROVIDER NOTE - OBJECTIVE STATEMENT
56 y/o F with Hx of PPM presents to ED for evaluation after she heard an audible noise from her PM. Pt states she heard the noise while resting at home today. She called EP Dr. Singer who placed the PM who referred her to the ED. No recent appointments with EP. Pt denies fever, chills, n/v/d, HA, SOB, CP.

## 2017-10-05 ENCOUNTER — APPOINTMENT (OUTPATIENT)
Dept: ELECTROPHYSIOLOGY | Facility: CLINIC | Age: 57
End: 2017-10-05

## 2017-10-12 ENCOUNTER — RX RENEWAL (OUTPATIENT)
Age: 57
End: 2017-10-12

## 2017-10-12 DIAGNOSIS — I47.2 VENTRICULAR TACHYCARDIA: ICD-10-CM

## 2017-10-12 RX ORDER — METOPROLOL TARTRATE 25 MG/1
25 TABLET, FILM COATED ORAL 3 TIMES DAILY
Qty: 270 | Refills: 2 | Status: ACTIVE | COMMUNITY
Start: 1900-01-01 | End: 1900-01-01

## 2017-10-23 ENCOUNTER — APPOINTMENT (OUTPATIENT)
Dept: ELECTROPHYSIOLOGY | Facility: CLINIC | Age: 57
End: 2017-10-23

## 2017-11-25 NOTE — ED ADULT NURSE NOTE - FINAL NURSING ELECTRONIC SIGNATURE
30-May-2017 23:39

## 2017-12-30 ENCOUNTER — EMERGENCY (EMERGENCY)
Facility: HOSPITAL | Age: 57
LOS: 0 days | Discharge: HOME | End: 2017-12-30
Attending: EMERGENCY MEDICINE | Admitting: EMERGENCY MEDICINE
Payer: COMMERCIAL

## 2017-12-30 VITALS
RESPIRATION RATE: 19 BRPM | SYSTOLIC BLOOD PRESSURE: 101 MMHG | TEMPERATURE: 98 F | OXYGEN SATURATION: 97 % | DIASTOLIC BLOOD PRESSURE: 64 MMHG | HEART RATE: 88 BPM

## 2017-12-30 VITALS — HEIGHT: 60 IN | WEIGHT: 160.06 LBS

## 2017-12-30 DIAGNOSIS — Z95.0 PRESENCE OF CARDIAC PACEMAKER: Chronic | ICD-10-CM

## 2017-12-30 DIAGNOSIS — Z90.49 ACQUIRED ABSENCE OF OTHER SPECIFIED PARTS OF DIGESTIVE TRACT: Chronic | ICD-10-CM

## 2017-12-30 DIAGNOSIS — R05 COUGH: ICD-10-CM

## 2017-12-30 DIAGNOSIS — J40 BRONCHITIS, NOT SPECIFIED AS ACUTE OR CHRONIC: ICD-10-CM

## 2017-12-30 PROCEDURE — 71020: CPT | Mod: 26

## 2017-12-30 PROCEDURE — 99285 EMERGENCY DEPT VISIT HI MDM: CPT

## 2017-12-30 RX ORDER — IPRATROPIUM/ALBUTEROL SULFATE 18-103MCG
3 AEROSOL WITH ADAPTER (GRAM) INHALATION ONCE
Qty: 0 | Refills: 0 | Status: COMPLETED | OUTPATIENT
Start: 2017-12-30 | End: 2017-12-30

## 2017-12-30 RX ADMIN — Medication 3 MILLILITER(S): at 11:35

## 2017-12-30 RX ADMIN — Medication 3 MILLILITER(S): at 11:55

## 2017-12-30 RX ADMIN — Medication 50 MILLIGRAM(S): at 11:35

## 2017-12-30 NOTE — ED STATDOCS - ATTENDING CONTRIBUTION TO CARE
I performed the initial face to face bedside interview with this patient regarding history of present illness, review of symptoms and past medical, social and family history.  I completed an independent physical examination.  I was the initial provider who evaluated this patient.  The history, review of symptoms and examination was documented by the scribe in my presence and I attest to the accuracy of the documentation.  I have signed out the follow up of any pending tests (i.e. labs, radiological studies) to the resident.  I have discussed the patient’s plan of care and disposition with the resident

## 2017-12-30 NOTE — ED STATDOCS - OBJECTIVE STATEMENT
56 y/o F with Hx of CHF, CAD, HTN, HLD, DM, asthma presents to ED for evaluation of worsening SOB. Pt states the SOB has made it very difficult for her sleep and requires 5 pillows at night to sleep. No worsening peripheral edema. Pt also reports a cough, sore throat, subjective fever. No n/v/d, abd pain. 58 y/o F with Hx of CHF, CAD, HTN, HLD, DM, asthma presents to ED for evaluation of worsening SOB. Pt states the SOB has made it very difficult for her sleep and requires 5 pillows at night to sleep (which is her baseline). No worsening peripheral edema. Pt also reports a cough, sore throat, subjective fever. Sob present only with cough. No n/v/d, abd pain. No chest pain, LH, syncope.

## 2017-12-30 NOTE — ED STATDOCS - PROGRESS NOTE DETAILS
wheezing improved. Pt sent home with steriods. Pt has inhaler at home. instructed to continue taking 4x a day and steriods for 5 days. Pt also sent home with tessalon for cough

## 2017-12-30 NOTE — ED STATDOCS - MEDICAL DECISION MAKING DETAILS
Pt with Hx of CHF, CAD, asthma p/w cough, SOB, sore throat likely URI v. asthma exacerbation. Plan for CXR, duonebs, reassess. Pt with Hx of CHF, CAD, asthma p/w cough, SOB, sore throat likely URI v. asthma exacerbation given wheezes and lack of evidence of fluid overload. Plan for CXR, duonebs, steroids, reassess.

## 2017-12-30 NOTE — ED STATDOCS - PMH
Asthma    CAD (coronary artery disease)    CHF (congestive heart failure)    DM (diabetes mellitus)    HLD (hyperlipidemia)    HTN (hypertension)    Pacemaker

## 2018-01-01 ENCOUNTER — EMERGENCY (EMERGENCY)
Facility: HOSPITAL | Age: 58
LOS: 0 days | Discharge: HOME | End: 2018-01-01
Attending: EMERGENCY MEDICINE | Admitting: EMERGENCY MEDICINE
Payer: COMMERCIAL

## 2018-01-01 VITALS
RESPIRATION RATE: 20 BRPM | WEIGHT: 184.97 LBS | HEIGHT: 66 IN | DIASTOLIC BLOOD PRESSURE: 89 MMHG | HEART RATE: 83 BPM | SYSTOLIC BLOOD PRESSURE: 130 MMHG | OXYGEN SATURATION: 99 %

## 2018-01-01 DIAGNOSIS — I50.9 HEART FAILURE, UNSPECIFIED: ICD-10-CM

## 2018-01-01 DIAGNOSIS — J11.1 INFLUENZA DUE TO UNIDENTIFIED INFLUENZA VIRUS WITH OTHER RESPIRATORY MANIFESTATIONS: ICD-10-CM

## 2018-01-01 DIAGNOSIS — Z95.0 PRESENCE OF CARDIAC PACEMAKER: ICD-10-CM

## 2018-01-01 DIAGNOSIS — I10 ESSENTIAL (PRIMARY) HYPERTENSION: ICD-10-CM

## 2018-01-01 DIAGNOSIS — Z90.49 ACQUIRED ABSENCE OF OTHER SPECIFIED PARTS OF DIGESTIVE TRACT: Chronic | ICD-10-CM

## 2018-01-01 DIAGNOSIS — E11.9 TYPE 2 DIABETES MELLITUS WITHOUT COMPLICATIONS: ICD-10-CM

## 2018-01-01 DIAGNOSIS — Z95.0 PRESENCE OF CARDIAC PACEMAKER: Chronic | ICD-10-CM

## 2018-01-01 DIAGNOSIS — I25.10 ATHEROSCLEROTIC HEART DISEASE OF NATIVE CORONARY ARTERY WITHOUT ANGINA PECTORIS: ICD-10-CM

## 2018-01-01 LAB
ALBUMIN SERPL ELPH-MCNC: 3.3 G/DL — SIGNIFICANT CHANGE UP (ref 3.3–5)
ALP SERPL-CCNC: 152 U/L — HIGH (ref 40–120)
ALT FLD-CCNC: 63 U/L — SIGNIFICANT CHANGE UP (ref 12–78)
ANION GAP SERPL CALC-SCNC: 10 MMOL/L — SIGNIFICANT CHANGE UP (ref 5–17)
APTT BLD: 24.2 SEC — LOW (ref 27.5–37.4)
AST SERPL-CCNC: 52 U/L — HIGH (ref 15–37)
BASOPHILS # BLD AUTO: 0 K/UL — SIGNIFICANT CHANGE UP (ref 0–0.2)
BASOPHILS NFR BLD AUTO: 0.5 % — SIGNIFICANT CHANGE UP (ref 0–2)
BILIRUB SERPL-MCNC: 0.6 MG/DL — SIGNIFICANT CHANGE UP (ref 0.2–1.2)
BUN SERPL-MCNC: 65 MG/DL — HIGH (ref 7–23)
CALCIUM SERPL-MCNC: 8.9 MG/DL — SIGNIFICANT CHANGE UP (ref 8.5–10.1)
CHLORIDE SERPL-SCNC: 98 MMOL/L — SIGNIFICANT CHANGE UP (ref 96–108)
CO2 SERPL-SCNC: 29 MMOL/L — SIGNIFICANT CHANGE UP (ref 22–31)
CREAT SERPL-MCNC: 2.15 MG/DL — HIGH (ref 0.5–1.3)
EOSINOPHIL # BLD AUTO: 0 K/UL — SIGNIFICANT CHANGE UP (ref 0–0.5)
EOSINOPHIL NFR BLD AUTO: 0 % — SIGNIFICANT CHANGE UP (ref 0–6)
GLUCOSE SERPL-MCNC: 113 MG/DL — HIGH (ref 70–99)
HCT VFR BLD CALC: 43.4 % — SIGNIFICANT CHANGE UP (ref 34.5–45)
HGB BLD-MCNC: 13.3 G/DL — SIGNIFICANT CHANGE UP (ref 11.5–15.5)
HPIV4 RNA SPEC QL NAA+PROBE: DETECTED
INR BLD: 1.16 RATIO — SIGNIFICANT CHANGE UP (ref 0.88–1.16)
LACTATE SERPL-SCNC: 2.1 MMOL/L — HIGH (ref 0.7–2)
LYMPHOCYTES # BLD AUTO: 0.6 K/UL — LOW (ref 1–3.3)
LYMPHOCYTES # BLD AUTO: 7.4 % — LOW (ref 13–44)
MAGNESIUM SERPL-MCNC: 2.4 MG/DL — SIGNIFICANT CHANGE UP (ref 1.6–2.6)
MCHC RBC-ENTMCNC: 25.6 PG — LOW (ref 27–34)
MCHC RBC-ENTMCNC: 30.7 GM/DL — LOW (ref 32–36)
MCV RBC AUTO: 83.4 FL — SIGNIFICANT CHANGE UP (ref 80–100)
MONOCYTES # BLD AUTO: 0.8 K/UL — SIGNIFICANT CHANGE UP (ref 0–0.9)
MONOCYTES NFR BLD AUTO: 9.4 % — SIGNIFICANT CHANGE UP (ref 2–14)
NEUTROPHILS # BLD AUTO: 6.7 K/UL — SIGNIFICANT CHANGE UP (ref 1.8–7.4)
NEUTROPHILS NFR BLD AUTO: 82.6 % — HIGH (ref 43–77)
NT-PROBNP SERPL-SCNC: 3340 PG/ML — HIGH (ref 0–125)
PLATELET # BLD AUTO: 268 K/UL — SIGNIFICANT CHANGE UP (ref 150–400)
POTASSIUM SERPL-MCNC: 4.2 MMOL/L — SIGNIFICANT CHANGE UP (ref 3.5–5.3)
POTASSIUM SERPL-SCNC: 4.2 MMOL/L — SIGNIFICANT CHANGE UP (ref 3.5–5.3)
PROT SERPL-MCNC: 7.2 GM/DL — SIGNIFICANT CHANGE UP (ref 6–8.3)
PROTHROM AB SERPL-ACNC: 12.6 SEC — SIGNIFICANT CHANGE UP (ref 9.8–12.7)
RAPID RVP RESULT: DETECTED
RBC # BLD: 5.2 M/UL — SIGNIFICANT CHANGE UP (ref 3.8–5.2)
RBC # FLD: 16.4 % — HIGH (ref 10.3–14.5)
SODIUM SERPL-SCNC: 137 MMOL/L — SIGNIFICANT CHANGE UP (ref 135–145)
TROPONIN I SERPL-MCNC: 0.03 NG/ML — SIGNIFICANT CHANGE UP (ref 0.01–0.04)
WBC # BLD: 8.1 K/UL — SIGNIFICANT CHANGE UP (ref 3.8–10.5)
WBC # FLD AUTO: 8.1 K/UL — SIGNIFICANT CHANGE UP (ref 3.8–10.5)

## 2018-01-01 PROCEDURE — 99284 EMERGENCY DEPT VISIT MOD MDM: CPT | Mod: 25

## 2018-01-01 PROCEDURE — 71045 X-RAY EXAM CHEST 1 VIEW: CPT | Mod: 26

## 2018-01-01 RX ORDER — SODIUM CHLORIDE 9 MG/ML
1000 INJECTION INTRAMUSCULAR; INTRAVENOUS; SUBCUTANEOUS ONCE
Qty: 0 | Refills: 0 | Status: COMPLETED | OUTPATIENT
Start: 2018-01-01 | End: 2018-01-01

## 2018-01-01 RX ORDER — MAGNESIUM SULFATE 500 MG/ML
2 VIAL (ML) INJECTION ONCE
Qty: 0 | Refills: 0 | Status: COMPLETED | OUTPATIENT
Start: 2018-01-01 | End: 2018-01-01

## 2018-01-01 RX ADMIN — Medication 125 MILLIGRAM(S): at 04:28

## 2018-01-01 RX ADMIN — Medication 50 GRAM(S): at 04:28

## 2018-01-01 NOTE — ED PROVIDER NOTE - OBJECTIVE STATEMENT
58 y/o F with Hx of CHF, CAD, HTN, HLD, DM, asthma presents to ED for evaluation of worsening SOB and difficulty sleeping. No worsening peripheral edema. Pt also reports a cough, sore throat, subjective fever. Sob present only with cough. Pt was seen in ED 12/30, given prednisone but continues to feel unwell.  No chest pain.

## 2018-01-01 NOTE — ED ADULT TRIAGE NOTE - CHIEF COMPLAINT QUOTE
difficulty breathing started yesterday worsening today. Pt states she had difficulty breathing with 4 pillows. (+) cough Denies Chest pain, ha,dz,n/v/d/fever/chills. hx of pacemaker, DM

## 2018-01-01 NOTE — ED PROVIDER NOTE - CONSTITUTIONAL, MLM
normal... Well appearing, well nourished, awake, alert, oriented to person, place, time/situation and in no apparent distress.  coughing

## 2018-01-01 NOTE — ED ADULT NURSE NOTE - OBJECTIVE STATEMENT
pt c/o difficulty breathing x2 mo; was seen in ED yesterday for same but states that she is still unable to breathe adequately despite taking prescribed prednisone and tessalon. PMH asthma, DM, and CHF as per pt. pt states that typically when she comes to ED w/ this problem they "give her two injections to clean the fluid off of her lungs" and that they did not do that for her yesterday. denies chest pain.

## 2018-01-01 NOTE — ED PROVIDER NOTE - PROGRESS NOTE DETAILS
pt more comfortable, walking easily.  lungs with only slight expiratory wheeze, Pox 100% wants to go home.  will d/c for outpt followup

## 2018-01-28 ENCOUNTER — INPATIENT (INPATIENT)
Facility: HOSPITAL | Age: 58
LOS: 2 days | Discharge: HOME | End: 2018-01-31
Attending: FAMILY MEDICINE | Admitting: FAMILY MEDICINE
Payer: COMMERCIAL

## 2018-01-28 VITALS — WEIGHT: 186.07 LBS | OXYGEN SATURATION: 97 % | HEIGHT: 66 IN | HEART RATE: 82 BPM

## 2018-01-28 DIAGNOSIS — Z95.0 PRESENCE OF CARDIAC PACEMAKER: Chronic | ICD-10-CM

## 2018-01-28 DIAGNOSIS — Z90.49 ACQUIRED ABSENCE OF OTHER SPECIFIED PARTS OF DIGESTIVE TRACT: Chronic | ICD-10-CM

## 2018-01-28 RX ORDER — IPRATROPIUM/ALBUTEROL SULFATE 18-103MCG
3 AEROSOL WITH ADAPTER (GRAM) INHALATION ONCE
Qty: 0 | Refills: 0 | Status: COMPLETED | OUTPATIENT
Start: 2018-01-28 | End: 2018-01-28

## 2018-01-28 RX ORDER — FUROSEMIDE 40 MG
40 TABLET ORAL ONCE
Qty: 0 | Refills: 0 | Status: COMPLETED | OUTPATIENT
Start: 2018-01-28 | End: 2018-01-28

## 2018-01-28 RX ORDER — SODIUM CHLORIDE 9 MG/ML
3 INJECTION INTRAMUSCULAR; INTRAVENOUS; SUBCUTANEOUS ONCE
Qty: 0 | Refills: 0 | Status: COMPLETED | OUTPATIENT
Start: 2018-01-28 | End: 2018-01-28

## 2018-01-28 RX ORDER — NITROGLYCERIN 6.5 MG
1 CAPSULE, EXTENDED RELEASE ORAL ONCE
Qty: 0 | Refills: 0 | Status: COMPLETED | OUTPATIENT
Start: 2018-01-28 | End: 2018-01-28

## 2018-01-28 RX ADMIN — Medication 40 MILLIGRAM(S): at 23:59

## 2018-01-28 RX ADMIN — Medication 3 MILLILITER(S): at 23:59

## 2018-01-28 RX ADMIN — Medication 1 INCH(S): at 23:59

## 2018-01-28 RX ADMIN — SODIUM CHLORIDE 3 MILLILITER(S): 9 INJECTION INTRAMUSCULAR; INTRAVENOUS; SUBCUTANEOUS at 23:48

## 2018-01-28 NOTE — ED PROVIDER NOTE - OBJECTIVE STATEMENT
Pt. is a 58 yo F with a hx of HTN, PPM, CHF, hyperlipidemia, DM type 2, COPD BIB  for SOB and wheezing.  Pt. describes SOB at rest X 1 day and SOB with exertion for weeks.  Pt. c/o cough for 5 months.  Had PPM placed a few months ago.  Denies fever, but feels sweats.  Taking all meds as prescribed.  Pt. also feeling right sided neck pain intermittently without any falls, sore throat or trauma. Pt. is a 56 yo F with a hx of HTN, PPM, CHF, hyperlipidemia, DM type 2, COPD BIB  for SOB and wheezing.  Pt. describes SOB at rest X 1 day and SOB with exertion for weeks.  Pt. c/o cough for 5 months.  Had PPM placed a few months ago.  Denies fever, but feels sweats.  Taking all meds as prescribed.  Pt. also feeling right sided neck pain intermittently without any falls, sore throat or trauma. PMSHEA Campbell

## 2018-01-28 NOTE — ED PROVIDER NOTE - HEME LYMPH, MLM
No adenopathy or splenomegaly. No cervical or inguinal lymphadenopathy.  1+ peripheral edema in lower legs, ankles, feet bilaterally

## 2018-01-28 NOTE — ED PROVIDER NOTE - MEDICAL DECISION MAKING DETAILS
On exam significantly elevated BP, peripheral edema and wheezing and rales on exam.  Likely acute on chronic CHF exacerbation.  Duoneb ordered as well for hx of COPD.

## 2018-01-28 NOTE — ED PROVIDER NOTE - CONSTITUTIONAL, MLM
normal... Well appearing, well nourished, awake, alert, oriented to person, place, time/situation and in no apparent distress.  Conversational dyspnea.

## 2018-01-29 LAB
APTT BLD: 25.5 SEC — LOW (ref 27.5–37.4)
BASOPHILS # BLD AUTO: 0.1 K/UL — SIGNIFICANT CHANGE UP (ref 0–0.2)
BASOPHILS NFR BLD AUTO: 1.2 % — SIGNIFICANT CHANGE UP (ref 0–2)
D DIMER BLD IA.RAPID-MCNC: 458 NG/ML DDU — HIGH
EOSINOPHIL # BLD AUTO: 0 K/UL — SIGNIFICANT CHANGE UP (ref 0–0.5)
EOSINOPHIL NFR BLD AUTO: 0 % — SIGNIFICANT CHANGE UP (ref 0–6)
GLUCOSE BLDC GLUCOMTR-MCNC: 154 MG/DL — HIGH (ref 70–99)
GLUCOSE BLDC GLUCOMTR-MCNC: 165 MG/DL — HIGH (ref 70–99)
GLUCOSE BLDC GLUCOMTR-MCNC: 265 MG/DL — HIGH (ref 70–99)
HCOV HKU1 RNA SPEC QL NAA+PROBE: DETECTED
HCT VFR BLD CALC: 46 % — HIGH (ref 34.5–45)
HGB BLD-MCNC: 14.1 G/DL — SIGNIFICANT CHANGE UP (ref 11.5–15.5)
INR BLD: 1.43 RATIO — HIGH (ref 0.88–1.16)
LYMPHOCYTES # BLD AUTO: 1.9 K/UL — SIGNIFICANT CHANGE UP (ref 1–3.3)
LYMPHOCYTES # BLD AUTO: 24.7 % — SIGNIFICANT CHANGE UP (ref 13–44)
MCHC RBC-ENTMCNC: 25.1 PG — LOW (ref 27–34)
MCHC RBC-ENTMCNC: 30.7 GM/DL — LOW (ref 32–36)
MCV RBC AUTO: 82 FL — SIGNIFICANT CHANGE UP (ref 80–100)
MONOCYTES # BLD AUTO: 0.8 K/UL — SIGNIFICANT CHANGE UP (ref 0–0.9)
MONOCYTES NFR BLD AUTO: 11.1 % — SIGNIFICANT CHANGE UP (ref 2–14)
NEUTROPHILS # BLD AUTO: 4.8 K/UL — SIGNIFICANT CHANGE UP (ref 1.8–7.4)
NEUTROPHILS NFR BLD AUTO: 63 % — SIGNIFICANT CHANGE UP (ref 43–77)
PLATELET # BLD AUTO: 271 K/UL — SIGNIFICANT CHANGE UP (ref 150–400)
PROTHROM AB SERPL-ACNC: 15.6 SEC — HIGH (ref 9.8–12.7)
RAPID RVP RESULT: DETECTED
RBC # BLD: 5.61 M/UL — HIGH (ref 3.8–5.2)
RBC # FLD: 17.1 % — HIGH (ref 10.3–14.5)
WBC # BLD: 7.6 K/UL — SIGNIFICANT CHANGE UP (ref 3.8–10.5)
WBC # FLD AUTO: 7.6 K/UL — SIGNIFICANT CHANGE UP (ref 3.8–10.5)

## 2018-01-29 PROCEDURE — 93010 ELECTROCARDIOGRAM REPORT: CPT

## 2018-01-29 PROCEDURE — 99285 EMERGENCY DEPT VISIT HI MDM: CPT

## 2018-01-29 PROCEDURE — 71045 X-RAY EXAM CHEST 1 VIEW: CPT | Mod: 26

## 2018-01-29 RX ORDER — ASPIRIN/CALCIUM CARB/MAGNESIUM 324 MG
81 TABLET ORAL DAILY
Qty: 0 | Refills: 0 | Status: DISCONTINUED | OUTPATIENT
Start: 2018-01-29 | End: 2018-01-31

## 2018-01-29 RX ORDER — ALBUTEROL 90 UG/1
2.5 AEROSOL, METERED ORAL
Qty: 0 | Refills: 0 | Status: DISCONTINUED | OUTPATIENT
Start: 2018-01-29 | End: 2018-01-31

## 2018-01-29 RX ORDER — METOPROLOL TARTRATE 50 MG
75 TABLET ORAL
Qty: 0 | Refills: 0 | Status: DISCONTINUED | OUTPATIENT
Start: 2018-01-29 | End: 2018-01-30

## 2018-01-29 RX ORDER — HEPARIN SODIUM 5000 [USP'U]/ML
5000 INJECTION INTRAVENOUS; SUBCUTANEOUS EVERY 8 HOURS
Qty: 0 | Refills: 0 | Status: DISCONTINUED | OUTPATIENT
Start: 2018-01-29 | End: 2018-01-31

## 2018-01-29 RX ORDER — FUROSEMIDE 40 MG
40 TABLET ORAL
Qty: 0 | Refills: 0 | Status: DISCONTINUED | OUTPATIENT
Start: 2018-01-29 | End: 2018-01-31

## 2018-01-29 RX ORDER — INSULIN LISPRO 100/ML
VIAL (ML) SUBCUTANEOUS
Qty: 0 | Refills: 0 | Status: DISCONTINUED | OUTPATIENT
Start: 2018-01-29 | End: 2018-01-31

## 2018-01-29 RX ORDER — BUDESONIDE, MICRONIZED 100 %
0.5 POWDER (GRAM) MISCELLANEOUS EVERY 12 HOURS
Qty: 0 | Refills: 0 | Status: DISCONTINUED | OUTPATIENT
Start: 2018-01-29 | End: 2018-01-31

## 2018-01-29 RX ORDER — ACETAMINOPHEN 500 MG
650 TABLET ORAL EVERY 6 HOURS
Qty: 0 | Refills: 0 | Status: DISCONTINUED | OUTPATIENT
Start: 2018-01-29 | End: 2018-01-31

## 2018-01-29 RX ORDER — DOCUSATE SODIUM 100 MG
100 CAPSULE ORAL THREE TIMES A DAY
Qty: 0 | Refills: 0 | Status: DISCONTINUED | OUTPATIENT
Start: 2018-01-29 | End: 2018-01-31

## 2018-01-29 RX ORDER — GLUCAGON INJECTION, SOLUTION 0.5 MG/.1ML
1 INJECTION, SOLUTION SUBCUTANEOUS ONCE
Qty: 0 | Refills: 0 | Status: DISCONTINUED | OUTPATIENT
Start: 2018-01-29 | End: 2018-01-31

## 2018-01-29 RX ORDER — DEXTROSE 50 % IN WATER 50 %
25 SYRINGE (ML) INTRAVENOUS ONCE
Qty: 0 | Refills: 0 | Status: DISCONTINUED | OUTPATIENT
Start: 2018-01-29 | End: 2018-01-31

## 2018-01-29 RX ORDER — INSULIN LISPRO 100/ML
VIAL (ML) SUBCUTANEOUS AT BEDTIME
Qty: 0 | Refills: 0 | Status: DISCONTINUED | OUTPATIENT
Start: 2018-01-29 | End: 2018-01-31

## 2018-01-29 RX ORDER — ALBUTEROL 90 UG/1
2.5 AEROSOL, METERED ORAL EVERY 6 HOURS
Qty: 0 | Refills: 0 | Status: DISCONTINUED | OUTPATIENT
Start: 2018-01-29 | End: 2018-01-31

## 2018-01-29 RX ORDER — DEXTROSE 50 % IN WATER 50 %
12.5 SYRINGE (ML) INTRAVENOUS ONCE
Qty: 0 | Refills: 0 | Status: DISCONTINUED | OUTPATIENT
Start: 2018-01-29 | End: 2018-01-31

## 2018-01-29 RX ORDER — ONDANSETRON 8 MG/1
4 TABLET, FILM COATED ORAL EVERY 6 HOURS
Qty: 0 | Refills: 0 | Status: DISCONTINUED | OUTPATIENT
Start: 2018-01-29 | End: 2018-01-31

## 2018-01-29 RX ORDER — DEXTROSE 50 % IN WATER 50 %
1 SYRINGE (ML) INTRAVENOUS ONCE
Qty: 0 | Refills: 0 | Status: DISCONTINUED | OUTPATIENT
Start: 2018-01-29 | End: 2018-01-31

## 2018-01-29 RX ORDER — SIMVASTATIN 20 MG/1
40 TABLET, FILM COATED ORAL AT BEDTIME
Qty: 0 | Refills: 0 | Status: DISCONTINUED | OUTPATIENT
Start: 2018-01-29 | End: 2018-01-31

## 2018-01-29 RX ORDER — AMIODARONE HYDROCHLORIDE 400 MG/1
200 TABLET ORAL DAILY
Qty: 0 | Refills: 0 | Status: DISCONTINUED | OUTPATIENT
Start: 2018-01-29 | End: 2018-01-31

## 2018-01-29 RX ORDER — SENNA PLUS 8.6 MG/1
2 TABLET ORAL AT BEDTIME
Qty: 0 | Refills: 0 | Status: DISCONTINUED | OUTPATIENT
Start: 2018-01-29 | End: 2018-01-31

## 2018-01-29 RX ORDER — SODIUM CHLORIDE 9 MG/ML
1000 INJECTION, SOLUTION INTRAVENOUS
Qty: 0 | Refills: 0 | Status: DISCONTINUED | OUTPATIENT
Start: 2018-01-29 | End: 2018-01-31

## 2018-01-29 RX ORDER — POTASSIUM CHLORIDE 20 MEQ
20 PACKET (EA) ORAL DAILY
Qty: 0 | Refills: 0 | Status: DISCONTINUED | OUTPATIENT
Start: 2018-01-29 | End: 2018-01-30

## 2018-01-29 RX ORDER — SACUBITRIL AND VALSARTAN 24; 26 MG/1; MG/1
1 TABLET, FILM COATED ORAL
Qty: 0 | Refills: 0 | Status: DISCONTINUED | OUTPATIENT
Start: 2018-01-29 | End: 2018-01-30

## 2018-01-29 RX ADMIN — Medication 40 MILLIGRAM(S): at 23:03

## 2018-01-29 RX ADMIN — AMIODARONE HYDROCHLORIDE 200 MILLIGRAM(S): 400 TABLET ORAL at 11:49

## 2018-01-29 RX ADMIN — Medication 1 TABLET(S): at 11:49

## 2018-01-29 RX ADMIN — Medication 100 MILLIGRAM(S): at 23:03

## 2018-01-29 RX ADMIN — Medication 75 MILLIGRAM(S): at 19:02

## 2018-01-29 RX ADMIN — Medication 2: at 19:00

## 2018-01-29 RX ADMIN — HEPARIN SODIUM 5000 UNIT(S): 5000 INJECTION INTRAVENOUS; SUBCUTANEOUS at 14:57

## 2018-01-29 RX ADMIN — Medication 125 MILLIGRAM(S): at 00:22

## 2018-01-29 RX ADMIN — SACUBITRIL AND VALSARTAN 1 TABLET(S): 24; 26 TABLET, FILM COATED ORAL at 19:01

## 2018-01-29 RX ADMIN — Medication 20 MILLIEQUIVALENT(S): at 11:50

## 2018-01-29 RX ADMIN — Medication 40 MILLIGRAM(S): at 19:01

## 2018-01-29 RX ADMIN — HEPARIN SODIUM 5000 UNIT(S): 5000 INJECTION INTRAVENOUS; SUBCUTANEOUS at 23:03

## 2018-01-29 RX ADMIN — Medication 40 MILLIGRAM(S): at 14:57

## 2018-01-29 RX ADMIN — Medication 100 MILLIGRAM(S): at 15:58

## 2018-01-29 RX ADMIN — Medication 81 MILLIGRAM(S): at 15:58

## 2018-01-29 NOTE — H&P ADULT - NSHPLABSRESULTS_GEN_ALL_CORE
14.1   7.6   )-----------( 271      ( 28 Jan 2018 23:50 )             46.0     29 Jan 2018 00:46    135    |  98     |  49     ----------------------------<  145    5.1     |  26     |  1.63     Ca    8.9        29 Jan 2018 00:46    TPro  7.1    /  Alb  3.2    /  TBili  1.0    /  DBili  x      /  AST  63     /  ALT  63     /  AlkPhos  169    29 Jan 2018 00:46    LIVER FUNCTIONS - ( 29 Jan 2018 00:46 )  Alb: 3.2 g/dL / Pro: 7.1 gm/dL / ALK PHOS: 169 U/L / ALT: 63 U/L / AST: 63 U/L / GGT: x         PT/INR - ( 28 Jan 2018 23:50 )   PT: 15.6 sec;   INR: 1.43 ratio     PTT - ( 28 Jan 2018 23:50 )  PTT:25.5 sec    POCT Blood Glucose.: 265 mg/dL (29 Jan 2018 07:59)  CARDIAC MARKERS ( 29 Jan 2018 00:46 )  0.047 ng/mL / x     / x     / x     / x

## 2018-01-29 NOTE — CONSULT NOTE ADULT - SUBJECTIVE AND OBJECTIVE BOX
Patient is a 57y old  Female who presents with a chief complaint of SOB, cough (29 Jan 2018 10:44)      HPI:  56yo/F with PMH chr systolic CHF, BiV-AICD, HTN, hyperlipidemia, asthma, h/o Vtach on amiodarone, diabetes presented for worsening SOB, cough and wheezing. Patient got sick about couple days ago and symptoms got progressively worse. Here afebrile, wbc ct wnl, xray with no obvious consolidation or infiltrate, BNP elevated, RVP positive for coronvavirus, she was given inhalers/steroids/lasix.       PMH: as above    PSH: as above    Meds: per reconciliation sheet, noted below    MEDICATIONS  (STANDING):  ALBUTerol    0.083% 2.5 milliGRAM(s) Nebulizer every 6 hours  amiodarone    Tablet 200 milliGRAM(s) Oral daily  aspirin  chewable 81 milliGRAM(s) Oral daily  buDESOnide   0.5 milliGRAM(s) Respule 0.5 milliGRAM(s) Inhalation every 12 hours  dextrose 5%. 1000 milliLiter(s) (50 mL/Hr) IV Continuous <Continuous>  dextrose 50% Injectable 12.5 Gram(s) IV Push once  dextrose 50% Injectable 25 Gram(s) IV Push once  dextrose 50% Injectable 25 Gram(s) IV Push once  docusate sodium 100 milliGRAM(s) Oral three times a day  furosemide   Injectable 40 milliGRAM(s) IV Push two times a day  heparin  Injectable 5000 Unit(s) SubCutaneous every 8 hours  insulin lispro (HumaLOG) corrective regimen sliding scale   SubCutaneous three times a day before meals  insulin lispro (HumaLOG) corrective regimen sliding scale   SubCutaneous at bedtime  methylPREDNISolone sodium succinate Injectable 40 milliGRAM(s) IV Push every 8 hours  metoprolol     tartrate 75 milliGRAM(s) Oral two times a day  multivitamin 1 Tablet(s) Oral daily  potassium chloride    Tablet ER 20 milliEquivalent(s) Oral daily  sacubitril 24 mG/valsartan 26 mG 1 Tablet(s) Oral two times a day  simvastatin 40 milliGRAM(s) Oral at bedtime      Allergies    No Known Allergies    Intolerances        Social: no smoking, no alcohol, no illegal drugs; no recent travel, no exposure to TB    Family history: NC  Family history of other heart disease (Mother)  No pertinent family history in first degree relatives      ROS: the patient denies fever, no chills, no HA, no dizziness, no sore throat, no blurry vision, no CP, no palpitations, no abdominal pain, no diarrhea, no N/V, no dysuria, no leg pain, no claudication, no rash, no joint aches, no rectal pain or bleeding, no night sweats    Vital Signs Last 24 Hrs  T(C): 36.9 (29 Jan 2018 11:44), Max: 36.9 (29 Jan 2018 11:44)  T(F): 98.5 (29 Jan 2018 11:44), Max: 98.5 (29 Jan 2018 11:44)  HR: 80 (29 Jan 2018 11:44) (70 - 82)  BP: 142/86 (29 Jan 2018 11:44) (92/76 - 142/86)  BP(mean): --  RR: 15 (29 Jan 2018 11:44) (15 - 23)  SpO2: 99% (29 Jan 2018 11:44) (97% - 100%)      PE:  Constitutional: frail looking  HEENT: NC/AT, EOMI, PERRLA  Neck: supple  Back: no tenderness  Respiratory: decreased breath sounds  Cardiovascular: S1S2 regular, no murmurs  Abdomen: soft, not tender, not distended, positive BS  Genitourinary: deferred  Rectal: deferred  Musculoskeletal: no muscle tenderness, no joint swelling or tenderness  Extremities: no pedal edema  Neurological:  no focal deficits  Skin: no rashes    Labs:                        14.1   7.6   )-----------( 271      ( 28 Jan 2018 23:50 )             46.0     01-29    135  |  98  |  49<H>  ----------------------------<  145<H>  5.1   |  26  |  1.63<H>    Ca    8.9      29 Jan 2018 00:46    TPro  7.1  /  Alb  3.2<L>  /  TBili  1.0  /  DBili  x   /  AST  63<H>  /  ALT  63  /  AlkPhos  169<H>  01-29     LIVER FUNCTIONS - ( 29 Jan 2018 00:46 )  Alb: 3.2 g/dL / Pro: 7.1 gm/dL / ALK PHOS: 169 U/L / ALT: 63 U/L / AST: 63 U/L / GGT: x             RVP positive for coronavirus       Radiology:      < from: Xray Chest 1 View AP/PA. (01.29.18 @ 06:02) >    EXAM:  XR CHEST AP OR PA 1V                            PROCEDURE DATE:  01/29/2018          INTERPRETATION:    INDICATION: Cough and shortness of breath.    Single frontal view of chest dated 1/29/2018 6:02 AM, compared to prior   study of 1.1.18.    FINDINGS /   IMPRESSION:     There is no acute consolidation. There is marked cardiomegaly.   Defibrillator/pacemaker unchanged.    There are degenerative changes.       < end of copied text >    Advanced directives addressed: full resuscitation

## 2018-01-29 NOTE — H&P ADULT - ASSESSMENT
#Acute bronchospasm likely triggered by parainfluenza  Admit to tele  IV Solumedrol  Bronchodilators  Tamiflu  Pulm kavita Wade  ID kavita Garcia    #Acute on chronic systolic CHF  IV Furosemide  Card kavita Stevens  Borderline troponins likely due to demand ischemia  Aspirin, statin    #H/o Vtach s/p BiV AICD  EP abbeyal DR Singer to interrogate AICD  Cont Amiodarone, BB    #

## 2018-01-29 NOTE — CONSULT NOTE ADULT - ASSESSMENT
58yo/F with PMH chr systolic CHF, BiV-AICD, HTN, hyperlipidemia, asthma, h/o Vtach on amiodarone, diabetes presented for worsening SOB, cough and wheezing. Patient got sick about couple days ago and symptoms got progressively worse. Here afebrile, wbc ct wnl, xray with no obvious consolidation or infiltrate, BNP elevated, RVP positive for coronvavirus, she was given inhalers/steroids/lasix.     1. viral syndrome/coronavirus/asthmaticus bronchitis/CHF  - no evidence of superimposed PNA or flu  - d/c tamiflu  - observe off antibiotics  - on steroids/inhalers for asthma  - on lasix for chf  - f/u cbc  - monitor temps  - supportive care    2. other issues - care per medicine

## 2018-01-29 NOTE — ED ADULT NURSE REASSESSMENT NOTE - COMFORT CARE
plan of care explained/ambulated to bathroom
plan of care explained
plan of care explained/side rails up/wait time explained

## 2018-01-29 NOTE — H&P ADULT - HISTORY OF PRESENT ILLNESS
58yo/F with PMH chr systolic CHF, BiV-AICD, HTN, hyperlipidemia, asthma, h/o Vtach on amiodarone, diabetes presented for worsening SOB, cough and wheezing. Patient got sick about couple days ago and symptoms got progressively worse. In ED she was positive for +parainfluenza virus

## 2018-01-29 NOTE — ED ADULT NURSE NOTE - OBJECTIVE STATEMENT
pt arrives to ED complaining of shortness of breath. pt with history of asthma, CHF, PPM, HTN. pt states the shortness of breath, cough started "a few months ago." pt states it worsened tonight without relief from her inhaler. LE swelling +2. wheezes b/l on ausculation. alert and oriented x 4.

## 2018-01-29 NOTE — ED ADULT NURSE REASSESSMENT NOTE - NS ED NURSE REASSESS COMMENT FT1
pt assisted to bathroom
report given report to Lavinia IVAN RN for transfer of care. Patient stable at time of transfer
patient denies cp, reports le edema. awaiting bed assignment. no s/s of distress.
Patient received from RODERICK Kate. Patient resting comfortably in bed. Safety and comfort maintained. Will continue to monitor.

## 2018-01-29 NOTE — H&P ADULT - NSHPPHYSICALEXAM_GEN_ALL_CORE
Vital Signs Last 24 Hrs  T(C): 36.6 (28 Jan 2018 23:21), Max: 36.6 (28 Jan 2018 23:21)  T(F): 97.8 (28 Jan 2018 23:21), Max: 97.8 (28 Jan 2018 23:21)  HR: 82 (29 Jan 2018 06:22) (70 - 82)  BP: 120/85 (29 Jan 2018 06:22) (92/76 - 120/85)  BP(mean): --  RR: 17 (29 Jan 2018 06:22) (15 - 23)  SpO2: 99% (29 Jan 2018 06:22) (97% - 100%)

## 2018-01-30 DIAGNOSIS — I50.9 HEART FAILURE, UNSPECIFIED: ICD-10-CM

## 2018-01-30 LAB
ANION GAP SERPL CALC-SCNC: 13 MMOL/L — SIGNIFICANT CHANGE UP (ref 5–17)
BUN SERPL-MCNC: 72 MG/DL — HIGH (ref 7–23)
CALCIUM SERPL-MCNC: 9.5 MG/DL — SIGNIFICANT CHANGE UP (ref 8.5–10.1)
CHLORIDE SERPL-SCNC: 93 MMOL/L — LOW (ref 96–108)
CO2 SERPL-SCNC: 25 MMOL/L — SIGNIFICANT CHANGE UP (ref 22–31)
CREAT SERPL-MCNC: 2.13 MG/DL — HIGH (ref 0.5–1.3)
GLUCOSE BLDC GLUCOMTR-MCNC: 116 MG/DL — HIGH (ref 70–99)
GLUCOSE BLDC GLUCOMTR-MCNC: 141 MG/DL — HIGH (ref 70–99)
GLUCOSE BLDC GLUCOMTR-MCNC: 154 MG/DL — HIGH (ref 70–99)
GLUCOSE BLDC GLUCOMTR-MCNC: 196 MG/DL — HIGH (ref 70–99)
GLUCOSE SERPL-MCNC: 147 MG/DL — HIGH (ref 70–99)
HBA1C BLD-MCNC: 7.3 % — HIGH (ref 4–5.6)
HCT VFR BLD CALC: 46.4 % — HIGH (ref 34.5–45)
HGB BLD-MCNC: 14.2 G/DL — SIGNIFICANT CHANGE UP (ref 11.5–15.5)
MCHC RBC-ENTMCNC: 25.3 PG — LOW (ref 27–34)
MCHC RBC-ENTMCNC: 30.6 GM/DL — LOW (ref 32–36)
MCV RBC AUTO: 82.4 FL — SIGNIFICANT CHANGE UP (ref 80–100)
PLATELET # BLD AUTO: 283 K/UL — SIGNIFICANT CHANGE UP (ref 150–400)
POTASSIUM SERPL-MCNC: 5.3 MMOL/L — SIGNIFICANT CHANGE UP (ref 3.5–5.3)
POTASSIUM SERPL-MCNC: 6.3 MMOL/L — CRITICAL HIGH (ref 3.5–5.3)
POTASSIUM SERPL-MCNC: 6.3 MMOL/L — CRITICAL HIGH (ref 3.5–5.3)
POTASSIUM SERPL-MCNC: 6.6 MMOL/L — CRITICAL HIGH (ref 3.5–5.3)
POTASSIUM SERPL-SCNC: 5.3 MMOL/L — SIGNIFICANT CHANGE UP (ref 3.5–5.3)
POTASSIUM SERPL-SCNC: 6.3 MMOL/L — CRITICAL HIGH (ref 3.5–5.3)
POTASSIUM SERPL-SCNC: 6.3 MMOL/L — CRITICAL HIGH (ref 3.5–5.3)
POTASSIUM SERPL-SCNC: 6.6 MMOL/L — CRITICAL HIGH (ref 3.5–5.3)
RBC # BLD: 5.63 M/UL — HIGH (ref 3.8–5.2)
RBC # FLD: 17 % — HIGH (ref 10.3–14.5)
SODIUM SERPL-SCNC: 131 MMOL/L — LOW (ref 135–145)
WBC # BLD: 8.5 K/UL — SIGNIFICANT CHANGE UP (ref 3.8–10.5)
WBC # FLD AUTO: 8.5 K/UL — SIGNIFICANT CHANGE UP (ref 3.8–10.5)

## 2018-01-30 RX ORDER — SACUBITRIL AND VALSARTAN 24; 26 MG/1; MG/1
1 TABLET, FILM COATED ORAL
Qty: 0 | Refills: 0 | Status: DISCONTINUED | OUTPATIENT
Start: 2018-01-30 | End: 2018-01-31

## 2018-01-30 RX ORDER — DEXTROSE 50 % IN WATER 50 %
25 SYRINGE (ML) INTRAVENOUS ONCE
Qty: 0 | Refills: 0 | Status: COMPLETED | OUTPATIENT
Start: 2018-01-30 | End: 2018-01-30

## 2018-01-30 RX ORDER — SIMVASTATIN 20 MG/1
1 TABLET, FILM COATED ORAL
Qty: 0 | Refills: 0 | COMMUNITY

## 2018-01-30 RX ORDER — SODIUM POLYSTYRENE SULFONATE 4.1 MEQ/G
30 POWDER, FOR SUSPENSION ORAL ONCE
Qty: 0 | Refills: 0 | Status: COMPLETED | OUTPATIENT
Start: 2018-01-30 | End: 2018-01-30

## 2018-01-30 RX ORDER — POTASSIUM CHLORIDE 20 MEQ
20 PACKET (EA) ORAL
Qty: 0 | Refills: 0 | COMMUNITY

## 2018-01-30 RX ORDER — METOPROLOL TARTRATE 50 MG
100 TABLET ORAL DAILY
Qty: 0 | Refills: 0 | Status: DISCONTINUED | OUTPATIENT
Start: 2018-01-30 | End: 2018-01-31

## 2018-01-30 RX ORDER — LACTULOSE 10 G/15ML
20 SOLUTION ORAL ONCE
Qty: 0 | Refills: 0 | Status: COMPLETED | OUTPATIENT
Start: 2018-01-30 | End: 2018-01-30

## 2018-01-30 RX ORDER — INSULIN LISPRO 100/ML
10 VIAL (ML) SUBCUTANEOUS ONCE
Qty: 0 | Refills: 0 | Status: COMPLETED | OUTPATIENT
Start: 2018-01-30 | End: 2018-01-30

## 2018-01-30 RX ORDER — LANOLIN ALCOHOL/MO/W.PET/CERES
3 CREAM (GRAM) TOPICAL AT BEDTIME
Qty: 0 | Refills: 0 | Status: DISCONTINUED | OUTPATIENT
Start: 2018-01-30 | End: 2018-01-31

## 2018-01-30 RX ADMIN — HEPARIN SODIUM 5000 UNIT(S): 5000 INJECTION INTRAVENOUS; SUBCUTANEOUS at 13:56

## 2018-01-30 RX ADMIN — Medication 1 TABLET(S): at 11:41

## 2018-01-30 RX ADMIN — Medication 40 MILLIGRAM(S): at 06:05

## 2018-01-30 RX ADMIN — SODIUM POLYSTYRENE SULFONATE 30 GRAM(S): 4.1 POWDER, FOR SUSPENSION ORAL at 11:29

## 2018-01-30 RX ADMIN — Medication 40 MILLIGRAM(S): at 17:15

## 2018-01-30 RX ADMIN — Medication 100 MILLIGRAM(S): at 13:56

## 2018-01-30 RX ADMIN — SACUBITRIL AND VALSARTAN 1 TABLET(S): 24; 26 TABLET, FILM COATED ORAL at 17:18

## 2018-01-30 RX ADMIN — SIMVASTATIN 40 MILLIGRAM(S): 20 TABLET, FILM COATED ORAL at 22:52

## 2018-01-30 RX ADMIN — Medication 40 MILLIGRAM(S): at 13:56

## 2018-01-30 RX ADMIN — AMIODARONE HYDROCHLORIDE 200 MILLIGRAM(S): 400 TABLET ORAL at 06:04

## 2018-01-30 RX ADMIN — Medication 100 MILLIGRAM(S): at 22:51

## 2018-01-30 RX ADMIN — ALBUTEROL 2.5 MILLIGRAM(S): 90 AEROSOL, METERED ORAL at 02:42

## 2018-01-30 RX ADMIN — SIMVASTATIN 40 MILLIGRAM(S): 20 TABLET, FILM COATED ORAL at 00:46

## 2018-01-30 RX ADMIN — ALBUTEROL 2.5 MILLIGRAM(S): 90 AEROSOL, METERED ORAL at 23:10

## 2018-01-30 RX ADMIN — Medication 81 MILLIGRAM(S): at 11:41

## 2018-01-30 RX ADMIN — Medication 2: at 11:31

## 2018-01-30 RX ADMIN — HEPARIN SODIUM 5000 UNIT(S): 5000 INJECTION INTRAVENOUS; SUBCUTANEOUS at 06:04

## 2018-01-30 RX ADMIN — Medication 10 UNIT(S): at 11:28

## 2018-01-30 RX ADMIN — Medication 40 MILLIGRAM(S): at 06:04

## 2018-01-30 RX ADMIN — Medication 650 MILLIGRAM(S): at 01:25

## 2018-01-30 RX ADMIN — Medication 100 MILLIGRAM(S): at 06:04

## 2018-01-30 RX ADMIN — Medication 3 MILLIGRAM(S): at 22:51

## 2018-01-30 RX ADMIN — Medication 650 MILLIGRAM(S): at 00:03

## 2018-01-30 RX ADMIN — ONDANSETRON 4 MILLIGRAM(S): 8 TABLET, FILM COATED ORAL at 02:20

## 2018-01-30 RX ADMIN — ALBUTEROL 2.5 MILLIGRAM(S): 90 AEROSOL, METERED ORAL at 18:25

## 2018-01-30 RX ADMIN — Medication 25 MILLILITER(S): at 11:29

## 2018-01-30 RX ADMIN — HEPARIN SODIUM 5000 UNIT(S): 5000 INJECTION INTRAVENOUS; SUBCUTANEOUS at 22:52

## 2018-01-30 RX ADMIN — SACUBITRIL AND VALSARTAN 1 TABLET(S): 24; 26 TABLET, FILM COATED ORAL at 06:45

## 2018-01-30 RX ADMIN — LACTULOSE 20 GRAM(S): 10 SOLUTION ORAL at 18:06

## 2018-01-30 RX ADMIN — Medication 3 MILLIGRAM(S): at 00:46

## 2018-01-30 NOTE — CONSULT NOTE ADULT - ASSESSMENT
Acute on Chr HFrEF  NICM  Non obstructive CAD  HTN    Suggest:    Diuresis.  Salt restriction  Flu treatment Acute on Chr HFrEF  NICM  Non obstructive CAD  HTN    Suggest:    Diuresis.  Salt restriction  Observe patient on telemetry.  follow low sodium, low cholesterol, ADA diet.  Fluid restriction 1.5 lit/day  monitor Intake & output  Daily weights.  Follow up electrolytes, renal function.   Follow up cardiac enzymes  Treat with Diuretics, ACEi/ARBs and beta blockers.  Flu treatment

## 2018-01-30 NOTE — DIETITIAN INITIAL EVALUATION ADULT. - NS AS NUTRI INTERV ED CONTENT3
Purpose of the nutrition education/Nutrition relationship to health/disease/Recommended modifications/Priority modifications

## 2018-01-30 NOTE — CONSULT NOTE ADULT - SUBJECTIVE AND OBJECTIVE BOX
Patient is a 57y old  Female who presents with a chief complaint of SOB, cough (29 Jan 2018 10:44)      HPI:  58yo/F with PMH chr systolic CHF, BiV-AICD, HTN, hyperlipidemia, asthma, h/o Vtach on amiodarone, diabetes presented for worsening SOB, cough and wheezing. Patient got sick about couple days ago and symptoms got progressively worse. In ED she was positive for +parainfluenza virus (29 Jan 2018 10:44)      PAST MEDICAL & SURGICAL HISTORY:  CHF (congestive heart failure)  Pacemaker  HTN (hypertension)  HLD (hyperlipidemia)  Asthma  DM (diabetes mellitus)  CAD (coronary artery disease)  History of cholecystectomy  Artificial cardiac pacemaker      PREVIOUS DIAGNOSTIC TESTING:      MEDICATIONS  (STANDING):  ALBUTerol    0.083% 2.5 milliGRAM(s) Nebulizer every 6 hours  amiodarone    Tablet 200 milliGRAM(s) Oral daily  aspirin  chewable 81 milliGRAM(s) Oral daily  buDESOnide   0.5 milliGRAM(s) Respule 0.5 milliGRAM(s) Inhalation every 12 hours  dextrose 5%. 1000 milliLiter(s) (50 mL/Hr) IV Continuous <Continuous>  dextrose 50% Injectable 12.5 Gram(s) IV Push once  dextrose 50% Injectable 25 Gram(s) IV Push once  dextrose 50% Injectable 25 Gram(s) IV Push once  docusate sodium 100 milliGRAM(s) Oral three times a day  furosemide   Injectable 40 milliGRAM(s) IV Push two times a day  heparin  Injectable 5000 Unit(s) SubCutaneous every 8 hours  insulin lispro (HumaLOG) corrective regimen sliding scale   SubCutaneous three times a day before meals  insulin lispro (HumaLOG) corrective regimen sliding scale   SubCutaneous at bedtime  melatonin 3 milliGRAM(s) Oral at bedtime  methylPREDNISolone sodium succinate Injectable 40 milliGRAM(s) IV Push every 8 hours  metoprolol     tartrate 75 milliGRAM(s) Oral two times a day  multivitamin 1 Tablet(s) Oral daily  potassium chloride    Tablet ER 20 milliEquivalent(s) Oral daily  sacubitril 24 mG/valsartan 26 mG 1 Tablet(s) Oral two times a day  simvastatin 40 milliGRAM(s) Oral at bedtime    MEDICATIONS  (PRN):  acetaminophen   Tablet 650 milliGRAM(s) Oral every 6 hours PRN For Temp greater than 38 C (100.4 F)  acetaminophen   Tablet. 650 milliGRAM(s) Oral every 6 hours PRN Mild Pain (1 - 3)  ALBUTerol    0.083% 2.5 milliGRAM(s) Nebulizer every 3 hours PRN Shortness of Breath and/or Wheezing  aluminum hydroxide/magnesium hydroxide/simethicone Suspension 30 milliLiter(s) Oral every 4 hours PRN Dyspepsia  dextrose Gel 1 Dose(s) Oral once PRN Blood Glucose LESS THAN 70 milliGRAM(s)/deciliter  glucagon  Injectable 1 milliGRAM(s) IntraMuscular once PRN Glucose LESS THAN 70 milligrams/deciliter  ondansetron Injectable 4 milliGRAM(s) IV Push every 6 hours PRN Nausea  senna 2 Tablet(s) Oral at bedtime PRN Constipation      FAMILY HISTORY:  Family history of other heart disease (Mother)      SOCIAL HISTORY:  lives at home, non smoker     REVIEW OF SYSTEM:  shortness of breath, lower extremity swelling, otherwise 12 point ROS negative     Vital Signs Last 24 Hrs  T(C): 36.9 (30 Jan 2018 04:34), Max: 36.9 (29 Jan 2018 11:44)  T(F): 98.4 (30 Jan 2018 04:34), Max: 98.5 (29 Jan 2018 11:44)  HR: 83 (30 Jan 2018 06:44) (72 - 86)  BP: 94/75 (30 Jan 2018 06:44) (94/75 - 142/86)  BP(mean): --  RR: 19 (30 Jan 2018 04:34) (15 - 20)  SpO2: 93% (30 Jan 2018 04:34) (93% - 100%)    I&O's Summary    PHYSICAL EXAM  General Appearance: cooperative, no acute distress,   HEENT: PERRL, conjunctiva clear, EOM's intact, non injected pharynx, no exudate, TM   normal  Neck: Supple, , no adenopathy, thyroid: not enlarged, no carotid bruit or JVD  Back: Symmetric, no  tenderness,no soft tissue tenderness  Lungs: Bilateral Wheezing, Upper lobe predominant   Heart: Regular rate and rhythm, S1, S2 normal, no murmur, rub or gallop  Abdomen: Soft, non-tender, bowel sounds active , no hepatosplenomegaly  Extremities: no cyanosis or edema, no joint swelling  Skin: Skin color, texture normal, no rashes   Neurologic: Alert and oriented X3 , cranial nerves intact, sensory and motor normal,    ECG:    LABS:                          14.2   8.5   )-----------( 283      ( 30 Jan 2018 06:07 )             46.4     01-30    131<L>  |  93<L>  |  72<H>  ----------------------------<  147<H>  6.3<HH>   |  25  |  2.13<H>    Ca    9.5      30 Jan 2018 08:16    TPro  7.1  /  Alb  3.2<L>  /  TBili  1.0  /  DBili  x   /  AST  63<H>  /  ALT  63  /  AlkPhos  169<H>  01-29    CARDIAC MARKERS ( 29 Jan 2018 00:46 )  0.047 ng/mL / x     / x     / x     / x            Pro BNP  [id number] 01-29 @ 00:46  D Dimer  -- 01-29 @ 00:46  Pro BNP  -- 01-28 @ 23:50  D Dimer  [id number] 01-28 @ 23:50    PT/INR - ( 28 Jan 2018 23:50 )   PT: 15.6 sec;   INR: 1.43 ratio         PTT - ( 28 Jan 2018 23:50 )  PTT:25.5 sec          RADIOLOGY & ADDITIONAL STUDIES:    IMPRESSION:     There is no acute consolidation. There is marked cardiomegaly.   Defibrillator/pacemaker unchanged.    There are degenerative changes.

## 2018-01-30 NOTE — PROGRESS NOTE ADULT - PROBLEM SELECTOR PLAN 1
CHF education done with patient:  Educational handouts provided including:  Signs and symptoms of CHF exacerbation   Daily Weights- pt states she does not have a scale, will have  buy her one at discharge  What to do if symptoms worsen  How, when, and why to take medication  lifestyle changes  limiting sodium intake to 1500mg-2000mg daily  when to contact HCP  Ejection Fraction 20%      Post d/c follow up appointment to be made by unit clerk s/p medical clearance

## 2018-01-30 NOTE — DIETITIAN INITIAL EVALUATION ADULT. - OTHER INFO
58yo female from community with PMH of CHF, HTN, HLD, asthma, h/o Vtach on amiodarone, DM p/w worsening SOB, cough, wheezing; pt found to be postitive for acute bronchospasm triggered by parainfluenza and acute on chronic CHF.  Upon visit, pt appears well nourished with fair to good appetite; consumed most of her breakfast.  Diet recall reviewed; pt with good appetite PTA; uses adobo and small amounts of salt.  Reviewed CHF and DM diet education with pt (provided written materials in [place]ian).  Also, pt requested list of high potassium foods to avoid due to current elevated potassium level; reviewed verbally. (list provided in [place]ian).  Pt with increased protein needs for wound healing; however, based on diet recall, pt likely with good protein intake.  Will add high protein snacks to optimize intake to promote wound healing.  MOnitor PO intake and wt closely.  rec'd add MVI daily and vitamin C 500mg BID to optimize wound healing.

## 2018-01-30 NOTE — CONSULT NOTE ADULT - SUBJECTIVE AND OBJECTIVE BOX
Patient is a 57y old  Female who presents with a chief complaint of SOB, cough (29 Jan 2018 10:44)      HPI:  56yo/F with PMH chr systolic CHF, BiV-AICD, HTN, hyperlipidemia, asthma, h/o Vtach on amiodarone, diabetes presented for worsening SOB, cough and wheezing. Patient got sick about couple days ago and symptoms got progressively worse. In ED she was positive for +parainfluenza virus (29 Jan 2018 10:44)      PAST MEDICAL & SURGICAL HISTORY:  HTN (hypertension)  HLD (hyperlipidemia)  Asthma  DM (diabetes mellitus)  Non obst CAD (coronary artery disease)  History of cholecystectomy  Artificial cardiac pacemaker    CARDIAC WORKUP:    Echo:  Severe reduced EF  Cardiac Cath:  8/24/17 - Non obstructive    ALLERGIES:    No Known Allergies       MEDICATIONS  (STANDING):  ALBUTerol    0.083% 2.5 milliGRAM(s) Nebulizer every 6 hours  amiodarone    Tablet 200 milliGRAM(s) Oral daily  aspirin  chewable 81 milliGRAM(s) Oral daily  buDESOnide   0.5 milliGRAM(s) Respule 0.5 milliGRAM(s) Inhalation every 12 hours  dextrose 5%. 1000 milliLiter(s) (50 mL/Hr) IV Continuous <Continuous>  dextrose 50% Injectable 12.5 Gram(s) IV Push once  dextrose 50% Injectable 25 Gram(s) IV Push once  dextrose 50% Injectable 25 Gram(s) IV Push once  docusate sodium 100 milliGRAM(s) Oral three times a day  furosemide   Injectable 40 milliGRAM(s) IV Push two times a day  heparin  Injectable 5000 Unit(s) SubCutaneous every 8 hours  insulin lispro (HumaLOG) corrective regimen sliding scale   SubCutaneous three times a day before meals  insulin lispro (HumaLOG) corrective regimen sliding scale   SubCutaneous at bedtime  melatonin 3 milliGRAM(s) Oral at bedtime  methylPREDNISolone sodium succinate Injectable 40 milliGRAM(s) IV Push every 8 hours  metoprolol     tartrate 75 milliGRAM(s) Oral two times a day  multivitamin 1 Tablet(s) Oral daily  sacubitril 24 mG/valsartan 26 mG 1 Tablet(s) Oral two times a day  simvastatin 40 milliGRAM(s) Oral at bedtime    MEDICATIONS  (PRN):  acetaminophen   Tablet 650 milliGRAM(s) Oral every 6 hours PRN For Temp greater than 38 C (100.4 F)  acetaminophen   Tablet. 650 milliGRAM(s) Oral every 6 hours PRN Mild Pain (1 - 3)  ALBUTerol    0.083% 2.5 milliGRAM(s) Nebulizer every 3 hours PRN Shortness of Breath and/or Wheezing  aluminum hydroxide/magnesium hydroxide/simethicone Suspension 30 milliLiter(s) Oral every 4 hours PRN Dyspepsia  dextrose Gel 1 Dose(s) Oral once PRN Blood Glucose LESS THAN 70 milliGRAM(s)/deciliter  glucagon  Injectable 1 milliGRAM(s) IntraMuscular once PRN Glucose LESS THAN 70 milligrams/deciliter  ondansetron Injectable 4 milliGRAM(s) IV Push every 6 hours PRN Nausea  senna 2 Tablet(s) Oral at bedtime PRN Constipation      FAMILY HISTORY:  Family history of other heart disease (Mother)        SOCIAL HISTORY:  .scl     ROS:     .ros    Vital Signs Last 24 Hrs  T(C): 36.4 (30 Jan 2018 11:09), Max: 36.9 (30 Jan 2018 04:34)  T(F): 97.6 (30 Jan 2018 11:09), Max: 98.4 (30 Jan 2018 04:34)  HR: 61 (30 Jan 2018 11:09) (61 - 86)  BP: 100/74 (30 Jan 2018 11:09) (94/75 - 107/72)  BP(mean): --  RR: 19 (30 Jan 2018 11:09) (16 - 20)  SpO2: 96% (30 Jan 2018 11:09) (93% - 100%)    I&O's Summary      PHYSICAL EXAM:    .phy      TELEMETRY:    ECG:    LABS:    Rapid Respiratory Viral Panel (01.29.18 @ 11:01)    Rapid RVP Result: Detected: The Film[employer] RVP Rapid uses polymerase chain reaction (PCR) and melt  curve analysis to screen for adenovirus; coronavirus HKU1, NL63, 229E,  OC43; human metapneumovirus (hMPV); human enterovirus/rhinovirus  (Entero/RV); influenza A; influenza A/H1;influenza A/H3; influenza  A/H1-2009; influenza B; parainfluenza viruses 1, 2, 3, 4; respiratory  syncytial virus; Bordetella pertussis; Mycoplasma pneumoniae; and  Chlamydophila pneumoniae.    HKU1 Coronavirus (RapRVP): Detected: CALLED RESULT TO FABIAN THOMAS R.N. 01/29/18 12:29                          14.2   8.5   )-----------( 283      ( 30 Jan 2018 06:07 )             46.4     01-30    131   |  93  |  72  ----------------------------<  147   6.6 H |  25  |  2.13     Ca    9.5      30 Jan 2018 08:16    TPro  7.1  /  Alb  3.2<L>  /  TBili  1.0  /  DBili  x   /  AST  63<H>  /  ALT  63  /  AlkPhos  169<H>  01-29 01-29 @ 00:46  Trop-I  0.047      Pro BNP  [id number] 01-29 @ 00:46  D Dimer  [id number] 01-28 @ 23:50    PT/INR - ( 28 Jan 2018 23:50 )   PT: 15.6 sec;   INR: 1.43 ratio    PTT - ( 28 Jan 2018 23:50 )  PTT:25.5 sec    RADIOLOGY & ADDITIONAL STUDIES:    Xray Chest 1 View AP/PA. (01.29.18 @ 06:02) > IMPRESSION:  There is no acute consolidation. There is marked cardiomegaly. Defibrillator/pacemaker unchanged.    There are degenerative changes. Patient is a 57y old  Female who presents with a chief complaint of SOB, cough (29 Jan 2018 10:44)      HPI:  56yo/F with PMH chr systolic CHF, BiV-AICD, HTN, hyperlipidemia, asthma, h/o Vtach on amiodarone, diabetes presented for worsening SOB, cough and wheezing. Patient got sick about couple days ago and symptoms got progressively worse. In ED she was positive for +parainfluenza virus (29 Jan 2018 10:44)      PAST MEDICAL & SURGICAL HISTORY:  HTN (hypertension)  HLD (hyperlipidemia)  Asthma  DM (diabetes mellitus)  Non obst CAD (coronary artery disease)  History of cholecystectomy  Artificial cardiac pacemaker    CARDIAC WORKUP:    Echo:  Severe reduced EF  Cardiac Cath:  8/24/17 - Non obstructive    ALLERGIES:    No Known Allergies       MEDICATIONS  (STANDING):  ALBUTerol    0.083% 2.5 milliGRAM(s) Nebulizer every 6 hours  amiodarone    Tablet 200 milliGRAM(s) Oral daily  aspirin  chewable 81 milliGRAM(s) Oral daily  buDESOnide   0.5 milliGRAM(s) Respule 0.5 milliGRAM(s) Inhalation every 12 hours  dextrose 5%. 1000 milliLiter(s) (50 mL/Hr) IV Continuous <Continuous>  dextrose 50% Injectable 12.5 Gram(s) IV Push once  dextrose 50% Injectable 25 Gram(s) IV Push once  dextrose 50% Injectable 25 Gram(s) IV Push once  docusate sodium 100 milliGRAM(s) Oral three times a day  furosemide   Injectable 40 milliGRAM(s) IV Push two times a day  heparin  Injectable 5000 Unit(s) SubCutaneous every 8 hours  insulin lispro (HumaLOG) corrective regimen sliding scale   SubCutaneous three times a day before meals  insulin lispro (HumaLOG) corrective regimen sliding scale   SubCutaneous at bedtime  melatonin 3 milliGRAM(s) Oral at bedtime  methylPREDNISolone sodium succinate Injectable 40 milliGRAM(s) IV Push every 8 hours  metoprolol     tartrate 75 milliGRAM(s) Oral two times a day  multivitamin 1 Tablet(s) Oral daily  sacubitril 24 mG/valsartan 26 mG 1 Tablet(s) Oral two times a day  simvastatin 40 milliGRAM(s) Oral at bedtime    MEDICATIONS  (PRN):  acetaminophen   Tablet 650 milliGRAM(s) Oral every 6 hours PRN For Temp greater than 38 C (100.4 F)  acetaminophen   Tablet. 650 milliGRAM(s) Oral every 6 hours PRN Mild Pain (1 - 3)  ALBUTerol    0.083% 2.5 milliGRAM(s) Nebulizer every 3 hours PRN Shortness of Breath and/or Wheezing  aluminum hydroxide/magnesium hydroxide/simethicone Suspension 30 milliLiter(s) Oral every 4 hours PRN Dyspepsia  dextrose Gel 1 Dose(s) Oral once PRN Blood Glucose LESS THAN 70 milliGRAM(s)/deciliter  glucagon  Injectable 1 milliGRAM(s) IntraMuscular once PRN Glucose LESS THAN 70 milligrams/deciliter  ondansetron Injectable 4 milliGRAM(s) IV Push every 6 hours PRN Nausea  senna 2 Tablet(s) Oral at bedtime PRN Constipation      FAMILY HISTORY:  Family history of other heart disease (Mother)        SOCIAL HISTORY:  Nonsmoker. No ETOH abuse. No illicit drugs.     ROS:     Detailed ten system ROS was performed and was negative except for history as eluded to above.    no fever  no chills  no nausea  no vomiting  no diarrhea  no constipation  no melena  no hematochezia  no chest pain  no palpitations  + sob at rest  + dyspnea on exertion  + cough  + wheezing  no anorexia  no headache  no dizziness  no syncope  no weakness  no myalgia  no dysuria  no polyuria  no hematuria     Vital Signs Last 24 Hrs  T(C): 36.4 (30 Jan 2018 11:09), Max: 36.9 (30 Jan 2018 04:34)  T(F): 97.6 (30 Jan 2018 11:09), Max: 98.4 (30 Jan 2018 04:34)  HR: 61 (30 Jan 2018 11:09) (61 - 86)  BP: 100/74 (30 Jan 2018 11:09) (94/75 - 107/72)  BP(mean): --  RR: 19 (30 Jan 2018 11:09) (16 - 20)  SpO2: 96% (30 Jan 2018 11:09) (93% - 100%)    I&O's Summary      PHYSICAL EXAM:    General:                Comfortable, AAO X 3, in no distress.   HEENT:                  Atraumatic, PERRLA, EOMI, conjunctiva clear.   Neck:                     Supple, no adenopathy, no thyromegaly, no JVD, no bruit.  Back:                     Symmetric, non tender.  Chest:                    Exp wheeze, B/L symmetric air entry, no tachypnea  Heart:                     S1, S2 normal, no gallop, + murmur.  Abdomen:              Soft, non-tender, bowel sounds active. No palpable masses.  Extremities:           no cyanosis, no edema. Peripheral pulses normal.  Skin:                      Skin color, texture normal. No rashes.  Neurologic:            Grossly nonfocal.       TELEMETRY:   Normal sinus rhythm  with no tachy or lillian events     ECG:  NSR, first deg AV block, no ST T changes    LABS:    Rapid Respiratory Viral Panel (01.29.18 @ 11:01)    Rapid RVP Result: Detected: The FilmArray RVP Rapid uses polymerase chain reaction (PCR) and melt  curve analysis to screen for adenovirus; coronavirus HKU1, NL63, 229E,  OC43; human metapneumovirus (hMPV); human enterovirus/rhinovirus  (Entero/RV); influenza A; influenza A/H1;influenza A/H3; influenza  A/H1-2009; influenza B; parainfluenza viruses 1, 2, 3, 4; respiratory  syncytial virus; Bordetella pertussis; Mycoplasma pneumoniae; and  Chlamydophila pneumoniae.    HKU1 Coronavirus (RapRVP): Detected: CALLED RESULT TO FABIAN THOMAS R.N. 01/29/18 12:29                          14.2   8.5   )-----------( 283      ( 30 Jan 2018 06:07 )             46.4     01-30    131   |  93  |  72  ----------------------------<  147   6.6 H |  25  |  2.13     Ca    9.5      30 Jan 2018 08:16    TPro  7.1  /  Alb  3.2<L>  /  TBili  1.0  /  DBili  x   /  AST  63<H>  /  ALT  63  /  AlkPhos  169<H>  01-29 01-29 @ 00:46  Trop-I  0.047      Pro BNP  2766 01-29 @ 00:46  D Dimer  458 01-28 @ 23:50    PT/INR - ( 28 Jan 2018 23:50 )   PT: 15.6 sec;   INR: 1.43 ratio    PTT - ( 28 Jan 2018 23:50 )  PTT:25.5 sec    RADIOLOGY & ADDITIONAL STUDIES:    Xray Chest 1 View AP/PA. (01.29.18 @ 06:02) > IMPRESSION:  There is no acute consolidation. There is marked cardiomegaly. Defibrillator/pacemaker unchanged.    There are degenerative changes.

## 2018-01-30 NOTE — DIETITIAN INITIAL EVALUATION ADULT. - ENERGY NEEDS
Ht. 60  "        Wt.  90 kg          39 BMI                  IBW 45  kg               Pt is at 200 %  IBW

## 2018-01-30 NOTE — CONSULT NOTE ADULT - ASSESSMENT
1) Acute Decompensated Heart Failure  2) Shortness of Breath  3) History of Asthma  4) Wheezing  5) +Coronavirus   6) History of AICD/VT   7) Amiodarone History     58 yo F, history of HFrEF, AICD, Asthma, presents for ADHF and an acute asthma exacerbation.  Found to have + Coronavirus this admission and Parainfluenza previously  Currently being treated with Amiodarone, Lasix 40bid, Metoprolol 75 bid, Sacubutril 24/valsartan 26 bid  In addition she is on Solumedrol 40 mg q 8 hrs  Agree with Albuterol q 6 hrs/Pulmicort q 12 hours  She states that her symptoms are somewhat improved  Told patient that symptoms of bronchial hyperresponsiveness can last for up to 8-12 weeks and it is imperative that she stay compliant on her asthma medications  Ambulate as tolerated  Will continue to monitor  Thank you for the consult!

## 2018-01-31 ENCOUNTER — TRANSCRIPTION ENCOUNTER (OUTPATIENT)
Age: 58
End: 2018-01-31

## 2018-01-31 VITALS — WEIGHT: 195.55 LBS

## 2018-01-31 LAB
ANION GAP SERPL CALC-SCNC: 9 MMOL/L — SIGNIFICANT CHANGE UP (ref 5–17)
BUN SERPL-MCNC: 69 MG/DL — HIGH (ref 7–23)
CALCIUM SERPL-MCNC: 9 MG/DL — SIGNIFICANT CHANGE UP (ref 8.5–10.1)
CHLORIDE SERPL-SCNC: 97 MMOL/L — SIGNIFICANT CHANGE UP (ref 96–108)
CO2 SERPL-SCNC: 29 MMOL/L — SIGNIFICANT CHANGE UP (ref 22–31)
CREAT SERPL-MCNC: 1.62 MG/DL — HIGH (ref 0.5–1.3)
GLUCOSE BLDC GLUCOMTR-MCNC: 150 MG/DL — HIGH (ref 70–99)
GLUCOSE BLDC GLUCOMTR-MCNC: 230 MG/DL — HIGH (ref 70–99)
GLUCOSE SERPL-MCNC: 140 MG/DL — HIGH (ref 70–99)
POTASSIUM SERPL-MCNC: 4.4 MMOL/L — SIGNIFICANT CHANGE UP (ref 3.5–5.3)
POTASSIUM SERPL-SCNC: 4.4 MMOL/L — SIGNIFICANT CHANGE UP (ref 3.5–5.3)
SODIUM SERPL-SCNC: 135 MMOL/L — SIGNIFICANT CHANGE UP (ref 135–145)

## 2018-01-31 PROCEDURE — 93283 PRGRMG EVAL IMPLANTABLE DFB: CPT | Mod: 26

## 2018-01-31 RX ORDER — ASPIRIN/CALCIUM CARB/MAGNESIUM 324 MG
1 TABLET ORAL
Qty: 0 | Refills: 0 | COMMUNITY
Start: 2018-01-31

## 2018-01-31 RX ORDER — METOPROLOL TARTRATE 50 MG
2 TABLET ORAL
Qty: 0 | Refills: 0 | COMMUNITY

## 2018-01-31 RX ORDER — METFORMIN HYDROCHLORIDE 850 MG/1
1 TABLET ORAL
Qty: 0 | Refills: 0 | COMMUNITY

## 2018-01-31 RX ORDER — SITAGLIPTIN 50 MG/1
1 TABLET, FILM COATED ORAL
Qty: 30 | Refills: 0 | OUTPATIENT
Start: 2018-01-31 | End: 2018-03-01

## 2018-01-31 RX ORDER — POTASSIUM CHLORIDE 20 MEQ
20 PACKET (EA) ORAL
Qty: 0 | Refills: 0 | COMMUNITY

## 2018-01-31 RX ADMIN — Medication 0.5 MILLIGRAM(S): at 09:06

## 2018-01-31 RX ADMIN — HEPARIN SODIUM 5000 UNIT(S): 5000 INJECTION INTRAVENOUS; SUBCUTANEOUS at 06:21

## 2018-01-31 RX ADMIN — ALBUTEROL 2.5 MILLIGRAM(S): 90 AEROSOL, METERED ORAL at 09:06

## 2018-01-31 RX ADMIN — Medication 1 TABLET(S): at 12:02

## 2018-01-31 RX ADMIN — SACUBITRIL AND VALSARTAN 1 TABLET(S): 24; 26 TABLET, FILM COATED ORAL at 06:21

## 2018-01-31 RX ADMIN — Medication 40 MILLIGRAM(S): at 06:21

## 2018-01-31 RX ADMIN — AMIODARONE HYDROCHLORIDE 200 MILLIGRAM(S): 400 TABLET ORAL at 06:21

## 2018-01-31 RX ADMIN — Medication 100 MILLIGRAM(S): at 12:05

## 2018-01-31 RX ADMIN — Medication 40 MILLIGRAM(S): at 06:20

## 2018-01-31 RX ADMIN — Medication 4: at 12:01

## 2018-01-31 RX ADMIN — Medication 100 MILLIGRAM(S): at 06:21

## 2018-01-31 RX ADMIN — Medication 81 MILLIGRAM(S): at 12:03

## 2018-01-31 NOTE — PHYSICAL THERAPY INITIAL EVALUATION ADULT - MODALITIES TREATMENT COMMENTS
pt left in bed supine post Eval; bed alarm on; HM in place; EDWIN Mendez present to test PPM; spouse in room; callbell in reach; pt instructed not to get up alone; call nursing for assist; scott well; no c/o pain

## 2018-01-31 NOTE — DISCHARGE NOTE ADULT - HOSPITAL COURSE
Chief Complaint/Reason for Admission: SOB, cough	  History of Present Illness: 	  58yo/F with PMH chr systolic CHF, BiV-AICD, HTN, hyperlipidemia, asthma, h/o Vtach on amiodarone, diabetes presented for worsening SOB, cough and wheezing. Patient got sick about couple days ago and symptoms got progressively worse. In ED she was positive for +parainfluenza virus.    1/30: Less wheezing, states feeling slightly improved w/o active chest pain. Notes LE edema. Afebrile.   ROS: neg unless stated above.     1/31: Feels much better, less coughing, afebrile and eager for discharge. Had go BM's yesterday with decrease in Serum K+.   ROS: neg unless stated above.     Vital Signs Last 24 Hrs  T(C): 36.8 (31 Jan 2018 09:56), Max: 36.8 (31 Jan 2018 09:56)  T(F): 98.2 (31 Jan 2018 09:56), Max: 98.2 (31 Jan 2018 09:56)  HR: 92 (31 Jan 2018 09:56) (70 - 92)  BP: 119/71 (31 Jan 2018 09:56) (95/61 - 119/71)  BP(mean): --  RR: 18 (31 Jan 2018 09:56) (18 - 19)  SpO2: 98% (31 Jan 2018 09:56) (93% - 98%)    PHYSICAL EXAM:    Constitutional: NAD, awake and alert, well-developed  female.  HEENT: PERR, EOMI, Normal Hearing, MMM  Neck: Soft and supple, No LAD, No JVD  Respiratory: Mild end expiratory wheeze, no rhonchi and resolution of rales.   Cardiovascular: S1 and S2, regular rate and rhythm, no Murmurs, gallops or rubs  Gastrointestinal: Bowel Sounds present, soft, nontender, nondistended, no guarding, no rebound  Extremities: +2 LE peripheral edema  Vascular: 2+ peripheral pulses  Neurological: A/O x 3, no focal deficits  Musculoskeletal: 5/5 strength b/l upper and lower extremities  Skin: No rashes    All labs/ images reviewed.   01-31    135  |  97  |  69<H>  ----------------------------<  140<H>  4.4   |  29  |  1.62<H>    Ca    9.0      31 Jan 2018 06:40    Assessment and Plan:   · Assessment		  #Acute bronchospasm likely triggered by parainfluenza  - much improved. Discussed w/ Pulm and Cardiology.   - cont home bronchodilators: Symbicort / Albuterol prn and add 3 day burst of Prednisone.   - s/p IV Solumedrol.   ID eval DR Garcia - off Tamiflu, no signs of superimposed bacterial infection.     # Hyperkalemia - likely 2/2 constipation and was on 40meqK supplements daily.   - now normalized s/p treatment.   - decrease to 20meq daily and f/u outpatient as patient on Lasix BID and Entresto.     #Acute on chronic systolic CHF - RESOLVED. Last known EF 25% in Aug.   - s/p IV lasix --> dc home on PO BID 40mg / daily weights / low salt diet.   - cont home meds: Entresto. Aspirin, statin,   Borderline troponins likely due to demand ischemia    #H/o Vtach s/p BiV AICD  EP interrogated AICD  Cont Amiodarone, BB    # DM 2- A1c 7.3%, mildly elevated. STOP Metformin given CKD III, start Januvia.     DC home today.   Discussed w/ Dr. Stevens and IDR. Total time > 45 mins.

## 2018-01-31 NOTE — PROGRESS NOTE ADULT - ASSESSMENT
Acute on Chr HFrEF  Bronchospasm sec to viral URI  NICM  Non obstructive CAD  HTN    Suggest:    Diuresis.  Salt restriction  Flu treatment  Out pt follow up.  Continue current medications  D/W  at bedside
#Acute bronchospasm likely triggered by parainfluenza  - slowly improving respiratory status, not hypoxic.   - continue symptomatic treatment.   IV Solumedrol --> switch to PO pred tomorrow for short course.   Bronchodilators  Pulm kavita Wade  ID kavita Garcia - off Tamiflu, no signs of superimposed bacterial infection.     # Hyperkalemia - stop daily K+ supplements.   - given IV dextrose / insulin / kayexelate  - repeat BMP in PM.     #Acute on chronic systolic CHF  IV Furosemide BID, enforced low salt diet.   Card kavita Stevens  Borderline troponins likely due to demand ischemia  Aspirin, statin, Entresto    #H/o Vtach s/p BiV AICD  EP kavita Singer to interrogate AICD  Cont Amiodarone, BB    DVT ppx: Heparin  Dispo: remain inpatient on telemetry, likely dc home tomorrow. PT consult.   Discussed w/ Dr. Stevens and IDR. Total time > 45 mins.
1) Acute Decompensated Heart Failure  2) Shortness of Breath  3) History of Asthma  4) Wheezing  5) +Coronavirus   6) History of AICD/VT   7) Amiodarone History     1/31/18  58 yo F, history of HFrEF, AICD, Asthma, presents for ADHF and an acute asthma exacerbation.  Found to have + Coronavirus this admission and Parainfluenza previously  Currently being treated with Amiodarone, Lasix 40bid, Metoprolol 75 bid, Sacubutril 24/valsartan 26 bid  In addition she is on Solumedrol 40 mg q 8 hrs  Agree with Albuterol q 6 hrs/Pulmicort q 12 hours  She states that her symptoms are back to baseline  Told patient that symptoms of bronchial hyperresponsiveness can last for up to 8-12 weeks and it is imperative that she stay compliant on her asthma medications  Ambulate as tolerated  Will follow up with me as an outpatient  Thank you for the consult!
PHYSICAL EXAM:  GENERAL: NAD, well-developed  HEAD:  Atraumatic, Normocephalic  EYES: EOMI, PERRLA, conjunctiva and sclera YELLOWED, (LFT's normal on 1/29)  NECK: Supple, No JVD  CHEST/LUNG: Clear to auscultation; right lung sounds diminished  HEART: Regular rate and rhythm; No murmurs, rubs, or gallops  ABDOMEN: Frim, Nontender, distended; Bowel sounds present  EXTREMITIES:  2+ Peripheral Pulses, BLLE 2+ pitting edema  PSYCH: AAOx3  NEUROLOGY: non-focal  SKIN: No rashes or lesions

## 2018-01-31 NOTE — DISCHARGE NOTE ADULT - CARE PLAN
Principal Discharge DX:	Acute congestive heart failure, unspecified congestive heart failure type  Goal:	Improvement in shortness of breath  Assessment and plan of treatment:	Continue all cardiac meds including Lasix 2X a day.   - Measure daily weights at home and call your cardiologist if you gain more than 2lbs in 1 day or 3lbs in 2 days.  Secondary Diagnosis:	URI (upper respiratory infection)  Goal:	No fevers  Assessment and plan of treatment:	You tested positive for the parainfluenza virus which likely triggered your asthma and heart failure symptoms causing difficulty breathing.   - use all inhalers as prescribed and wash hands with soap and water daily.  Secondary Diagnosis:	DM (diabetes mellitus)  Goal:	Better blood sugar control  Assessment and plan of treatment:	A1c = 7.3% --> STOP METFORMIN and start Januvia new medicine for diabetes

## 2018-01-31 NOTE — DISCHARGE NOTE ADULT - MEDICATION SUMMARY - MEDICATIONS TO STOP TAKING
I will STOP taking the medications listed below when I get home from the hospital:    metFORMIN 500 mg oral tablet  -- 1 tab(s) by mouth 2 times a day    predniSONE 50 mg oral tablet  -- 1 tab(s) by mouth once a day   -- It is very important that you take or use this exactly as directed.  Do not skip doses or discontinue unless directed by your doctor.  Obtain medical advice before taking any non-prescription drugs as some may affect the action of this medication.  Take with food or milk.

## 2018-01-31 NOTE — DISCHARGE NOTE ADULT - MEDICATION SUMMARY - MEDICATIONS TO CHANGE
I will SWITCH the dose or number of times a day I take the medications listed below when I get home from the hospital:    potassium chloride 20 mEq oral granule, extended release  -- 20 milliequivalent(s) by mouth once a day    metoprolol tartrate 25 mg oral tablet  -- 1 tab(s) by mouth every 8 hours   -- It is very important that you take or use this exactly as directed.  Do not skip doses or discontinue unless directed by your doctor.  May cause drowsiness.  Alcohol may intensify this effect.  Use care when operating dangerous machinery.  Some non-prescription drugs may aggravate your condition.  Read all labels carefully.  If a warning appears, check with your doctor before taking.  Take with food or milk.  This drug may impair the ability to drive or operate machinery.  Use care until you become familiar with its effects.    furosemide 40 mg oral tablet  -- 1 tab(s) by mouth once a day    Entresto 49 mg-51 mg oral tablet  -- 1 tab(s) by mouth 2 times a day    metoprolol succinate 50 mg oral tablet, extended release  -- 2 tab(s) by mouth once a day    potassium chloride 20 mEq oral granule, extended release  -- 20 milliequivalent(s) by mouth 2 times a day

## 2018-01-31 NOTE — DISCHARGE NOTE ADULT - PLAN OF CARE
Improvement in shortness of breath Continue all cardiac meds including Lasix 2X a day.   - Measure daily weights at home and call your cardiologist if you gain more than 2lbs in 1 day or 3lbs in 2 days. No fevers You tested positive for the parainfluenza virus which likely triggered your asthma and heart failure symptoms causing difficulty breathing.   - use all inhalers as prescribed and wash hands with soap and water daily. Better blood sugar control A1c = 7.3% --> STOP METFORMIN and start Januvia new medicine for diabetes

## 2018-01-31 NOTE — DISCHARGE NOTE ADULT - MEDICATION SUMMARY - MEDICATIONS TO TAKE
I will START or STAY ON the medications listed below when I get home from the hospital:    predniSONE 10 mg oral tablet  -- 3 tab(s) by mouth once a day X 3 days. Take with food.  -- It is very important that you take or use this exactly as directed.  Do not skip doses or discontinue unless directed by your doctor.  Obtain medical advice before taking any non-prescription drugs as some may affect the action of this medication.  Take with food or milk.    -- Indication: For Steroid taper    aspirin 81 mg oral tablet, chewable  -- 1 tab(s) by mouth once a day  -- Indication: For Cardioprotective    Entresto 49 mg-51 mg oral tablet  -- 1 tab(s) by mouth 2 times a day  -- Indication: For CHF    amiodarone 200 mg oral tablet  -- 1 tab(s) by mouth once a day   -- Avoid grapefruit and grapefruit juice while taking this medication.  Avoid prolonged or excessive exposure to direct and/or artificial sunlight while taking this medication.  Do not take this drug if you are pregnant.  It is very important that you take or use this exactly as directed.  Do not skip doses or discontinue unless directed by your doctor.  May cause drowsiness or dizziness.    -- Indication: For Hx of Vtach    Januvia 50 mg oral tablet  -- 1 tab(s) by mouth once a day for diabetes  -- Do not drink alcoholic beverages when taking this medication.    -- Indication: For Diabetes    metoprolol succinate 50 mg oral tablet, extended release  -- 1.5 tab(s) by mouth once a day  -- Indication: For High blood pressure    albuterol 90 mcg/inh inhalation aerosol  -- 2 puff(s) inhaled every 4 hours, As Needed -Shortness of Breath and/or Wheezing   -- Indication: For Shortness of breath    Symbicort 160 mcg-4.5 mcg/inh inhalation aerosol  -- 2 puff(s) inhaled 2 times a day  -- Indication: For Shortness of breath    furosemide 40 mg oral tablet  -- 1 tab(s) by mouth 2 times a day  -- Indication: For CHF    potassium chloride 20 mEq oral granule, extended release  -- 20 milliequivalent(s) by mouth once a day  -- Indication: For Potassium

## 2018-01-31 NOTE — DISCHARGE NOTE ADULT - CARE PROVIDER_API CALL
Juanita Stevens (MD), Cardiology; Interventional Cardiology  [address] [facility]  [place] [address]  Phone: [phone]  Fax: [phone]    Sanchez Goldstein), Family Medicine  [address] [place] [address]  Phone: [phone]  Fax: [phone]    Gregg Wade), Internal Medicine  [address] [place] [address]  Phone: [phone]

## 2018-01-31 NOTE — DISCHARGE NOTE ADULT - PATIENT PORTAL LINK FT
“You can access the [facility] Patient Portal, offered by , by registering with the following website: [email or web address]”

## 2018-01-31 NOTE — PROGRESS NOTE ADULT - SUBJECTIVE AND OBJECTIVE BOX
58yo/F with PMH chr systolic CHF, BiV-AICD, HTN, hyperlipidemia, asthma, h/o Vtach on amiodarone, diabetes presented for worsening SOB, cough and wheezing. Patient got sick about couple days ago and symptoms got progressively worse. In ED she was positive for +parainfluenza virus (29 Jan 2018 10:44)    Today, she feels better. No new events. No edema. Breathing is better.    PAST MEDICAL & SURGICAL HISTORY:  HTN (hypertension)  HLD (hyperlipidemia)  Asthma  DM (diabetes mellitus)  Non obst CAD (coronary artery disease)  History of cholecystectomy  Artificial cardiac pacemaker    CARDIAC WORKUP:    Echo:  Severe reduced EF  Cardiac Cath:  8/24/17 - Non obstructive    Allergies:   No Known Allergies      MEDICATIONS  (STANDING):  ALBUTerol    0.083% 2.5 milliGRAM(s) Nebulizer every 6 hours  amiodarone    Tablet 200 milliGRAM(s) Oral daily  aspirin  chewable 81 milliGRAM(s) Oral daily  buDESOnide   0.5 milliGRAM(s) Respule 0.5 milliGRAM(s) Inhalation every 12 hours  dextrose 5%. 1000 milliLiter(s) (50 mL/Hr) IV Continuous <Continuous>  dextrose 50% Injectable 12.5 Gram(s) IV Push once  dextrose 50% Injectable 25 Gram(s) IV Push once  dextrose 50% Injectable 25 Gram(s) IV Push once  docusate sodium 100 milliGRAM(s) Oral three times a day  furosemide   Injectable 40 milliGRAM(s) IV Push two times a day  heparin  Injectable 5000 Unit(s) SubCutaneous every 8 hours  insulin lispro (HumaLOG) corrective regimen sliding scale   SubCutaneous three times a day before meals  insulin lispro (HumaLOG) corrective regimen sliding scale   SubCutaneous at bedtime  melatonin 3 milliGRAM(s) Oral at bedtime  metoprolol succinate  milliGRAM(s) Oral daily  multivitamin 1 Tablet(s) Oral daily  sacubitril 49 mG/valsartan 51 mG 1 Tablet(s) Oral two times a day  simvastatin 40 milliGRAM(s) Oral at bedtime    MEDICATIONS  (PRN):  acetaminophen   Tablet 650 milliGRAM(s) Oral every 6 hours PRN For Temp greater than 38 C (100.4 F)  acetaminophen   Tablet. 650 milliGRAM(s) Oral every 6 hours PRN Mild Pain (1 - 3)  ALBUTerol    0.083% 2.5 milliGRAM(s) Nebulizer every 3 hours PRN Shortness of Breath and/or Wheezing  aluminum hydroxide/magnesium hydroxide/simethicone Suspension 30 milliLiter(s) Oral every 4 hours PRN Dyspepsia  dextrose Gel 1 Dose(s) Oral once PRN Blood Glucose LESS THAN 70 milliGRAM(s)/deciliter  glucagon  Injectable 1 milliGRAM(s) IntraMuscular once PRN Glucose LESS THAN 70 milligrams/deciliter  ondansetron Injectable 4 milliGRAM(s) IV Push every 6 hours PRN Nausea  senna 2 Tablet(s) Oral at bedtime PRN Constipation      ROS:     Detailed ten system ROS was performed and was negative except for history as eluded to above.    no fever  no chills  no nausea  no vomiting  no diarrhea  no constipation  no melena  no hematochezia  no chest pain  no palpitations  no sob at rest  no dyspnea on exertion  no cough  no wheezing  no anorexia  no headache  no dizziness  no syncope  no weakness  no myalgia  no dysuria  no polyuria  no hematuria       Vital Signs Last 24 Hrs  T(C): 36.8 (31 Jan 2018 09:56), Max: 36.8 (31 Jan 2018 09:56)  T(F): 98.2 (31 Jan 2018 09:56), Max: 98.2 (31 Jan 2018 09:56)  HR: 92 (31 Jan 2018 09:56) (70 - 92)  BP: 119/71 (31 Jan 2018 09:56) (95/61 - 119/71)  BP(mean): --  RR: 18 (31 Jan 2018 09:56) (18 - 19)  SpO2: 98% (31 Jan 2018 09:56) (93% - 98%)    I&O's Summary      PHYSICAL EXAM:    General:                Comfortable, AAO X 3, in no distress.   HEENT:                  Atraumatic, PERRLA, EOMI, conjunctiva clear.   Neck:                     Supple, no adenopathy, no thyromegaly, no JVD, no bruit.  Back:                     Symmetric, non tender.  Chest:                    Clear, B/L symmetric air entry, no tachypnea  Heart:                     S1, S2 normal, no gallop, + murmur.  Abdomen:              Soft, non-tender, bowel sounds active. No palpable masses.  Extremities:           no cyanosis, no edema. Peripheral pulses normal.  Skin:                      Skin color, texture normal. No rashes.  Neurologic:            Grossly nonfocal.       INTERPRETATION OF TELEMETRY:  Normal sinus rhythm with no tachy or lillian events     ECG:  Sinus, first deg AV block, PRWP    LABS:                        14.2   8.5   )-----------( 283      ( 30 Jan 2018 06:07 )             46.4     01-31    135  |  97  |  69<H>  ----------------------------<  140<H>  4.4   |  29  |  1.62<H>    Ca    9.0      31 Jan 2018 06:40        01-29 @ 00:46  Trop-I 0.047    Pro BNP  [id number] 01-29 @ 00:46  D Dimer  [id number] 01-28 @ 23:50      RADIOLOGY & ADDITIONAL STUDIES:    Xray Chest 1 View AP/PA. (01.29.18 @ 06:02) >  IMPRESSION:  There is no acute consolidation. There is marked cardiomegaly. Defibrillator/pacemaker unchanged.    There are degenerative changes.
Chief Complaint/Reason for Admission: SOB, cough	  History of Present Illness: 	  56yo/F with PMH chr systolic CHF, BiV-AICD, HTN, hyperlipidemia, asthma, h/o Vtach on amiodarone, diabetes presented for worsening SOB, cough and wheezing. Patient got sick about couple days ago and symptoms got progressively worse. In ED she was positive for +parainfluenza virus.    1/30: Less wheezing, states feeling slightly improved w/o active chest pain. Notes LE edema. Afebrile.   ROS: neg unless stated above.       Vital Signs Last 24 Hrs  T(C): 36.4 (30 Jan 2018 11:09), Max: 36.9 (30 Jan 2018 04:34)  T(F): 97.6 (30 Jan 2018 11:09), Max: 98.4 (30 Jan 2018 04:34)  HR: 61 (30 Jan 2018 11:09) (61 - 86)  BP: 100/74 (30 Jan 2018 11:09) (94/75 - 107/72)  BP(mean): --  RR: 19 (30 Jan 2018 11:09) (16 - 20)  SpO2: 96% (30 Jan 2018 11:09) (93% - 100%)  PHYSICAL EXAM:    Constitutional: NAD, awake and alert, well-developed  female.  HEENT: PERR, EOMI, Normal Hearing, MMM  Neck: Soft and supple, No LAD, No JVD  Respiratory: Mild end expiratory wheeze, no rhonchi and mild rales at bases.   Cardiovascular: S1 and S2, regular rate and rhythm, no Murmurs, gallops or rubs  Gastrointestinal: Bowel Sounds present, soft, nontender, nondistended, no guarding, no rebound  Extremities: +2 LE peripheral edema  Vascular: 2+ peripheral pulses  Neurological: A/O x 3, no focal deficits  Musculoskeletal: 5/5 strength b/l upper and lower extremities  Skin: No rashes    MEDICATIONS  (STANDING):  ALBUTerol    0.083% 2.5 milliGRAM(s) Nebulizer every 6 hours  amiodarone    Tablet 200 milliGRAM(s) Oral daily  aspirin  chewable 81 milliGRAM(s) Oral daily  buDESOnide   0.5 milliGRAM(s) Respule 0.5 milliGRAM(s) Inhalation every 12 hours  dextrose 5%. 1000 milliLiter(s) (50 mL/Hr) IV Continuous <Continuous>  dextrose 50% Injectable 12.5 Gram(s) IV Push once  dextrose 50% Injectable 25 Gram(s) IV Push once  dextrose 50% Injectable 25 Gram(s) IV Push once  docusate sodium 100 milliGRAM(s) Oral three times a day  furosemide   Injectable 40 milliGRAM(s) IV Push two times a day  heparin  Injectable 5000 Unit(s) SubCutaneous every 8 hours  insulin lispro (HumaLOG) corrective regimen sliding scale   SubCutaneous three times a day before meals  insulin lispro (HumaLOG) corrective regimen sliding scale   SubCutaneous at bedtime  melatonin 3 milliGRAM(s) Oral at bedtime  methylPREDNISolone sodium succinate Injectable 40 milliGRAM(s) IV Push every 8 hours  metoprolol     tartrate 75 milliGRAM(s) Oral two times a day  multivitamin 1 Tablet(s) Oral daily  sacubitril 24 mG/valsartan 26 mG 1 Tablet(s) Oral two times a day  simvastatin 40 milliGRAM(s) Oral at bedtime    LABS: All Labs Reviewed:                        14.2   8.5   )-----------( 283      ( 30 Jan 2018 06:07 )             46.4     01-30    x   |  x   |  x   ----------------------------<  x   6.6<HH>   |  x   |  x     Ca    9.5      30 Jan 2018 08:16    TPro  7.1  /  Alb  3.2<L>  /  TBili  1.0  /  DBili  x   /  AST  63<H>  /  ALT  63  /  AlkPhos  169<H>  01-29    PT/INR - ( 28 Jan 2018 23:50 )   PT: 15.6 sec;   INR: 1.43 ratio   PTT - ( 28 Jan 2018 23:50 )  PTT:25.5 sec  CARDIAC MARKERS ( 29 Jan 2018 00:46 )  0.047 ng/mL / x     / x     / x     / x          Blood Culture:
Patient is a 57y old  Female who presents with a chief complaint of SOB, cough (2018 10:44)    HPI:  56yo/F with PMH chr systolic CHF, BiV-AICD, HTN, hyperlipidemia, asthma, h/o Vtach on amiodarone, diabetes presented for worsening SOB, cough and wheezing. Patient got sick about couple days ago and symptoms got progressively worse. In ED she was positive for +parainfluenza virus (2018 10:44)    18- pt denies any CP/SOB currently.  States that she still feels swollen in her abdomen and legs.  EKG reading possible PPM failure, Dr. Rajput aware.  Tele is currently reading A Paced.          Daily     Daily Weight in k (2018 11:26)    T(C): 36.4 (18 @ 11:09), Max: 36.9 (18 @ 04:34)  HR: 61 (18 @ 11:09) (61 - 86)  BP: 100/74 (18 @ 11:09) (94/75 - 107/72)  RR: 19 (18 @ 11:09) (16 - 20)  SpO2: 96% (18 @ 11:09) (93% - 100%)    Home Medications:  Entresto 49 mg-51 mg oral tablet: 1 tab(s) orally 2 times a day (2018 12:07)  furosemide 40 mg oral tablet: 1 tab(s) orally once a day (2018 12:07)  metFORMIN 500 mg oral tablet: 1 tab(s) orally 2 times a day (2018 12:07)  metoprolol succinate 50 mg oral tablet, extended release: 2 tab(s) orally once a day (2018 12:07)  potassium chloride 20 mEq oral granule, extended release: 20 milliequivalent(s) orally 2 times a day (2018 12:07)      Transthoracic Echocardiogram:    EXAM:  2D ECHOCARDIOGRAM AD         PROCEDURE DATE:  2017        INTERPRETATION:  Transthoracic Echocardiography Report (TTE)     Findings     Mitral Valve   There is thickening of both mitral valve leaflet tips. The leaflet   opening   is normal. Moderate mitral regurgitation is present based on a calculated   ERO of .32cm2.     Aortic Valve   The aortic valve appears normal.     Tricuspid Valve   The tricuspid valve leaflets are thin and pliable. Moderate (2+)   tricuspid   valve regurgitation is present. Moderate pulmonary hypertension.     Pulmonic Valve   The pulmonic valve appears normal.     Left Atrium   Left atrium is moderately dilated.     Left Ventricle   The left ventricle is severely dilated with severe global hypokinesis.   Estimated left ventricular ejection fraction is 20 % via bi-plane method.     Right Atrium   The right atrium appears moderately dilated. A device wire is seen in the   RV and RA.     Right Ventricle   The right ventricle is normal in size and contractility.     Pericardial Effusion   Trace pericardial effusion is present.     Pleural Effusion   No evidence of pleural effusion.     Miscellaneous   All visualized extra cardiac structures appears to be normal.     Impression     Summary     The left ventricle is severely dilated with severe global hypokinesis.   Estimated left ventricular ejection fraction is 20 % via bi-plane method.   Left atriumis moderately dilated.   The right atrium appears moderately dilated. A device wire is seen in the   RV and RA.   The right ventricle is normal in size and contractility.   The aortic valve appears normal.   There is thickening of both mitral valve leaflet tips. The leaflet   opening   is normal. Moderate mitral regurgitation is present based on a calculated   ERO of .32cm2.   The tricuspid valve leaflets are thin and pliable. Moderate (2+)   tricuspid   valve regurgitation is present. Moderate pulmonary hypertension.   Trace pericardial effusion is present.     Signature     ----------------------------------------------------------------   Electronically signed by Fadi Pride MD(Interpreting   physician) on 2017 01:05 PM   ---------------------------------------------------------------
Patient is a 57y old  Female who presents with a chief complaint of SOB, cough (29 Jan 2018 10:44)      HPI:  58yo/F with PMH chr systolic CHF, BiV-AICD, HTN, hyperlipidemia, asthma, h/o Vtach on amiodarone, diabetes presented for worsening SOB, cough and wheezing. Patient got sick about couple days ago and symptoms got progressively worse. In ED she was positive for +parainfluenza virus (29 Jan 2018 10:44)    1/31  No acute events occurred overnight, patient ambulated without difficulty    PAST MEDICAL & SURGICAL HISTORY:  CHF (congestive heart failure)  Pacemaker  HTN (hypertension)  HLD (hyperlipidemia)  Asthma  DM (diabetes mellitus)  CAD (coronary artery disease)  History of cholecystectomy  Artificial cardiac pacemaker      PREVIOUS DIAGNOSTIC TESTING:      MEDICATIONS  (STANDING):  ALBUTerol    0.083% 2.5 milliGRAM(s) Nebulizer every 6 hours  amiodarone    Tablet 200 milliGRAM(s) Oral daily  aspirin  chewable 81 milliGRAM(s) Oral daily  buDESOnide   0.5 milliGRAM(s) Respule 0.5 milliGRAM(s) Inhalation every 12 hours  dextrose 5%. 1000 milliLiter(s) (50 mL/Hr) IV Continuous <Continuous>  dextrose 50% Injectable 12.5 Gram(s) IV Push once  dextrose 50% Injectable 25 Gram(s) IV Push once  dextrose 50% Injectable 25 Gram(s) IV Push once  docusate sodium 100 milliGRAM(s) Oral three times a day  furosemide   Injectable 40 milliGRAM(s) IV Push two times a day  heparin  Injectable 5000 Unit(s) SubCutaneous every 8 hours  insulin lispro (HumaLOG) corrective regimen sliding scale   SubCutaneous three times a day before meals  insulin lispro (HumaLOG) corrective regimen sliding scale   SubCutaneous at bedtime  melatonin 3 milliGRAM(s) Oral at bedtime  methylPREDNISolone sodium succinate Injectable 40 milliGRAM(s) IV Push every 8 hours  metoprolol     tartrate 75 milliGRAM(s) Oral two times a day  multivitamin 1 Tablet(s) Oral daily  potassium chloride    Tablet ER 20 milliEquivalent(s) Oral daily  sacubitril 24 mG/valsartan 26 mG 1 Tablet(s) Oral two times a day  simvastatin 40 milliGRAM(s) Oral at bedtime    MEDICATIONS  (PRN):  acetaminophen   Tablet 650 milliGRAM(s) Oral every 6 hours PRN For Temp greater than 38 C (100.4 F)  acetaminophen   Tablet. 650 milliGRAM(s) Oral every 6 hours PRN Mild Pain (1 - 3)  ALBUTerol    0.083% 2.5 milliGRAM(s) Nebulizer every 3 hours PRN Shortness of Breath and/or Wheezing  aluminum hydroxide/magnesium hydroxide/simethicone Suspension 30 milliLiter(s) Oral every 4 hours PRN Dyspepsia  dextrose Gel 1 Dose(s) Oral once PRN Blood Glucose LESS THAN 70 milliGRAM(s)/deciliter  glucagon  Injectable 1 milliGRAM(s) IntraMuscular once PRN Glucose LESS THAN 70 milligrams/deciliter  ondansetron Injectable 4 milliGRAM(s) IV Push every 6 hours PRN Nausea  senna 2 Tablet(s) Oral at bedtime PRN Constipation      FAMILY HISTORY:  Family history of other heart disease (Mother)      SOCIAL HISTORY:  lives at home, non smoker     REVIEW OF SYSTEM:  shortness of breath, lower extremity swelling, otherwise 12 point ROS negative     Vital Signs Last 24 Hrs  T(C): 36.3 (31 Jan 2018 05:08), Max: 36.7 (30 Jan 2018 17:12)  T(F): 97.4 (31 Jan 2018 05:08), Max: 98.1 (30 Jan 2018 17:12)  HR: 72 (31 Jan 2018 05:08) (61 - 79)  BP: 95/61 (31 Jan 2018 05:08) (95/61 - 112/64)  BP(mean): --  RR: 18 (31 Jan 2018 05:08) (18 - 19)  SpO2: 93% (31 Jan 2018 05:08) (93% - 98%)    I&O's Summary    PHYSICAL EXAM  General Appearance: cooperative, no acute distress,   HEENT: PERRL, conjunctiva clear, EOM's intact, non injected pharynx, no exudate, TM   normal  Neck: Supple, , no adenopathy, thyroid: not enlarged, no carotid bruit or JVD  Back: Symmetric, no  tenderness,no soft tissue tenderness  Lungs: Clear in the upper lobes  Heart: Regular rate and rhythm, S1, S2 normal, no murmur, rub or gallop  Abdomen: Soft, non-tender, bowel sounds active , no hepatosplenomegaly  Extremities: no cyanosis or edema, no joint swelling  Skin: Skin color, texture normal, no rashes   Neurologic: Alert and oriented X3 , cranial nerves intact, sensory and motor normal,    ECG:    LABS:                          14.2   8.5   )-----------( 283      ( 30 Jan 2018 06:07 )             46.4     01-30    131<L>  |  93<L>  |  72<H>  ----------------------------<  147<H>  6.3<HH>   |  25  |  2.13<H>    Ca    9.5      30 Jan 2018 08:16    TPro  7.1  /  Alb  3.2<L>  /  TBili  1.0  /  DBili  x   /  AST  63<H>  /  ALT  63  /  AlkPhos  169<H>  01-29    CARDIAC MARKERS ( 29 Jan 2018 00:46 )  0.047 ng/mL / x     / x     / x     / x            Pro BNP  [id number] 01-29 @ 00:46  D Dimer  -- 01-29 @ 00:46  Pro BNP  -- 01-28 @ 23:50  D Dimer  458 01-28 @ 23:50    PT/INR - ( 28 Jan 2018 23:50 )   PT: 15.6 sec;   INR: 1.43 ratio         PTT - ( 28 Jan 2018 23:50 )  PTT:25.5 sec          RADIOLOGY & ADDITIONAL STUDIES:    IMPRESSION:     There is no acute consolidation. There is marked cardiomegaly.   Defibrillator/pacemaker unchanged.    There are degenerative changes.

## 2018-02-04 ENCOUNTER — INPATIENT (INPATIENT)
Facility: HOSPITAL | Age: 58
LOS: 3 days | Discharge: LEFT BEFORE DISCHARGE | End: 2018-02-08
Attending: HOSPITALIST | Admitting: HOSPITALIST
Payer: COMMERCIAL

## 2018-02-04 VITALS
OXYGEN SATURATION: 100 % | DIASTOLIC BLOOD PRESSURE: 75 MMHG | TEMPERATURE: 98 F | HEIGHT: 66 IN | RESPIRATION RATE: 20 BRPM | HEART RATE: 83 BPM | WEIGHT: 197.09 LBS | SYSTOLIC BLOOD PRESSURE: 103 MMHG

## 2018-02-04 DIAGNOSIS — Z95.0 PRESENCE OF CARDIAC PACEMAKER: Chronic | ICD-10-CM

## 2018-02-04 DIAGNOSIS — Z90.49 ACQUIRED ABSENCE OF OTHER SPECIFIED PARTS OF DIGESTIVE TRACT: Chronic | ICD-10-CM

## 2018-02-04 LAB
ALBUMIN SERPL ELPH-MCNC: 3.1 G/DL — LOW (ref 3.3–5)
ALP SERPL-CCNC: 157 U/L — HIGH (ref 40–120)
ALT FLD-CCNC: 79 U/L — HIGH (ref 12–78)
ANION GAP SERPL CALC-SCNC: 8 MMOL/L — SIGNIFICANT CHANGE UP (ref 5–17)
APTT BLD: 24.6 SEC — LOW (ref 27.5–37.4)
AST SERPL-CCNC: 62 U/L — HIGH (ref 15–37)
BASOPHILS # BLD AUTO: 0.1 K/UL — SIGNIFICANT CHANGE UP (ref 0–0.2)
BASOPHILS NFR BLD AUTO: 0.7 % — SIGNIFICANT CHANGE UP (ref 0–2)
BILIRUB SERPL-MCNC: 1 MG/DL — SIGNIFICANT CHANGE UP (ref 0.2–1.2)
BUN SERPL-MCNC: 43 MG/DL — HIGH (ref 7–23)
CALCIUM SERPL-MCNC: 8.5 MG/DL — SIGNIFICANT CHANGE UP (ref 8.5–10.1)
CHLORIDE SERPL-SCNC: 101 MMOL/L — SIGNIFICANT CHANGE UP (ref 96–108)
CO2 SERPL-SCNC: 28 MMOL/L — SIGNIFICANT CHANGE UP (ref 22–31)
CREAT SERPL-MCNC: 1.29 MG/DL — SIGNIFICANT CHANGE UP (ref 0.5–1.3)
EOSINOPHIL # BLD AUTO: 0 K/UL — SIGNIFICANT CHANGE UP (ref 0–0.5)
EOSINOPHIL NFR BLD AUTO: 0.1 % — SIGNIFICANT CHANGE UP (ref 0–6)
GLUCOSE SERPL-MCNC: 105 MG/DL — HIGH (ref 70–99)
HCT VFR BLD CALC: 45 % — SIGNIFICANT CHANGE UP (ref 34.5–45)
HGB BLD-MCNC: 13.7 G/DL — SIGNIFICANT CHANGE UP (ref 11.5–15.5)
INR BLD: 1.22 RATIO — HIGH (ref 0.88–1.16)
LACTATE SERPL-SCNC: 1.4 MMOL/L — SIGNIFICANT CHANGE UP (ref 0.7–2)
LIDOCAIN IGE QN: 316 U/L — SIGNIFICANT CHANGE UP (ref 73–393)
LYMPHOCYTES # BLD AUTO: 1.1 K/UL — SIGNIFICANT CHANGE UP (ref 1–3.3)
LYMPHOCYTES # BLD AUTO: 12.6 % — LOW (ref 13–44)
MAGNESIUM SERPL-MCNC: 2.2 MG/DL — SIGNIFICANT CHANGE UP (ref 1.6–2.6)
MCHC RBC-ENTMCNC: 24.9 PG — LOW (ref 27–34)
MCHC RBC-ENTMCNC: 30.3 GM/DL — LOW (ref 32–36)
MCV RBC AUTO: 82 FL — SIGNIFICANT CHANGE UP (ref 80–100)
MONOCYTES # BLD AUTO: 0.9 K/UL — SIGNIFICANT CHANGE UP (ref 0–0.9)
MONOCYTES NFR BLD AUTO: 11.2 % — SIGNIFICANT CHANGE UP (ref 2–14)
NEUTROPHILS # BLD AUTO: 6.3 K/UL — SIGNIFICANT CHANGE UP (ref 1.8–7.4)
NEUTROPHILS NFR BLD AUTO: 75.5 % — SIGNIFICANT CHANGE UP (ref 43–77)
PLATELET # BLD AUTO: 198 K/UL — SIGNIFICANT CHANGE UP (ref 150–400)
POTASSIUM SERPL-MCNC: 5.2 MMOL/L — SIGNIFICANT CHANGE UP (ref 3.5–5.3)
POTASSIUM SERPL-SCNC: 5.2 MMOL/L — SIGNIFICANT CHANGE UP (ref 3.5–5.3)
PROT SERPL-MCNC: 6.7 GM/DL — SIGNIFICANT CHANGE UP (ref 6–8.3)
PROTHROM AB SERPL-ACNC: 13.2 SEC — HIGH (ref 9.8–12.7)
RBC # BLD: 5.49 M/UL — HIGH (ref 3.8–5.2)
RBC # FLD: 17.1 % — HIGH (ref 10.3–14.5)
SODIUM SERPL-SCNC: 137 MMOL/L — SIGNIFICANT CHANGE UP (ref 135–145)
TROPONIN I SERPL-MCNC: 0.02 NG/ML — SIGNIFICANT CHANGE UP (ref 0.01–0.04)
WBC # BLD: 8.4 K/UL — SIGNIFICANT CHANGE UP (ref 3.8–10.5)
WBC # FLD AUTO: 8.4 K/UL — SIGNIFICANT CHANGE UP (ref 3.8–10.5)

## 2018-02-04 PROCEDURE — 71045 X-RAY EXAM CHEST 1 VIEW: CPT | Mod: 26

## 2018-02-04 RX ORDER — MAGNESIUM SULFATE 500 MG/ML
2 VIAL (ML) INJECTION ONCE
Qty: 0 | Refills: 0 | Status: COMPLETED | OUTPATIENT
Start: 2018-02-04 | End: 2018-02-04

## 2018-02-04 RX ORDER — IPRATROPIUM/ALBUTEROL SULFATE 18-103MCG
3 AEROSOL WITH ADAPTER (GRAM) INHALATION ONCE
Qty: 0 | Refills: 0 | Status: COMPLETED | OUTPATIENT
Start: 2018-02-04 | End: 2018-02-04

## 2018-02-04 RX ORDER — SODIUM CHLORIDE 9 MG/ML
1000 INJECTION INTRAMUSCULAR; INTRAVENOUS; SUBCUTANEOUS ONCE
Qty: 0 | Refills: 0 | Status: COMPLETED | OUTPATIENT
Start: 2018-02-04 | End: 2018-02-04

## 2018-02-04 RX ADMIN — Medication 125 MILLIGRAM(S): at 23:13

## 2018-02-04 RX ADMIN — Medication 50 GRAM(S): at 23:13

## 2018-02-04 RX ADMIN — SODIUM CHLORIDE 1000 MILLILITER(S): 9 INJECTION INTRAMUSCULAR; INTRAVENOUS; SUBCUTANEOUS at 23:13

## 2018-02-04 NOTE — ED PROVIDER NOTE - MEDICAL DECISION MAKING DETAILS
58 yo female with asthma, sob/wheezing, will check labs/xray/rvp and will give nebs/asthma treatment

## 2018-02-04 NOTE — ED PROVIDER NOTE - OBJECTIVE STATEMENT
56 y/o female with PMHx of CHD, CHF, HTN, HLD, DM, asthma s/p pacemaker c/o dyspnea. +fever x 2 days. +cough. Used nebulizer without relief. No CP but feels tightness. Discharged from  4 days ago.

## 2018-02-04 NOTE — ED ADULT TRIAGE NOTE - CHIEF COMPLAINT QUOTE
BIBA c/o SOB after albuterol inhaler with no improvement, c/o cough and chest congestion. HX DM, CHF, pacemaker

## 2018-02-04 NOTE — ED ADULT NURSE NOTE - OBJECTIVE STATEMENT
pt presents c/o SOB/dyspnea. pt w/ increased work of breathing and a non-productive cough. pt was d/c'd from  last week, returns today for worsening symptoms. pmh chf. pt a&ox3. vss. pt oxygenating at 100% on RA. vs wnl. denies chest pain.

## 2018-02-04 NOTE — ED PROVIDER NOTE - CONSTITUTIONAL, MLM
normal... Well appearing, well nourished, awake, alert, oriented to person, place, time/situation. Dyspneic but able to speak in full sentences.

## 2018-02-05 LAB
APPEARANCE UR: CLEAR — SIGNIFICANT CHANGE UP
BACTERIA # UR AUTO: (no result)
BILIRUB UR-MCNC: NEGATIVE — SIGNIFICANT CHANGE UP
COLOR SPEC: YELLOW — SIGNIFICANT CHANGE UP
DIFF PNL FLD: (no result)
EPI CELLS # UR: (no result)
FLUBV RNA SPEC QL NAA+PROBE: DETECTED
GLUCOSE BLDC GLUCOMTR-MCNC: 123 MG/DL — HIGH (ref 70–99)
GLUCOSE BLDC GLUCOMTR-MCNC: 140 MG/DL — HIGH (ref 70–99)
GLUCOSE BLDC GLUCOMTR-MCNC: 183 MG/DL — HIGH (ref 70–99)
GLUCOSE UR QL: NEGATIVE MG/DL — SIGNIFICANT CHANGE UP
HCOV HKU1 RNA SPEC QL NAA+PROBE: DETECTED
KETONES UR-MCNC: (no result)
LEUKOCYTE ESTERASE UR-ACNC: (no result)
NITRITE UR-MCNC: NEGATIVE — SIGNIFICANT CHANGE UP
PH UR: 7 — SIGNIFICANT CHANGE UP (ref 5–8)
PROT UR-MCNC: 100 MG/DL
RAPID RVP RESULT: DETECTED
RBC CASTS # UR COMP ASSIST: (no result) /HPF (ref 0–4)
SP GR SPEC: 1.01 — SIGNIFICANT CHANGE UP (ref 1.01–1.02)
UROBILINOGEN FLD QL: 1 MG/DL
WBC UR QL: (no result)

## 2018-02-05 PROCEDURE — 99285 EMERGENCY DEPT VISIT HI MDM: CPT

## 2018-02-05 PROCEDURE — 93010 ELECTROCARDIOGRAM REPORT: CPT

## 2018-02-05 RX ORDER — INSULIN LISPRO 100/ML
VIAL (ML) SUBCUTANEOUS
Qty: 0 | Refills: 0 | Status: DISCONTINUED | OUTPATIENT
Start: 2018-02-05 | End: 2018-02-08

## 2018-02-05 RX ORDER — ACETAMINOPHEN 500 MG
325 TABLET ORAL EVERY 4 HOURS
Qty: 0 | Refills: 0 | Status: DISCONTINUED | OUTPATIENT
Start: 2018-02-05 | End: 2018-02-08

## 2018-02-05 RX ORDER — DEXTROSE 50 % IN WATER 50 %
12.5 SYRINGE (ML) INTRAVENOUS ONCE
Qty: 0 | Refills: 0 | Status: DISCONTINUED | OUTPATIENT
Start: 2018-02-05 | End: 2018-02-08

## 2018-02-05 RX ORDER — ALBUTEROL 90 UG/1
1 AEROSOL, METERED ORAL EVERY 4 HOURS
Qty: 0 | Refills: 0 | Status: DISCONTINUED | OUTPATIENT
Start: 2018-02-05 | End: 2018-02-08

## 2018-02-05 RX ORDER — DEXTROSE 50 % IN WATER 50 %
1 SYRINGE (ML) INTRAVENOUS ONCE
Qty: 0 | Refills: 0 | Status: DISCONTINUED | OUTPATIENT
Start: 2018-02-05 | End: 2018-02-08

## 2018-02-05 RX ORDER — TIOTROPIUM BROMIDE 18 UG/1
1 CAPSULE ORAL; RESPIRATORY (INHALATION) DAILY
Qty: 0 | Refills: 0 | Status: DISCONTINUED | OUTPATIENT
Start: 2018-02-05 | End: 2018-02-06

## 2018-02-05 RX ORDER — DEXTROSE 50 % IN WATER 50 %
25 SYRINGE (ML) INTRAVENOUS ONCE
Qty: 0 | Refills: 0 | Status: DISCONTINUED | OUTPATIENT
Start: 2018-02-05 | End: 2018-02-08

## 2018-02-05 RX ORDER — POTASSIUM CHLORIDE 20 MEQ
20 PACKET (EA) ORAL DAILY
Qty: 0 | Refills: 0 | Status: COMPLETED | OUTPATIENT
Start: 2018-02-05 | End: 2018-02-05

## 2018-02-05 RX ORDER — IPRATROPIUM/ALBUTEROL SULFATE 18-103MCG
3 AEROSOL WITH ADAPTER (GRAM) INHALATION EVERY 6 HOURS
Qty: 0 | Refills: 0 | Status: DISCONTINUED | OUTPATIENT
Start: 2018-02-05 | End: 2018-02-08

## 2018-02-05 RX ORDER — FUROSEMIDE 40 MG
40 TABLET ORAL
Qty: 0 | Refills: 0 | Status: DISCONTINUED | OUTPATIENT
Start: 2018-02-05 | End: 2018-02-06

## 2018-02-05 RX ORDER — SODIUM CHLORIDE 9 MG/ML
1000 INJECTION, SOLUTION INTRAVENOUS
Qty: 0 | Refills: 0 | Status: DISCONTINUED | OUTPATIENT
Start: 2018-02-05 | End: 2018-02-08

## 2018-02-05 RX ORDER — GLUCAGON INJECTION, SOLUTION 0.5 MG/.1ML
1 INJECTION, SOLUTION SUBCUTANEOUS ONCE
Qty: 0 | Refills: 0 | Status: DISCONTINUED | OUTPATIENT
Start: 2018-02-05 | End: 2018-02-08

## 2018-02-05 RX ORDER — SACUBITRIL AND VALSARTAN 24; 26 MG/1; MG/1
1 TABLET, FILM COATED ORAL
Qty: 0 | Refills: 0 | Status: DISCONTINUED | OUTPATIENT
Start: 2018-02-05 | End: 2018-02-06

## 2018-02-05 RX ORDER — INSULIN LISPRO 100/ML
VIAL (ML) SUBCUTANEOUS AT BEDTIME
Qty: 0 | Refills: 0 | Status: DISCONTINUED | OUTPATIENT
Start: 2018-02-05 | End: 2018-02-08

## 2018-02-05 RX ORDER — ASPIRIN/CALCIUM CARB/MAGNESIUM 324 MG
81 TABLET ORAL DAILY
Qty: 0 | Refills: 0 | Status: DISCONTINUED | OUTPATIENT
Start: 2018-02-05 | End: 2018-02-08

## 2018-02-05 RX ORDER — METOPROLOL TARTRATE 50 MG
75 TABLET ORAL DAILY
Qty: 0 | Refills: 0 | Status: DISCONTINUED | OUTPATIENT
Start: 2018-02-05 | End: 2018-02-08

## 2018-02-05 RX ORDER — AMIODARONE HYDROCHLORIDE 400 MG/1
200 TABLET ORAL DAILY
Qty: 0 | Refills: 0 | Status: DISCONTINUED | OUTPATIENT
Start: 2018-02-05 | End: 2018-02-08

## 2018-02-05 RX ORDER — HEPARIN SODIUM 5000 [USP'U]/ML
5000 INJECTION INTRAVENOUS; SUBCUTANEOUS EVERY 8 HOURS
Qty: 0 | Refills: 0 | Status: DISCONTINUED | OUTPATIENT
Start: 2018-02-05 | End: 2018-02-08

## 2018-02-05 RX ADMIN — Medication 30 MILLIGRAM(S): at 19:50

## 2018-02-05 RX ADMIN — Medication 3 MILLILITER(S): at 15:02

## 2018-02-05 RX ADMIN — Medication 75 MILLIGRAM(S): at 01:11

## 2018-02-05 RX ADMIN — Medication 2: at 11:30

## 2018-02-05 RX ADMIN — Medication 20 MILLIEQUIVALENT(S): at 11:54

## 2018-02-05 RX ADMIN — Medication 3 MILLILITER(S): at 21:07

## 2018-02-05 RX ADMIN — Medication 40 MILLIGRAM(S): at 15:00

## 2018-02-05 RX ADMIN — HEPARIN SODIUM 5000 UNIT(S): 5000 INJECTION INTRAVENOUS; SUBCUTANEOUS at 22:08

## 2018-02-05 RX ADMIN — HEPARIN SODIUM 5000 UNIT(S): 5000 INJECTION INTRAVENOUS; SUBCUTANEOUS at 15:00

## 2018-02-05 RX ADMIN — Medication 3 MILLILITER(S): at 00:01

## 2018-02-05 RX ADMIN — SACUBITRIL AND VALSARTAN 1 TABLET(S): 24; 26 TABLET, FILM COATED ORAL at 19:50

## 2018-02-05 RX ADMIN — Medication 40 MILLIGRAM(S): at 18:35

## 2018-02-05 RX ADMIN — Medication 81 MILLIGRAM(S): at 11:51

## 2018-02-05 RX ADMIN — AMIODARONE HYDROCHLORIDE 200 MILLIGRAM(S): 400 TABLET ORAL at 11:51

## 2018-02-05 RX ADMIN — Medication 40 MILLIGRAM(S): at 22:08

## 2018-02-05 NOTE — H&P ADULT - NSHPLABSRESULTS_GEN_ALL_CORE
LABS: All Labs Reviewed:                        13.7   8.4   )-----------( 198      ( 04 Feb 2018 23:13 )             45.0     02-04    137  |  101  |  43<H>  ----------------------------<  105<H>  5.2   |  28  |  1.29    Ca    8.5      04 Feb 2018 23:13  Mg     2.2     02-04    TPro  6.7  /  Alb  3.1<L>  /  TBili  1.0  /  DBili  x   /  AST  62<H>  /  ALT  79<H>  /  AlkPhos  157<H>  02-04    PT/INR - ( 04 Feb 2018 23:13 )   PT: 13.2 sec;   INR: 1.22 ratio         PTT - ( 04 Feb 2018 23:13 )  PTT:24.6 sec  CARDIAC MARKERS ( 04 Feb 2018 23:13 )  0.022 ng/mL / x     / x     / x     / x              Blood Culture:

## 2018-02-05 NOTE — H&P ADULT - NSHPPHYSICALEXAM_GEN_ALL_CORE
PHYSICAL EXAM:    Constitutional: NAD, awake and alert, well-developed  HEENT: PERR, EOMI, Normal Hearing, MMM  Neck: Soft and supple, No LAD, No JVD  Respiratory: Breath sounds are clear bilaterally, No wheezing, rales or rhonchi  Cardiovascular: S1 and S2, regular rate and rhythm, no Murmurs, gallops or rubs  Gastrointestinal: Bowel Sounds present, soft, nontender, nondistended, no guarding, no rebound  Extremities: No peripheral edema  Vascular: 2+ peripheral pulses  Neurological: A/O x 3, no focal deficits  Musculoskeletal: 5/5 strength b/l upper and lower extremities  Skin: No rashes PHYSICAL EXAM:    Constitutional: NAD, awake and alert, well-developed  HEENT: PERR, EOMI, Normal Hearing, MMM  Neck: Soft and supple, No LAD, No JVD  Respiratory:  diffuse wheezing   Cardiovascular: S1 and S2, regular rate and rhythm, no Murmurs, gallops or rubs  Gastrointestinal: Bowel Sounds present, soft, nontender, nondistended, no guarding, no rebound  Extremities: No peripheral edema  Vascular: 2+ peripheral pulses  Neurological: A/O x 3, no focal deficits  Musculoskeletal: 5/5 strength b/l upper and lower extremities  Skin: No rashes

## 2018-02-05 NOTE — H&P ADULT - ASSESSMENT
56 yo female with ppmh chronic systolic CHF, bi-v aicd, htn, hyperlipidemia, asthma vtach on amio DM, recently d/c last week for asthma exacerbation 2/2 to parainfluenza infection now p/w c/o shortness of breath and fevers with associated myalgias and weakness. 56 yo female with ppmh chronic systolic CHF, bi-v aicd, htn, hyperlipidemia, asthma vtach on amio DM, recently d/c last week for asthma exacerbation 2/2 to parainfluenza infection now p/w c/o shortness of breath and fevers with associated myalgias and weakness.       A/P  Influenza B infection  Asthma exacerbation with acute hypoxemic resp failure 2/2 to above  Chronic systolic CHF  DM  HTN      Admit to monitored floor  Tamiflu  Duonebs  Solumedrol  ISS, start BRAVO if uncontrolledm hold januvia  CXR  HD stable, continue O2.   Chronic sCHF- cont BB, lasix and amio. Daily weights and strict I/Os    DVT px

## 2018-02-05 NOTE — H&P ADULT - HISTORY OF PRESENT ILLNESS
58 yo female with ppmh chronic systolic CHF, bi-v aicd, htn, hyperlipidemia, asthma vtach on amio DM, recently d/c last week for asthma exacerbation 2/2 to parainfluenza infection now p/w c/o shortness of breath and fevers with associated myalgias and weakness.     ED: + Influenza B infection. Duonebs, Solumedrol, mag infusion, tamilflu.   Social:  Shx:  Fhx: non contributory 58 yo female with ppmh chronic systolic CHF, bi-v aicd, htn, hyperlipidemia, asthma vtach on amio DM, recently d/c last week for asthma exacerbation 2/2 to parainfluenza infection now p/w c/o shortness of breath and fevers with associated myalgias and weakness.     ED: + Influenza B infection. Duonebs, Solumedrol, mag infusion, tamilflu.   Social: no toxic habits  Shx: sholecsytectomy, AICD  Fhx: non contributory

## 2018-02-05 NOTE — ED ADULT NURSE REASSESSMENT NOTE - NS ED NURSE REASSESS COMMENT FT1
pt received from RODERICK Laughlin. pt is resting in bed comfortably at this time. Will continue to monitor pt.
Patient received from RODERICK Cortez. Patient resting comfortably in bed. Safety and comfort maintained. Will continue to monitor.

## 2018-02-05 NOTE — PATIENT PROFILE ADULT. - NS TRANSFER PATIENT BELONGINGS
amsung smart phone, iphone plus, purse, hemanth gold watch, hoop earrings, 3 pairs of stud, ring, 3 bracelets, gold necklace, clothes, medications/Jewelry/Money (specify)/Other belongings/Wrist Watch/Clothing/Cell Phone/PDA (specify)

## 2018-02-05 NOTE — H&P ADULT - NSHPREVIEWOFSYSTEMS_GEN_ALL_CORE
REVIEW OF SYSTEMS:    CONSTITUTIONAL: fevers and myalgias  EYES/ENT: No visual changes;  No vertigo or throat pain   NECK: No pain or stiffness  RESPIRATORY: wheezing and coughing  CARDIOVASCULAR: No chest pain or palpitations  GASTROINTESTINAL: No abdominal or epigastric pain. No nausea, vomiting, or hematemesis; No diarrhea or constipation. No melena or hematochezia.  GENITOURINARY: No dysuria, frequency or hematuria  NEUROLOGICAL: No numbness or weakness  SKIN: No itching, burning, rashes, or lesions   All other review of systems is negative unless indicated above

## 2018-02-06 LAB
ALBUMIN SERPL ELPH-MCNC: 3.4 G/DL — SIGNIFICANT CHANGE UP (ref 3.3–5)
ALP SERPL-CCNC: 190 U/L — HIGH (ref 40–120)
ALT FLD-CCNC: 113 U/L — HIGH (ref 12–78)
ANION GAP SERPL CALC-SCNC: 12 MMOL/L — SIGNIFICANT CHANGE UP (ref 5–17)
ANION GAP SERPL CALC-SCNC: 7 MMOL/L — SIGNIFICANT CHANGE UP (ref 5–17)
ANION GAP SERPL CALC-SCNC: 8 MMOL/L — SIGNIFICANT CHANGE UP (ref 5–17)
AST SERPL-CCNC: 112 U/L — HIGH (ref 15–37)
BILIRUB SERPL-MCNC: 1 MG/DL — SIGNIFICANT CHANGE UP (ref 0.2–1.2)
BUN SERPL-MCNC: 61 MG/DL — HIGH (ref 7–23)
BUN SERPL-MCNC: 64 MG/DL — HIGH (ref 7–23)
BUN SERPL-MCNC: 67 MG/DL — HIGH (ref 7–23)
CALCIUM SERPL-MCNC: 8.8 MG/DL — SIGNIFICANT CHANGE UP (ref 8.5–10.1)
CALCIUM SERPL-MCNC: 8.9 MG/DL — SIGNIFICANT CHANGE UP (ref 8.5–10.1)
CALCIUM SERPL-MCNC: 8.9 MG/DL — SIGNIFICANT CHANGE UP (ref 8.5–10.1)
CHLORIDE SERPL-SCNC: 100 MMOL/L — SIGNIFICANT CHANGE UP (ref 96–108)
CHLORIDE SERPL-SCNC: 97 MMOL/L — SIGNIFICANT CHANGE UP (ref 96–108)
CHLORIDE SERPL-SCNC: 98 MMOL/L — SIGNIFICANT CHANGE UP (ref 96–108)
CO2 SERPL-SCNC: 21 MMOL/L — LOW (ref 22–31)
CO2 SERPL-SCNC: 27 MMOL/L — SIGNIFICANT CHANGE UP (ref 22–31)
CO2 SERPL-SCNC: 27 MMOL/L — SIGNIFICANT CHANGE UP (ref 22–31)
CREAT SERPL-MCNC: 1.65 MG/DL — HIGH (ref 0.5–1.3)
CREAT SERPL-MCNC: 1.7 MG/DL — HIGH (ref 0.5–1.3)
CREAT SERPL-MCNC: 1.74 MG/DL — HIGH (ref 0.5–1.3)
GLUCOSE BLDC GLUCOMTR-MCNC: 150 MG/DL — HIGH (ref 70–99)
GLUCOSE BLDC GLUCOMTR-MCNC: 165 MG/DL — HIGH (ref 70–99)
GLUCOSE BLDC GLUCOMTR-MCNC: 181 MG/DL — HIGH (ref 70–99)
GLUCOSE BLDC GLUCOMTR-MCNC: 182 MG/DL — HIGH (ref 70–99)
GLUCOSE BLDC GLUCOMTR-MCNC: 182 MG/DL — HIGH (ref 70–99)
GLUCOSE SERPL-MCNC: 183 MG/DL — HIGH (ref 70–99)
GLUCOSE SERPL-MCNC: 201 MG/DL — HIGH (ref 70–99)
GLUCOSE SERPL-MCNC: 205 MG/DL — HIGH (ref 70–99)
NT-PROBNP SERPL-SCNC: 5476 PG/ML — HIGH (ref 0–125)
POTASSIUM SERPL-MCNC: 5.7 MMOL/L — HIGH (ref 3.5–5.3)
POTASSIUM SERPL-MCNC: 5.8 MMOL/L — HIGH (ref 3.5–5.3)
POTASSIUM SERPL-MCNC: 5.9 MMOL/L — HIGH (ref 3.5–5.3)
POTASSIUM SERPL-SCNC: 5.7 MMOL/L — HIGH (ref 3.5–5.3)
POTASSIUM SERPL-SCNC: 5.8 MMOL/L — HIGH (ref 3.5–5.3)
POTASSIUM SERPL-SCNC: 5.9 MMOL/L — HIGH (ref 3.5–5.3)
PROT SERPL-MCNC: 7.3 GM/DL — SIGNIFICANT CHANGE UP (ref 6–8.3)
SODIUM SERPL-SCNC: 132 MMOL/L — LOW (ref 135–145)
SODIUM SERPL-SCNC: 132 MMOL/L — LOW (ref 135–145)
SODIUM SERPL-SCNC: 133 MMOL/L — LOW (ref 135–145)

## 2018-02-06 PROCEDURE — 93283 PRGRMG EVAL IMPLANTABLE DFB: CPT | Mod: 26

## 2018-02-06 PROCEDURE — 71045 X-RAY EXAM CHEST 1 VIEW: CPT | Mod: 26

## 2018-02-06 PROCEDURE — 93010 ELECTROCARDIOGRAM REPORT: CPT | Mod: 76

## 2018-02-06 RX ORDER — SODIUM POLYSTYRENE SULFONATE 4.1 MEQ/G
30 POWDER, FOR SUSPENSION ORAL ONCE
Qty: 0 | Refills: 0 | Status: COMPLETED | OUTPATIENT
Start: 2018-02-06 | End: 2018-02-06

## 2018-02-06 RX ORDER — BUDESONIDE, MICRONIZED 100 %
0.5 POWDER (GRAM) MISCELLANEOUS
Qty: 0 | Refills: 0 | Status: DISCONTINUED | OUTPATIENT
Start: 2018-02-06 | End: 2018-02-08

## 2018-02-06 RX ORDER — METOPROLOL TARTRATE 50 MG
2.5 TABLET ORAL ONCE
Qty: 0 | Refills: 0 | Status: COMPLETED | OUTPATIENT
Start: 2018-02-06 | End: 2018-02-06

## 2018-02-06 RX ORDER — FUROSEMIDE 40 MG
40 TABLET ORAL DAILY
Qty: 0 | Refills: 0 | Status: DISCONTINUED | OUTPATIENT
Start: 2018-02-06 | End: 2018-02-08

## 2018-02-06 RX ADMIN — Medication 40 MILLIGRAM(S): at 05:50

## 2018-02-06 RX ADMIN — SODIUM POLYSTYRENE SULFONATE 30 GRAM(S): 4.1 POWDER, FOR SUSPENSION ORAL at 16:35

## 2018-02-06 RX ADMIN — Medication 30 MILLIGRAM(S): at 17:09

## 2018-02-06 RX ADMIN — SACUBITRIL AND VALSARTAN 1 TABLET(S): 24; 26 TABLET, FILM COATED ORAL at 05:50

## 2018-02-06 RX ADMIN — HEPARIN SODIUM 5000 UNIT(S): 5000 INJECTION INTRAVENOUS; SUBCUTANEOUS at 05:50

## 2018-02-06 RX ADMIN — Medication 3 MILLILITER(S): at 14:29

## 2018-02-06 RX ADMIN — Medication 40 MILLIGRAM(S): at 22:51

## 2018-02-06 RX ADMIN — Medication 2.5 MILLIGRAM(S): at 19:45

## 2018-02-06 RX ADMIN — Medication 81 MILLIGRAM(S): at 11:55

## 2018-02-06 RX ADMIN — AMIODARONE HYDROCHLORIDE 200 MILLIGRAM(S): 400 TABLET ORAL at 05:51

## 2018-02-06 RX ADMIN — Medication 40 MILLIGRAM(S): at 05:51

## 2018-02-06 RX ADMIN — HEPARIN SODIUM 5000 UNIT(S): 5000 INJECTION INTRAVENOUS; SUBCUTANEOUS at 22:51

## 2018-02-06 RX ADMIN — SODIUM POLYSTYRENE SULFONATE 30 GRAM(S): 4.1 POWDER, FOR SUSPENSION ORAL at 22:51

## 2018-02-06 RX ADMIN — Medication 40 MILLIGRAM(S): at 15:59

## 2018-02-06 RX ADMIN — HEPARIN SODIUM 5000 UNIT(S): 5000 INJECTION INTRAVENOUS; SUBCUTANEOUS at 14:31

## 2018-02-06 RX ADMIN — Medication 3 MILLILITER(S): at 19:49

## 2018-02-06 RX ADMIN — Medication 3 MILLILITER(S): at 08:36

## 2018-02-06 RX ADMIN — Medication 40 MILLIGRAM(S): at 14:31

## 2018-02-06 RX ADMIN — Medication 0: at 22:57

## 2018-02-06 RX ADMIN — Medication 30 MILLIGRAM(S): at 05:51

## 2018-02-06 RX ADMIN — Medication 0.5 MILLIGRAM(S): at 19:49

## 2018-02-06 RX ADMIN — Medication 3 MILLILITER(S): at 02:38

## 2018-02-06 RX ADMIN — Medication 75 MILLIGRAM(S): at 05:51

## 2018-02-06 RX ADMIN — Medication 2: at 15:59

## 2018-02-06 RX ADMIN — Medication 2: at 11:55

## 2018-02-06 NOTE — PROGRESS NOTE ADULT - ASSESSMENT
58 yo female with [facility] chronic systolic CHF, bi-v aicd, htn, hyperlipidemia, asthma vtach on amio DM, recently d/c last week for asthma exacerbation 2/2 to parainfluenza infection now p/w c/o shortness of breath and fevers with associated myalgias and weakness.       A/P  Influenza B infection  Asthma exacerbation with acute hypoxemic resp failure 2/2 to above  acute on Chronic systolic CHF  DM  HTN  Hyperkalemia  Transaminitis- passive congestion      Admit to monitored floor  Tamiflu  Duonebs, pulmicort  Solumedrol  ISS, start BRAVO if uncontrolled dm hold januvia  HD stable, continue O2.   Chronic sCHF- cont BB, start IV lasix- CXR and BNP now; cont amio. Daily weights and strict I/Os with 1500 ml fluid restrict Hold entresto in setting of KEYSHA and hypotension - poss etiology of recurrent hyperkalemia;   Stat dose kayexalate now. EKG stat   if remains elevated will give orders for  1 amp D50, 10 units R insulin    DVT px 56 yo female with [facility] chronic systolic CHF, bi-v aicd, htn, hyperlipidemia, asthma vtach on amio DM, recently d/c last week for asthma exacerbation 2/2 to parainfluenza infection now p/w c/o shortness of breath and fevers with associated myalgias and weakness.       A/P  Influenza B infection  Asthma exacerbation with acute hypoxemic resp failure 2/2 to above  acute on Chronic systolic CHF  DM  HTN  Hyperkalemia/hypoNa  Transaminitis- passive congestion      Admit to monitored floor  Tamiflu  Duonebs, pulmicort  Solumedrol  ISS, start BRAVO if uncontrolled dm hold januvia  HD stable, continue O2.   Chronic sCHF- cont BB, start IV lasix- CXR and BNP now; cont amio. Daily weights and strict I/Os with 1500 ml fluid restrict Hold entresto in setting of KEYSHA and hypotension - poss etiology of recurrent hyperkalemia;   Stat dose kayexalate now. EKG stat   if remains elevated will give orders for  1 amp D50, 10 units R insulin    DVT px

## 2018-02-07 DIAGNOSIS — N18.3 CHRONIC KIDNEY DISEASE, STAGE 3 (MODERATE): ICD-10-CM

## 2018-02-07 DIAGNOSIS — Z79.84 LONG TERM (CURRENT) USE OF ORAL HYPOGLYCEMIC DRUGS: ICD-10-CM

## 2018-02-07 DIAGNOSIS — K59.00 CONSTIPATION, UNSPECIFIED: ICD-10-CM

## 2018-02-07 DIAGNOSIS — I42.8 OTHER CARDIOMYOPATHIES: ICD-10-CM

## 2018-02-07 DIAGNOSIS — I25.10 ATHEROSCLEROTIC HEART DISEASE OF NATIVE CORONARY ARTERY WITHOUT ANGINA PECTORIS: ICD-10-CM

## 2018-02-07 DIAGNOSIS — Z79.51 LONG TERM (CURRENT) USE OF INHALED STEROIDS: ICD-10-CM

## 2018-02-07 DIAGNOSIS — J06.9 ACUTE UPPER RESPIRATORY INFECTION, UNSPECIFIED: ICD-10-CM

## 2018-02-07 DIAGNOSIS — J45.901 UNSPECIFIED ASTHMA WITH (ACUTE) EXACERBATION: ICD-10-CM

## 2018-02-07 DIAGNOSIS — I13.0 HYPERTENSIVE HEART AND CHRONIC KIDNEY DISEASE WITH HEART FAILURE AND STAGE 1 THROUGH STAGE 4 CHRONIC KIDNEY DISEASE, OR UNSPECIFIED CHRONIC KIDNEY DISEASE: ICD-10-CM

## 2018-02-07 DIAGNOSIS — E78.5 HYPERLIPIDEMIA, UNSPECIFIED: ICD-10-CM

## 2018-02-07 DIAGNOSIS — Z95.810 PRESENCE OF AUTOMATIC (IMPLANTABLE) CARDIAC DEFIBRILLATOR: ICD-10-CM

## 2018-02-07 DIAGNOSIS — E11.22 TYPE 2 DIABETES MELLITUS WITH DIABETIC CHRONIC KIDNEY DISEASE: ICD-10-CM

## 2018-02-07 DIAGNOSIS — I50.9 HEART FAILURE, UNSPECIFIED: ICD-10-CM

## 2018-02-07 DIAGNOSIS — E87.5 HYPERKALEMIA: ICD-10-CM

## 2018-02-07 DIAGNOSIS — B97.29 OTHER CORONAVIRUS AS THE CAUSE OF DISEASES CLASSIFIED ELSEWHERE: ICD-10-CM

## 2018-02-07 DIAGNOSIS — I50.23 ACUTE ON CHRONIC SYSTOLIC (CONGESTIVE) HEART FAILURE: ICD-10-CM

## 2018-02-07 DIAGNOSIS — I24.8 OTHER FORMS OF ACUTE ISCHEMIC HEART DISEASE: ICD-10-CM

## 2018-02-07 LAB
ANION GAP SERPL CALC-SCNC: 7 MMOL/L — SIGNIFICANT CHANGE UP (ref 5–17)
BUN SERPL-MCNC: 65 MG/DL — HIGH (ref 7–23)
CALCIUM SERPL-MCNC: 8.9 MG/DL — SIGNIFICANT CHANGE UP (ref 8.5–10.1)
CHLORIDE SERPL-SCNC: 97 MMOL/L — SIGNIFICANT CHANGE UP (ref 96–108)
CO2 SERPL-SCNC: 30 MMOL/L — SIGNIFICANT CHANGE UP (ref 22–31)
CREAT SERPL-MCNC: 1.63 MG/DL — HIGH (ref 0.5–1.3)
GLUCOSE BLDC GLUCOMTR-MCNC: 156 MG/DL — HIGH (ref 70–99)
GLUCOSE BLDC GLUCOMTR-MCNC: 167 MG/DL — HIGH (ref 70–99)
GLUCOSE BLDC GLUCOMTR-MCNC: 169 MG/DL — HIGH (ref 70–99)
GLUCOSE BLDC GLUCOMTR-MCNC: 185 MG/DL — HIGH (ref 70–99)
GLUCOSE SERPL-MCNC: 162 MG/DL — HIGH (ref 70–99)
HCT VFR BLD CALC: 44.7 % — SIGNIFICANT CHANGE UP (ref 34.5–45)
HGB BLD-MCNC: 13.6 G/DL — SIGNIFICANT CHANGE UP (ref 11.5–15.5)
MCHC RBC-ENTMCNC: 25 PG — LOW (ref 27–34)
MCHC RBC-ENTMCNC: 30.4 GM/DL — LOW (ref 32–36)
MCV RBC AUTO: 82.4 FL — SIGNIFICANT CHANGE UP (ref 80–100)
PLATELET # BLD AUTO: 168 K/UL — SIGNIFICANT CHANGE UP (ref 150–400)
POTASSIUM SERPL-MCNC: 4.9 MMOL/L — SIGNIFICANT CHANGE UP (ref 3.5–5.3)
POTASSIUM SERPL-SCNC: 4.9 MMOL/L — SIGNIFICANT CHANGE UP (ref 3.5–5.3)
RBC # BLD: 5.43 M/UL — HIGH (ref 3.8–5.2)
RBC # FLD: 17.5 % — HIGH (ref 10.3–14.5)
SODIUM SERPL-SCNC: 134 MMOL/L — LOW (ref 135–145)
WBC # BLD: 8 K/UL — SIGNIFICANT CHANGE UP (ref 3.8–10.5)
WBC # FLD AUTO: 8 K/UL — SIGNIFICANT CHANGE UP (ref 3.8–10.5)

## 2018-02-07 RX ADMIN — HEPARIN SODIUM 5000 UNIT(S): 5000 INJECTION INTRAVENOUS; SUBCUTANEOUS at 15:21

## 2018-02-07 RX ADMIN — Medication 5 MILLIGRAM(S): at 21:39

## 2018-02-07 RX ADMIN — AMIODARONE HYDROCHLORIDE 200 MILLIGRAM(S): 400 TABLET ORAL at 06:16

## 2018-02-07 RX ADMIN — Medication 40 MILLIGRAM(S): at 15:21

## 2018-02-07 RX ADMIN — Medication 0.5 MILLIGRAM(S): at 20:33

## 2018-02-07 RX ADMIN — Medication 3 MILLILITER(S): at 20:32

## 2018-02-07 RX ADMIN — Medication 40 MILLIGRAM(S): at 06:16

## 2018-02-07 RX ADMIN — Medication 30 MILLIGRAM(S): at 06:16

## 2018-02-07 RX ADMIN — Medication 30 MILLIGRAM(S): at 18:16

## 2018-02-07 RX ADMIN — Medication 3 MILLILITER(S): at 01:10

## 2018-02-07 RX ADMIN — HEPARIN SODIUM 5000 UNIT(S): 5000 INJECTION INTRAVENOUS; SUBCUTANEOUS at 21:39

## 2018-02-07 RX ADMIN — Medication 2: at 12:15

## 2018-02-07 RX ADMIN — Medication 2: at 08:40

## 2018-02-07 RX ADMIN — Medication 2: at 17:25

## 2018-02-07 RX ADMIN — HEPARIN SODIUM 5000 UNIT(S): 5000 INJECTION INTRAVENOUS; SUBCUTANEOUS at 06:16

## 2018-02-07 RX ADMIN — Medication 81 MILLIGRAM(S): at 12:15

## 2018-02-07 RX ADMIN — Medication 3 MILLILITER(S): at 13:55

## 2018-02-07 RX ADMIN — Medication 40 MILLIGRAM(S): at 21:39

## 2018-02-07 RX ADMIN — Medication 75 MILLIGRAM(S): at 06:16

## 2018-02-07 RX ADMIN — Medication 0.5 MILLIGRAM(S): at 09:35

## 2018-02-07 RX ADMIN — Medication 3 MILLILITER(S): at 09:35

## 2018-02-07 NOTE — CONSULT NOTE ADULT - ASSESSMENT
Influenza  Acute on Chr HFrEF  Bronchospasm sec to viral URI  NICM  Non obstructive CAD  HTN    Suggest:    Flu treatment  Diuresis.  Salt restriction  Continue current medications

## 2018-02-07 NOTE — CONSULT NOTE ADULT - ASSESSMENT
Patient is a 56 y/o F with PMH of HTN, DM2, chronic systolic CHF (+AICD), hyperlipidemia, asthma, h/o VT (on Amio) recently discharged last week for asthma exacerbation 2/2 to parainfluenza infection now p/w SOB and fevers with associated myalgias and weakness. Found to have persistent hyperkalemia and KEYSHA (SCr 1.7) - renal called for evaluation.    1. KEYSHA on CKD3 - baseline ?1.3, peak 1.7 during this admission (2.1 on prior admission)  - likely due to hypoperfusion in setting of ARB + infection  - agree with holding Entresto  - recommend liberalizing fluid intake in setting of daily Lasix (1.5L daily) - currently with rising bicarb likely due to contraction alkalosis  - avoid ACEI/ARB for now, no NSAIDs  - check urine culture, possible UTI    2. Hyperkalemia  - low K diet  - s/p Kayexalate  - stopped ARB  - tight glycemic control  - Lasix daily    3. Influenza  - Tamiflu, isolation, nebs, steroids    4. CHF  - Lasix, BB    5. ?UTI - check urine culture    Thank you for consult. Will follow.  d/w RODERICK

## 2018-02-07 NOTE — CONSULT NOTE ADULT - SUBJECTIVE AND OBJECTIVE BOX
Patient is a 58 y/o F with PMH of HTN, DM2, chronic systolic CHF (+AICD), hyperlipidemia, asthma, h/o VT (on Amio) recently discharged last week for asthma exacerbation 2/2 to parainfluenza infection now p/w SOB and fevers with associated myalgias and weakness. Found to have persistent hyperkalemia and KEYSHA (SCr 1.7) - renal called for evaluation.    Denies kidney dysfunction, no outside nephrologist.  Since admission, received Kayexalate and Lasix. No BM yet - last one x4 days ago. Also holding Entresto.    Reports SOB improved. Was eating high K diet - potatoes, plantains.    PAST MEDICAL & SURGICAL HISTORY:  CHF (congestive heart failure)  Pacemaker  HTN (hypertension)  HLD (hyperlipidemia)  Asthma  DM (diabetes mellitus)  CAD (coronary artery disease)  History of cholecystectomy  Artificial cardiac pacemaker      MEDICATIONS  (STANDING):  ALBUTerol    90 MICROgram(s) HFA Inhaler 1 Puff(s) Inhalation every 4 hours  ALBUTerol/ipratropium for Nebulization 3 milliLiter(s) Nebulizer every 6 hours  amiodarone    Tablet 200 milliGRAM(s) Oral daily  aspirin  chewable 81 milliGRAM(s) Oral daily  buDESOnide   0.5 milliGRAM(s) Respule 0.5 milliGRAM(s) Inhalation two times a day  dextrose 5%. 1000 milliLiter(s) (50 mL/Hr) IV Continuous <Continuous>  dextrose 50% Injectable 12.5 Gram(s) IV Push once  dextrose 50% Injectable 25 Gram(s) IV Push once  dextrose 50% Injectable 25 Gram(s) IV Push once  furosemide   Injectable 40 milliGRAM(s) IV Push daily  heparin  Injectable 5000 Unit(s) SubCutaneous every 8 hours  insulin lispro (HumaLOG) corrective regimen sliding scale   SubCutaneous three times a day before meals  insulin lispro (HumaLOG) corrective regimen sliding scale   SubCutaneous at bedtime  methylPREDNISolone sodium succinate Injectable 40 milliGRAM(s) IV Push every 8 hours  metoprolol succinate ER 75 milliGRAM(s) Oral daily  oseltamivir 30 milliGRAM(s) Oral two times a day      Allergies    No Known Allergies    Intolerances        SOCIAL HISTORY:  Denies ETOh, Smoking, IVDU    FAMILY HISTORY:  Family history of other heart disease (Mother)      REVIEW OF SYSTEMS:    CONSTITUTIONAL: ++ weakness, no fevers or chills  EYES/ENT: No visual changes;  No vertigo or throat pain   NECK: No pain or stiffness  RESPIRATORY: ++ cough, no wheezing, hemoptysis; ++ shortness of breath  CARDIOVASCULAR: No chest pain or palpitations  GASTROINTESTINAL: No abdominal or epigastric pain. No nausea, vomiting, or hematemesis; No diarrhea or constipation. No melena or hematochezia.  GENITOURINARY: No dysuria, frequency or hematuria  NEUROLOGICAL: No numbness or weakness  SKIN: No itching, burning, rashes, or lesions   All other review of systems is negative unless indicated above.    Vital Signs Last 24 Hrs  T(C): 36.8 (07 Feb 2018 08:24), Max: 36.9 (07 Feb 2018 00:00)  T(F): 98.2 (07 Feb 2018 08:24), Max: 98.4 (07 Feb 2018 00:00)  HR: 75 (07 Feb 2018 11:00) (70 - 137)  BP: 100/62 (07 Feb 2018 11:00) (94/60 - 162/102)  BP(mean): 70 (07 Feb 2018 11:00) (63 - 139)  RR: 19 (07 Feb 2018 00:00) (16 - 31)  SpO2: 100% (07 Feb 2018 10:00) (94% - 100%)  I and O's:    02-06 @ 07:01  -  02-07 @ 07:00  --------------------------------------------------------  IN: 0 mL / OUT: 350 mL / NET: -350 mL          PHYSICAL EXAM:    Constitutional: NAD, AAOx3, eating breakfast  HEENT: PERRLA, EOMI,  MMM  Neck: No LAD, No JVD  Respiratory: scattered wheeze, no crackles  Cardiovascular: S1 and S2  Gastrointestinal: BS+, soft, NT/ND  Extremities: 1-2+ peripheral edema  Neurological: A/O x 3, no focal deficits  Psychiatric: Normal mood, normal affect  : No Locke  Skin: No rashes  Access: Not applicable    LABS:                        13.6   8.0   )-----------( 168      ( 07 Feb 2018 05:17 )             44.7       134    |  97     |  65     ----------------------------<  162       07 Feb 2018 05:17  4.9     |  30     |  1.63     133    |  100    |  67     ----------------------------<  205       06 Feb 2018 18:02  5.7     |  21     |  1.70     132    |  97     |  64     ----------------------------<  183       06 Feb 2018 09:26  5.9     |  27     |  1.74     Ca    8.9        07 Feb 2018 05:17  Ca    8.8        06 Feb 2018 18:02      Mg     2.2       04 Feb 2018 23:13    TPro  7.3    /  Alb  3.4    /  TBili  1.0    /        06 Feb 2018 06:13  DBili  x      /  AST  112    /  ALT  113    /  AlkPhos  190      TPro  6.7    /  Alb  3.1    /  TBili  1.0    /        04 Feb 2018 23:13  DBili  x      /  AST  62     /  ALT  79     /  AlkPhos  157            Urine Studies:          RADIOLOGY & ADDITIONAL STUDIES:

## 2018-02-07 NOTE — PROGRESS NOTE ADULT - ASSESSMENT
58 yo female with [facility] chronic systolic CHF, bi-v aicd, htn, hyperlipidemia, asthma vtach on amio DM, recently d/c last week for asthma exacerbation 2/2 to parainfluenza infection now p/w c/o shortness of breath and fevers with associated myalgias and weakness.       A/P  Influenza B infection  Asthma exacerbation with acute hypoxemic resp failure 2/2 to above  acute on Chronic systolic CHF  DM  HTN  Hyperkalemia/hypoNa  Transaminitis- passive congestion      May transfer to tele floor  Tamiflu  Duonebs, pulmicort  Solumedrol  ISS, start BRAVO if uncontrolled dm hold januvia  HD stable, continue O2.   Chronic sCHF- cont BB, start IV lasix (monitorBUN-Cr now with contraction alkalosis)- ; cont amio. Daily weights and strict I/Os Hold entresto in setting of KEYSHA ; hypotension resolved- poss etiology of recurrent hyperkalemia; Hyperkalemia resolved after kayexalate and lasix    DVT px

## 2018-02-07 NOTE — CONSULT NOTE ADULT - SUBJECTIVE AND OBJECTIVE BOX
Chief complaint of shortness of breath/flu/swollen feet/no BM (05 Feb 2018 17:29)    HPI: 58 yo female with [facility] chronic systolic CHF, bi-v aicd, htn, hyperlipidemia, asthma vtach on amio DM, recently d/c last week for asthma exacerbation 2/2 to parainfluenza infection now p/w c/o shortness of breath and fevers with associated myalgias and weakness. ED: + Influenza B infection. Duonebs, Solumedrol, mag infusion, tamilflu.       PAST MEDICAL & SURGICAL HISTORY:  HTN (hypertension)  HLD (hyperlipidemia)  Asthma  DM (diabetes mellitus)  Non obst CAD (coronary artery disease)  History of cholecystectomy  Artificial cardiac pacemaker    CARDIAC WORKUP:    Echo:  Severe reduced EF  Cardiac Cath:  8/24/17 - Non obstructive      ALLERGIES:    No Known Allergies       MEDICATIONS  (STANDING):  ALBUTerol    90 MICROgram(s) HFA Inhaler 1 Puff(s) Inhalation every 4 hours  ALBUTerol/ipratropium for Nebulization 3 milliLiter(s) Nebulizer every 6 hours  amiodarone    Tablet 200 milliGRAM(s) Oral daily  aspirin  chewable 81 milliGRAM(s) Oral daily  buDESOnide   0.5 milliGRAM(s) Respule 0.5 milliGRAM(s) Inhalation two times a day  dextrose 5%. 1000 milliLiter(s) (50 mL/Hr) IV Continuous <Continuous>  dextrose 50% Injectable 12.5 Gram(s) IV Push once  dextrose 50% Injectable 25 Gram(s) IV Push once  dextrose 50% Injectable 25 Gram(s) IV Push once  furosemide   Injectable 40 milliGRAM(s) IV Push daily  heparin  Injectable 5000 Unit(s) SubCutaneous every 8 hours  insulin lispro (HumaLOG) corrective regimen sliding scale   SubCutaneous three times a day before meals  insulin lispro (HumaLOG) corrective regimen sliding scale   SubCutaneous at bedtime  methylPREDNISolone sodium succinate Injectable 40 milliGRAM(s) IV Push every 8 hours  metoprolol succinate ER 75 milliGRAM(s) Oral daily  oseltamivir 30 milliGRAM(s) Oral two times a day    MEDICATIONS  (PRN):  acetaminophen   Tablet 325 milliGRAM(s) Oral every 4 hours PRN For Temp greater than 38 C (100.4 F)  bisacodyl 5 milliGRAM(s) Oral daily PRN Constipation  dextrose Gel 1 Dose(s) Oral once PRN Blood Glucose LESS THAN 70 milliGRAM(s)/deciliter  glucagon  Injectable 1 milliGRAM(s) IntraMuscular once PRN Glucose LESS THAN 70 milligrams/deciliter      FAMILY HISTORY:  Family history of other heart disease (Mother)        SOCIAL HISTORY:  Nonsmoker. No ETOH abuse. No illicit drugs.     ROS:     Detailed ten system ROS was performed and was negative except for history as eluded to above.    + fever  + chills  no nausea  no vomiting  no diarrhea  no constipation  no melena  no hematochezia  no chest pain  no palpitations  + sob at rest  + dyspnea on exertion  + cough  no wheezing  no anorexia  no headache  no dizziness  no syncope  no weakness  + myalgia  no dysuria  no polyuria  no hematuria     Vital Signs Last 24 Hrs  T(C): 36.8 (07 Feb 2018 08:24), Max: 36.9 (07 Feb 2018 00:00)  T(F): 98.2 (07 Feb 2018 08:24), Max: 98.4 (07 Feb 2018 00:00)  HR: 83 (07 Feb 2018 10:00) (70 - 137)  BP: 95/61 (07 Feb 2018 10:00) (94/60 - 162/102)  BP(mean): 69 (07 Feb 2018 10:00) (63 - 139)  RR: 19 (07 Feb 2018 00:00) (16 - 31)  SpO2: 100% (07 Feb 2018 10:00) (94% - 100%)    I&O's Summary    06 Feb 2018 07:01  -  07 Feb 2018 07:00  --------------------------------------------------------  IN: 0 mL / OUT: 350 mL / NET: -350 mL        PHYSICAL EXAM:    General:                Comfortable, AAO X 3, in no distress.   HEENT:                  Atraumatic, PERRLA, EOMI, conjunctiva clear.   Neck:                     Supple, no adenopathy, no thyromegaly, no JVD, no bruit.  Back:                     Symmetric, non tender.  Chest:                    Exp wheeze, B/L symmetric air entry, no tachypnea  Heart:                     S1, S2 normal, no gallop, + murmur.  Abdomen:              Soft, non-tender, bowel sounds active. No palpable masses.  Extremities:           no cyanosis, no edema. Peripheral pulses normal.  Skin:                      Skin color, texture normal. No rashes.  Neurologic:            Grossly nonfocal.       TELEMETRY:   Normal sinus rhythm  with no tachy or lillian events     ECG:  NSR, first deg AV block, no ST T changes    LABS:                          13.6   8.0   )-----------( 168      ( 07 Feb 2018 05:17 )             44.7     02-07    134<L>  |  97  |  65<H>  ----------------------------<  162<H>  4.9   |  30  |  1.63<H>    Ca    8.9      07 Feb 2018 05:17    TPro  7.3  /  Alb  3.4  /  TBili  1.0  /  DBili  x   /  AST  112<H>  /  ALT  113<H>  /  AlkPhos  190<H>  02-06 02-04 @ 23:13  Trop-I  0.022    Pro BNP  [id number] 02-06 @ 18:02    Pro BNP  [id number] 02-04 @ 23:13      RADIOLOGY & ADDITIONAL STUDIES:    Xray Chest 1 View AP/PA. (02.06.18 @ 15:59) >  Significant cardiomegaly is again noted and pericardial effusion cannot be excluded. Left-sided pacemaker/AICD is again noted. There remains obscuration of the left CP angle. The right lung is clear. The trachea is midline. The osseous structures are intact.

## 2018-02-08 VITALS — WEIGHT: 202.83 LBS

## 2018-02-08 LAB
ANION GAP SERPL CALC-SCNC: 6 MMOL/L — SIGNIFICANT CHANGE UP (ref 5–17)
BUN SERPL-MCNC: 72 MG/DL — HIGH (ref 7–23)
CALCIUM SERPL-MCNC: 8.7 MG/DL — SIGNIFICANT CHANGE UP (ref 8.5–10.1)
CHLORIDE SERPL-SCNC: 100 MMOL/L — SIGNIFICANT CHANGE UP (ref 96–108)
CO2 SERPL-SCNC: 27 MMOL/L — SIGNIFICANT CHANGE UP (ref 22–31)
CREAT SERPL-MCNC: 1.49 MG/DL — HIGH (ref 0.5–1.3)
GLUCOSE BLDC GLUCOMTR-MCNC: 209 MG/DL — HIGH (ref 70–99)
GLUCOSE BLDC GLUCOMTR-MCNC: 213 MG/DL — HIGH (ref 70–99)
GLUCOSE SERPL-MCNC: 168 MG/DL — HIGH (ref 70–99)
HCT VFR BLD CALC: 44.1 % — SIGNIFICANT CHANGE UP (ref 34.5–45)
HGB BLD-MCNC: 13.3 G/DL — SIGNIFICANT CHANGE UP (ref 11.5–15.5)
MCHC RBC-ENTMCNC: 24.8 PG — LOW (ref 27–34)
MCHC RBC-ENTMCNC: 30.1 GM/DL — LOW (ref 32–36)
MCV RBC AUTO: 82.5 FL — SIGNIFICANT CHANGE UP (ref 80–100)
PLATELET # BLD AUTO: 164 K/UL — SIGNIFICANT CHANGE UP (ref 150–400)
POTASSIUM SERPL-MCNC: 4.6 MMOL/L — SIGNIFICANT CHANGE UP (ref 3.5–5.3)
POTASSIUM SERPL-SCNC: 4.6 MMOL/L — SIGNIFICANT CHANGE UP (ref 3.5–5.3)
RBC # BLD: 5.34 M/UL — HIGH (ref 3.8–5.2)
RBC # FLD: 18.1 % — HIGH (ref 10.3–14.5)
SODIUM SERPL-SCNC: 133 MMOL/L — LOW (ref 135–145)
WBC # BLD: 7.4 K/UL — SIGNIFICANT CHANGE UP (ref 3.8–10.5)
WBC # FLD AUTO: 7.4 K/UL — SIGNIFICANT CHANGE UP (ref 3.8–10.5)

## 2018-02-08 RX ORDER — PANTOPRAZOLE SODIUM 20 MG/1
1 TABLET, DELAYED RELEASE ORAL
Qty: 15 | Refills: 0 | OUTPATIENT
Start: 2018-02-08 | End: 2018-02-22

## 2018-02-08 RX ADMIN — AMIODARONE HYDROCHLORIDE 200 MILLIGRAM(S): 400 TABLET ORAL at 06:09

## 2018-02-08 RX ADMIN — Medication 3 MILLILITER(S): at 10:07

## 2018-02-08 RX ADMIN — Medication 4: at 11:42

## 2018-02-08 RX ADMIN — Medication 4: at 08:07

## 2018-02-08 RX ADMIN — Medication 0.5 MILLIGRAM(S): at 10:07

## 2018-02-08 RX ADMIN — Medication 30 MILLIGRAM(S): at 06:09

## 2018-02-08 RX ADMIN — HEPARIN SODIUM 5000 UNIT(S): 5000 INJECTION INTRAVENOUS; SUBCUTANEOUS at 06:09

## 2018-02-08 RX ADMIN — Medication 40 MILLIGRAM(S): at 06:09

## 2018-02-08 RX ADMIN — Medication 3 MILLILITER(S): at 02:47

## 2018-02-08 RX ADMIN — Medication 75 MILLIGRAM(S): at 06:10

## 2018-02-08 RX ADMIN — Medication 81 MILLIGRAM(S): at 12:48

## 2018-02-08 NOTE — PROGRESS NOTE ADULT - ASSESSMENT
Patient is a 56 y/o F with PMH of HTN, DM2, chronic systolic CHF (+AICD), hyperlipidemia, asthma, h/o VT (on Amio) recently discharged last week for asthma exacerbation 2/2 to parainfluenza infection now p/w SOB and fevers with associated myalgias and weakness. Found to have persistent hyperkalemia and KEYSHA (SCr 1.7) - renal called for evaluation.    1. KEYSHA on CKD3 - baseline ?1.3, peak 1.7 during this admission (2.1 on prior admission)  - likely due to hypoperfusion in setting of ARB + infection  - agree with holding Entresto  - renal function improving daily - today 1.5  - po fluids  - avoid ACEI/ARB for now, no NSAIDs    2. Hyperkalemia  - low K diet  - s/p Kayexalate  - stopped ARB  - tight glycemic control  - Lasix daily  - improving into normal range    3. Influenza  - Tamiflu, isolation, nebs, steroids    4. CHF  - Lasix, BB    F/u in my office in 2-4 weeks.

## 2018-02-08 NOTE — DISCHARGE NOTE ADULT - CARE PLAN
Principal Discharge DX:	Influenza B  Goal:	tamiflu. Wash hands frequently and wear protective masks around other until tamiflu complete.  Assessment and plan of treatment:	meds as indicated  Secondary Diagnosis:	Acute systolic congestive heart failure  Goal:	low salt intake. Fluid restriction of 1500 ml per day. f/u with Dr Stevens in 5-7 days

## 2018-02-08 NOTE — DISCHARGE NOTE ADULT - PLAN OF CARE
tamiflu. Wash hands frequently and wear protective masks around other until tamiflu complete. meds as indicated low salt intake. Fluid restriction of 1500 ml per day. f/u with Dr Stevens in 5-7 days

## 2018-02-08 NOTE — DISCHARGE NOTE ADULT - HOSPITAL COURSE
58 yo female with ppmh chronic systolic CHF, bi-v aicd, htn, hyperlipidemia, asthma vtach on amio DM, recently d/c last week for asthma exacerbation 2/2 to parainfluenza infection now p/w c/o shortness of breath and fevers with associated myalgias and weakness.     ED: + Influenza B infection. Duonebs, Solumedrol, mag infusion, tamilflu. REVIEW OF SYSTEMS:    CONSTITUTIONAL: No weakness, fevers or chills  EYES/ENT: No visual changes;  No vertigo or throat pain   NECK: No pain or stiffness  RESPIRATORY: +sob  CARDIOVASCULAR: No chest pain or palpitations  GASTROINTESTINAL: No abdominal or epigastric pain. No nausea, vomiting, or hematemesis; No diarrhea or constipation. No melena or hematochezia.  GENITOURINARY: No dysuria, frequency or hematuria  NEUROLOGICAL: No numbness or weakness  SKIN: No itching, burning, rashes, or lesions   All other review of systems is negative unless indicated abovePHYSICAL EXAM:  ICU Vital Signs Last 24 Hrs  T(C): 36.6 (08 Feb 2018 12:19), Max: 36.9 (08 Feb 2018 00:00)  T(F): 97.9 (08 Feb 2018 12:19), Max: 98.5 (08 Feb 2018 00:00)  HR: 80 (08 Feb 2018 08:07) (71 - 84)  BP: 115/76 (08 Feb 2018 07:00) (82/64 - 191/161)  BP(mean): 85 (08 Feb 2018 07:00) (54 - 168)  ABP: --  ABP(mean): --  RR: --  SpO2: 92% (08 Feb 2018 07:00) (92% - 100%)      PHYSICAL EXAM:    Constitutional: NAD, awake and alert, well-developed  HEENT: PERR, EOMI, Normal Hearing, MMM  Neck: Soft and supple, No LAD, No JVD  Respiratory: minimal  wheezing b/l bases  Cardiovascular: S1 and S2, regular rate and rhythm, no Murmurs, gallops or rubs  Gastrointestinal: Bowel Sounds present, soft, nontender, nondistended, no guarding, no rebound  Extremities: + 2 pitting pedal edema   Vascular: 2+ peripheral pulses  Neurological: A/O x 3, no focal deficits  Musculoskeletal: 5/5 strength b/l upper and lower extremities  Skin: No rashes    Meds/labs/images/ekg: reviewed    Assessment and Plan:   · Assessment		  58 yo female with ppmh chronic systolic CHF, bi-v aicd, htn, hyperlipidemia, asthma vtach on amio DM, recently d/c last week for asthma exacerbation 2/2 to parainfluenza infection now p/w c/o shortness of breath and fevers with associated myalgias and weakness.       A/P  Influenza B infection  Asthma exacerbation with acute hypoxemic resp failure 2/2 to above  acute on Chronic systolic CHF  DM  HTN  Hyperkalemia/hypoNa  Transaminitis- passive congestion        Tamiflu 2 more days - instructions given on hand washing and prevention of spread of infection  symbicort/albuterol  Prednisone taper   januvia  HD stable,   Chronic sCHF- cont BB, resume po lasix and  entresto; hypotension resolved-     total dx time 60 min  PCP Dr Stevens aware

## 2018-02-08 NOTE — DISCHARGE NOTE ADULT - PATIENT PORTAL LINK FT
You can access the [email or web address] Patient Portal, offered by [facility], by registering with the following website: [email or web address]

## 2018-02-08 NOTE — DISCHARGE NOTE ADULT - CARE PROVIDER_API CALL
Juanita Stevens), Cardiology; Interventional Cardiology  [address] [facility] [place] [address]  Phone: [phone]  Fax: [phone]    Luke Chan (MD), Internal Medicine; Nephrology  [address] [place] [address]  Phone: [phone]  Fax: [phone]    Sanchez Goldstein), Family Medicine  [address] [place] [address]  Phone: [phone]  Fax: [phone]

## 2018-02-08 NOTE — PROGRESS NOTE ADULT - ASSESSMENT
Influenza  Acute on Chr HFrEF  Bronchospasm sec to viral URI  NICM  Non obstructive CAD  HTN    Suggest:    Flu treatment  Diuresis.  Salt restriction  Continue current cardiac medications  Bowel relief for constipation  I shall f/u PRN now. Advised to make appointment at office for follow up

## 2018-02-08 NOTE — DISCHARGE NOTE ADULT - MEDICATION SUMMARY - MEDICATIONS TO TAKE
I will START or STAY ON the medications listed below when I get home from the hospital:    predniSONE 10 mg oral tablet  -- 3 tab(s) by mouth once a day X 3 days. Take with food.  -- It is very important that you take or use this exactly as directed.  Do not skip doses or discontinue unless directed by your doctor.  Obtain medical advice before taking any non-prescription drugs as some may affect the action of this medication.  Take with food or milk.    -- Indication: For Influenza B    aspirin 81 mg oral tablet, chewable  -- 1 tab(s) by mouth once a day  -- Indication: For cad    Entresto 49 mg-51 mg oral tablet  -- 1 tab(s) by mouth 2 times a day  -- Indication: For chf    amiodarone 200 mg oral tablet  -- 1 tab(s) by mouth once a day   -- Avoid grapefruit and grapefruit juice while taking this medication.  Avoid prolonged or excessive exposure to direct and/or artificial sunlight while taking this medication.  Do not take this drug if you are pregnant.  It is very important that you take or use this exactly as directed.  Do not skip doses or discontinue unless directed by your doctor.  May cause drowsiness or dizziness.    -- Indication: For Afib    Januvia 50 mg oral tablet  -- 1 tab(s) by mouth once a day for diabetes  -- Do not drink alcoholic beverages when taking this medication.    -- Indication: For dm    oseltamivir 30 mg oral capsule  -- 1 cap(s) by mouth 2 times a day  -- Indication: For Influenza B    metoprolol succinate 50 mg oral tablet, extended release  -- 1.5 tab(s) by mouth once a day  -- Indication: For chf    Symbicort 160 mcg-4.5 mcg/inh inhalation aerosol  -- 2 puff(s) inhaled 2 times a day  -- Indication: For Asthma    albuterol 90 mcg/inh inhalation aerosol  -- 2 puff(s) inhaled every 4 hours, As Needed -Shortness of Breath and/or Wheezing   -- Indication: For Asthma    furosemide 40 mg oral tablet  -- 1 tab(s) by mouth 2 times a day  -- Indication: For chf    potassium chloride 20 mEq oral granule, extended release  -- 20 milliequivalent(s) by mouth once a day  -- Indication: For supplement    Protonix 40 mg oral delayed release tablet  -- 1 tab(s) by mouth once a day   -- It is very important that you take or use this exactly as directed.  Do not skip doses or discontinue unless directed by your doctor.  Obtain medical advice before taking any non-prescription drugs as some may affect the action of this medication.  Swallow whole.  Do not crush.    -- Indication: For for your stomach

## 2018-02-08 NOTE — PROGRESS NOTE ADULT - SUBJECTIVE AND OBJECTIVE BOX
56 yo female with ppmh chronic systolic CHF, bi-v aicd, htn, hyperlipidemia, asthma vtach on amio DM, recently d/c last week for asthma exacerbation 2/2 to parainfluenza infection now p/w c/o shortness of breath and fevers with associated myalgias and weakness. ED: + Influenza B infection. Duonebs, Solumedrol, mag infusion, tamilflu.     Today, she feels well. no chest pain. No orthopnea. Overall feels OK but has constipation.    PAST MEDICAL & SURGICAL HISTORY:  HTN (hypertension)  HLD (hyperlipidemia)  Asthma  DM (diabetes mellitus)  Non obst CAD (coronary artery disease)  History of cholecystectomy  Artificial cardiac pacemaker    CARDIAC WORKUP:    Echo:  Severe reduced EF  Cardiac Cath:  8/24/17 - Non obstructive    Allergies:   No Known Allergies      MEDICATIONS  (STANDING):  ALBUTerol    90 MICROgram(s) HFA Inhaler 1 Puff(s) Inhalation every 4 hours  ALBUTerol/ipratropium for Nebulization 3 milliLiter(s) Nebulizer every 6 hours  amiodarone    Tablet 200 milliGRAM(s) Oral daily  aspirin  chewable 81 milliGRAM(s) Oral daily  buDESOnide   0.5 milliGRAM(s) Respule 0.5 milliGRAM(s) Inhalation two times a day  dextrose 5%. 1000 milliLiter(s) (50 mL/Hr) IV Continuous <Continuous>  dextrose 50% Injectable 12.5 Gram(s) IV Push once  dextrose 50% Injectable 25 Gram(s) IV Push once  dextrose 50% Injectable 25 Gram(s) IV Push once  furosemide   Injectable 40 milliGRAM(s) IV Push daily  heparin  Injectable 5000 Unit(s) SubCutaneous every 8 hours  insulin lispro (HumaLOG) corrective regimen sliding scale   SubCutaneous three times a day before meals  insulin lispro (HumaLOG) corrective regimen sliding scale   SubCutaneous at bedtime  methylPREDNISolone sodium succinate Injectable 40 milliGRAM(s) IV Push every 8 hours  metoprolol succinate ER 75 milliGRAM(s) Oral daily  oseltamivir 30 milliGRAM(s) Oral two times a day    MEDICATIONS  (PRN):  acetaminophen   Tablet 325 milliGRAM(s) Oral every 4 hours PRN For Temp greater than 38 C (100.4 F)  bisacodyl 5 milliGRAM(s) Oral daily PRN Constipation  dextrose Gel 1 Dose(s) Oral once PRN Blood Glucose LESS THAN 70 milliGRAM(s)/deciliter  glucagon  Injectable 1 milliGRAM(s) IntraMuscular once PRN Glucose LESS THAN 70 milligrams/deciliter      ROS:     Detailed ten system ROS was performed and was negative except for history as eluded to above.    no fever  no chills  no nausea  no vomiting  no diarrhea  + constipation  no melena  no hematochezia  no chest pain  no palpitations  no sob at rest  + dyspnea on exertion  + cough  no wheezing  no anorexia  no headache  no dizziness  no syncope  no weakness  no myalgia  no dysuria  no polyuria  no hematuria       Vital Signs Last 24 Hrs  T(C): 36.6 (08 Feb 2018 12:19), Max: 36.9 (08 Feb 2018 00:00)  T(F): 97.9 (08 Feb 2018 12:19), Max: 98.5 (08 Feb 2018 00:00)  HR: 80 (08 Feb 2018 08:07) (71 - 84)  BP: 115/76 (08 Feb 2018 07:00) (82/64 - 191/161)  BP(mean): 85 (08 Feb 2018 07:00) (54 - 168)  RR: --  SpO2: 92% (08 Feb 2018 07:00) (92% - 100%)    I&O's Summary    07 Feb 2018 07:01  -  08 Feb 2018 07:00  --------------------------------------------------------  IN: 1060 mL / OUT: 300 mL / NET: 760 mL        PHYSICAL EXAM:    General:                Comfortable, AAO X 3, in no distress.   HEENT:                  Atraumatic, PERRLA, EOMI, conjunctiva clear.   Neck:                     Supple, no adenopathy, no thyromegaly, no JVD, no bruit.  Back:                     Symmetric, non tender.  Chest:                    Exp wheeze, B/L symmetric air entry, no tachypnea  Heart:                     S1, S2 normal, no gallop, + murmur.  Abdomen:              Soft, non-tender, bowel sounds active. No palpable masses.  Extremities:           no cyanosis, no edema. Peripheral pulses normal.  Skin:                      Skin color, texture normal. No rashes.  Neurologic:            Grossly nonfocal.       TELEMETRY:   Normal sinus rhythm  with no tachy or lillian events     ECG:  NSR, first deg AV block, no ST T changes    LABS:                        13.3   7.4   )-----------( 164      ( 08 Feb 2018 05:22 )             44.1     02-08    133<L>  |  100  |  72<H>  ----------------------------<  168<H>  4.6   |  27  |  1.49<H>    Ca    8.7      08 Feb 2018 05:22
Patient is a 57y old  Female who presents with a chief complaint of shortness of breath/flu/swollen feet/no BM (05 Feb 2018 17:29)      SUBJECTIVE:   HPI:  58 yo female with ppmh chronic systolic CHF, bi-v aicd, htn, hyperlipidemia, asthma vtach on amio DM, recently d/c last week for asthma exacerbation 2/2 to parainfluenza infection now p/w c/o shortness of breath and fevers with associated myalgias and weakness.     ED: + Influenza B infection. Duonebs, Solumedrol, mag infusion, tamilflu.       2/6: B/L LE edematous. 10lb wt gain. Endorses  increased abd girth. Cough has worsened and increased exp wheezing.             Social: no toxic habits  Shx: sholecsytectomy, AICD  Fhx: non contributory (05 Feb 2018 08:37)        REVIEW OF SYSTEMS:    CONSTITUTIONAL: No weakness, fevers or chills  EYES/ENT: No visual changes;  No vertigo or throat pain   NECK: No pain or stiffness  RESPIRATORY: No cough, wheezing, hemoptysis; No shortness of breath  CARDIOVASCULAR: No chest pain or palpitations  GASTROINTESTINAL: No abdominal or epigastric pain. No nausea, vomiting, or hematemesis; No diarrhea or constipation. No melena or hematochezia.  GENITOURINARY: No dysuria, frequency or hematuria  NEUROLOGICAL: No numbness or weakness  SKIN: No itching, burning, rashes, or lesions   All other review of systems is negative unless indicated above        ICU Vital Signs Last 24 Hrs  T(C): 36.9 (06 Feb 2018 10:45), Max: 36.9 (06 Feb 2018 10:45)  T(F): 98.4 (06 Feb 2018 10:45), Max: 98.4 (06 Feb 2018 10:45)  HR: 84 (06 Feb 2018 11:07) (79 - 110)  BP: 108/91 (06 Feb 2018 13:21) (88/50 - 128/72)  BP(mean): --  ABP: --  ABP(mean): --  RR: 20 (06 Feb 2018 10:45) (20 - 22)  SpO2: 97% (06 Feb 2018 10:45) (95% - 100%)          CAPILLARY BLOOD GLUCOSE      POCT Blood Glucose.: 181 mg/dL (06 Feb 2018 11:28)  POCT Blood Glucose.: 165 mg/dL (06 Feb 2018 07:49)  POCT Blood Glucose.: 140 mg/dL (05 Feb 2018 22:07)  POCT Blood Glucose.: 123 mg/dL (05 Feb 2018 17:49)      PHYSICAL EXAM:    Constitutional: NAD, awake and alert, well-developed  HEENT: PERR, EOMI, Normal Hearing, MMM  Neck: Soft and supple, No LAD, No JVD  Respiratory: coarse breath sounds B/L, NO rales  Cardiovascular: S1 and S2, regular rate and rhythm, no Murmurs, gallops or rubs  Gastrointestinal: Bowel Sounds present, soft, nontender, nondistended, no guarding, no rebound  Extremities: + 3 pitting edema to level of the thighs with wheeping B/L  Vascular: 2+ peripheral pulses  Neurological: A/O x 3, no focal deficits  Musculoskeletal: 5/5 strength b/l upper and lower extremities  Skin: No rashes    MEDICATIONS:  MEDICATIONS  (STANDING):  ALBUTerol    90 MICROgram(s) HFA Inhaler 1 Puff(s) Inhalation every 4 hours  ALBUTerol/ipratropium for Nebulization 3 milliLiter(s) Nebulizer every 6 hours  amiodarone    Tablet 200 milliGRAM(s) Oral daily  aspirin  chewable 81 milliGRAM(s) Oral daily  buDESOnide   0.5 milliGRAM(s) Respule 0.5 milliGRAM(s) Inhalation two times a day  dextrose 5%. 1000 milliLiter(s) (50 mL/Hr) IV Continuous <Continuous>  dextrose 50% Injectable 12.5 Gram(s) IV Push once  dextrose 50% Injectable 25 Gram(s) IV Push once  dextrose 50% Injectable 25 Gram(s) IV Push once  furosemide   Injectable 40 milliGRAM(s) IV Push daily  heparin  Injectable 5000 Unit(s) SubCutaneous every 8 hours  insulin lispro (HumaLOG) corrective regimen sliding scale   SubCutaneous three times a day before meals  insulin lispro (HumaLOG) corrective regimen sliding scale   SubCutaneous at bedtime  methylPREDNISolone sodium succinate Injectable 40 milliGRAM(s) IV Push every 8 hours  metoprolol succinate ER 75 milliGRAM(s) Oral daily  oseltamivir 30 milliGRAM(s) Oral two times a day  sodium polystyrene sulfonate Suspension 30 Gram(s) Oral once      LABS: All Labs Reviewed:                        13.7   8.4   )-----------( 198      ( 04 Feb 2018 23:13 )             45.0     02-06    132<L>  |  97  |  64<H>  ----------------------------<  183<H>  5.9<H>   |  27  |  1.74<H>    Ca    8.9      06 Feb 2018 09:26  Mg     2.2     02-04    TPro  7.3  /  Alb  3.4  /  TBili  1.0  /  DBili  x   /  AST  112<H>  /  ALT  113<H>  /  AlkPhos  190<H>  02-06    PT/INR - ( 04 Feb 2018 23:13 )   PT: 13.2 sec;   INR: 1.22 ratio         PTT - ( 04 Feb 2018 23:13 )  PTT:24.6 sec  CARDIAC MARKERS ( 04 Feb 2018 23:13 )  0.022 ng/mL / x     / x     / x     / x              Blood Culture:     RADIOLOGY/EKG:    Total D/C time > 30 min
Patient is a 57y old  Female who presents with a chief complaint of shortness of breath/flu/swollen feet/no BM (05 Feb 2018 17:29)      SUBJECTIVE:   HPI:  58 yo female with ppmh chronic systolic CHF, bi-v aicd, htn, hyperlipidemia, asthma vtach on amio DM, recently d/c last week for asthma exacerbation 2/2 to parainfluenza infection now p/w c/o shortness of breath and fevers with associated myalgias and weakness.     ED: + Influenza B infection. Duonebs, Solumedrol, mag infusion, tamilflu.       2/6: B/L LE edematous. 10lb wt gain. Endorses  increased abd girth. Cough has worsened and increased exp wheezing.   2/7: noteably improved today; BP controlled and feels less dyspneic and swelling has regressed somewhat            Social: no toxic habits  Shx: sholecsytectomy, AICD  Fhx: non contributory (05 Feb 2018 08:37)        REVIEW OF SYSTEMS:    CONSTITUTIONAL: No weakness, fevers or chills  EYES/ENT: No visual changes;  No vertigo or throat pain   NECK: No pain or stiffness  RESPIRATORY: +sob  CARDIOVASCULAR: No chest pain or palpitations  GASTROINTESTINAL: No abdominal or epigastric pain. No nausea, vomiting, or hematemesis; No diarrhea or constipation. No melena or hematochezia.  GENITOURINARY: No dysuria, frequency or hematuria  NEUROLOGICAL: No numbness or weakness  SKIN: No itching, burning, rashes, or lesions   All other review of systems is negative unless indicated above        ICU Vital Signs Last 24 Hrs  T(C): 36.8 (07 Feb 2018 08:24), Max: 36.9 (07 Feb 2018 00:00)  T(F): 98.2 (07 Feb 2018 08:24), Max: 98.4 (07 Feb 2018 00:00)  HR: 75 (07 Feb 2018 11:00) (70 - 137)  BP: 100/62 (07 Feb 2018 11:00) (94/60 - 162/102)  BP(mean): 70 (07 Feb 2018 11:00) (63 - 139)  ABP: --  ABP(mean): --  RR: 19 (07 Feb 2018 00:00) (16 - 31)  SpO2: 100% (07 Feb 2018 10:00) (94% - 100%)            CAPILLARY BLOOD GLUCOSE      POCT Blood Glucose.: 181 mg/dL (06 Feb 2018 11:28)  POCT Blood Glucose.: 165 mg/dL (06 Feb 2018 07:49)  POCT Blood Glucose.: 140 mg/dL (05 Feb 2018 22:07)  POCT Blood Glucose.: 123 mg/dL (05 Feb 2018 17:49)      PHYSICAL EXAM:    Constitutional: NAD, awake and alert, well-developed  HEENT: PERR, EOMI, Normal Hearing, MMM  Neck: Soft and supple, No LAD, No JVD  Respiratory: expiratory wheezing b/l   Cardiovascular: S1 and S2, regular rate and rhythm, no Murmurs, gallops or rubs  Gastrointestinal: Bowel Sounds present, soft, nontender, nondistended, no guarding, no rebound  Extremities: + 2 pitting edema   Vascular: 2+ peripheral pulses  Neurological: A/O x 3, no focal deficits  Musculoskeletal: 5/5 strength b/l upper and lower extremities  Skin: No rashes    MEDICATIONS:  MEDICATIONS  (STANDING):  ALBUTerol    90 MICROgram(s) HFA Inhaler 1 Puff(s) Inhalation every 4 hours  ALBUTerol/ipratropium for Nebulization 3 milliLiter(s) Nebulizer every 6 hours  amiodarone    Tablet 200 milliGRAM(s) Oral daily  aspirin  chewable 81 milliGRAM(s) Oral daily  buDESOnide   0.5 milliGRAM(s) Respule 0.5 milliGRAM(s) Inhalation two times a day  dextrose 5%. 1000 milliLiter(s) (50 mL/Hr) IV Continuous <Continuous>  dextrose 50% Injectable 12.5 Gram(s) IV Push once  dextrose 50% Injectable 25 Gram(s) IV Push once  dextrose 50% Injectable 25 Gram(s) IV Push once  furosemide   Injectable 40 milliGRAM(s) IV Push daily  heparin  Injectable 5000 Unit(s) SubCutaneous every 8 hours  insulin lispro (HumaLOG) corrective regimen sliding scale   SubCutaneous three times a day before meals  insulin lispro (HumaLOG) corrective regimen sliding scale   SubCutaneous at bedtime  methylPREDNISolone sodium succinate Injectable 40 milliGRAM(s) IV Push every 8 hours  metoprolol succinate ER 75 milliGRAM(s) Oral daily  oseltamivir 30 milliGRAM(s) Oral two times a day  sodium polystyrene sulfonate Suspension 30 Gram(s) Oral once      LABS: All Labs Reviewed:                        13.7   8.4   )-----------( 198      ( 04 Feb 2018 23:13 )             45.0     02-06    132<L>  |  97  |  64<H>  ----------------------------<  183<H>  5.9<H>   |  27  |  1.74<H>    Ca    8.9      06 Feb 2018 09:26  Mg     2.2     02-04    TPro  7.3  /  Alb  3.4  /  TBili  1.0  /  DBili  x   /  AST  112<H>  /  ALT  113<H>  /  AlkPhos  190<H>  02-06    PT/INR - ( 04 Feb 2018 23:13 )   PT: 13.2 sec;   INR: 1.22 ratio         PTT - ( 04 Feb 2018 23:13 )  PTT:24.6 sec  CARDIAC MARKERS ( 04 Feb 2018 23:13 )  0.022 ng/mL / x     / x     / x     / x              Blood Culture:     RADIOLOGY/EKG:    Total D/C time > 30 min
Patient is a 58 y/o F with PMH of HTN, DM2, chronic systolic CHF (+AICD), hyperlipidemia, asthma, h/o VT (on Amio) recently discharged last week for asthma exacerbation 2/2 to parainfluenza infection now p/w SOB and fevers with associated myalgias and weakness. Found to have persistent hyperkalemia and KEYSHA (SCr 1.7) - renal called for evaluation.    Denies kidney dysfunction, no outside nephrologist.  Since admission, received Kayexalate and Lasix. No BM yet - last one x4 days ago. Also holding Entresto.    Reports SOB improved. Was eating high K diet - potatoes, plantains.     - improving breathing, K stable / improved    PAST MEDICAL & SURGICAL HISTORY:  CHF (congestive heart failure)  Pacemaker  HTN (hypertension)  HLD (hyperlipidemia)  Asthma  DM (diabetes mellitus)  CAD (coronary artery disease)  History of cholecystectomy  Artificial cardiac pacemaker      MEDICATIONS  (STANDING):  ALBUTerol    90 MICROgram(s) HFA Inhaler 1 Puff(s) Inhalation every 4 hours  ALBUTerol/ipratropium for Nebulization 3 milliLiter(s) Nebulizer every 6 hours  amiodarone    Tablet 200 milliGRAM(s) Oral daily  aspirin  chewable 81 milliGRAM(s) Oral daily  buDESOnide   0.5 milliGRAM(s) Respule 0.5 milliGRAM(s) Inhalation two times a day  dextrose 5%. 1000 milliLiter(s) (50 mL/Hr) IV Continuous <Continuous>  dextrose 50% Injectable 12.5 Gram(s) IV Push once  dextrose 50% Injectable 25 Gram(s) IV Push once  dextrose 50% Injectable 25 Gram(s) IV Push once  furosemide   Injectable 40 milliGRAM(s) IV Push daily  heparin  Injectable 5000 Unit(s) SubCutaneous every 8 hours  insulin lispro (HumaLOG) corrective regimen sliding scale   SubCutaneous three times a day before meals  insulin lispro (HumaLOG) corrective regimen sliding scale   SubCutaneous at bedtime  methylPREDNISolone sodium succinate Injectable 40 milliGRAM(s) IV Push every 8 hours  metoprolol succinate ER 75 milliGRAM(s) Oral daily  oseltamivir 30 milliGRAM(s) Oral two times a day      Vital Signs Last 24 Hrs  T(C): 36.6 (2018 12:19), Max: 36.9 (2018 00:00)  T(F): 97.9 (2018 12:19), Max: 98.5 (2018 00:00)  HR: 80 (2018 08:07) (71 - 84)  BP: 115/76 (2018 07:00) (82/64 - 191/161)  BP(mean): 85 (2018 07:00) (54 - 168)  RR: --  SpO2: 92% (2018 07:00) (92% - 100%)  Daily     Daily Weight in k (2018 02:41)  I&O's Summary    2018 07:01  -  2018 07:00  --------------------------------------------------------  IN: 1060 mL / OUT: 300 mL / NET: 760 mL      Allergies    No Known Allergies    Intolerances        SOCIAL HISTORY:  Denies ETOh, Smoking, IVDU    FAMILY HISTORY:  Family history of other heart disease (Mother)      REVIEW OF SYSTEMS:    CONSTITUTIONAL: ++ weakness (improving), no fevers or chills  EYES/ENT: No visual changes;  No vertigo or throat pain   NECK: No pain or stiffness  RESPIRATORY: ++ cough, no wheezing, hemoptysis; ++ shortness of breath (improving)  CARDIOVASCULAR: No chest pain or palpitations  GASTROINTESTINAL: No abdominal or epigastric pain. No nausea, vomiting, or hematemesis; No diarrhea or constipation. No melena or hematochezia.  GENITOURINARY: No dysuria, frequency or hematuria  NEUROLOGICAL: No numbness or weakness  SKIN: No itching, burning, rashes, or lesions   All other review of systems is negative unless indicated above.        PHYSICAL EXAM:    Constitutional: NAD, AAOx3, eating breakfast  HEENT: PERRLA, EOMI,  MMM  Neck: No LAD, No JVD  Respiratory: less wheeze, no crackles  Cardiovascular: S1 and S2  Gastrointestinal: BS+, soft, NT/ND  Extremities: 1+ peripheral edema (better)  Neurological: A/O x 3, no focal deficits  Psychiatric: Normal mood, normal affect  : No Locke  Skin: No rashes  Access: Not applicable    LABS:                        13.6   8.0   )-----------( 168      ( 2018 05:17 )             44.7     02-08    133<L>  |  100  |  72<H>  ----------------------------<  168<H>  4.6   |  27  |  1.49<H>    Ca    8.7      2018 05:22    134    |  97     |  65     ----------------------------<  162       2018 05:17  4.9     |  30     |  1.63     133    |  100    |  67     ----------------------------<  205       2018 18:02  5.7     |  21     |  1.70     132    |  97     |  64     ----------------------------<  183       2018 09:26  5.9     |  27     |  1.74     Ca    8.9        2018 05:17  Ca    8.8        2018 18:02      Mg     2.2       2018 23:13    TPro  7.3    /  Alb  3.4    /  TBili  1.0    /        2018 06:13  DBili  x      /  AST  112    /  ALT  113    /  AlkPhos  190      TPro  6.7    /  Alb  3.1    /  TBili  1.0    /        2018 23:13  DBili  x      /  AST  62     /  ALT  79     /  AlkPhos  157            Urine Studies:          RADIOLOGY & ADDITIONAL STUDIES:

## 2018-02-17 DIAGNOSIS — Z79.51 LONG TERM (CURRENT) USE OF INHALED STEROIDS: ICD-10-CM

## 2018-02-17 DIAGNOSIS — J06.9 ACUTE UPPER RESPIRATORY INFECTION, UNSPECIFIED: ICD-10-CM

## 2018-02-17 DIAGNOSIS — R06.02 SHORTNESS OF BREATH: ICD-10-CM

## 2018-02-17 DIAGNOSIS — E87.1 HYPO-OSMOLALITY AND HYPONATREMIA: ICD-10-CM

## 2018-02-17 DIAGNOSIS — I95.9 HYPOTENSION, UNSPECIFIED: ICD-10-CM

## 2018-02-17 DIAGNOSIS — R74.0 NONSPECIFIC ELEVATION OF LEVELS OF TRANSAMINASE AND LACTIC ACID DEHYDROGENASE [LDH]: ICD-10-CM

## 2018-02-17 DIAGNOSIS — Z79.52 LONG TERM (CURRENT) USE OF SYSTEMIC STEROIDS: ICD-10-CM

## 2018-02-17 DIAGNOSIS — I13.0 HYPERTENSIVE HEART AND CHRONIC KIDNEY DISEASE WITH HEART FAILURE AND STAGE 1 THROUGH STAGE 4 CHRONIC KIDNEY DISEASE, OR UNSPECIFIED CHRONIC KIDNEY DISEASE: ICD-10-CM

## 2018-02-17 DIAGNOSIS — J10.1 INFLUENZA DUE TO OTHER IDENTIFIED INFLUENZA VIRUS WITH OTHER RESPIRATORY MANIFESTATIONS: ICD-10-CM

## 2018-02-17 DIAGNOSIS — I47.2 VENTRICULAR TACHYCARDIA: ICD-10-CM

## 2018-02-17 DIAGNOSIS — J96.01 ACUTE RESPIRATORY FAILURE WITH HYPOXIA: ICD-10-CM

## 2018-02-17 DIAGNOSIS — E78.5 HYPERLIPIDEMIA, UNSPECIFIED: ICD-10-CM

## 2018-02-17 DIAGNOSIS — B97.89 OTHER VIRAL AGENTS AS THE CAUSE OF DISEASES CLASSIFIED ELSEWHERE: ICD-10-CM

## 2018-02-17 DIAGNOSIS — E11.22 TYPE 2 DIABETES MELLITUS WITH DIABETIC CHRONIC KIDNEY DISEASE: ICD-10-CM

## 2018-02-17 DIAGNOSIS — E87.5 HYPERKALEMIA: ICD-10-CM

## 2018-02-17 DIAGNOSIS — J45.901 UNSPECIFIED ASTHMA WITH (ACUTE) EXACERBATION: ICD-10-CM

## 2018-02-17 DIAGNOSIS — Z95.810 PRESENCE OF AUTOMATIC (IMPLANTABLE) CARDIAC DEFIBRILLATOR: ICD-10-CM

## 2018-02-17 DIAGNOSIS — I25.10 ATHEROSCLEROTIC HEART DISEASE OF NATIVE CORONARY ARTERY WITHOUT ANGINA PECTORIS: ICD-10-CM

## 2018-02-17 DIAGNOSIS — I50.23 ACUTE ON CHRONIC SYSTOLIC (CONGESTIVE) HEART FAILURE: ICD-10-CM

## 2018-02-17 DIAGNOSIS — N18.3 CHRONIC KIDNEY DISEASE, STAGE 3 (MODERATE): ICD-10-CM

## 2018-02-17 DIAGNOSIS — I42.8 OTHER CARDIOMYOPATHIES: ICD-10-CM

## 2018-02-17 DIAGNOSIS — Z90.49 ACQUIRED ABSENCE OF OTHER SPECIFIED PARTS OF DIGESTIVE TRACT: ICD-10-CM

## 2018-02-17 DIAGNOSIS — Z79.84 LONG TERM (CURRENT) USE OF ORAL HYPOGLYCEMIC DRUGS: ICD-10-CM

## 2018-02-17 DIAGNOSIS — Z79.82 LONG TERM (CURRENT) USE OF ASPIRIN: ICD-10-CM

## 2018-02-20 ENCOUNTER — INPATIENT (INPATIENT)
Facility: HOSPITAL | Age: 58
LOS: 11 days | Discharge: HOME | End: 2018-03-04
Attending: FAMILY MEDICINE | Admitting: FAMILY MEDICINE
Payer: COMMERCIAL

## 2018-02-20 VITALS
SYSTOLIC BLOOD PRESSURE: 95 MMHG | TEMPERATURE: 99 F | DIASTOLIC BLOOD PRESSURE: 62 MMHG | OXYGEN SATURATION: 97 % | HEIGHT: 64 IN | HEART RATE: 82 BPM | RESPIRATION RATE: 18 BRPM | WEIGHT: 199.96 LBS

## 2018-02-20 DIAGNOSIS — E87.6 HYPOKALEMIA: ICD-10-CM

## 2018-02-20 DIAGNOSIS — I25.10 ATHEROSCLEROTIC HEART DISEASE OF NATIVE CORONARY ARTERY WITHOUT ANGINA PECTORIS: ICD-10-CM

## 2018-02-20 DIAGNOSIS — I11.0 HYPERTENSIVE HEART DISEASE WITH HEART FAILURE: ICD-10-CM

## 2018-02-20 DIAGNOSIS — I50.23 ACUTE ON CHRONIC SYSTOLIC (CONGESTIVE) HEART FAILURE: ICD-10-CM

## 2018-02-20 DIAGNOSIS — Z79.84 LONG TERM (CURRENT) USE OF ORAL HYPOGLYCEMIC DRUGS: ICD-10-CM

## 2018-02-20 DIAGNOSIS — I95.9 HYPOTENSION, UNSPECIFIED: ICD-10-CM

## 2018-02-20 DIAGNOSIS — E11.9 TYPE 2 DIABETES MELLITUS WITHOUT COMPLICATIONS: ICD-10-CM

## 2018-02-20 DIAGNOSIS — Z90.49 ACQUIRED ABSENCE OF OTHER SPECIFIED PARTS OF DIGESTIVE TRACT: Chronic | ICD-10-CM

## 2018-02-20 DIAGNOSIS — Z90.49 ACQUIRED ABSENCE OF OTHER SPECIFIED PARTS OF DIGESTIVE TRACT: ICD-10-CM

## 2018-02-20 DIAGNOSIS — J45.909 UNSPECIFIED ASTHMA, UNCOMPLICATED: ICD-10-CM

## 2018-02-20 DIAGNOSIS — E78.5 HYPERLIPIDEMIA, UNSPECIFIED: ICD-10-CM

## 2018-02-20 DIAGNOSIS — L03.116 CELLULITIS OF LEFT LOWER LIMB: ICD-10-CM

## 2018-02-20 DIAGNOSIS — Z95.0 PRESENCE OF CARDIAC PACEMAKER: Chronic | ICD-10-CM

## 2018-02-20 DIAGNOSIS — Z79.82 LONG TERM (CURRENT) USE OF ASPIRIN: ICD-10-CM

## 2018-02-20 DIAGNOSIS — L03.115 CELLULITIS OF RIGHT LOWER LIMB: ICD-10-CM

## 2018-02-20 DIAGNOSIS — I42.9 CARDIOMYOPATHY, UNSPECIFIED: ICD-10-CM

## 2018-02-20 DIAGNOSIS — Z95.0 PRESENCE OF CARDIAC PACEMAKER: ICD-10-CM

## 2018-02-20 DIAGNOSIS — T50.2X5A ADVERSE EFFECT OF CARBONIC-ANHYDRASE INHIBITORS, BENZOTHIADIAZIDES AND OTHER DIURETICS, INITIAL ENCOUNTER: ICD-10-CM

## 2018-02-20 DIAGNOSIS — I45.81 LONG QT SYNDROME: ICD-10-CM

## 2018-02-20 DIAGNOSIS — N17.9 ACUTE KIDNEY FAILURE, UNSPECIFIED: ICD-10-CM
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
## 2018-02-20 DIAGNOSIS — Z82.49 FAMILY HISTORY OF ISCHEMIC HEART DISEASE AND OTHER DISEASES OF THE CIRCULATORY SYSTEM: ICD-10-CM

## 2018-02-20 LAB
ADD ON TEST-SPECIMEN IN LAB: SIGNIFICANT CHANGE UP
ALBUMIN SERPL ELPH-MCNC: 2.9 G/DL — LOW (ref 3.3–5)
ALP SERPL-CCNC: 156 U/L — HIGH (ref 40–120)
ALT FLD-CCNC: 41 U/L — SIGNIFICANT CHANGE UP (ref 12–78)
ANION GAP SERPL CALC-SCNC: 6 MMOL/L — SIGNIFICANT CHANGE UP (ref 5–17)
APTT BLD: 26.7 SEC — LOW (ref 27.5–37.4)
AST SERPL-CCNC: 34 U/L — SIGNIFICANT CHANGE UP (ref 15–37)
BASE EXCESS BLDV CALC-SCNC: 8.8 MMOL/L — HIGH (ref -2–2)
BASOPHILS # BLD AUTO: 0.1 K/UL — SIGNIFICANT CHANGE UP (ref 0–0.2)
BASOPHILS NFR BLD AUTO: 0.5 % — SIGNIFICANT CHANGE UP (ref 0–2)
BILIRUB SERPL-MCNC: 1.2 MG/DL — SIGNIFICANT CHANGE UP (ref 0.2–1.2)
BUN SERPL-MCNC: 27 MG/DL — HIGH (ref 7–23)
CALCIUM SERPL-MCNC: 9 MG/DL — SIGNIFICANT CHANGE UP (ref 8.5–10.1)
CHLORIDE SERPL-SCNC: 97 MMOL/L — SIGNIFICANT CHANGE UP (ref 96–108)
CK SERPL-CCNC: 63 U/L — SIGNIFICANT CHANGE UP (ref 26–192)
CO2 SERPL-SCNC: 35 MMOL/L — HIGH (ref 22–31)
CREAT SERPL-MCNC: 1.14 MG/DL — SIGNIFICANT CHANGE UP (ref 0.5–1.3)
EOSINOPHIL # BLD AUTO: 0 K/UL — SIGNIFICANT CHANGE UP (ref 0–0.5)
EOSINOPHIL NFR BLD AUTO: 0.2 % — SIGNIFICANT CHANGE UP (ref 0–6)
GLUCOSE BLDC GLUCOMTR-MCNC: 112 MG/DL — HIGH (ref 70–99)
GLUCOSE SERPL-MCNC: 93 MG/DL — SIGNIFICANT CHANGE UP (ref 70–99)
HCG SERPL-ACNC: <1 MIU/ML — SIGNIFICANT CHANGE UP
HCO3 BLDV-SCNC: 36 MMOL/L — HIGH (ref 21–29)
HCT VFR BLD CALC: 43 % — SIGNIFICANT CHANGE UP (ref 34.5–45)
HGB BLD-MCNC: 13 G/DL — SIGNIFICANT CHANGE UP (ref 11.5–15.5)
INR BLD: 1.08 RATIO — SIGNIFICANT CHANGE UP (ref 0.88–1.16)
LACTATE SERPL-SCNC: 1.3 MMOL/L — SIGNIFICANT CHANGE UP (ref 0.7–2)
LYMPHOCYTES # BLD AUTO: 1.2 K/UL — SIGNIFICANT CHANGE UP (ref 1–3.3)
LYMPHOCYTES # BLD AUTO: 11.3 % — LOW (ref 13–44)
MAGNESIUM SERPL-MCNC: 1.8 MG/DL — SIGNIFICANT CHANGE UP (ref 1.6–2.6)
MCHC RBC-ENTMCNC: 24.6 PG — LOW (ref 27–34)
MCHC RBC-ENTMCNC: 30.3 GM/DL — LOW (ref 32–36)
MCV RBC AUTO: 81.4 FL — SIGNIFICANT CHANGE UP (ref 80–100)
MONOCYTES # BLD AUTO: 0.7 K/UL — SIGNIFICANT CHANGE UP (ref 0–0.9)
MONOCYTES NFR BLD AUTO: 6.6 % — SIGNIFICANT CHANGE UP (ref 2–14)
NEUTROPHILS # BLD AUTO: 9 K/UL — HIGH (ref 1.8–7.4)
NEUTROPHILS NFR BLD AUTO: 81.4 % — HIGH (ref 43–77)
NT-PROBNP SERPL-SCNC: 3548 PG/ML — HIGH (ref 0–125)
PCO2 BLDV: 67 MMHG — HIGH (ref 35–50)
PH BLDV: 7.35 — SIGNIFICANT CHANGE UP (ref 7.35–7.45)
PLATELET # BLD AUTO: 204 K/UL — SIGNIFICANT CHANGE UP (ref 150–400)
PO2 BLDV: 43 MMHG — SIGNIFICANT CHANGE UP (ref 25–45)
POTASSIUM SERPL-MCNC: 2.6 MMOL/L — CRITICAL LOW (ref 3.5–5.3)
POTASSIUM SERPL-SCNC: 2.6 MMOL/L — CRITICAL LOW (ref 3.5–5.3)
PROT SERPL-MCNC: 7.6 GM/DL — SIGNIFICANT CHANGE UP (ref 6–8.3)
PROTHROM AB SERPL-ACNC: 11.7 SEC — SIGNIFICANT CHANGE UP (ref 9.8–12.7)
RBC # BLD: 5.29 M/UL — HIGH (ref 3.8–5.2)
RBC # FLD: 18.2 % — HIGH (ref 10.3–14.5)
SAO2 % BLDV: 70 % — SIGNIFICANT CHANGE UP (ref 67–88)
SODIUM SERPL-SCNC: 138 MMOL/L — SIGNIFICANT CHANGE UP (ref 135–145)
TROPONIN I SERPL-MCNC: <0.015 NG/ML — SIGNIFICANT CHANGE UP (ref 0.01–0.04)
WBC # BLD: 11.1 K/UL — HIGH (ref 3.8–10.5)
WBC # FLD AUTO: 11.1 K/UL — HIGH (ref 3.8–10.5)

## 2018-02-20 PROCEDURE — 93970 EXTREMITY STUDY: CPT | Mod: 26

## 2018-02-20 PROCEDURE — 99285 EMERGENCY DEPT VISIT HI MDM: CPT

## 2018-02-20 PROCEDURE — 71046 X-RAY EXAM CHEST 2 VIEWS: CPT | Mod: 26

## 2018-02-20 PROCEDURE — 93010 ELECTROCARDIOGRAM REPORT: CPT

## 2018-02-20 RX ORDER — INSULIN LISPRO 100/ML
VIAL (ML) SUBCUTANEOUS
Qty: 0 | Refills: 0 | Status: DISCONTINUED | OUTPATIENT
Start: 2018-02-20 | End: 2018-03-04

## 2018-02-20 RX ORDER — CEFTRIAXONE 500 MG/1
1 INJECTION, POWDER, FOR SOLUTION INTRAMUSCULAR; INTRAVENOUS ONCE
Qty: 0 | Refills: 0 | Status: DISCONTINUED | OUTPATIENT
Start: 2018-02-20 | End: 2018-02-20

## 2018-02-20 RX ORDER — HEPARIN SODIUM 5000 [USP'U]/ML
5000 INJECTION INTRAVENOUS; SUBCUTANEOUS EVERY 8 HOURS
Qty: 0 | Refills: 0 | Status: DISCONTINUED | OUTPATIENT
Start: 2018-02-20 | End: 2018-03-04

## 2018-02-20 RX ORDER — VANCOMYCIN HCL 1 G
1000 VIAL (EA) INTRAVENOUS ONCE
Qty: 0 | Refills: 0 | Status: COMPLETED | OUTPATIENT
Start: 2018-02-20 | End: 2018-02-20

## 2018-02-20 RX ORDER — POTASSIUM CHLORIDE 20 MEQ
40 PACKET (EA) ORAL ONCE
Qty: 0 | Refills: 0 | Status: COMPLETED | OUTPATIENT
Start: 2018-02-20 | End: 2018-02-20

## 2018-02-20 RX ORDER — SACUBITRIL AND VALSARTAN 24; 26 MG/1; MG/1
1 TABLET, FILM COATED ORAL
Qty: 0 | Refills: 0 | Status: DISCONTINUED | OUTPATIENT
Start: 2018-02-20 | End: 2018-02-24

## 2018-02-20 RX ORDER — METOPROLOL TARTRATE 50 MG
75 TABLET ORAL DAILY
Qty: 0 | Refills: 0 | Status: DISCONTINUED | OUTPATIENT
Start: 2018-02-20 | End: 2018-02-24

## 2018-02-20 RX ORDER — SODIUM CHLORIDE 9 MG/ML
1000 INJECTION, SOLUTION INTRAVENOUS
Qty: 0 | Refills: 0 | Status: DISCONTINUED | OUTPATIENT
Start: 2018-02-20 | End: 2018-03-04

## 2018-02-20 RX ORDER — GLUCAGON INJECTION, SOLUTION 0.5 MG/.1ML
1 INJECTION, SOLUTION SUBCUTANEOUS ONCE
Qty: 0 | Refills: 0 | Status: DISCONTINUED | OUTPATIENT
Start: 2018-02-20 | End: 2018-03-04

## 2018-02-20 RX ORDER — POTASSIUM CHLORIDE 20 MEQ
20 PACKET (EA) ORAL DAILY
Qty: 0 | Refills: 0 | Status: DISCONTINUED | OUTPATIENT
Start: 2018-02-21 | End: 2018-03-01

## 2018-02-20 RX ORDER — BUDESONIDE AND FORMOTEROL FUMARATE DIHYDRATE 160; 4.5 UG/1; UG/1
2 AEROSOL RESPIRATORY (INHALATION)
Qty: 0 | Refills: 0 | Status: DISCONTINUED | OUTPATIENT
Start: 2018-02-20 | End: 2018-03-04

## 2018-02-20 RX ORDER — METOPROLOL TARTRATE 50 MG
1 TABLET ORAL
Qty: 0 | Refills: 0 | COMMUNITY

## 2018-02-20 RX ORDER — POTASSIUM CHLORIDE 20 MEQ
20 PACKET (EA) ORAL
Qty: 0 | Refills: 0 | COMMUNITY

## 2018-02-20 RX ORDER — FUROSEMIDE 40 MG
40 TABLET ORAL
Qty: 0 | Refills: 0 | Status: DISCONTINUED | OUTPATIENT
Start: 2018-02-20 | End: 2018-02-24

## 2018-02-20 RX ORDER — FUROSEMIDE 40 MG
40 TABLET ORAL ONCE
Qty: 0 | Refills: 0 | Status: COMPLETED | OUTPATIENT
Start: 2018-02-20 | End: 2018-02-20

## 2018-02-20 RX ORDER — ASPIRIN/CALCIUM CARB/MAGNESIUM 324 MG
81 TABLET ORAL DAILY
Qty: 0 | Refills: 0 | Status: DISCONTINUED | OUTPATIENT
Start: 2018-02-20 | End: 2018-03-04

## 2018-02-20 RX ORDER — DEXTROSE 50 % IN WATER 50 %
25 SYRINGE (ML) INTRAVENOUS ONCE
Qty: 0 | Refills: 0 | Status: DISCONTINUED | OUTPATIENT
Start: 2018-02-20 | End: 2018-03-04

## 2018-02-20 RX ORDER — DEXTROSE 50 % IN WATER 50 %
1 SYRINGE (ML) INTRAVENOUS ONCE
Qty: 0 | Refills: 0 | Status: DISCONTINUED | OUTPATIENT
Start: 2018-02-20 | End: 2018-03-04

## 2018-02-20 RX ORDER — AMIODARONE HYDROCHLORIDE 400 MG/1
200 TABLET ORAL DAILY
Qty: 0 | Refills: 0 | Status: DISCONTINUED | OUTPATIENT
Start: 2018-02-20 | End: 2018-03-04

## 2018-02-20 RX ORDER — DEXTROSE 50 % IN WATER 50 %
12.5 SYRINGE (ML) INTRAVENOUS ONCE
Qty: 0 | Refills: 0 | Status: DISCONTINUED | OUTPATIENT
Start: 2018-02-20 | End: 2018-03-04

## 2018-02-20 RX ORDER — OXYCODONE AND ACETAMINOPHEN 5; 325 MG/1; MG/1
1 TABLET ORAL EVERY 6 HOURS
Qty: 0 | Refills: 0 | Status: DISCONTINUED | OUTPATIENT
Start: 2018-02-20 | End: 2018-02-27

## 2018-02-20 RX ORDER — ACETAMINOPHEN 500 MG
650 TABLET ORAL EVERY 6 HOURS
Qty: 0 | Refills: 0 | Status: DISCONTINUED | OUTPATIENT
Start: 2018-02-20 | End: 2018-03-04

## 2018-02-20 RX ORDER — INSULIN LISPRO 100/ML
VIAL (ML) SUBCUTANEOUS AT BEDTIME
Qty: 0 | Refills: 0 | Status: DISCONTINUED | OUTPATIENT
Start: 2018-02-20 | End: 2018-03-04

## 2018-02-20 RX ORDER — METOPROLOL TARTRATE 50 MG
1.5 TABLET ORAL
Qty: 0 | Refills: 0 | COMMUNITY

## 2018-02-20 RX ORDER — CEFTRIAXONE 500 MG/1
1000 INJECTION, POWDER, FOR SOLUTION INTRAMUSCULAR; INTRAVENOUS ONCE
Qty: 0 | Refills: 0 | Status: COMPLETED | OUTPATIENT
Start: 2018-02-20 | End: 2018-02-20

## 2018-02-20 RX ORDER — OXYCODONE AND ACETAMINOPHEN 5; 325 MG/1; MG/1
2 TABLET ORAL EVERY 6 HOURS
Qty: 0 | Refills: 0 | Status: DISCONTINUED | OUTPATIENT
Start: 2018-02-20 | End: 2018-02-24

## 2018-02-20 RX ORDER — PANTOPRAZOLE SODIUM 20 MG/1
40 TABLET, DELAYED RELEASE ORAL
Qty: 0 | Refills: 0 | Status: DISCONTINUED | OUTPATIENT
Start: 2018-02-20 | End: 2018-03-04

## 2018-02-20 RX ORDER — CEFTRIAXONE 500 MG/1
1000 INJECTION, POWDER, FOR SOLUTION INTRAMUSCULAR; INTRAVENOUS EVERY 24 HOURS
Qty: 0 | Refills: 0 | Status: DISCONTINUED | OUTPATIENT
Start: 2018-02-20 | End: 2018-02-23

## 2018-02-20 RX ADMIN — OXYCODONE AND ACETAMINOPHEN 1 TABLET(S): 5; 325 TABLET ORAL at 17:42

## 2018-02-20 RX ADMIN — CEFTRIAXONE 1000 MILLIGRAM(S): 500 INJECTION, POWDER, FOR SOLUTION INTRAMUSCULAR; INTRAVENOUS at 13:00

## 2018-02-20 RX ADMIN — Medication 40 MILLIEQUIVALENT(S): at 14:17

## 2018-02-20 RX ADMIN — Medication 40 MILLIEQUIVALENT(S): at 19:40

## 2018-02-20 RX ADMIN — Medication 250 MILLIGRAM(S): at 13:05

## 2018-02-20 NOTE — ED PROVIDER NOTE - OBJECTIVE STATEMENT
56 yo female PMH pacemaker, DM II, pleural effusion, presents c/o bilateral LE swelling, redness, and pain x 2 weeks, worsening over the past week.  Reports fever, N/V/D, constipation, fatigue at home.  Has not had this in the past.  NKDA, no recent travel.  PCP [place], Cardio [facility].

## 2018-02-20 NOTE — ED PROVIDER NOTE - SKIN, MLM
2+ pitting edema, warmth, redness, bilat LE, with multiple scabs in different stages of healing, + TTP to bilat calf, no palpable cords.

## 2018-02-20 NOTE — H&P ADULT - ASSESSMENT
58 yo female with PMH of chronic systolic CHF, s/p bi-v AICD, HTN, HLD, CAD, DM2, h/o vtach, asthma with recent admission 2/5-2/8 for asthma exacerbation and CHF exacerbation presents with 2 weeks of pedal edema. Pt states she has been having progressively worsening pedal edema over the past 2 weeks. She has been taking her lasix twice a day and also metolazone without improvement. She states she has pain on both legs which feels like something is stabbing her and she has difficulty ambulating. She has subjective fevers and chills. Has not taken her temperature. States her legs have been itching and she has been scratching at them. She does have multiple scratch marks on legs from scratching and she has noted to have increasing erythema to both legs. Pain worse around calves. Dopplers in ED negative for DVT. Denies any chest pain or SOB.     In ED pt given vanco and ceftriaxone for possible b/l lower ext cellulitis. Dopplers negative for DVT.     #b/l pedal edema - secondary to acute systolic CHF exacerbation with possible surrounding cellulitis  - admit to tele  - dopplers negative for DVT  - diuresis with IV lasix 40mg BID  - continue entresto, toprol  - PBNP 3548  - CXR with mild pulm vascular congestion  - cardio consult  - daily weights, strict I/Os  - continue IV abx - ceftriaxone, given vanco in ED  - ID consult    #Hypokalemia - secondary to diuretic use  - given 40meq in ED  - will give another 40meq now  - monitor electrolytes    #h/o vtach  - continue amiodarone    #HTN  - continue toprol, entresto    #DM2  - A1c 7.3 (1/30/18)  - hold metformin and januvia  - ISS  - consistent carb diet    #Asthma - stable  - continue symbicort    #DVT prophylaxis  - heparin sq

## 2018-02-20 NOTE — H&P ADULT - NSHPPHYSICALEXAM_GEN_ALL_CORE
Vital Signs Last 24 Hrs  T(C): 37.2 (20 Feb 2018 12:02), Max: 37.2 (20 Feb 2018 12:02)  T(F): 99 (20 Feb 2018 12:02), Max: 99 (20 Feb 2018 12:02)  HR: 82 (20 Feb 2018 12:02) (82 - 82)  BP: 95/62 (20 Feb 2018 12:02) (95/62 - 95/62)  RR: 18 (20 Feb 2018 12:02) (18 - 18)  SpO2: 97% (20 Feb 2018 12:02) (97% - 97%)

## 2018-02-20 NOTE — ED PROVIDER NOTE - MEDICAL DECISION MAKING DETAILS
Pt with likely bilat LE infection, also concerning for blood clots.  Plan labs, septic work-up, bilat LE sonogram.  Due to known CHF will not give 30cc/kg bolus.

## 2018-02-20 NOTE — ED PROVIDER NOTE - ATTENDING CONTRIBUTION TO CARE
I, Emilio Gross MD, personally saw the patient with the resident, and completed the key components of the history and physical exam. I then discussed the management plan with the resident.

## 2018-02-20 NOTE — CONSULT NOTE ADULT - SUBJECTIVE AND OBJECTIVE BOX
Patient is a 57y old  Female who presents with a chief complaint of pedal edema (20 Feb 2018 15:40)      HPI:  56 yo female with PMH of chronic systolic CHF, s/p bi-v AICD, HTN, HLD, CAD, DM2, h/o vtach, asthma with recent admission 2/5-2/8 for asthma exacerbation and CHF exacerbation presents with 2 weeks of pedal edema. Pt states she has been having progressively worsening pedal edema over the past 2 weeks. She has been taking her lasix twice a day and also metolazone without improvement. She states she has pain on both legs which feels like something is stabbing her and she has difficulty ambulating. She has subjective fevers and chills. Has not taken her temperature. States her legs have been itching and she has been scratching at them. She does have multiple scratch marks on legs from scratching and she has noted to have increasing erythema to both legs. Pain worse around calves. Dopplers in ED negative for DVT. Denies any chest pain or SOB.     In ED pt given vanco and ceftriaxone for possible b/l lower ext cellulitis. Dopplers negative for DVT. (20 Feb 2018 15:40)      PAST MEDICAL & SURGICAL HISTORY:  HFrEF CHF (congestive heart failure)  NICM  Non obsrtuctive CAD  AICD  HTN (hypertension)  HLD (hyperlipidemia)  Asthma  DM (diabetes mellitus)  History of cholecystectomy      PREVIOUS CARDIAC WORKUP:    Echo:  Severe reduced EF  Cardiac Cath:  8/24/17 - Non obstructive    ALLERGIES:    No Known Allergies       MEDICATIONS  (STANDING):  amiodarone    Tablet 200 milliGRAM(s) Oral daily  aspirin  chewable 81 milliGRAM(s) Oral daily  buDESOnide 160 MICROgram(s)/formoterol 4.5 MICROgram(s) Inhaler 2 Puff(s) Inhalation two times a day  cefTRIAXone Injectable. 1000 milliGRAM(s) IV Push every 24 hours  dextrose 5%. 1000 milliLiter(s) (50 mL/Hr) IV Continuous <Continuous>  dextrose 50% Injectable 12.5 Gram(s) IV Push once  dextrose 50% Injectable 25 Gram(s) IV Push once  dextrose 50% Injectable 25 Gram(s) IV Push once  furosemide   Injectable 40 milliGRAM(s) IV Push two times a day  furosemide   Injectable 40 milliGRAM(s) IV Push once  heparin  Injectable 5000 Unit(s) SubCutaneous every 8 hours  insulin lispro (HumaLOG) corrective regimen sliding scale   SubCutaneous three times a day before meals  insulin lispro (HumaLOG) corrective regimen sliding scale   SubCutaneous at bedtime  metoprolol succinate ER 75 milliGRAM(s) Oral daily  pantoprazole    Tablet 40 milliGRAM(s) Oral before breakfast  potassium chloride    Tablet ER 40 milliEquivalent(s) Oral once  sacubitril 49 mG/valsartan 51 mG 1 Tablet(s) Oral two times a day    MEDICATIONS  (PRN):  acetaminophen   Tablet 650 milliGRAM(s) Oral every 6 hours PRN For Temp greater than 38 C (100.4 F)  dextrose Gel 1 Dose(s) Oral once PRN Blood Glucose LESS THAN 70 milliGRAM(s)/deciliter  glucagon  Injectable 1 milliGRAM(s) IntraMuscular once PRN Glucose LESS THAN 70 milligrams/deciliter  oxyCODONE    5 mG/acetaminophen 325 mG 1 Tablet(s) Oral every 6 hours PRN Moderate Pain (4 - 6)  oxyCODONE    5 mG/acetaminophen 325 mG 2 Tablet(s) Oral every 6 hours PRN Severe Pain (7 - 10)      FAMILY HISTORY:  Family history of other heart disease (Mother)        SOCIAL HISTORY:  Nonsmoker. No ETOH abuse. No illicit drugs.     ROS:     Detailed ten system ROS was performed and was negative except for history as eluded to above.    no fever  no chills  no nausea  no vomiting  no diarrhea  no constipation  no melena  no hematochezia  no chest pain  no palpitations  no sob at rest  + dyspnea on exertion  no cough  no wheezing  no anorexia  no headache  no dizziness  no syncope  no weakness  no myalgia  no dysuria  no polyuria  no hematuria  + edema      Vital Signs Last 24 Hrs  T(C): 37.2 (20 Feb 2018 12:02), Max: 37.2 (20 Feb 2018 12:02)  T(F): 99 (20 Feb 2018 12:02), Max: 99 (20 Feb 2018 12:02)  HR: 74 (20 Feb 2018 15:52) (74 - 82)  BP: 91/59 (20 Feb 2018 15:52) (91/59 - 95/62)  BP(mean): --  RR: 15 (20 Feb 2018 15:52) (15 - 18)  SpO2: 100% (20 Feb 2018 15:52) (97% - 100%)    I&O's Summary      PHYSICAL EXAM:    General:                Comfortable, AAO X 3, in no distress.   HEENT:                  Atraumatic, PERRLA, EOMI, conjunctiva clear.   Neck:                     Supple, no adenopathy, no thyromegaly, no JVD, no bruit.  Back:                     Symmetric, non tender.  Chest:                    Exp wheeze, B/L symmetric air entry, no tachypnea  Heart:                     S1, S2 normal, no gallop, + murmur.  Abdomen:              Soft, non-tender, bowel sounds active. No palpable masses.  Extremities:           no cyanosis, + edema. Peripheral pulses normal.  Skin:                      Skin color, texture normal. No rashes.  Neurologic:            Grossly nonfocal.       TELEMETRY:   Normal sinus rhythm  with no tachy or lillian events     ECG:  NSR, first deg AV block, no ST T changes    LABS:                        13.0   11.1  )-----------( 204      ( 20 Feb 2018 12:35 )             43.0     02-20    138  |  97  |  27<H>  ----------------------------<  93  2.6<LL>   |  35<H>  |  1.14    Ca    9.0      20 Feb 2018 12:35  Mg     1.8     02-20    TPro  7.6  /  Alb  2.9<L>  /  TBili  1.2  /  DBili  x   /  AST  34  /  ALT  41  /  AlkPhos  156<H>  02-20 02-20 @ 12:35  Trop-I  <0.015  CK      63    Pro BNP  3548 02-20 @ 12:35    PT/INR - ( 20 Feb 2018 12:35 )   PT: 11.7 sec;   INR: 1.08 ratio    PTT - ( 20 Feb 2018 12:35 )  PTT:26.7 sec    RADIOLOGY & ADDITIONAL STUDIES:    Xray Chest 2 Views PA/Lat (02.20.18 @ 13:44) >  Impression:  Persisting cardiomegaly with mild pulmonary venous congestion.    US Duplex Venous Lower Ext Complete, Bilateral (02.20.18 @ 12:55) >  IMPRESSION:  No sonographic evidence of acute deep venous thrombosis in the femoropopliteal systems bilaterally.  Mild bilateral calf edema. Chief complaint of leg edema (20 Feb 2018 15:40)    HPI:  56 yo female with PMH of chronic systolic CHF, s/p bi-v AICD, HTN, HLD, CAD, DM2, h/o VT, AICD, asthma. Recent admission 2/5-2/8 for asthma exacerbation and CHF exacerbation presents with 2 weeks of progressive leg edema despite being on diuretics. DEnies non compliance to diet (sodium , fluid intake). She states she has pain on both legs which feels like something is stabbing her and she has difficulty ambulating. She has fevers and chills. Has not taken her temperature. States her legs have been itching and she has been scratching at them. She does have multiple scratch marks on legs from scratching and she has noted to have increasing erythema to both legs. Pain worse around calves. Dopplers in ED negative for DVT. Denies any chest pain or SOB.  In ED pt given vanco and ceftriaxone for possible b/l lower ext cellulitis. Dopplers negative for DVT. (20 Feb 2018 15:40).     PAST MEDICAL & SURGICAL HISTORY:  HFrEF CHF (congestive heart failure)  NICM  Non obsrtuctive CAD  AICD  HTN (hypertension)  HLD (hyperlipidemia)  Asthma  DM (diabetes mellitus)  History of cholecystectomy      PREVIOUS CARDIAC WORKUP:    Echo:  Severe reduced EF  Cardiac Cath:  8/24/17 - Non obstructive    ALLERGIES:    No Known Allergies       MEDICATIONS  (STANDING):  amiodarone    Tablet 200 milliGRAM(s) Oral daily  aspirin  chewable 81 milliGRAM(s) Oral daily  buDESOnide 160 MICROgram(s)/formoterol 4.5 MICROgram(s) Inhaler 2 Puff(s) Inhalation two times a day  cefTRIAXone Injectable. 1000 milliGRAM(s) IV Push every 24 hours  dextrose 5%. 1000 milliLiter(s) (50 mL/Hr) IV Continuous <Continuous>  dextrose 50% Injectable 12.5 Gram(s) IV Push once  dextrose 50% Injectable 25 Gram(s) IV Push once  dextrose 50% Injectable 25 Gram(s) IV Push once  furosemide   Injectable 40 milliGRAM(s) IV Push two times a day  furosemide   Injectable 40 milliGRAM(s) IV Push once  heparin  Injectable 5000 Unit(s) SubCutaneous every 8 hours  insulin lispro (HumaLOG) corrective regimen sliding scale   SubCutaneous three times a day before meals  insulin lispro (HumaLOG) corrective regimen sliding scale   SubCutaneous at bedtime  metoprolol succinate ER 75 milliGRAM(s) Oral daily  pantoprazole    Tablet 40 milliGRAM(s) Oral before breakfast  potassium chloride    Tablet ER 40 milliEquivalent(s) Oral once  sacubitril 49 mG/valsartan 51 mG 1 Tablet(s) Oral two times a day    MEDICATIONS  (PRN):  acetaminophen   Tablet 650 milliGRAM(s) Oral every 6 hours PRN For Temp greater than 38 C (100.4 F)  dextrose Gel 1 Dose(s) Oral once PRN Blood Glucose LESS THAN 70 milliGRAM(s)/deciliter  glucagon  Injectable 1 milliGRAM(s) IntraMuscular once PRN Glucose LESS THAN 70 milligrams/deciliter  oxyCODONE    5 mG/acetaminophen 325 mG 1 Tablet(s) Oral every 6 hours PRN Moderate Pain (4 - 6)  oxyCODONE    5 mG/acetaminophen 325 mG 2 Tablet(s) Oral every 6 hours PRN Severe Pain (7 - 10)      FAMILY HISTORY:  Family history of other heart disease (Mother)        SOCIAL HISTORY:  Nonsmoker. No ETOH abuse. No illicit drugs.     ROS:     Detailed ten system ROS was performed and was negative except for history as eluded to above.    + fever  + chills  no nausea  no vomiting  no diarrhea  no constipation  no melena  no hematochezia  no chest pain  no palpitations  no sob at rest  + dyspnea on exertion  no cough  no wheezing  no anorexia  no headache  no dizziness  no syncope  no weakness  no myalgia  no dysuria  no polyuria  no hematuria  + edema      Vital Signs Last 24 Hrs  T(C): 37.2 (20 Feb 2018 12:02), Max: 37.2 (20 Feb 2018 12:02)  T(F): 99 (20 Feb 2018 12:02), Max: 99 (20 Feb 2018 12:02)  HR: 74 (20 Feb 2018 15:52) (74 - 82)  BP: 91/59 (20 Feb 2018 15:52) (91/59 - 95/62)  BP(mean): --  RR: 15 (20 Feb 2018 15:52) (15 - 18)  SpO2: 100% (20 Feb 2018 15:52) (97% - 100%)    I&O's Summary      PHYSICAL EXAM:    General:                Comfortable, AAO X 3, in no distress.   HEENT:                  Atraumatic, PERRLA, EOMI, conjunctiva clear.   Neck:                     Supple, no adenopathy, no thyromegaly, no JVD, no bruit.  Back:                     Symmetric, non tender.  Chest:                    Exp wheeze, B/L symmetric air entry, no tachypnea  Heart:                     S1, S2 normal, no gallop, + murmur.  Abdomen:              Soft, non-tender, bowel sounds active. No palpable masses.  Extremities:           no cyanosis, + edema. Cellulitis B/L LE. multiple excoriation marks  Skin:                      Skin color, texture normal, no rash except for both both legs as described above.  Neurologic:            Grossly nonfocal.       TELEMETRY:   Normal sinus rhythm  with no tachy or lillian events     ECG:  NSR, first deg AV block, no ST T changes    LABS:                        13.0   11.1  )-----------( 204      ( 20 Feb 2018 12:35 )             43.0     02-20    138  |  97  |  27<H>  ----------------------------<  93  2.6<LL>   |  35<H>  |  1.14    Ca    9.0      20 Feb 2018 12:35  Mg     1.8     02-20    TPro  7.6  /  Alb  2.9<L>  /  TBili  1.2  /  DBili  x   /  AST  34  /  ALT  41  /  AlkPhos  156<H>  02-20 02-20 @ 12:35  Trop-I  <0.015  CK      63    Pro BNP  3548 02-20 @ 12:35    PT/INR - ( 20 Feb 2018 12:35 )   PT: 11.7 sec;   INR: 1.08 ratio    PTT - ( 20 Feb 2018 12:35 )  PTT:26.7 sec    RADIOLOGY & ADDITIONAL STUDIES:    Xray Chest 2 Views PA/Lat (02.20.18 @ 13:44) >  Impression:  Persisting cardiomegaly with mild pulmonary venous congestion.    US Duplex Venous Lower Ext Complete, Bilateral (02.20.18 @ 12:55) >  IMPRESSION:  No sonographic evidence of acute deep venous thrombosis in the femoropopliteal systems bilaterally.  Mild bilateral calf edema.

## 2018-02-20 NOTE — CONSULT NOTE ADULT - ASSESSMENT
Ac on chronic HFrEF  Hypokalemia  Venous insufficiency B/L LE cellulitis  Hypokalemia  Chronic HFrEF  NICM  Non obstructive CAD  HTN  AICD    Suggest:    ABx  Leg elevation  Supportive skin care  Supplement K, Keep > 4  Continue treatment of CHF with diuretics.   Continue Entresto  Observe patient on telemetry.  follow low sodium, low cholesterol, ADA diet.  Fluid restriction 1.5 lit/day  monitor Intake & output  Daily weights.  Follow up electrolytes, renal function.   Continue beta blockers.

## 2018-02-20 NOTE — ED PROVIDER NOTE - PROGRESS NOTE DETAILS
Attending светлана Gross/w Dr. Haley for admission Attending Gross, plan replace potassium.  Will hold on diuretic for now give low K.

## 2018-02-20 NOTE — ED ADULT NURSE NOTE - OBJECTIVE STATEMENT
Patient reports swollen legs x 2 weeks. Associated with pain, redness with small blebs. Warm to touch.

## 2018-02-20 NOTE — H&P ADULT - HISTORY OF PRESENT ILLNESS
56 yo female with PMH of chronic systolic CHF, s/p bi-v AICD, HTN, HLD, CAD, DM2, h/o vtach, asthma with recent admission 2/5-2/8 for asthma exacerbation and CHF exacerbation presents with 2 weeks of pedal edema. Pt states she has been having progressively worsening pedal edema over the past 2 weeks. She has been taking her lasix twice a day and also metolazone without improvement. She states she has pain on both legs which feels like something is stabbing her and she has difficulty ambulating. She has subjective fevers and chills. Has not taken her temperature. States her legs have been itching and she has been scratching at them. She does have multiple scratch marks on legs from scratching and she has noted to have increasing erythema to both legs. Pain worse around calves. Dopplers in ED negative for DVT. Denies any chest pain or SOB.     In ED pt given vanco and ceftriaxone for possible b/l lower ext cellulitis. Dopplers negative for DVT.

## 2018-02-20 NOTE — ED PROVIDER NOTE - CARE PLAN
Principal Discharge DX:	Cellulitis, unspecified cellulitis site  Secondary Diagnosis:	Hypokalemia  Secondary Diagnosis:	Pedal edema

## 2018-02-21 LAB
ANION GAP SERPL CALC-SCNC: 6 MMOL/L — SIGNIFICANT CHANGE UP (ref 5–17)
BASOPHILS # BLD AUTO: 0.2 K/UL — SIGNIFICANT CHANGE UP (ref 0–0.2)
BASOPHILS NFR BLD AUTO: 1.6 % — SIGNIFICANT CHANGE UP (ref 0–2)
BUN SERPL-MCNC: 29 MG/DL — HIGH (ref 7–23)
CALCIUM SERPL-MCNC: 8.8 MG/DL — SIGNIFICANT CHANGE UP (ref 8.5–10.1)
CHLORIDE SERPL-SCNC: 100 MMOL/L — SIGNIFICANT CHANGE UP (ref 96–108)
CO2 SERPL-SCNC: 33 MMOL/L — HIGH (ref 22–31)
CREAT SERPL-MCNC: 0.84 MG/DL — SIGNIFICANT CHANGE UP (ref 0.5–1.3)
EOSINOPHIL # BLD AUTO: 0 K/UL — SIGNIFICANT CHANGE UP (ref 0–0.5)
EOSINOPHIL NFR BLD AUTO: 0 % — SIGNIFICANT CHANGE UP (ref 0–6)
GLUCOSE BLDC GLUCOMTR-MCNC: 115 MG/DL — HIGH (ref 70–99)
GLUCOSE BLDC GLUCOMTR-MCNC: 84 MG/DL — SIGNIFICANT CHANGE UP (ref 70–99)
GLUCOSE BLDC GLUCOMTR-MCNC: 92 MG/DL — SIGNIFICANT CHANGE UP (ref 70–99)
GLUCOSE BLDC GLUCOMTR-MCNC: 99 MG/DL — SIGNIFICANT CHANGE UP (ref 70–99)
GLUCOSE SERPL-MCNC: 106 MG/DL — HIGH (ref 70–99)
HCT VFR BLD CALC: 38.5 % — SIGNIFICANT CHANGE UP (ref 34.5–45)
HGB BLD-MCNC: 11.9 G/DL — SIGNIFICANT CHANGE UP (ref 11.5–15.5)
LYMPHOCYTES # BLD AUTO: 1.3 K/UL — SIGNIFICANT CHANGE UP (ref 1–3.3)
LYMPHOCYTES # BLD AUTO: 12.6 % — LOW (ref 13–44)
MAGNESIUM SERPL-MCNC: 1.7 MG/DL — SIGNIFICANT CHANGE UP (ref 1.6–2.6)
MCHC RBC-ENTMCNC: 25.1 PG — LOW (ref 27–34)
MCHC RBC-ENTMCNC: 31 GM/DL — LOW (ref 32–36)
MCV RBC AUTO: 81.1 FL — SIGNIFICANT CHANGE UP (ref 80–100)
MONOCYTES # BLD AUTO: 0.9 K/UL — SIGNIFICANT CHANGE UP (ref 0–0.9)
MONOCYTES NFR BLD AUTO: 9.1 % — SIGNIFICANT CHANGE UP (ref 2–14)
NEUTROPHILS # BLD AUTO: 7.7 K/UL — HIGH (ref 1.8–7.4)
NEUTROPHILS NFR BLD AUTO: 76.7 % — SIGNIFICANT CHANGE UP (ref 43–77)
PHOSPHATE SERPL-MCNC: 3.6 MG/DL — SIGNIFICANT CHANGE UP (ref 2.5–4.5)
PLATELET # BLD AUTO: 189 K/UL — SIGNIFICANT CHANGE UP (ref 150–400)
POTASSIUM SERPL-MCNC: 3 MMOL/L — LOW (ref 3.5–5.3)
POTASSIUM SERPL-SCNC: 3 MMOL/L — LOW (ref 3.5–5.3)
RBC # BLD: 4.74 M/UL — SIGNIFICANT CHANGE UP (ref 3.8–5.2)
RBC # FLD: 18.3 % — HIGH (ref 10.3–14.5)
SODIUM SERPL-SCNC: 139 MMOL/L — SIGNIFICANT CHANGE UP (ref 135–145)
WBC # BLD: 10 K/UL — SIGNIFICANT CHANGE UP (ref 3.8–10.5)
WBC # FLD AUTO: 10 K/UL — SIGNIFICANT CHANGE UP (ref 3.8–10.5)

## 2018-02-21 PROCEDURE — 93010 ELECTROCARDIOGRAM REPORT: CPT

## 2018-02-21 RX ORDER — VANCOMYCIN HCL 1 G
VIAL (EA) INTRAVENOUS
Qty: 0 | Refills: 0 | Status: DISCONTINUED | OUTPATIENT
Start: 2018-02-21 | End: 2018-02-23

## 2018-02-21 RX ORDER — POTASSIUM CHLORIDE 20 MEQ
40 PACKET (EA) ORAL ONCE
Qty: 0 | Refills: 0 | Status: COMPLETED | OUTPATIENT
Start: 2018-02-21 | End: 2018-02-21

## 2018-02-21 RX ORDER — VANCOMYCIN HCL 1 G
1000 VIAL (EA) INTRAVENOUS EVERY 12 HOURS
Qty: 0 | Refills: 0 | Status: DISCONTINUED | OUTPATIENT
Start: 2018-02-21 | End: 2018-02-23

## 2018-02-21 RX ORDER — VANCOMYCIN HCL 1 G
1000 VIAL (EA) INTRAVENOUS ONCE
Qty: 0 | Refills: 0 | Status: COMPLETED | OUTPATIENT
Start: 2018-02-21 | End: 2018-02-21

## 2018-02-21 RX ADMIN — OXYCODONE AND ACETAMINOPHEN 1 TABLET(S): 5; 325 TABLET ORAL at 16:21

## 2018-02-21 RX ADMIN — AMIODARONE HYDROCHLORIDE 200 MILLIGRAM(S): 400 TABLET ORAL at 05:17

## 2018-02-21 RX ADMIN — HEPARIN SODIUM 5000 UNIT(S): 5000 INJECTION INTRAVENOUS; SUBCUTANEOUS at 21:07

## 2018-02-21 RX ADMIN — PANTOPRAZOLE SODIUM 40 MILLIGRAM(S): 20 TABLET, DELAYED RELEASE ORAL at 05:17

## 2018-02-21 RX ADMIN — HEPARIN SODIUM 5000 UNIT(S): 5000 INJECTION INTRAVENOUS; SUBCUTANEOUS at 14:09

## 2018-02-21 RX ADMIN — CEFTRIAXONE 1000 MILLIGRAM(S): 500 INJECTION, POWDER, FOR SOLUTION INTRAMUSCULAR; INTRAVENOUS at 05:17

## 2018-02-21 RX ADMIN — SACUBITRIL AND VALSARTAN 1 TABLET(S): 24; 26 TABLET, FILM COATED ORAL at 17:18

## 2018-02-21 RX ADMIN — SACUBITRIL AND VALSARTAN 1 TABLET(S): 24; 26 TABLET, FILM COATED ORAL at 05:18

## 2018-02-21 RX ADMIN — HEPARIN SODIUM 5000 UNIT(S): 5000 INJECTION INTRAVENOUS; SUBCUTANEOUS at 05:17

## 2018-02-21 RX ADMIN — Medication 40 MILLIGRAM(S): at 16:21

## 2018-02-21 RX ADMIN — Medication 40 MILLIGRAM(S): at 05:17

## 2018-02-21 RX ADMIN — Medication 250 MILLIGRAM(S): at 14:08

## 2018-02-21 RX ADMIN — Medication 20 MILLIEQUIVALENT(S): at 11:23

## 2018-02-21 RX ADMIN — OXYCODONE AND ACETAMINOPHEN 1 TABLET(S): 5; 325 TABLET ORAL at 17:20

## 2018-02-21 RX ADMIN — Medication 81 MILLIGRAM(S): at 11:23

## 2018-02-21 RX ADMIN — OXYCODONE AND ACETAMINOPHEN 1 TABLET(S): 5; 325 TABLET ORAL at 22:55

## 2018-02-21 RX ADMIN — Medication 40 MILLIEQUIVALENT(S): at 17:19

## 2018-02-21 RX ADMIN — BUDESONIDE AND FORMOTEROL FUMARATE DIHYDRATE 2 PUFF(S): 160; 4.5 AEROSOL RESPIRATORY (INHALATION) at 20:29

## 2018-02-21 RX ADMIN — Medication 250 MILLIGRAM(S): at 21:07

## 2018-02-21 RX ADMIN — OXYCODONE AND ACETAMINOPHEN 1 TABLET(S): 5; 325 TABLET ORAL at 11:22

## 2018-02-21 RX ADMIN — OXYCODONE AND ACETAMINOPHEN 1 TABLET(S): 5; 325 TABLET ORAL at 07:52

## 2018-02-21 NOTE — PHYSICAL THERAPY INITIAL EVALUATION ADULT - GENERAL OBSERVATIONS, REHAB EVAL
Pre session: supine in bed with monitor in place.  by bedside. Primarily Occitan speaking but can communicate in some broken english.. c/o B LE pain from cellulitis with observed superficial scratches on B LE. Pt states pain is better and swelling has gone down. unable to rate pain.  Post session; seated in chair with chair alarm on. call bell and phone in reach. monitor intact.  present. educated to use call bell when needing assist with mobility and safety/any medical needs. Spoke with  regarding pt concerns of current living situation. Tolerated well and would benefir from further skilled PT for functional mobility training

## 2018-02-21 NOTE — PHYSICAL THERAPY INITIAL EVALUATION ADULT - PERTINENT HX OF CURRENT PROBLEM, REHAB EVAL
Pt states she has been having progressively worsening pedal edema over the past 2 weeks. She has been taking her lasix twice a day and also metolazone without improvement. She states she has pain on both legs which feels like something is stabbing her and she has difficulty ambulating. She has subjective fevers and chills.

## 2018-02-21 NOTE — PROGRESS NOTE ADULT - ASSESSMENT
B/L LE cellulitis  Hypokalemia  Acute on Chronic HFrEF  NICM  Non obstructive CAD  HTN  AICD    Suggest:    ABx  Leg elevation  Supportive skin care  Supplement K, Keep > 4  Continue treatment of CHF with diuretics.   Continue Entresto  follow low sodium, low cholesterol, ADA diet.  Fluid restriction 1.5 lit/day  monitor Intake & output  Daily weights.  Follow up electrolytes, renal function.   Continue beta blockers.

## 2018-02-21 NOTE — PROGRESS NOTE ADULT - SUBJECTIVE AND OBJECTIVE BOX
CC:  Patient is a 57y old  Female who presents with a chief complaint of pedal edema (20 Feb 2018 15:40)    SUBJECTIVE:  OOB in chair.  states her legs are better.    ROS:  reviewed.    acetaminophen   Tablet 650 milliGRAM(s) Oral every 6 hours PRN  amiodarone    Tablet 200 milliGRAM(s) Oral daily  aspirin  chewable 81 milliGRAM(s) Oral daily  buDESOnide 160 MICROgram(s)/formoterol 4.5 MICROgram(s) Inhaler 2 Puff(s) Inhalation two times a day  cefTRIAXone Injectable. 1000 milliGRAM(s) IV Push every 24 hours  dextrose 5%. 1000 milliLiter(s) IV Continuous <Continuous>  dextrose 50% Injectable 12.5 Gram(s) IV Push once  dextrose 50% Injectable 25 Gram(s) IV Push once  dextrose 50% Injectable 25 Gram(s) IV Push once  dextrose Gel 1 Dose(s) Oral once PRN  furosemide   Injectable 40 milliGRAM(s) IV Push two times a day  glucagon  Injectable 1 milliGRAM(s) IntraMuscular once PRN  heparin  Injectable 5000 Unit(s) SubCutaneous every 8 hours  insulin lispro (HumaLOG) corrective regimen sliding scale   SubCutaneous three times a day before meals  insulin lispro (HumaLOG) corrective regimen sliding scale   SubCutaneous at bedtime  metoprolol succinate ER 75 milliGRAM(s) Oral daily  oxyCODONE    5 mG/acetaminophen 325 mG 1 Tablet(s) Oral every 6 hours PRN  oxyCODONE    5 mG/acetaminophen 325 mG 2 Tablet(s) Oral every 6 hours PRN  pantoprazole    Tablet 40 milliGRAM(s) Oral before breakfast  potassium chloride    Tablet ER 40 milliEquivalent(s) Oral once  potassium chloride    Tablet ER 20 milliEquivalent(s) Oral daily  sacubitril 49 mG/valsartan 51 mG 1 Tablet(s) Oral two times a day  vancomycin  IVPB      vancomycin  IVPB 1000 milliGRAM(s) IV Intermittent every 12 hours    T(C): 36.7 (02-21-18 @ 11:00), Max: 37.4 (02-20-18 @ 20:56)  HR: 64 (02-21-18 @ 11:00) (64 - 87)  BP: 104/55 (02-21-18 @ 11:00) (91/72 - 107/58)  RR: 17 (02-21-18 @ 11:00) (17 - 18)  SpO2: 95% (02-21-18 @ 11:00) (93% - 100%)    PHYSICAL EXAM:  general-nontoxic appearing.  lungs-clear.  heart-regular.  abd-soft.  NT.  ND.  ext-(+) b/l LE erythema, edema, warmth and tenderness.  R>L.  neuro-nonfocal.                        11.9   10.0  )-----------( 189      ( 21 Feb 2018 05:33 )             38.5     02-21    139  |  100  |  29<H>  ----------------------------<  106<H>  3.0<L>   |  33<H>  |  0.84    Ca    8.8      21 Feb 2018 05:33  Phos  3.6     02-21  Mg     1.7     02-21    TPro  7.6  /  Alb  2.9<L>  /  TBili  1.2  /  DBili  x   /  AST  34  /  ALT  41  /  AlkPhos  156<H>  02-20

## 2018-02-21 NOTE — CONSULT NOTE ADULT - SUBJECTIVE AND OBJECTIVE BOX
Patient is a 57y old  Female who presents with a chief complaint of pedal edema (20 Feb 2018 15:40)      HPI:  56 yo female with PMH of chronic systolic CHF, s/p bi-v AICD, HTN, HLD, CAD, DM2, h/o vtach, asthma with recent admission 2/5-2/8 for asthma exacerbation and CHF exacerbation presents with 2 weeks of pedal edema. Pt states she has been having progressively worsening pedal edema over the past 2 weeks. She has been taking her lasix twice a day and also metolazone without improvement. She states she has pain on both legs which feels like something is stabbing her and she has difficulty ambulating. She has subjective fevers and chills. Has not taken her temperature. States her legs have been itching and she has been scratching at them. She does have multiple scratch marks on legs from scratching and she has noted to have increasing erythema to both legs. Pain worse around calves. Dopplers in ED negative for DVT. Denies any chest pain or SOB. In ED pt given vanco and ceftriaxone for possible b/l lower ext cellulitis.        PMH: as above    PSH: as above    Meds: per reconciliation sheet, noted below    MEDICATIONS  (STANDING):  amiodarone    Tablet 200 milliGRAM(s) Oral daily  aspirin  chewable 81 milliGRAM(s) Oral daily  buDESOnide 160 MICROgram(s)/formoterol 4.5 MICROgram(s) Inhaler 2 Puff(s) Inhalation two times a day  cefTRIAXone Injectable. 1000 milliGRAM(s) IV Push every 24 hours  dextrose 5%. 1000 milliLiter(s) (50 mL/Hr) IV Continuous <Continuous>  dextrose 50% Injectable 12.5 Gram(s) IV Push once  dextrose 50% Injectable 25 Gram(s) IV Push once  dextrose 50% Injectable 25 Gram(s) IV Push once  furosemide   Injectable 40 milliGRAM(s) IV Push two times a day  heparin  Injectable 5000 Unit(s) SubCutaneous every 8 hours  insulin lispro (HumaLOG) corrective regimen sliding scale   SubCutaneous three times a day before meals  insulin lispro (HumaLOG) corrective regimen sliding scale   SubCutaneous at bedtime  metoprolol succinate ER 75 milliGRAM(s) Oral daily  pantoprazole    Tablet 40 milliGRAM(s) Oral before breakfast  potassium chloride    Tablet ER 20 milliEquivalent(s) Oral daily  sacubitril 49 mG/valsartan 51 mG 1 Tablet(s) Oral two times a day  vancomycin  IVPB          Allergies    No Known Allergies    Intolerances        Social: no smoking, no alcohol, no illegal drugs; no recent travel, no exposure to TB    Family history:  Family history of other heart disease (Mother)  No pertinent family history in first degree relatives      ROS:  no HA, no dizziness, no sore throat, no blurry vision, no CP, no palpitations, no abdominal pain, no diarrhea, no N/V, no dysuria, no leg pain, no claudication, no rash, no joint aches, no rectal pain or bleeding, no night sweats    Vital Signs Last 24 Hrs  T(C): 37.1 (21 Feb 2018 04:49), Max: 37.4 (20 Feb 2018 20:56)  T(F): 98.8 (21 Feb 2018 04:49), Max: 99.3 (20 Feb 2018 20:56)  HR: 76 (21 Feb 2018 04:49) (71 - 87)  BP: 103/65 (21 Feb 2018 04:49) (91/59 - 107/58)  BP(mean): --  RR: 17 (21 Feb 2018 04:49) (15 - 18)  SpO2: 93% (21 Feb 2018 04:49) (93% - 100%)      PE:  Constitutional: frail looking  HEENT: NC/AT, EOMI, PERRLA  Neck: supple  Back: no tenderness  Respiratory: decreased breath sounds  Cardiovascular: S1S2 regular, no murmurs  Abdomen: soft, not tender, not distended, positive BS  Genitourinary: deferred  Rectal: deferred  Musculoskeletal: no muscle tenderness, no joint swelling or tenderness  Extremities: pedal edema with bullous lesions and erythema, edema, tenderness, various excoriation and abrasions noted along LE's  Neurological:  no focal deficits  Skin: no rashes    Labs:                        11.9   10.0  )-----------( 189      ( 21 Feb 2018 05:33 )             38.5     02-21    139  |  100  |  29<H>  ----------------------------<  106<H>  3.0<L>   |  33<H>  |  0.84    Ca    8.8      21 Feb 2018 05:33  Phos  3.6     02-21  Mg     1.7     02-21    TPro  7.6  /  Alb  2.9<L>  /  TBili  1.2  /  DBili  x   /  AST  34  /  ALT  41  /  AlkPhos  156<H>  02-20     LIVER FUNCTIONS - ( 20 Feb 2018 12:35 )  Alb: 2.9 g/dL / Pro: 7.6 gm/dL / ALK PHOS: 156 U/L / ALT: 41 U/L / AST: 34 U/L / GGT: x                   Radiology:  < from: US Duplex Venous Lower Ext Complete, Bilateral (02.20.18 @ 12:55) >    EXAM:  US DPLX LWR EXT VEINS COMPL BI                            PROCEDURE DATE:  02/20/2018          INTERPRETATION:  Exam Date: 2/20/2018 12:55 PM  Indication: Bilateral lower extremity swelling and calf tenderness   Comparison: 4/14/2013    COMMENT:    TECHNIQUE: Real-time duplex sonography of the deep veins of both lower   extremities with color and spectral Doppler, with and without   compression.   Image quality is good.    FINDINGS:    RIGHT:  There is normal compressibility of the common femoral, femoral,   and popliteal veins.  Visualized posterior tibial veins are within normal   limits.    Doppler examination shows normal spontaneous and phasic flow.    LEFT:  There is normal compressibility of the common femoral, femoral,   and popliteal veins. Posterior tibial veins were not well delineated..    Doppler examination shows normal spontaneous and phasic flow.    Mild bilateral calf edema.    IMPRESSION:     No sonographic evidence of acute deep venous thrombosis in the   femoropopliteal systems bilaterally.  If concern for acute deep vein   thrombosis persists, consider follow-up evaluation in 5-7 days.    Mild bilateral calf edema.    < end of copied text >      < from: Xray Chest 2 Views PA/Lat (02.20.18 @ 13:44) >    EXAM:  XR CHEST PA LAT 2V                            PROCEDURE DATE:  02/20/2018          INTERPRETATION:  Chest erect AP and lateral 2 views:    Clinical history:    Shortness of breath. Dyspnea. Pain. Infection both legs.    Findings:    Unchanged global cardiomegaly. Aorta appears normal. Left subclavian   pacemaker. Pulmonary venous congestion.    Compared to the prior radiograph of 2/6/2018, no significant change.    Impression:    Persisting cardiomegaly with mild pulmonary venous congestion.    < end of copied text >    Advanced directives addressed: full resuscitation

## 2018-02-21 NOTE — CONSULT NOTE ADULT - ASSESSMENT
58 yo female with PMH of chronic systolic CHF, s/p bi-v AICD, HTN, HLD, CAD, DM2, h/o vtach, asthma with recent admission 2/5-2/8 for asthma exacerbation and CHF exacerbation presents with 2 weeks of pedal edema. Pt states she has been having progressively worsening pedal edema over the past 2 weeks. She has been taking her lasix twice a day and also metolazone without improvement. She states she has pain on both legs which feels like something is stabbing her and she has difficulty ambulating. She has subjective fevers and chills. Has not taken her temperature. States her legs have been itching and she has been scratching at them. She does have multiple scratch marks on legs from scratching and she has noted to have increasing erythema to both legs. Pain worse around calves. Dopplers in ED negative for DVT. Denies any chest pain or SOB. In ED pt given vanco and ceftriaxone for possible b/l lower ext cellulitis.      1. b/l LE cellulitis/CHF  - agree with vancomycin [id number], check trough prior to 4th dose   - on rocephin 1gm daily #2  - continue with antibiotic coverage  - f/u cbc  - monitor temps  -tolerating abx well so far; no side effects noted  -reason for abx use and side effects reviewed with patient  - on direusis for chf  - supportive care    2. other issues - care per medicine

## 2018-02-21 NOTE — PHYSICAL THERAPY INITIAL EVALUATION ADULT - ADDITIONAL COMMENTS
Dopplers in ED negative for DVT Dopplers in ED negative for DVT  PLOF no AD. rents a room in a house that has no kitchen access. States landlord has dogs, cats and chickens and doesn't clean/ Has observed mice in house. (concerns were brought to  attention)  works from 8 pm to 9am.

## 2018-02-21 NOTE — PROGRESS NOTE ADULT - ASSESSMENT
57F.  admitted 02/20/18.  resently admitted to  02/05-02/08 for asthma and HF exacerbation.  presents to ED due to leg swelling.  onset 2 weeks prior to admission.  reports not responding to diuretics.  developed pain and redness in her calves and feet.    PMHx:  HFrEF-AICD, VT;  CAD;  type 2 DM;  HTN;  hyperlipidemia;  asthma.    b/l LE cellulitis.  -resistant and gram negative organisms are possible.  -b/l LE venous dopplers, (-) DVT.  -c/w IV ABx.  -ID following.    hypokalemia.  -monitor/supplement.    mild acute upon chronic HFrEF.  -BNP 3.5K.  -CXR, mild pulmonary vascular congestion.  -IV Lasix.  -ARNI + BB.  -Cardiology following.    hx type 2 DM.  -FSBG reviewed.  target 140-180.  -hold oral hypoglycemics.  -correction insulin coverage.    hx asthma.  -stable.  -c/w LABA/ICS.    DVT prophylaxis.  -UFH sq.    disposition.  -telemetry galarza (3N).    communication.  -d/w RN. 57F.  admitted 02/20/18.  resently admitted to  02/05-02/08 for asthma and HF exacerbation.  presents to ED due to leg swelling.  onset 2 weeks prior to admission.  reports not responding to diuretics.  developed pain and redness in her calves and feet.    PMHx:  HFrEF-AICD, VT;  CAD;  type 2 DM;  HTN;  hyperlipidemia;  asthma.    b/l LE cellulitis.  -resistant and gram negative organisms are possible.  -b/l LE venous dopplers, (-) DVT.  -c/w IV ABx.  -ID following.    hypokalemia.  -monitor/supplement.    mild acute upon chronic HFrEF.  -BNP 3.5K.  -CXR, mild pulmonary vascular congestion.  -IV Lasix.  -ARNI + BB.  -Cardiology following.    hx type 2 DM.  -FSBG reviewed.  target 140-180.  -hold oral hypoglycemics.  -correction insulin coverage.    hx asthma.  -stable.  -c/w LABA/ICS.    DVT prophylaxis.  -UFH sq.    disposition.  -telemetry galarza (3N).    communication.  -d/w RN.  -d/w Cardiology.

## 2018-02-21 NOTE — PROGRESS NOTE ADULT - SUBJECTIVE AND OBJECTIVE BOX
58 yo female with PMH of chronic systolic CHF, s/p bi-v AICD, HTN, HLD, CAD, DM2, h/o VT, AICD, asthma. Recent admission - for asthma exacerbation and CHF exacerbation presents with 2 weeks of progressive leg edema despite being on diuretics. DEnies non compliance to diet (sodium , fluid intake). She states she has pain on both legs which feels like something is stabbing her and she has difficulty ambulating. She has fevers and chills. Has not taken her temperature. States her legs have been itching and she has been scratching at them. She does have multiple scratch marks on legs from scratching and she has noted to have increasing erythema to both legs. Pain worse around calves. Dopplers in ED negative for DVT. Denies any chest pain or SOB.     Today, she feels better after IV antibiotics. Leg swelling is improved. No orthopnea. She feels tired, did not sleep last night because of being in the ER.     PAST MEDICAL & SURGICAL HISTORY:  HFrEF CHF (congestive heart failure)  NICM  Non obstructive CAD  AICD  HTN (hypertension)  HLD (hyperlipidemia)  Asthma  DM (diabetes mellitus)  History of cholecystectomy      PREVIOUS CARDIAC WORKUP:    Echo:  Severe reduced EF  Cardiac Cath:  17 - Non obstructive      Allergies:   No Known Allergies      MEDICATIONS  (STANDING):  amiodarone    Tablet 200 milliGRAM(s) Oral daily  aspirin  chewable 81 milliGRAM(s) Oral daily  buDESOnide 160 MICROgram(s)/formoterol 4.5 MICROgram(s) Inhaler 2 Puff(s) Inhalation two times a day  cefTRIAXone Injectable. 1000 milliGRAM(s) IV Push every 24 hours  dextrose 5%. 1000 milliLiter(s) (50 mL/Hr) IV Continuous <Continuous>  dextrose 50% Injectable 12.5 Gram(s) IV Push once  dextrose 50% Injectable 25 Gram(s) IV Push once  dextrose 50% Injectable 25 Gram(s) IV Push once  furosemide   Injectable 40 milliGRAM(s) IV Push two times a day  heparin  Injectable 5000 Unit(s) SubCutaneous every 8 hours  insulin lispro (HumaLOG) corrective regimen sliding scale   SubCutaneous three times a day before meals  insulin lispro (HumaLOG) corrective regimen sliding scale   SubCutaneous at bedtime  metoprolol succinate ER 75 milliGRAM(s) Oral daily  pantoprazole    Tablet 40 milliGRAM(s) Oral before breakfast  potassium chloride    Tablet ER 20 milliEquivalent(s) Oral daily  sacubitril 49 mG/valsartan 51 mG 1 Tablet(s) Oral two times a day  vancomycin  IVPB      MEDICATIONS  (PRN):  acetaminophen   Tablet 650 milliGRAM(s) Oral every 6 hours PRN For Temp greater than 38 C (100.4 F)  dextrose Gel 1 Dose(s) Oral once PRN Blood Glucose LESS THAN 70 milliGRAM(s)/deciliter  glucagon  Injectable 1 milliGRAM(s) IntraMuscular once PRN Glucose LESS THAN 70 milligrams/deciliter  oxyCODONE    5 mG/acetaminophen 325 mG 1 Tablet(s) Oral every 6 hours PRN Moderate Pain (4 - 6)  oxyCODONE    5 mG/acetaminophen 325 mG 2 Tablet(s) Oral every 6 hours PRN Severe Pain (7 - 10)      ROS:     Detailed ten system ROS was performed and was negative except for history as eluded to above.    no fever  no chills  no nausea  no vomiting  no diarrhea  no constipation  no melena  no hematochezia  no chest pain  no palpitations  no sob at rest  + dyspnea on exertion  no cough  no wheezing  no anorexia  no headache  no dizziness  no syncope  no weakness  no myalgia  no dysuria  no polyuria  no hematuria       Vital Signs Last 24 Hrs  T(C): 37.1 (2018 04:49), Max: 37.4 (2018 20:56)  T(F): 98.8 (2018 04:49), Max: 99.3 (2018 20:56)  HR: 76 (2018 04:49) (71 - 87)  BP: 103/65 (2018 04:49) (91/59 - 107/58)  BP(mean): --  RR: 17 (2018 04:49) (15 - 18)  SpO2: 93% (2018 04:49) (93% - 100%)        PHYSICAL EXAM:    General:                Comfortable, AAO X 3, in no distress.   HEENT:                  Atraumatic, PERRLA, EOMI, conjunctiva clear.   Neck:                     Supple, no adenopathy, no thyromegaly, no JVD, no bruit.  Back:                     Symmetric, non tender.  Chest:                    Exp wheeze, B/L symmetric air entry, no tachypnea  Heart:                     S1, S2 normal, no gallop, + murmur.  Abdomen:              Soft, non-tender, bowel sounds active. No palpable masses.  Extremities:           no cyanosis, + edema. Cellulitis B/L LE. multiple excoriation marks  Skin:                      Skin color, texture normal, no rash except for both both legs as described above.  Neurologic:            Grossly nonfocal.       TELEMETRY:   Normal sinus rhythm  with no tachy or lillian events     ECG:  NSR, first deg AV block, no ST T changes      LABS:                        11.9   10.0  )-----------( 189      ( 2018 05:33 )             38.5         139  |  100  |  29<H>  ----------------------------<  106<H>  3.0<L>   |  33<H>  |  0.84    Ca    8.8      2018 05:33  Phos  3.6       Mg     1.7         TPro  7.6  /  Alb  2.9<L>  /  TBili  1.2  /  DBili  x   /  AST  34  /  ALT  41  /  AlkPhos  156<H>   @ 12:35  Trop-I <0.015  CK     63    Pro BNP  3548  @ 12:35      Urinalysis Basic - ( 2018 04:00 )    Color: Yellow / Appearance: Clear / S.010 / pH: x  Gluc: x / Ketone: Negative  / Bili: Negative / Urobili: 4 mg/dL   Blood: x / Protein: 30 mg/dL / Nitrite: Negative   Leuk Esterase: Small / RBC: 0-2 /HPF / WBC 3-5   Sq Epi: x / Non Sq Epi: Moderate / Bacteria: Few      RADIOLOGY & ADDITIONAL STUDIES:    Xray Chest 2 Views PA/Lat (18 @ 13:44) >  Impression:  Persisting cardiomegaly with mild pulmonary venous congestion.    US Duplex Venous Lower Ext Complete, Bilateral (18 @ 12:55) >  IMPRESSION:  No sonographic evidence of acute deep venous thrombosis in the femoropopliteal systems bilaterally.  Mild bilateral calf edema. 58 yo female with PMH of chronic systolic CHF, s/p bi-v AICD, HTN, HLD, CAD, DM2, h/o VT, AICD, asthma. Recent admission 2/5-2/8 for asthma exacerbation and CHF exacerbation presents with 2 weeks of progressive leg edema despite being on diuretics. DEnies non compliance to diet (sodium , fluid intake). She states she has pain on both legs which feels like something is stabbing her and she has difficulty ambulating. She has fevers and chills. Has not taken her temperature. States her legs have been itching and she has been scratching at them. She does have multiple scratch marks on legs from scratching and she has noted to have increasing erythema to both legs. Pain worse around calves. Dopplers in ED negative for DVT. Denies any chest pain or SOB.     Today, she feels better after IV antibiotics. Leg swelling is improved. No orthopnea. She feels tired, did not sleep last night because of being in the ER.     PAST MEDICAL & SURGICAL HISTORY:  HFrEF CHF (congestive heart failure)  NICM  Non obstructive CAD  AICD  HTN (hypertension)  HLD (hyperlipidemia)  Asthma  DM (diabetes mellitus)  History of cholecystectomy      PREVIOUS CARDIAC WORKUP:    Echo:  Severe reduced EF  Cardiac Cath:  8/24/17 - Non obstructive      Allergies:   No Known Allergies      MEDICATIONS  (STANDING):  amiodarone    Tablet 200 milliGRAM(s) Oral daily  aspirin  chewable 81 milliGRAM(s) Oral daily  buDESOnide 160 MICROgram(s)/formoterol 4.5 MICROgram(s) Inhaler 2 Puff(s) Inhalation two times a day  cefTRIAXone Injectable. 1000 milliGRAM(s) IV Push every 24 hours  dextrose 5%. 1000 milliLiter(s) (50 mL/Hr) IV Continuous <Continuous>  dextrose 50% Injectable 12.5 Gram(s) IV Push once  dextrose 50% Injectable 25 Gram(s) IV Push once  dextrose 50% Injectable 25 Gram(s) IV Push once  furosemide   Injectable 40 milliGRAM(s) IV Push two times a day  heparin  Injectable 5000 Unit(s) SubCutaneous every 8 hours  insulin lispro (HumaLOG) corrective regimen sliding scale   SubCutaneous three times a day before meals  insulin lispro (HumaLOG) corrective regimen sliding scale   SubCutaneous at bedtime  metoprolol succinate ER 75 milliGRAM(s) Oral daily  pantoprazole    Tablet 40 milliGRAM(s) Oral before breakfast  potassium chloride    Tablet ER 20 milliEquivalent(s) Oral daily  sacubitril 49 mG/valsartan 51 mG 1 Tablet(s) Oral two times a day  vancomycin  IVPB      MEDICATIONS  (PRN):  acetaminophen   Tablet 650 milliGRAM(s) Oral every 6 hours PRN For Temp greater than 38 C (100.4 F)  dextrose Gel 1 Dose(s) Oral once PRN Blood Glucose LESS THAN 70 milliGRAM(s)/deciliter  glucagon  Injectable 1 milliGRAM(s) IntraMuscular once PRN Glucose LESS THAN 70 milligrams/deciliter  oxyCODONE    5 mG/acetaminophen 325 mG 1 Tablet(s) Oral every 6 hours PRN Moderate Pain (4 - 6)  oxyCODONE    5 mG/acetaminophen 325 mG 2 Tablet(s) Oral every 6 hours PRN Severe Pain (7 - 10)      ROS:     Detailed ten system ROS was performed and was negative except for history as eluded to above.    no fever  no chills  no nausea  no vomiting  no diarrhea  no constipation  no melena  no hematochezia  no chest pain  no palpitations  no sob at rest  + dyspnea on exertion  no cough  no wheezing  no anorexia  no headache  no dizziness  no syncope  no weakness  no myalgia  no dysuria  no polyuria  no hematuria       Vital Signs Last 24 Hrs  T(C): 37.1 (21 Feb 2018 04:49), Max: 37.4 (20 Feb 2018 20:56)  T(F): 98.8 (21 Feb 2018 04:49), Max: 99.3 (20 Feb 2018 20:56)  HR: 76 (21 Feb 2018 04:49) (71 - 87)  BP: 103/65 (21 Feb 2018 04:49) (91/59 - 107/58)  BP(mean): --  RR: 17 (21 Feb 2018 04:49) (15 - 18)  SpO2: 93% (21 Feb 2018 04:49) (93% - 100%)        PHYSICAL EXAM:    General:                Comfortable, AAO X 3, in no distress.   HEENT:                  Atraumatic, PERRLA, EOMI, conjunctiva clear.   Neck:                     Supple, no adenopathy, no thyromegaly, no JVD, no bruit.  Back:                     Symmetric, non tender.  Chest:                    Exp wheeze, B/L symmetric air entry, no tachypnea  Heart:                     S1, S2 normal, no gallop, + murmur.  Abdomen:              Soft, non-tender, bowel sounds active. No palpable masses.  Extremities:           no cyanosis, + edema. Cellulitis B/L LE. multiple excoriation marks  Skin:                      Skin color, texture normal, no rash except for both both legs as described above.  Neurologic:            Grossly nonfocal.       TELEMETRY:   Normal sinus rhythm  with no tachy or lillian events     ECG:  NSR, first deg AV block, no ST T changes      LABS:                        11.9   10.0  )-----------( 189      ( 21 Feb 2018 05:33 )             38.5     02-21    139  |  100  |  29<H>  ----------------------------<  106<H>  3.0<L>   |  33<H>  |  0.84    Ca    8.8      21 Feb 2018 05:33  Phos  3.6     02-21  Mg     1.7     02-21    TPro  7.6  /  Alb  2.9<L>  /  TBili  1.2  /  DBili  x   /  AST  34  /  ALT  41  /  AlkPhos  156<H>  02-20 02-20 @ 12:35  Trop-I <0.015  CK     63    Pro BNP  3548 02-20 @ 12:35      RADIOLOGY & ADDITIONAL STUDIES:    Xray Chest 2 Views PA/Lat (02.20.18 @ 13:44) >  Impression:  Persisting cardiomegaly with mild pulmonary venous congestion.    US Duplex Venous Lower Ext Complete, Bilateral (02.20.18 @ 12:55) >  IMPRESSION:  No sonographic evidence of acute deep venous thrombosis in the femoropopliteal systems bilaterally.  Mild bilateral calf edema.

## 2018-02-22 LAB
ANION GAP SERPL CALC-SCNC: 7 MMOL/L — SIGNIFICANT CHANGE UP (ref 5–17)
APPEARANCE UR: CLEAR — SIGNIFICANT CHANGE UP
BACTERIA # UR AUTO: (no result)
BILIRUB UR-MCNC: NEGATIVE — SIGNIFICANT CHANGE UP
BUN SERPL-MCNC: 29 MG/DL — HIGH (ref 7–23)
CALCIUM SERPL-MCNC: 8.5 MG/DL — SIGNIFICANT CHANGE UP (ref 8.5–10.1)
CHLORIDE SERPL-SCNC: 95 MMOL/L — LOW (ref 96–108)
CO2 SERPL-SCNC: 34 MMOL/L — HIGH (ref 22–31)
COLOR SPEC: YELLOW — SIGNIFICANT CHANGE UP
CREAT SERPL-MCNC: 0.96 MG/DL — SIGNIFICANT CHANGE UP (ref 0.5–1.3)
DIFF PNL FLD: (no result)
EPI CELLS # UR: (no result)
GLUCOSE BLDC GLUCOMTR-MCNC: 111 MG/DL — HIGH (ref 70–99)
GLUCOSE BLDC GLUCOMTR-MCNC: 134 MG/DL — HIGH (ref 70–99)
GLUCOSE BLDC GLUCOMTR-MCNC: 145 MG/DL — HIGH (ref 70–99)
GLUCOSE BLDC GLUCOMTR-MCNC: 85 MG/DL — SIGNIFICANT CHANGE UP (ref 70–99)
GLUCOSE SERPL-MCNC: 84 MG/DL — SIGNIFICANT CHANGE UP (ref 70–99)
GLUCOSE UR QL: NEGATIVE MG/DL — SIGNIFICANT CHANGE UP
HCT VFR BLD CALC: 39 % — SIGNIFICANT CHANGE UP (ref 34.5–45)
HGB BLD-MCNC: 11.9 G/DL — SIGNIFICANT CHANGE UP (ref 11.5–15.5)
KETONES UR-MCNC: NEGATIVE — SIGNIFICANT CHANGE UP
LEUKOCYTE ESTERASE UR-ACNC: (no result)
MCHC RBC-ENTMCNC: 24.9 PG — LOW (ref 27–34)
MCHC RBC-ENTMCNC: 30.6 GM/DL — LOW (ref 32–36)
MCV RBC AUTO: 81.3 FL — SIGNIFICANT CHANGE UP (ref 80–100)
NITRITE UR-MCNC: NEGATIVE — SIGNIFICANT CHANGE UP
PH UR: 7 — SIGNIFICANT CHANGE UP (ref 5–8)
PLATELET # BLD AUTO: 181 K/UL — SIGNIFICANT CHANGE UP (ref 150–400)
POTASSIUM SERPL-MCNC: 3.1 MMOL/L — LOW (ref 3.5–5.3)
POTASSIUM SERPL-SCNC: 3.1 MMOL/L — LOW (ref 3.5–5.3)
PROT UR-MCNC: 30 MG/DL
RBC # BLD: 4.79 M/UL — SIGNIFICANT CHANGE UP (ref 3.8–5.2)
RBC # FLD: 18.3 % — HIGH (ref 10.3–14.5)
RBC CASTS # UR COMP ASSIST: SIGNIFICANT CHANGE UP /HPF (ref 0–4)
SODIUM SERPL-SCNC: 136 MMOL/L — SIGNIFICANT CHANGE UP (ref 135–145)
SP GR SPEC: 1.01 — SIGNIFICANT CHANGE UP (ref 1.01–1.02)
UROBILINOGEN FLD QL: 4 MG/DL
VANCOMYCIN TROUGH SERPL-MCNC: 38 UG/ML — CRITICAL HIGH (ref 10–20)
WBC # BLD: 8 K/UL — SIGNIFICANT CHANGE UP (ref 3.8–10.5)
WBC # FLD AUTO: 8 K/UL — SIGNIFICANT CHANGE UP (ref 3.8–10.5)
WBC UR QL: SIGNIFICANT CHANGE UP

## 2018-02-22 PROCEDURE — 93283 PRGRMG EVAL IMPLANTABLE DFB: CPT | Mod: 26

## 2018-02-22 RX ORDER — POTASSIUM CHLORIDE 20 MEQ
40 PACKET (EA) ORAL ONCE
Qty: 0 | Refills: 0 | Status: COMPLETED | OUTPATIENT
Start: 2018-02-22 | End: 2018-02-22

## 2018-02-22 RX ORDER — MAGNESIUM OXIDE 400 MG ORAL TABLET 241.3 MG
400 TABLET ORAL
Qty: 0 | Refills: 0 | Status: DISCONTINUED | OUTPATIENT
Start: 2018-02-22 | End: 2018-03-04

## 2018-02-22 RX ORDER — POTASSIUM CHLORIDE 20 MEQ
20 PACKET (EA) ORAL ONCE
Qty: 0 | Refills: 0 | Status: COMPLETED | OUTPATIENT
Start: 2018-02-22 | End: 2018-02-22

## 2018-02-22 RX ADMIN — Medication 40 MILLIGRAM(S): at 17:26

## 2018-02-22 RX ADMIN — CEFTRIAXONE 1000 MILLIGRAM(S): 500 INJECTION, POWDER, FOR SOLUTION INTRAMUSCULAR; INTRAVENOUS at 06:11

## 2018-02-22 RX ADMIN — HEPARIN SODIUM 5000 UNIT(S): 5000 INJECTION INTRAVENOUS; SUBCUTANEOUS at 21:45

## 2018-02-22 RX ADMIN — Medication 81 MILLIGRAM(S): at 12:13

## 2018-02-22 RX ADMIN — Medication 20 MILLIEQUIVALENT(S): at 09:08

## 2018-02-22 RX ADMIN — PANTOPRAZOLE SODIUM 40 MILLIGRAM(S): 20 TABLET, DELAYED RELEASE ORAL at 06:10

## 2018-02-22 RX ADMIN — Medication 250 MILLIGRAM(S): at 17:26

## 2018-02-22 RX ADMIN — HEPARIN SODIUM 5000 UNIT(S): 5000 INJECTION INTRAVENOUS; SUBCUTANEOUS at 13:41

## 2018-02-22 RX ADMIN — Medication 20 MILLIEQUIVALENT(S): at 21:45

## 2018-02-22 RX ADMIN — BUDESONIDE AND FORMOTEROL FUMARATE DIHYDRATE 2 PUFF(S): 160; 4.5 AEROSOL RESPIRATORY (INHALATION) at 10:48

## 2018-02-22 RX ADMIN — SACUBITRIL AND VALSARTAN 1 TABLET(S): 24; 26 TABLET, FILM COATED ORAL at 17:26

## 2018-02-22 RX ADMIN — Medication 250 MILLIGRAM(S): at 06:11

## 2018-02-22 RX ADMIN — Medication 40 MILLIEQUIVALENT(S): at 10:35

## 2018-02-22 RX ADMIN — HEPARIN SODIUM 5000 UNIT(S): 5000 INJECTION INTRAVENOUS; SUBCUTANEOUS at 06:11

## 2018-02-22 RX ADMIN — BUDESONIDE AND FORMOTEROL FUMARATE DIHYDRATE 2 PUFF(S): 160; 4.5 AEROSOL RESPIRATORY (INHALATION) at 20:01

## 2018-02-22 RX ADMIN — AMIODARONE HYDROCHLORIDE 200 MILLIGRAM(S): 400 TABLET ORAL at 06:10

## 2018-02-22 RX ADMIN — MAGNESIUM OXIDE 400 MG ORAL TABLET 400 MILLIGRAM(S): 241.3 TABLET ORAL at 17:26

## 2018-02-22 NOTE — PROGRESS NOTE ADULT - ASSESSMENT
57F.  admitted 02/20/18.  resently admitted to  02/05-02/08 for asthma and HF exacerbation.  presents to ED due to leg swelling.  onset 2 weeks prior to admission.  reports not responding to diuretics.  developed pain and redness in her calves and feet.    PMHx:  HFrEF-AICD, VT;  CAD;  type 2 DM;  HTN;  hyperlipidemia;  asthma.    b/l LE cellulitis.  -demonstrable improvement upon visual inspection today.  -resistant and gram negative organisms are possible.  -b/l LE venous dopplers, (-) DVT.  -c/w IV ABx.  -ID following.    prolonged QTc.  hx VT.  -c/w telemetry monitoring.  -keep potassium >=4.0 and magnesium >=2.0.  -EP consult.    hypokalemia.  -monitor/supplement.    mild acute upon chronic HFrEF-AICD.  -3+ LE pitting edema, unchanged.  -BNP 3.5K.  -CXR, mild pulmonary vascular congestion.  -Lasix 40mg IV bid.  caution w/ electrolyte abnormalities w/ prolonged QTc.  -ARNI + BB.  -Cardiology following.    hx type 2 DM.  -FSBG reviewed.  target 140-180.  -hold oral hypoglycemics.  -correction insulin coverage.    hx asthma.  -stable.  -c/w LABA/ICS.    DVT prophylaxis.  -UFH sq.    disposition.  -telemetry galarza (3N).    communication.  -d/w RN.

## 2018-02-22 NOTE — PROGRESS NOTE ADULT - SUBJECTIVE AND OBJECTIVE BOX
CC:  Patient is a 57y old  Female who presents with a chief complaint of pedal edema (20 Feb 2018 15:40)    SUBJECTIVE:  remarks that both her legs feeling better.  furthermore, breathing comfortably.    ROS:  as above.    acetaminophen   Tablet 650 milliGRAM(s) Oral every 6 hours PRN  amiodarone    Tablet 200 milliGRAM(s) Oral daily  aspirin  chewable 81 milliGRAM(s) Oral daily  buDESOnide 160 MICROgram(s)/formoterol 4.5 MICROgram(s) Inhaler 2 Puff(s) Inhalation two times a day  cefTRIAXone Injectable. 1000 milliGRAM(s) IV Push every 24 hours  dextrose 5%. 1000 milliLiter(s) IV Continuous <Continuous>  dextrose 50% Injectable 12.5 Gram(s) IV Push once  dextrose 50% Injectable 25 Gram(s) IV Push once  dextrose 50% Injectable 25 Gram(s) IV Push once  dextrose Gel 1 Dose(s) Oral once PRN  furosemide   Injectable 40 milliGRAM(s) IV Push two times a day  glucagon  Injectable 1 milliGRAM(s) IntraMuscular once PRN  heparin  Injectable 5000 Unit(s) SubCutaneous every 8 hours  insulin lispro (HumaLOG) corrective regimen sliding scale   SubCutaneous three times a day before meals  insulin lispro (HumaLOG) corrective regimen sliding scale   SubCutaneous at bedtime  magnesium oxide 400 milliGRAM(s) Oral two times a day with meals  metoprolol succinate ER 75 milliGRAM(s) Oral daily  oxyCODONE    5 mG/acetaminophen 325 mG 1 Tablet(s) Oral every 6 hours PRN  oxyCODONE    5 mG/acetaminophen 325 mG 2 Tablet(s) Oral every 6 hours PRN  pantoprazole    Tablet 40 milliGRAM(s) Oral before breakfast  potassium chloride    Tablet ER 20 milliEquivalent(s) Oral daily  potassium chloride    Tablet ER 40 milliEquivalent(s) Oral once  potassium chloride    Tablet ER 20 milliEquivalent(s) Oral once  sacubitril 49 mG/valsartan 51 mG 1 Tablet(s) Oral two times a day  vancomycin  IVPB      vancomycin  IVPB 1000 milliGRAM(s) IV Intermittent every 12 hours    T(C): 36.8 (02-22-18 @ 04:58), Max: 37.1 (02-21-18 @ 17:18)  HR: 72 (02-22-18 @ 04:58) (64 - 86)  BP: 91/60 (02-22-18 @ 06:14) (86/50 - 104/55)  RR: 18 (02-22-18 @ 04:58) (17 - 18)  SpO2: 100% (02-22-18 @ 04:58) (95% - 100%)    PHYSICAL EXAM:  general-nontoxic appearing.  lungs-clear.  heart-regular.  abd-soft.  NT.  ND.  ext-(+) b/l LE erythema w/ demonstrable improvement, (+) 3+ pitting edema.  R>L.  neuro-nonfocal.                        11.9   8.0   )-----------( 181      ( 22 Feb 2018 06:12 )             39.0     02-22    136  |  95<L>  |  29<H>  ----------------------------<  84  3.1<L>   |  34<H>  |  0.96    Ca    8.5      22 Feb 2018 06:12  Phos  3.6     02-21  Mg     1.7     02-21    TPro  7.6  /  Alb  2.9<L>  /  TBili  1.2  /  DBili  x   /  AST  34  /  ALT  41  /  AlkPhos  156<H>  02-20    < from: 12 Lead ECG (02.21.18 @ 07:43) >  QTC Calculation(Bezet) 535 ms    < end of copied text >

## 2018-02-22 NOTE — PROGRESS NOTE ADULT - SUBJECTIVE AND OBJECTIVE BOX
58 yo female with PMH of chronic systolic CHF, s/p bi-v AICD, HTN, HLD, CAD, DM2, h/o VT, AICD, asthma. Recent admission 2/5-2/8 for asthma exacerbation and CHF exacerbation presents with 2 weeks of progressive leg edema despite being on diuretics. DEnies non compliance to diet (sodium , fluid intake). She states she has pain on both legs which feels like something is stabbing her and she has difficulty ambulating. She has fevers and chills. Has not taken her temperature. States her legs have been itching and she has been scratching at them. She does have multiple scratch marks on legs from scratching and she has noted to have increasing erythema to both legs. Pain worse around calves. Dopplers in ED negative for DVT. Denies any chest pain or SOB.     Today, she feels better. She is ambulating in the hallway with the help of physical therapist. Leg edema is better. No itching on the legs. No chest pain. No orthopnea.  a    PAST MEDICAL & SURGICAL HISTORY:  HFrEF CHF (congestive heart failure)  NICM  Non obstructive CAD  AICD  HTN (hypertension)  HLD (hyperlipidemia)  Asthma  DM (diabetes mellitus)  History of cholecystectomy      PREVIOUS CARDIAC WORKUP:    Echo:  Severe reduced EF  Cardiac Cath:  8/24/17 - Non obstructive    Allergies:   No Known Allergies      MEDICATIONS  (STANDING):  amiodarone    Tablet 200 milliGRAM(s) Oral daily  aspirin  chewable 81 milliGRAM(s) Oral daily  buDESOnide 160 MICROgram(s)/formoterol 4.5 MICROgram(s) Inhaler 2 Puff(s) Inhalation two times a day  cefTRIAXone Injectable. 1000 milliGRAM(s) IV Push every 24 hours  dextrose 5%. 1000 milliLiter(s) (50 mL/Hr) IV Continuous <Continuous>  dextrose 50% Injectable 12.5 Gram(s) IV Push once  dextrose 50% Injectable 25 Gram(s) IV Push once  dextrose 50% Injectable 25 Gram(s) IV Push once  furosemide   Injectable 40 milliGRAM(s) IV Push two times a day  heparin  Injectable 5000 Unit(s) SubCutaneous every 8 hours  insulin lispro (HumaLOG) corrective regimen sliding scale   SubCutaneous three times a day before meals  insulin lispro (HumaLOG) corrective regimen sliding scale   SubCutaneous at bedtime  magnesium oxide 400 milliGRAM(s) Oral two times a day with meals  metoprolol succinate ER 75 milliGRAM(s) Oral daily  pantoprazole    Tablet 40 milliGRAM(s) Oral before breakfast  potassium chloride    Tablet ER 20 milliEquivalent(s) Oral daily  sacubitril 49 mG/valsartan 51 mG 1 Tablet(s) Oral two times a day  vancomycin  IVPB      vancomycin  IVPB 1000 milliGRAM(s) IV Intermittent every 12 hours    MEDICATIONS  (PRN):  acetaminophen   Tablet 650 milliGRAM(s) Oral every 6 hours PRN For Temp greater than 38 C (100.4 F)  dextrose Gel 1 Dose(s) Oral once PRN Blood Glucose LESS THAN 70 milliGRAM(s)/deciliter  glucagon  Injectable 1 milliGRAM(s) IntraMuscular once PRN Glucose LESS THAN 70 milligrams/deciliter  oxyCODONE    5 mG/acetaminophen 325 mG 1 Tablet(s) Oral every 6 hours PRN Moderate Pain (4 - 6)  oxyCODONE    5 mG/acetaminophen 325 mG 2 Tablet(s) Oral every 6 hours PRN Severe Pain (7 - 10)      ROS:     Detailed ten system ROS was performed and was negative except for history as eluded to above.    no fever  no chills  no nausea  no vomiting  no diarrhea  no constipation  no melena  no hematochezia  no chest pain  no palpitations  no sob at rest  + dyspnea on exertion  no cough  no wheezing  no anorexia  no headache  no dizziness  no syncope  no weakness  no myalgia  no dysuria  no polyuria  no hematuria       Vital Signs Last 24 Hrs  T(C): 36.8 (02-22-18 @ 04:58), Max: 37.1 (02-21-18 @ 17:18)  HR: 72 (02-22-18 @ 04:58) (64 - 86)  BP: 91/60 (02-22-18 @ 06:14) (86/50 - 104/55)  RR: 18 (02-22-18 @ 04:58) (17 - 18)  SpO2: 100% (02-22-18 @ 04:58) (95% - 100%)    I&O's Summary    21 Feb 2018 07:01  -  22 Feb 2018 07:00  --------------------------------------------------------  IN: 950 mL / OUT: 1000 mL / NET: -50 mL        PHYSICAL EXAM:    General:                Comfortable, AAO X 3, in no distress.   HEENT:                  Atraumatic, PERRLA, EOMI, conjunctiva clear.   Neck:                     Supple, no adenopathy, no thyromegaly, no JVD, no bruit.  Back:                     Symmetric, non tender.  Chest:                    Exp wheeze, B/L symmetric air entry, no tachypnea  Heart:                     S1, S2 normal, no gallop, + murmur.  Abdomen:              Soft, non-tender, bowel sounds active. No palpable masses.  Extremities:           no cyanosis, + edema. Cellulitis B/L LE. multiple excoriation marks  Skin:                      Skin color, texture normal, no rash except for both both legs as described above.  Neurologic:            Grossly nonfocal.       TELEMETRY:   Normal sinus rhythm  with no tachy or lillian events     ECG: A paced, first deg AV block, no ST T changes. QTc prolongation.      LABS:                        11.9   8.0   )-----------( 181      ( 22 Feb 2018 06:12 )             39.0     02-22    136  |  95<L>  |  29<H>  ----------------------------<  84  3.1<L>   |  34<H>  |  0.96    Ca    8.5      22 Feb 2018 06:12  Phos  3.6     02-21  Mg     1.7     02-21 02-20 @ 12:35  Trop-I <0.015  CK     63    Pro BNP  3548 02-20 @ 12:35

## 2018-02-22 NOTE — PROGRESS NOTE ADULT - ASSESSMENT
B/L LE cellulitis  Hypokalemia  Acute on Chronic HFrEF  NICM  Non obstructive CAD  HTN  AICD    Suggest:    Continue ABx, Leg elevation, Supportive skin care  Supplement K, Keep > 4. Increase supplement.   Continue treatment of CHF with diuretics.   Hold Entresto if BP is lower  Check BNP  follow low sodium, low cholesterol, ADA diet.  Fluid restriction 1.5 lit/day  monitor Intake & output  Daily weights.  Follow up electrolytes, renal function.   Continue beta blockers.  Monitor QTc after correcting K

## 2018-02-22 NOTE — PROGRESS NOTE ADULT - ASSESSMENT
58 yo female with PMH of chronic systolic CHF, s/p bi-v AICD, HTN, HLD, CAD, DM2, h/o vtach, asthma with recent admission 2/5-2/8 for asthma exacerbation and CHF exacerbation presents with 2 weeks of pedal edema. Pt states she has been having progressively worsening pedal edema over the past 2 weeks. She has been taking her lasix twice a day and also metolazone without improvement. She states she has pain on both legs which feels like something is stabbing her and she has difficulty ambulating. She has subjective fevers and chills. Has not taken her temperature. States her legs have been itching and she has been scratching at them. She does have multiple scratch marks on legs from scratching and she has noted to have increasing erythema to both legs. Pain worse around calves. Dopplers in ED negative for DVT. Denies any chest pain or SOB. In ED pt given vanco and ceftriaxone for possible b/l lower ext cellulitis.      1. b/l LE cellulitis/CHF/asthma  - improving  - on vancomycin [id number] #3, f/u trough prior to 4th dose  - on rocephin 1gm daily #3  - continue with antibiotic coverage  - blood cx no growth  - once further improved in next 24-48 hours, switch to po doxy 100mg BID to complete 7 day course  - monitor temps  -tolerating abx well so far; no side effects noted  -reason for abx use and side effects reviewed with patient  - on direusis for chf  - supportive care    2. other issues - care per medicine

## 2018-02-22 NOTE — PROGRESS NOTE ADULT - SUBJECTIVE AND OBJECTIVE BOX
58 yo female with PMH of chronic systolic CHF, s/p bi-v AICD, HTN, HLD, CAD, DM2, h/o vtach, asthma with recent admission 2/5-2/8 for asthma exacerbation and CHF exacerbation presents with 2 weeks of pedal edema. Pt states she has been having progressively worsening pedal edema over the past 2 weeks. She has been taking her lasix twice a day and also metolazone without improvement. She states she has pain on both legs which feels like something is stabbing her and she has difficulty ambulating. She has subjective fevers and chills. Has not taken her temperature. States her legs have been itching and she has been scratching at them. She does have multiple scratch marks on legs from scratching and she has noted to have increasing erythema to both legs. Pain worse around calves. Dopplers in ED negative for DVT. Denies any chest pain or SOB. In ED pt given vanco and ceftriaxone for possible b/l lower ext cellulitis.        Date of Service: 02-22-18 @ 13:57    feels better  LEs/feet less red/tender    MEDICATIONS  (STANDING):  amiodarone    Tablet 200 milliGRAM(s) Oral daily  aspirin  chewable 81 milliGRAM(s) Oral daily  buDESOnide 160 MICROgram(s)/formoterol 4.5 MICROgram(s) Inhaler 2 Puff(s) Inhalation two times a day  cefTRIAXone Injectable. 1000 milliGRAM(s) IV Push every 24 hours  dextrose 5%. 1000 milliLiter(s) (50 mL/Hr) IV Continuous <Continuous>  dextrose 50% Injectable 12.5 Gram(s) IV Push once  dextrose 50% Injectable 25 Gram(s) IV Push once  dextrose 50% Injectable 25 Gram(s) IV Push once  furosemide   Injectable 40 milliGRAM(s) IV Push two times a day  heparin  Injectable 5000 Unit(s) SubCutaneous every 8 hours  insulin lispro (HumaLOG) corrective regimen sliding scale   SubCutaneous three times a day before meals  insulin lispro (HumaLOG) corrective regimen sliding scale   SubCutaneous at bedtime  magnesium oxide 400 milliGRAM(s) Oral two times a day with meals  metoprolol succinate ER 75 milliGRAM(s) Oral daily  pantoprazole    Tablet 40 milliGRAM(s) Oral before breakfast  potassium chloride    Tablet ER 20 milliEquivalent(s) Oral daily  potassium chloride    Tablet ER 20 milliEquivalent(s) Oral once  sacubitril 49 mG/valsartan 51 mG 1 Tablet(s) Oral two times a day  vancomycin  IVPB      vancomycin  IVPB 1000 milliGRAM(s) IV Intermittent every 12 hours      Vital Signs Last 24 Hrs  T(C): 36.4 (22 Feb 2018 11:25), Max: 37.1 (21 Feb 2018 17:18)  T(F): 97.6 (22 Feb 2018 11:25), Max: 98.8 (21 Feb 2018 17:18)  HR: 80 (22 Feb 2018 11:25) (72 - 86)  BP: 87/50 (22 Feb 2018 11:25) (86/50 - 91/60)  BP(mean): --  RR: 17 (22 Feb 2018 11:25) (17 - 18)  SpO2: 96% (22 Feb 2018 11:25) (96% - 100%)              PE:  Constitutional: frail looking  HEENT: NC/AT, EOMI, PERRLA  Neck: supple  Back: no tenderness  Respiratory: decreased breath sounds  Cardiovascular: S1S2 regular, no murmurs  Abdomen: soft, not tender, not distended, positive BS  Genitourinary: deferred  Rectal: deferred  Musculoskeletal: no muscle tenderness, no joint swelling or tenderness  Extremities: pedal edema with bullous lesions and resolving erythema, edema, tenderness, various excoriation and abrasions noted along LE's  Neurological:  no focal deficits  Skin: no rashes    Labs:                                   11.9   8.0   )-----------( 181      ( 22 Feb 2018 06:12 )             39.0     02-22    136  |  95<L>  |  29<H>  ----------------------------<  84  3.1<L>   |  34<H>  |  0.96    Ca    8.5      22 Feb 2018 06:12  Phos  3.6     02-21  Mg     1.7     02-21             Cultures:         Culture - Blood (02.20.18 @ 12:35)    Specimen Source: .Blood None    Culture Results:   No growth to date.    Culture - Blood (02.20.18 @ 12:35)    Specimen Source: .Blood None    Culture Results:   No growth to date.        Radiology:  < from: US Duplex Venous Lower Ext Complete, Bilateral (02.20.18 @ 12:55) >    EXAM:  US DPLX LWR EXT VEINS COMPL BI                            PROCEDURE DATE:  02/20/2018          INTERPRETATION:  Exam Date: 2/20/2018 12:55 PM  Indication: Bilateral lower extremity swelling and calf tenderness   Comparison: 4/14/2013    COMMENT:    TECHNIQUE: Real-time duplex sonography of the deep veins of both lower   extremities with color and spectral Doppler, with and without   compression.   Image quality is good.    FINDINGS:    RIGHT:  There is normal compressibility of the common femoral, femoral,   and popliteal veins.  Visualized posterior tibial veins are within normal   limits.    Doppler examination shows normal spontaneous and phasic flow.    LEFT:  There is normal compressibility of the common femoral, femoral,   and popliteal veins. Posterior tibial veins were not well delineated..    Doppler examination shows normal spontaneous and phasic flow.    Mild bilateral calf edema.    IMPRESSION:     No sonographic evidence of acute deep venous thrombosis in the   femoropopliteal systems bilaterally.  If concern for acute deep vein   thrombosis persists, consider follow-up evaluation in 5-7 days.    Mild bilateral calf edema.    < end of copied text >      < from: Xray Chest 2 Views PA/Lat (02.20.18 @ 13:44) >    EXAM:  XR CHEST PA LAT 2V                            PROCEDURE DATE:  02/20/2018          INTERPRETATION:  Chest erect AP and lateral 2 views:    Clinical history:    Shortness of breath. Dyspnea. Pain. Infection both legs.    Findings:    Unchanged global cardiomegaly. Aorta appears normal. Left subclavian   pacemaker. Pulmonary venous congestion.    Compared to the prior radiograph of 2/6/2018, no significant change.    Impression:    Persisting cardiomegaly with mild pulmonary venous congestion.      Advanced directives addressed: full resuscitation

## 2018-02-23 LAB
ANION GAP SERPL CALC-SCNC: 5 MMOL/L — SIGNIFICANT CHANGE UP (ref 5–17)
BUN SERPL-MCNC: 21 MG/DL — SIGNIFICANT CHANGE UP (ref 7–23)
CALCIUM SERPL-MCNC: 8.7 MG/DL — SIGNIFICANT CHANGE UP (ref 8.5–10.1)
CHLORIDE SERPL-SCNC: 94 MMOL/L — LOW (ref 96–108)
CO2 SERPL-SCNC: 36 MMOL/L — HIGH (ref 22–31)
CREAT SERPL-MCNC: 0.76 MG/DL — SIGNIFICANT CHANGE UP (ref 0.5–1.3)
CULTURE RESULTS: NO GROWTH — SIGNIFICANT CHANGE UP
GLUCOSE BLDC GLUCOMTR-MCNC: 105 MG/DL — HIGH (ref 70–99)
GLUCOSE BLDC GLUCOMTR-MCNC: 106 MG/DL — HIGH (ref 70–99)
GLUCOSE BLDC GLUCOMTR-MCNC: 118 MG/DL — HIGH (ref 70–99)
GLUCOSE BLDC GLUCOMTR-MCNC: 125 MG/DL — HIGH (ref 70–99)
GLUCOSE SERPL-MCNC: 92 MG/DL — SIGNIFICANT CHANGE UP (ref 70–99)
MAGNESIUM SERPL-MCNC: 1.8 MG/DL — SIGNIFICANT CHANGE UP (ref 1.6–2.6)
POTASSIUM SERPL-MCNC: 3.2 MMOL/L — LOW (ref 3.5–5.3)
POTASSIUM SERPL-SCNC: 3.2 MMOL/L — LOW (ref 3.5–5.3)
SODIUM SERPL-SCNC: 135 MMOL/L — SIGNIFICANT CHANGE UP (ref 135–145)
SPECIMEN SOURCE: SIGNIFICANT CHANGE UP
VANCOMYCIN TROUGH SERPL-MCNC: 15 UG/ML — SIGNIFICANT CHANGE UP (ref 10–20)

## 2018-02-23 RX ORDER — POTASSIUM CHLORIDE 20 MEQ
40 PACKET (EA) ORAL EVERY 4 HOURS
Qty: 0 | Refills: 0 | Status: COMPLETED | OUTPATIENT
Start: 2018-02-23 | End: 2018-02-23

## 2018-02-23 RX ADMIN — Medication 40 MILLIEQUIVALENT(S): at 16:58

## 2018-02-23 RX ADMIN — SACUBITRIL AND VALSARTAN 1 TABLET(S): 24; 26 TABLET, FILM COATED ORAL at 05:26

## 2018-02-23 RX ADMIN — MAGNESIUM OXIDE 400 MG ORAL TABLET 400 MILLIGRAM(S): 241.3 TABLET ORAL at 12:32

## 2018-02-23 RX ADMIN — AMIODARONE HYDROCHLORIDE 200 MILLIGRAM(S): 400 TABLET ORAL at 05:26

## 2018-02-23 RX ADMIN — HEPARIN SODIUM 5000 UNIT(S): 5000 INJECTION INTRAVENOUS; SUBCUTANEOUS at 05:26

## 2018-02-23 RX ADMIN — Medication 20 MILLIEQUIVALENT(S): at 12:36

## 2018-02-23 RX ADMIN — Medication 81 MILLIGRAM(S): at 12:32

## 2018-02-23 RX ADMIN — BUDESONIDE AND FORMOTEROL FUMARATE DIHYDRATE 2 PUFF(S): 160; 4.5 AEROSOL RESPIRATORY (INHALATION) at 08:01

## 2018-02-23 RX ADMIN — PANTOPRAZOLE SODIUM 40 MILLIGRAM(S): 20 TABLET, DELAYED RELEASE ORAL at 05:26

## 2018-02-23 RX ADMIN — Medication 75 MILLIGRAM(S): at 05:26

## 2018-02-23 RX ADMIN — Medication 250 MILLIGRAM(S): at 06:27

## 2018-02-23 RX ADMIN — Medication 40 MILLIGRAM(S): at 05:26

## 2018-02-23 RX ADMIN — MAGNESIUM OXIDE 400 MG ORAL TABLET 400 MILLIGRAM(S): 241.3 TABLET ORAL at 17:02

## 2018-02-23 RX ADMIN — CEFTRIAXONE 1000 MILLIGRAM(S): 500 INJECTION, POWDER, FOR SOLUTION INTRAMUSCULAR; INTRAVENOUS at 05:25

## 2018-02-23 RX ADMIN — Medication 40 MILLIGRAM(S): at 18:23

## 2018-02-23 RX ADMIN — HEPARIN SODIUM 5000 UNIT(S): 5000 INJECTION INTRAVENOUS; SUBCUTANEOUS at 21:08

## 2018-02-23 RX ADMIN — SACUBITRIL AND VALSARTAN 1 TABLET(S): 24; 26 TABLET, FILM COATED ORAL at 18:11

## 2018-02-23 RX ADMIN — Medication 100 MILLIGRAM(S): at 18:11

## 2018-02-23 RX ADMIN — HEPARIN SODIUM 5000 UNIT(S): 5000 INJECTION INTRAVENOUS; SUBCUTANEOUS at 14:51

## 2018-02-23 RX ADMIN — Medication 40 MILLIEQUIVALENT(S): at 18:10

## 2018-02-23 NOTE — PROGRESS NOTE ADULT - SUBJECTIVE AND OBJECTIVE BOX
58 yo female with PMH of chronic systolic CHF, s/p bi-v AICD, HTN, HLD, CAD, DM2, h/o vtach, asthma with recent admission 2/5-2/8 for asthma exacerbation and CHF exacerbation presents with 2 weeks of pedal edema. Pt states she has been having progressively worsening pedal edema over the past 2 weeks. She has been taking her lasix twice a day and also metolazone without improvement. She states she has pain on both legs which feels like something is stabbing her and she has difficulty ambulating. She has subjective fevers and chills. Has not taken her temperature. States her legs have been itching and she has been scratching at them. She does have multiple scratch marks on legs from scratching and she has noted to have increasing erythema to both legs. Pain worse around calves. Dopplers in ED negative for DVT. Denies any chest pain or SOB. In ED pt given vanco and ceftriaxone for possible b/l lower ext cellulitis.          feels better  LEs/feet less red/tender    Date of Service: 02-23-18 @ 13:11    MEDICATIONS  (STANDING):  amiodarone    Tablet 200 milliGRAM(s) Oral daily  aspirin  chewable 81 milliGRAM(s) Oral daily  buDESOnide 160 MICROgram(s)/formoterol 4.5 MICROgram(s) Inhaler 2 Puff(s) Inhalation two times a day  cefTRIAXone Injectable. 1000 milliGRAM(s) IV Push every 24 hours  dextrose 5%. 1000 milliLiter(s) (50 mL/Hr) IV Continuous <Continuous>  dextrose 50% Injectable 12.5 Gram(s) IV Push once  dextrose 50% Injectable 25 Gram(s) IV Push once  dextrose 50% Injectable 25 Gram(s) IV Push once  furosemide   Injectable 40 milliGRAM(s) IV Push two times a day  heparin  Injectable 5000 Unit(s) SubCutaneous every 8 hours  insulin lispro (HumaLOG) corrective regimen sliding scale   SubCutaneous three times a day before meals  insulin lispro (HumaLOG) corrective regimen sliding scale   SubCutaneous at bedtime  magnesium oxide 400 milliGRAM(s) Oral two times a day with meals  metoprolol succinate ER 75 milliGRAM(s) Oral daily  pantoprazole    Tablet 40 milliGRAM(s) Oral before breakfast  potassium chloride    Tablet ER 20 milliEquivalent(s) Oral daily  sacubitril 49 mG/valsartan 51 mG 1 Tablet(s) Oral two times a day  vancomycin  IVPB      vancomycin  IVPB 1000 milliGRAM(s) IV Intermittent every 12 hours      Vital Signs Last 24 Hrs  T(C): 36.7 (23 Feb 2018 11:05), Max: 37.4 (22 Feb 2018 16:39)  T(F): 98 (23 Feb 2018 11:05), Max: 99.3 (22 Feb 2018 16:39)  HR: 84 (23 Feb 2018 11:05) (78 - 85)  BP: 98/65 (23 Feb 2018 11:05) (98/65 - 115/84)  BP(mean): --  RR: 18 (23 Feb 2018 11:05) (17 - 20)  SpO2: 100% (23 Feb 2018 11:05) (94% - 100%)            PE:  Constitutional: frail looking  HEENT: NC/AT, EOMI, PERRLA  Neck: supple  Back: no tenderness  Respiratory: decreased breath sounds  Cardiovascular: S1S2 regular, no murmurs  Abdomen: soft, not tender, not distended, positive BS  Genitourinary: deferred  Rectal: deferred  Musculoskeletal: no muscle tenderness, no joint swelling or tenderness  Extremities: pedal edema with bullous lesions and resolving erythema, edema, tenderness, various excoriation and abrasions noted along LE's  Neurological:  no focal deficits  Skin: no rashes    Labs:                                   11.9   8.0   )-----------( 181      ( 22 Feb 2018 06:12 )             39.0     02-22    136  |  95<L>  |  29<H>  ----------------------------<  84  3.1<L>   |  34<H>  |  0.96    Ca    8.5      22 Feb 2018 06:12  Phos  3.6     02-21  Mg     1.7     02-21             Cultures:         Culture - Blood (02.20.18 @ 12:35)    Specimen Source: .Blood None    Culture Results:   No growth to date.    Culture - Blood (02.20.18 @ 12:35)    Specimen Source: .Blood None    Culture Results:   No growth to date.        Radiology:  < from: US Duplex Venous Lower Ext Complete, Bilateral (02.20.18 @ 12:55) >    EXAM:  US DPLX LWR EXT VEINS COMPL BI                            PROCEDURE DATE:  02/20/2018          INTERPRETATION:  Exam Date: 2/20/2018 12:55 PM  Indication: Bilateral lower extremity swelling and calf tenderness   Comparison: 4/14/2013    COMMENT:    TECHNIQUE: Real-time duplex sonography of the deep veins of both lower   extremities with color and spectral Doppler, with and without   compression.   Image quality is good.    FINDINGS:    RIGHT:  There is normal compressibility of the common femoral, femoral,   and popliteal veins.  Visualized posterior tibial veins are within normal   limits.    Doppler examination shows normal spontaneous and phasic flow.    LEFT:  There is normal compressibility of the common femoral, femoral,   and popliteal veins. Posterior tibial veins were not well delineated..    Doppler examination shows normal spontaneous and phasic flow.    Mild bilateral calf edema.    IMPRESSION:     No sonographic evidence of acute deep venous thrombosis in the   femoropopliteal systems bilaterally.  If concern for acute deep vein   thrombosis persists, consider follow-up evaluation in 5-7 days.    Mild bilateral calf edema.    < end of copied text >      < from: Xray Chest 2 Views PA/Lat (02.20.18 @ 13:44) >    EXAM:  XR CHEST PA LAT 2V                            PROCEDURE DATE:  02/20/2018          INTERPRETATION:  Chest erect AP and lateral 2 views:    Clinical history:    Shortness of breath. Dyspnea. Pain. Infection both legs.    Findings:    Unchanged global cardiomegaly. Aorta appears normal. Left subclavian   pacemaker. Pulmonary venous congestion.    Compared to the prior radiograph of 2/6/2018, no significant change.    Impression:    Persisting cardiomegaly with mild pulmonary venous congestion.      Advanced directives addressed: full resuscitation

## 2018-02-23 NOTE — PROGRESS NOTE ADULT - SUBJECTIVE AND OBJECTIVE BOX
56 yo female with PMH of chronic systolic CHF, s/p bi-v AICD, HTN, HLD, CAD, DM2, h/o VT, AICD, asthma. Recent admission - for asthma exacerbation and CHF exacerbation presents with 2 weeks of progressive leg edema despite being on diuretics. DEnies non compliance to diet (sodium , fluid intake). She states she has pain on both legs which feels like something is stabbing her and she has difficulty ambulating. She has fevers and chills. Has not taken her temperature. States her legs have been itching and she has been scratching at them. She does have multiple scratch marks on legs from scratching and she has noted to have increasing erythema to both legs. Pain worse around calves. Dopplers in ED negative for DVT. Denies any chest pain or SOB.     Today, she feels better. No new events. Wants to go home.     PAST MEDICAL & SURGICAL HISTORY:  HFrEF CHF (congestive heart failure)  NICM  Non obstructive CAD  AICD  HTN (hypertension)  HLD (hyperlipidemia)  Asthma  DM (diabetes mellitus)  History of cholecystectomy      PREVIOUS CARDIAC WORKUP:    Echo:  Severe reduced EF  Cardiac Cath:  17 - Non obstructive    Allergies:   No Known Allergies      MEDICATIONS  (STANDING):  amiodarone    Tablet 200 milliGRAM(s) Oral daily  aspirin  chewable 81 milliGRAM(s) Oral daily  buDESOnide 160 MICROgram(s)/formoterol 4.5 MICROgram(s) Inhaler 2 Puff(s) Inhalation two times a day  dextrose 5%. 1000 milliLiter(s) (50 mL/Hr) IV Continuous <Continuous>  dextrose 50% Injectable 12.5 Gram(s) IV Push once  dextrose 50% Injectable 25 Gram(s) IV Push once  dextrose 50% Injectable 25 Gram(s) IV Push once  doxycycline hyclate Capsule 100 milliGRAM(s) Oral every 12 hours  furosemide   Injectable 40 milliGRAM(s) IV Push two times a day  heparin  Injectable 5000 Unit(s) SubCutaneous every 8 hours  insulin lispro (HumaLOG) corrective regimen sliding scale   SubCutaneous three times a day before meals  insulin lispro (HumaLOG) corrective regimen sliding scale   SubCutaneous at bedtime  magnesium oxide 400 milliGRAM(s) Oral two times a day with meals  metoprolol succinate ER 75 milliGRAM(s) Oral daily  pantoprazole    Tablet 40 milliGRAM(s) Oral before breakfast  potassium chloride    Tablet ER 20 milliEquivalent(s) Oral daily  sacubitril 49 mG/valsartan 51 mG 1 Tablet(s) Oral two times a day    MEDICATIONS  (PRN):  acetaminophen   Tablet 650 milliGRAM(s) Oral every 6 hours PRN For Temp greater than 38 C (100.4 F)  dextrose Gel 1 Dose(s) Oral once PRN Blood Glucose LESS THAN 70 milliGRAM(s)/deciliter  glucagon  Injectable 1 milliGRAM(s) IntraMuscular once PRN Glucose LESS THAN 70 milligrams/deciliter  oxyCODONE    5 mG/acetaminophen 325 mG 1 Tablet(s) Oral every 6 hours PRN Moderate Pain (4 - 6)  oxyCODONE    5 mG/acetaminophen 325 mG 2 Tablet(s) Oral every 6 hours PRN Severe Pain (7 - 10)      ROS:     Detailed ten system ROS was performed and was negative except for history as eluded to above.    no fever  no chills  no nausea  no vomiting  no diarrhea  no constipation  no melena  no hematochezia  no chest pain  no palpitations  no sob at rest  no dyspnea on exertion  no cough  no wheezing  no anorexia  no headache  no dizziness  no syncope  no weakness  no myalgia  no dysuria  no polyuria  no hematuria       Vital Signs Last 24 Hrs  T(C): 36.7 (2018 11:05), Max: 37.4 (2018 16:39)  T(F): 98 (2018 11:05), Max: 99.3 (2018 16:39)  HR: 84 (2018 11:05) (78 - 85)  BP: 98/65 (2018 11:05) (98/65 - 115/84)  BP(mean): --  RR: 18 (2018 11:05) (17 - 20)  SpO2: 100% (2018 11:05) (94% - 100%)    I&O's Summary      PHYSICAL EXAM:    General:                Comfortable, AAO X 3, in no distress.   HEENT:                  Atraumatic, PERRLA, EOMI, conjunctiva clear.   Neck:                     Supple, no adenopathy, no thyromegaly, no JVD, no bruit.  Back:                     Symmetric, non tender.  Chest:                    Exp wheeze, B/L symmetric air entry, no tachypnea  Heart:                     S1, S2 normal, no gallop, + murmur.  Abdomen:              Soft, non-tender, bowel sounds active. No palpable masses.  Extremities:           no cyanosis, + edema. Cellulitis B/L LE. multiple excoriation marks  Skin:                      Skin color, texture normal, no rash except for both both legs as described above.  Neurologic:            Grossly nonfocal.       TELEMETRY:   Normal sinus rhythm  with no tachy or lillian events     ECG: A paced, first deg AV block, no ST T changes. QTc prolongation.    LABS:                        11.9   8.0   )-----------( 181      ( 2018 06:12 )             39.0         135  |  94<L>  |  21  ----------------------------<  92  3.2<L>   |  36<H>  |  0.76    Ca    8.7      2018 04:58  Mg     1.8      @ 12:35  Trop-I <0.015  CK     63    Pro BNP  3548  @ 12:35    Urinalysis Basic - ( 2018 04:00 )    Color: Yellow / Appearance: Clear / S.010 / pH: x  Gluc: x / Ketone: Negative  / Bili: Negative / Urobili: 4 mg/dL   Blood: x / Protein: 30 mg/dL / Nitrite: Negative   Leuk Esterase: Small / RBC: 0-2 /HPF / WBC 3-5   Sq Epi: x / Non Sq Epi: Moderate / Bacteria: Few    RADIOLOGY & ADDITIONAL STUDIES:

## 2018-02-23 NOTE — PROGRESS NOTE ADULT - ASSESSMENT
B/L LE cellulitis  Hypokalemia  Acute on Chronic HFrEF  NICM  Non obstructive CAD  HTN  AICD    Suggest:    Continue ABx, Leg elevation, Supportive skin care  Supplement K, Keep > 4. Increase supplement.   Continue treatment of CHF with diuretics.   Hold Entresto if BP is lower  Check BNP  follow low sodium, low cholesterol, ADA diet.  Fluid restriction 1.5 lit/day  monitor Intake & output  Daily weights.  Follow up electrolytes, renal function.   Continue beta blockers.  Monitor QTc after correcting K  Will f/u PRN now. B/L LE cellulitis  Hypokalemia  Acute on Chronic HFrEF  NICM  Non obstructive CAD  HTN  AICD    Suggest:    Continue ABx, Leg elevation, Supportive skin care  Supplement K, Keep > 4. Increase supplement.   Continue treatment of CHF with diuretics.   No aldactone because BPO is low.  Hold / reduce Entresto if BP is lower.  Check BNP  follow low sodium, low cholesterol, ADA diet.  Fluid restriction 1.5 lit/day  monitor Intake & output  Daily weights.  Follow up electrolytes, renal function.   Continue beta blockers.  Monitor QTc after correcting K

## 2018-02-23 NOTE — PROGRESS NOTE ADULT - SUBJECTIVE AND OBJECTIVE BOX
CC:  Patient is a 57y old  Female who presents with a chief complaint of pedal edema (2018 15:40)    : no complaints  K 3.2      PHYSICAL EXAM:    Daily     Daily Weight in k (2018 04:53)    ICU Vital Signs Last 24 Hrs  T(C): 36.7 (2018 11:05), Max: 37.4 (2018 16:39)  T(F): 98 (2018 11:05), Max: 99.3 (2018 16:39)  HR: 84 (2018 11:05) (78 - 85)  BP: 98/65 (2018 11:05) (98/65 - 115/84)  BP(mean): --  ABP: --  ABP(mean): --  RR: 18 (2018 11:05) (17 - 20)  SpO2: 100% (2018 11:05) (94% - 100%)      Constitutional: Well appearing  HEENT: Atraumatic,   Respiratory: Breath Sounds normal, no rhonchi/wheeze  Cardiovascular: N S1S2; IZABELA present  Gastrointestinal: Abdomen soft, non tender, Bowel Sounds present  Extremities: 2+ edema b/l, peripheral pulses present  Neurological: AAO x 3, no gross focal motor deficits  Skin: lower leg cellulitis                        11.9   8.0   )-----------( 181      ( 2018 06:12 )             39.0       CBC Full  -  ( 2018 06:12 )  WBC Count : 8.0 K/uL  Hemoglobin : 11.9 g/dL  Hematocrit : 39.0 %  Platelet Count - Automated : 181 K/uL  Mean Cell Volume : 81.3 fl  Mean Cell Hemoglobin : 24.9 pg  Mean Cell Hemoglobin Concentration : 30.6 gm/dL  Auto Neutrophil # : x  Auto Lymphocyte # : x  Auto Monocyte # : x  Auto Eosinophil # : x  Auto Basophil # : x  Auto Neutrophil % : x  Auto Lymphocyte % : x  Auto Monocyte % : x  Auto Eosinophil % : x  Auto Basophil % : x      02-    135  |  94<L>  |  21  ----------------------------<  92  3.2<L>   |  36<H>  |  0.76    Ca    8.7      2018 04:58  Mg     1.8     -                      Urinalysis Basic - ( 2018 04:00 )    Color: Yellow / Appearance: Clear / S.010 / pH: x  Gluc: x / Ketone: Negative  / Bili: Negative / Urobili: 4 mg/dL   Blood: x / Protein: 30 mg/dL / Nitrite: Negative   Leuk Esterase: Small / RBC: 0-2 /HPF / WBC 3-5   Sq Epi: x / Non Sq Epi: Moderate / Bacteria: Few            MEDICATIONS  (STANDING):  amiodarone    Tablet 200 milliGRAM(s) Oral daily  aspirin  chewable 81 milliGRAM(s) Oral daily  buDESOnide 160 MICROgram(s)/formoterol 4.5 MICROgram(s) Inhaler 2 Puff(s) Inhalation two times a day  dextrose 5%. 1000 milliLiter(s) (50 mL/Hr) IV Continuous <Continuous>  dextrose 50% Injectable 12.5 Gram(s) IV Push once  dextrose 50% Injectable 25 Gram(s) IV Push once  dextrose 50% Injectable 25 Gram(s) IV Push once  doxycycline hyclate Capsule 100 milliGRAM(s) Oral every 12 hours  furosemide   Injectable 40 milliGRAM(s) IV Push two times a day  heparin  Injectable 5000 Unit(s) SubCutaneous every 8 hours  insulin lispro (HumaLOG) corrective regimen sliding scale   SubCutaneous three times a day before meals  insulin lispro (HumaLOG) corrective regimen sliding scale   SubCutaneous at bedtime  magnesium oxide 400 milliGRAM(s) Oral two times a day with meals  metoprolol succinate ER 75 milliGRAM(s) Oral daily  pantoprazole    Tablet 40 milliGRAM(s) Oral before breakfast  potassium chloride    Tablet ER 20 milliEquivalent(s) Oral daily  potassium chloride    Tablet ER 40 milliEquivalent(s) Oral every 4 hours  sacubitril 49 mG/valsartan 51 mG 1 Tablet(s) Oral two times a day

## 2018-02-23 NOTE — PROGRESS NOTE ADULT - ASSESSMENT
56 yo female with PMH of chronic systolic CHF, s/p bi-v AICD, HTN, HLD, CAD, DM2, h/o vtach, asthma with recent admission 2/5-2/8 for asthma exacerbation and CHF exacerbation presents with 2 weeks of pedal edema. Pt states she has been having progressively worsening pedal edema over the past 2 weeks. She has been taking her lasix twice a day and also metolazone without improvement. She states she has pain on both legs which feels like something is stabbing her and she has difficulty ambulating. She has subjective fevers and chills. Has not taken her temperature. States her legs have been itching and she has been scratching at them. She does have multiple scratch marks on legs from scratching and she has noted to have increasing erythema to both legs. Pain worse around calves. Dopplers in ED negative for DVT. Denies any chest pain or SOB. In ED pt given vanco and ceftriaxone for possible b/l lower ext cellulitis.      1. b/l LE cellulitis/CHF/asthma  - improving  - on vancomycin [id number] #4, trough therapeutic  - on rocephin 1gm daily #4  - continue with antibiotic coverage  - blood cx no growth  - switch to po doxy 100mg BID to complete 7 day course  - monitor temps  -tolerating abx well so far; no side effects noted  -reason for abx use and side effects reviewed with patient  - supportive care    2. other issues - care per medicine

## 2018-02-23 NOTE — PROGRESS NOTE ADULT - ASSESSMENT
57F.  admitted 02/20/18.  resently admitted to  02/05-02/08 for asthma and HF exacerbation.  presents to ED due to leg swelling.  onset 2 weeks prior to admission.  reports not responding to diuretics.  developed pain and redness in her calves and feet.    PMHx:  HFrEF-AICD, VT;  CAD;  type 2 DM;  HTN;  hyperlipidemia;  asthma.    b/l LE cellulitis.  -resistant and gram negative organisms are possible.  -b/l LE venous dopplers, (-) DVT.  ABX changed to po doxy from 2/23  -ID following.    prolonged QTc.  hx VT.  -c/w telemetry monitoring.  -keep potassium >=4.0 and magnesium >=2.0.; check in am again  -EP interrogated AICD; normal    hypokalemia.  -monitor/supplement.    mild acute upon chronic HFrEF-AICD.  -2+ LE pitting edema,   -BNP 3.5K.  -CXR, mild pulmonary vascular congestion.  -Lasix 40mg IV bid.  caution w/ electrolyte abnormalities w/ prolonged QTc.  -Cardiology following.    hx type 2 DM.  -hold oral hypoglycemics.  -correction insulin coverage.    hx asthma.  -stable.  -c/w LABA/ICS.    DVT prophylaxis.  -UFH sq.    poc discussed with pt, team 57F.  admitted 02/20/18.  resently admitted to  02/05-02/08 for asthma and HF exacerbation.  presents to ED due to leg swelling.  onset 2 weeks prior to admission.  reports not responding to diuretics.  developed pain and redness in her calves and feet.    PMHx:  HFrEF-AICD, VT;  CAD;  type 2 DM;  HTN;  hyperlipidemia;  asthma.    b/l LE cellulitis.  -resistant and gram negative organisms are possible.  -b/l LE venous dopplers, (-) DVT.  ABX changed to po doxy from 2/23  -ID following.    prolonged QTc.  hx VT.  -c/w telemetry monitoring.  -keep potassium >=4.0 and magnesium >=2.0.; check in am again  -EP interrogated AICD; normal  daily EKG    hypokalemia.  -monitor/supplement.    mild acute upon chronic HFrEF-AICD.  -2+ LE pitting edema,   -BNP 3.5K.  -CXR, mild pulmonary vascular congestion.  -Lasix 40mg IV bid.  caution w/ electrolyte abnormalities w/ prolonged QTc.  -Cardiology following.    hx type 2 DM.  -hold oral hypoglycemics.  -correction insulin coverage.    hx asthma.  -stable.  -c/w LABA/ICS.    DVT prophylaxis.  -UFH sq.    poc discussed with pt, team

## 2018-02-24 LAB
ANION GAP SERPL CALC-SCNC: 7 MMOL/L — SIGNIFICANT CHANGE UP (ref 5–17)
BUN SERPL-MCNC: 28 MG/DL — HIGH (ref 7–23)
CALCIUM SERPL-MCNC: 9 MG/DL — SIGNIFICANT CHANGE UP (ref 8.5–10.1)
CHLORIDE SERPL-SCNC: 95 MMOL/L — LOW (ref 96–108)
CO2 SERPL-SCNC: 34 MMOL/L — HIGH (ref 22–31)
CREAT SERPL-MCNC: 0.97 MG/DL — SIGNIFICANT CHANGE UP (ref 0.5–1.3)
GLUCOSE BLDC GLUCOMTR-MCNC: 108 MG/DL — HIGH (ref 70–99)
GLUCOSE BLDC GLUCOMTR-MCNC: 111 MG/DL — HIGH (ref 70–99)
GLUCOSE BLDC GLUCOMTR-MCNC: 138 MG/DL — HIGH (ref 70–99)
GLUCOSE BLDC GLUCOMTR-MCNC: 138 MG/DL — HIGH (ref 70–99)
GLUCOSE SERPL-MCNC: 105 MG/DL — HIGH (ref 70–99)
HCT VFR BLD CALC: 39.2 % — SIGNIFICANT CHANGE UP (ref 34.5–45)
HGB BLD-MCNC: 11.8 G/DL — SIGNIFICANT CHANGE UP (ref 11.5–15.5)
MAGNESIUM SERPL-MCNC: 1.9 MG/DL — SIGNIFICANT CHANGE UP (ref 1.6–2.6)
MCHC RBC-ENTMCNC: 24.6 PG — LOW (ref 27–34)
MCHC RBC-ENTMCNC: 30.1 GM/DL — LOW (ref 32–36)
MCV RBC AUTO: 81.5 FL — SIGNIFICANT CHANGE UP (ref 80–100)
NT-PROBNP SERPL-SCNC: 4006 PG/ML — HIGH (ref 0–125)
PHOSPHATE SERPL-MCNC: 2.4 MG/DL — LOW (ref 2.5–4.5)
PLATELET # BLD AUTO: 236 K/UL — SIGNIFICANT CHANGE UP (ref 150–400)
POTASSIUM SERPL-MCNC: 4 MMOL/L — SIGNIFICANT CHANGE UP (ref 3.5–5.3)
POTASSIUM SERPL-SCNC: 4 MMOL/L — SIGNIFICANT CHANGE UP (ref 3.5–5.3)
RBC # BLD: 4.81 M/UL — SIGNIFICANT CHANGE UP (ref 3.8–5.2)
RBC # FLD: 18.5 % — HIGH (ref 10.3–14.5)
SODIUM SERPL-SCNC: 136 MMOL/L — SIGNIFICANT CHANGE UP (ref 135–145)
WBC # BLD: 8.3 K/UL — SIGNIFICANT CHANGE UP (ref 3.8–10.5)
WBC # FLD AUTO: 8.3 K/UL — SIGNIFICANT CHANGE UP (ref 3.8–10.5)

## 2018-02-24 PROCEDURE — 93010 ELECTROCARDIOGRAM REPORT: CPT

## 2018-02-24 RX ORDER — SODIUM,POTASSIUM PHOSPHATES 278-250MG
1 POWDER IN PACKET (EA) ORAL
Qty: 0 | Refills: 0 | Status: COMPLETED | OUTPATIENT
Start: 2018-02-24 | End: 2018-02-25

## 2018-02-24 RX ORDER — CEFTRIAXONE 500 MG/1
1000 INJECTION, POWDER, FOR SOLUTION INTRAMUSCULAR; INTRAVENOUS EVERY 24 HOURS
Qty: 0 | Refills: 0 | Status: DISCONTINUED | OUTPATIENT
Start: 2018-02-24 | End: 2018-03-01

## 2018-02-24 RX ORDER — ACETAMINOPHEN 500 MG
650 TABLET ORAL EVERY 6 HOURS
Qty: 0 | Refills: 0 | Status: DISCONTINUED | OUTPATIENT
Start: 2018-02-24 | End: 2018-03-04

## 2018-02-24 RX ORDER — METOPROLOL TARTRATE 50 MG
75 TABLET ORAL DAILY
Qty: 0 | Refills: 0 | Status: DISCONTINUED | OUTPATIENT
Start: 2018-02-24 | End: 2018-03-04

## 2018-02-24 RX ORDER — VANCOMYCIN HCL 1 G
1000 VIAL (EA) INTRAVENOUS EVERY 12 HOURS
Qty: 0 | Refills: 0 | Status: DISCONTINUED | OUTPATIENT
Start: 2018-02-24 | End: 2018-03-01

## 2018-02-24 RX ORDER — FUROSEMIDE 40 MG
40 TABLET ORAL EVERY 12 HOURS
Qty: 0 | Refills: 0 | Status: DISCONTINUED | OUTPATIENT
Start: 2018-02-24 | End: 2018-03-01

## 2018-02-24 RX ORDER — CEFTRIAXONE 500 MG/1
1 INJECTION, POWDER, FOR SOLUTION INTRAMUSCULAR; INTRAVENOUS EVERY 24 HOURS
Qty: 0 | Refills: 0 | Status: DISCONTINUED | OUTPATIENT
Start: 2018-02-24 | End: 2018-02-24

## 2018-02-24 RX ORDER — FUROSEMIDE 40 MG
40 TABLET ORAL
Qty: 0 | Refills: 0 | Status: DISCONTINUED | OUTPATIENT
Start: 2018-02-24 | End: 2018-02-24

## 2018-02-24 RX ORDER — SACUBITRIL AND VALSARTAN 24; 26 MG/1; MG/1
1 TABLET, FILM COATED ORAL
Qty: 0 | Refills: 0 | Status: DISCONTINUED | OUTPATIENT
Start: 2018-02-24 | End: 2018-02-25

## 2018-02-24 RX ORDER — SODIUM CHLORIDE 9 MG/ML
250 INJECTION INTRAMUSCULAR; INTRAVENOUS; SUBCUTANEOUS ONCE
Qty: 0 | Refills: 0 | Status: COMPLETED | OUTPATIENT
Start: 2018-02-24 | End: 2018-02-24

## 2018-02-24 RX ADMIN — Medication 650 MILLIGRAM(S): at 22:39

## 2018-02-24 RX ADMIN — MAGNESIUM OXIDE 400 MG ORAL TABLET 400 MILLIGRAM(S): 241.3 TABLET ORAL at 17:02

## 2018-02-24 RX ADMIN — OXYCODONE AND ACETAMINOPHEN 2 TABLET(S): 5; 325 TABLET ORAL at 17:04

## 2018-02-24 RX ADMIN — OXYCODONE AND ACETAMINOPHEN 2 TABLET(S): 5; 325 TABLET ORAL at 17:01

## 2018-02-24 RX ADMIN — Medication 250 MILLIGRAM(S): at 17:04

## 2018-02-24 RX ADMIN — Medication 100 MILLIGRAM(S): at 05:15

## 2018-02-24 RX ADMIN — Medication 650 MILLIGRAM(S): at 10:53

## 2018-02-24 RX ADMIN — PANTOPRAZOLE SODIUM 40 MILLIGRAM(S): 20 TABLET, DELAYED RELEASE ORAL at 05:15

## 2018-02-24 RX ADMIN — MAGNESIUM OXIDE 400 MG ORAL TABLET 400 MILLIGRAM(S): 241.3 TABLET ORAL at 08:03

## 2018-02-24 RX ADMIN — Medication 20 MILLIEQUIVALENT(S): at 12:01

## 2018-02-24 RX ADMIN — Medication 1 PACKET(S): at 10:49

## 2018-02-24 RX ADMIN — HEPARIN SODIUM 5000 UNIT(S): 5000 INJECTION INTRAVENOUS; SUBCUTANEOUS at 21:11

## 2018-02-24 RX ADMIN — SODIUM CHLORIDE 250 MILLILITER(S): 9 INJECTION INTRAMUSCULAR; INTRAVENOUS; SUBCUTANEOUS at 10:48

## 2018-02-24 RX ADMIN — AMIODARONE HYDROCHLORIDE 200 MILLIGRAM(S): 400 TABLET ORAL at 05:15

## 2018-02-24 RX ADMIN — Medication 81 MILLIGRAM(S): at 12:01

## 2018-02-24 RX ADMIN — HEPARIN SODIUM 5000 UNIT(S): 5000 INJECTION INTRAVENOUS; SUBCUTANEOUS at 12:01

## 2018-02-24 RX ADMIN — Medication 1 PACKET(S): at 17:03

## 2018-02-24 RX ADMIN — Medication 1 PACKET(S): at 13:43

## 2018-02-24 RX ADMIN — Medication 650 MILLIGRAM(S): at 00:25

## 2018-02-24 RX ADMIN — CEFTRIAXONE 1000 MILLIGRAM(S): 500 INJECTION, POWDER, FOR SOLUTION INTRAMUSCULAR; INTRAVENOUS at 14:09

## 2018-02-24 RX ADMIN — HEPARIN SODIUM 5000 UNIT(S): 5000 INJECTION INTRAVENOUS; SUBCUTANEOUS at 05:14

## 2018-02-24 NOTE — PROGRESS NOTE ADULT - ASSESSMENT
56 yo female with PMH of chronic systolic CHF, s/p bi-v AICD, HTN, HLD, CAD, DM2, h/o vtach, asthma with recent admission 2/5-2/8 for asthma exacerbation and CHF exacerbation presents with 2 weeks of pedal edema. Pt states she has been having progressively worsening pedal edema over the past 2 weeks. She has been taking her lasix twice a day and also metolazone without improvement. She states she has pain on both legs which feels like something is stabbing her and she has difficulty ambulating. She has subjective fevers and chills. Has not taken her temperature. States her legs have been itching and she has been scratching at them. She does have multiple scratch marks on legs from scratching and she has noted to have increasing erythema to both legs. Pain worse around calves. Dopplers in ED negative for DVT. Denies any chest pain or SOB. In ED pt given vanco and ceftriaxone for possible b/l lower ext cellulitis.      1. b/l LE cellulitis/CHF/asthma  - improving  - on vancomycin [id number] #4, trough therapeutic  - on rocephin 1gm daily #4  - continue with antibiotic coverage  - blood cx no growth  - given worsening symptoms and fever, will resume iv ceftriaxone and vancomycin  - monitor temps  -tolerating abx well so far; no side effects noted  -reason for abx use and side effects reviewed with patient  - supportive care    2. other issues - care per medicine

## 2018-02-24 NOTE — PROGRESS NOTE ADULT - SUBJECTIVE AND OBJECTIVE BOX
· Subjective and Objective: 	  58 yo female with PMH of chronic systolic CHF, s/p bi-v AICD, HTN, HLD, CAD, DM2, h/o VT, AICD, asthma. Recent admission 2/5-2/8 for asthma exacerbation and CHF exacerbation presents with 2 weeks of progressive leg edema despite being on diuretics.  After admission she was diagnosed to have bilateral leg edema and cellulitis. She feels improved and is comfortable. She denies any chest pain or shortness of breath. She still has significant bilateral leg edema. Inflammation is subsiding. She is anxious to go home. Family at bedside.    PAST MEDICAL & SURGICAL HISTORY:  HFrEF CHF (congestive heart failure)  NICM  Non obstructive CAD  AICD  HTN (hypertension)  HLD (hyperlipidemia)  Asthma  DM (diabetes mellitus)  History of cholecystectomy      PREVIOUS CARDIAC WORKUP:    Echo:  Severe reduced EF  Cardiac Cath:  8/24/17 - Non obstructive    MEDICATIONS  (STANDING):  amiodarone    Tablet 200 milliGRAM(s) Oral daily  aspirin  chewable 81 milliGRAM(s) Oral daily  buDESOnide 160 MICROgram(s)/formoterol 4.5 MICROgram(s) Inhaler 2 Puff(s) Inhalation two times a day  dextrose 5%. 1000 milliLiter(s) (50 mL/Hr) IV Continuous <Continuous>  dextrose 50% Injectable 12.5 Gram(s) IV Push once  dextrose 50% Injectable 25 Gram(s) IV Push once  dextrose 50% Injectable 25 Gram(s) IV Push once  doxycycline hyclate Capsule 100 milliGRAM(s) Oral every 12 hours  heparin  Injectable 5000 Unit(s) SubCutaneous every 8 hours  insulin lispro (HumaLOG) corrective regimen sliding scale   SubCutaneous three times a day before meals  insulin lispro (HumaLOG) corrective regimen sliding scale   SubCutaneous at bedtime  magnesium oxide 400 milliGRAM(s) Oral two times a day with meals  metoprolol succinate ER 75 milliGRAM(s) Oral daily  pantoprazole    Tablet 40 milliGRAM(s) Oral before breakfast  potassium chloride    Tablet ER 20 milliEquivalent(s) Oral daily  potassium phosphate / sodium phosphate powder 1 Packet(s) Oral four times a day  sacubitril 49 mG/valsartan 51 mG 1 Tablet(s) Oral two times a day  sodium chloride 0.9% Bolus 250 milliLiter(s) IV Bolus once    MEDICATIONS  (PRN):  acetaminophen   Tablet 650 milliGRAM(s) Oral every 6 hours PRN For Temp greater than 38 C (100.4 F)  dextrose Gel 1 Dose(s) Oral once PRN Blood Glucose LESS THAN 70 milliGRAM(s)/deciliter  glucagon  Injectable 1 milliGRAM(s) IntraMuscular once PRN Glucose LESS THAN 70 milligrams/deciliter  oxyCODONE    5 mG/acetaminophen 325 mG 1 Tablet(s) Oral every 6 hours PRN Moderate Pain (4 - 6)  oxyCODONE    5 mG/acetaminophen 325 mG 2 Tablet(s) Oral every 6 hours PRN Severe Pain (7 - 10)        REVIEW OF SYSTEM:  Pertinent items are noted in HPI.  Constitutional	Negative for chills, fevers, sweats.    Eyes: 	Negative for visual disturbance.  Ears, nose, mouth, throat, and face: Negative for epistaxis, nasal congestion, sore throat and tinnitus.  Neck:	Negative for enlargement, pain and difficulty in swallowing  Respiration : Negative for cough,  pleuritic chest pain and wheezing  Cardiovascular: Negative for chest pain, and palpitations , she has leg edema. She has dyspnea on exertion.    Gastrointestinal : Negative for abdominal pain, diarrhea, nausea and vomiting  Genitourinary: Negative for dysuria, frequency and urinary incontinence .  Skin: Negative for  rash, pruritus, swelling, dryness .  	  Hematologic/lymphatic: Negative for bleeding and easy bruising  Musculoskeletal: Negative for arthralgias, back pain and muscle weakness.  Neurological: Negative for dizziness, headaches, seizures and tremors  Behavioral/Psych: Negative for mood change, depression.  Endocrine:	Negative for blurry vision, polydipsia and polyuria, diaphoresis.   Allergic/Immunologic:	Negative for anaphylaxis, angioedema and urticaria.      Vital Signs Last 24 Hrs  T(C): 38.3 (24 Feb 2018 10:24), Max: 38.3 (24 Feb 2018 10:24)  T(F): 101 (24 Feb 2018 10:24), Max: 101 (24 Feb 2018 10:24)  HR: 90 (24 Feb 2018 10:24) (79 - 90)  BP: 88/59 (24 Feb 2018 10:24) (88/59 - 98/65)  BP(mean): --  RR: 18 (24 Feb 2018 10:24) (17 - 18)  SpO2: 99% (24 Feb 2018 10:24) (95% - 100%)    I&O's Summary    PHYSICAL EXAM  General Appearance: cooperative, no acute distress,   HEENT: PERRL, conjunctiva clear, EOM's intact, non injected pharynx, no exudate .   Neck: Supple, , no adenopathy, thyroid: not enlarged, no carotid bruit or JVD  Lungs: Clear to auscultation bilateral,no adventitious breath sounds, normal   expiratory phase  Heart: Regular rate and rhythm, S1, S2 normal, grade 1/6 holosystolic murmur, no  rub or gallop  Abdomen: Soft, non-tender, bowel sounds active , no hepatosplenomegaly  Extremities: no cyanosis or edema, no joint swelling  Skin: Skin color, texture normal, no rashes   Neurologic: Alert and oriented X3 , cranial nerves intact, sensory and motor normal,        INTERPRETATION OF TELEMETRY: NSR            LABS:                          11.8   8.3   )-----------( 236      ( 24 Feb 2018 06:44 )             39.2     02-24    136  |  95<L>  |  28<H>  ----------------------------<  105<H>  4.0   |  34<H>  |  0.97    Ca    9.0      24 Feb 2018 06:44  Phos  2.4     02-24  Mg     1.9     02-24            Pro BNP  4006 02-24 @ 06:44  D Dimer  -- 02-24 @ 06:44  Pro BNP  3548 02-20 @ 12:35  D Dimer  -- 02-20 @ 12:35

## 2018-02-24 NOTE — PROGRESS NOTE ADULT - SUBJECTIVE AND OBJECTIVE BOX
CC:  Patient is a 57y old  Female who presents with a chief complaint of pedal edema (2018 15:40)    : no complaints  K 3.2    : feeling weak today  fever of 101  BP 88/57  Phos 2.4  Mg 1.9      PHYSICAL EXAM:    Daily     Daily Weight in k.9 (2018 11:02)    ICU Vital Signs Last 24 Hrs  T(C): 38.3 (2018 10:24), Max: 38.3 (2018 10:24)  T(F): 101 (2018 10:24), Max: 101 (2018 10:24)  HR: 90 (2018 10:24) (79 - 90)  BP: 88/56 (2018 15:16) (88/56 - 95/61)  BP(mean): --  ABP: --  ABP(mean): --  RR: 18 (2018 10:24) (17 - 18)  SpO2: 99% (2018 10:24) (95% - 99%)      Constitutional: Weak appearing  HEENT: Atraumatic,   Respiratory: Breath Sounds normal, no rhonchi/wheeze  Cardiovascular: N S1S2; IZABELA present  Gastrointestinal: Abdomen soft, non tender, Bowel Sounds present  Extremities: 1+ edema b/l, peripheral pulses present  Neurological: AAO x 3, no gross focal motor deficits  Skin: lower leg cellulitis                        11.8   8.3   )-----------( 236      ( 2018 06:44 )             39.2       CBC Full  -  ( 2018 06:44 )  WBC Count : 8.3 K/uL  Hemoglobin : 11.8 g/dL  Hematocrit : 39.2 %  Platelet Count - Automated : 236 K/uL  Mean Cell Volume : 81.5 fl  Mean Cell Hemoglobin : 24.6 pg  Mean Cell Hemoglobin Concentration : 30.1 gm/dL  Auto Neutrophil # : x  Auto Lymphocyte # : x  Auto Monocyte # : x  Auto Eosinophil # : x  Auto Basophil # : x  Auto Neutrophil % : x  Auto Lymphocyte % : x  Auto Monocyte % : x  Auto Eosinophil % : x  Auto Basophil % : x      -    136  |  95<L>  |  28<H>  ----------------------------<  105<H>  4.0   |  34<H>  |  0.97    Ca    9.0      2018 06:44  Phos  2.4     02-24  Mg     1.9     02-24                              MEDICATIONS  (STANDING):  amiodarone    Tablet 200 milliGRAM(s) Oral daily  aspirin  chewable 81 milliGRAM(s) Oral daily  buDESOnide 160 MICROgram(s)/formoterol 4.5 MICROgram(s) Inhaler 2 Puff(s) Inhalation two times a day  cefTRIAXone Injectable. 1000 milliGRAM(s) IV Push every 24 hours  dextrose 5%. 1000 milliLiter(s) (50 mL/Hr) IV Continuous <Continuous>  dextrose 50% Injectable 12.5 Gram(s) IV Push once  dextrose 50% Injectable 25 Gram(s) IV Push once  dextrose 50% Injectable 25 Gram(s) IV Push once  furosemide   Injectable 40 milliGRAM(s) IV Push every 12 hours  heparin  Injectable 5000 Unit(s) SubCutaneous every 8 hours  insulin lispro (HumaLOG) corrective regimen sliding scale   SubCutaneous three times a day before meals  insulin lispro (HumaLOG) corrective regimen sliding scale   SubCutaneous at bedtime  magnesium oxide 400 milliGRAM(s) Oral two times a day with meals  metoprolol succinate ER 75 milliGRAM(s) Oral daily  pantoprazole    Tablet 40 milliGRAM(s) Oral before breakfast  potassium chloride    Tablet ER 20 milliEquivalent(s) Oral daily  potassium phosphate / sodium phosphate powder 1 Packet(s) Oral four times a day  sacubitril 49 mG/valsartan 51 mG 1 Tablet(s) Oral two times a day  vancomycin  IVPB 1000 milliGRAM(s) IV Intermittent every 12 hours

## 2018-02-24 NOTE — PROGRESS NOTE ADULT - ASSESSMENT
B/L LE cellulitis  Hypokalemia  Acute on Chronic HFrEF  NICM  Non obstructive CAD  HTN  AICD    Suggest:    Continue ABx, Leg elevation, Supportive skin care  Supplement K, Keep > 4. Increase supplement.   Continue treatment of CHF with diuretics.   Start IV lasix.  No aldactone because BPO is low.  Hold / reduce Entresto if BP is lower.  Check BNP  follow low sodium, low cholesterol, ADA diet.  Fluid restriction 1.5 lit/day  monitor Intake & output  Daily weights.  Follow up electrolytes, renal function.   Continue beta blockers.  Monitor QTc after correcting K

## 2018-02-24 NOTE — PROGRESS NOTE ADULT - ASSESSMENT
57F.  admitted 02/20/18.  resently admitted to  02/05-02/08 for asthma and HF exacerbation.  presents to ED due to leg swelling.  onset 2 weeks prior to admission.  reports not responding to diuretics.  developed pain and redness in her calves and feet.    PMHx:  HFrEF-AICD, VT;  CAD;  type 2 DM;  HTN;  hyperlipidemia;  asthma.    b/l LE cellulitis.  -resistant and gram negative organisms are possible.  -b/l LE venous dopplers, (-) DVT.  ABX changed to po doxy on2/23 but put back on vanco + rocephin from 2/24 sec to fever and low BP  -ID following, discussed with Dr. Gonsalves 2/24      Hypotensive episode:  ml iv  hold lasix for SBP less than 100  Hold Torol for SBP less than 120  Hold Entresto for SBP less than 140      prolonged QTc.  hx VT.  -c/w telemetry monitoring.  -keep potassium >=4.0 and magnesium >=2.0.; check in am again  -EP interrogated AICD; normal  daily EKG    hypokalemia.  -monitor/supplement; 4.0 today    mild acute upon chronic HFrEF-AICD.  -2+ LE pitting edema,   -BNP 3.5K.  -CXR, mild pulmonary vascular congestion.  -Lasix changed to 40mg po bid from 2/24 d/t low BP.   -Cardiology following.    hx type 2 DM.  -hold oral hypoglycemics.  -correction insulin coverage.    hx asthma.  -stable.  -c/w LABA/ICS.    DVT prophylaxis.  -UFH sq.    poc discussed with pt, team 57F.  admitted 02/20/18.  resently admitted to  02/05-02/08 for asthma and HF exacerbation.  presents to ED due to leg swelling.  onset 2 weeks prior to admission.  reports not responding to diuretics.  developed pain and redness in her calves and feet.    PMHx:  HFrEF-AICD, VT;  CAD;  type 2 DM;  HTN;  hyperlipidemia;  asthma.    b/l LE cellulitis.  -resistant and gram negative organisms are possible.  -b/l LE venous dopplers, (-) DVT.  ABX changed to po doxy on2/23 but put back on vanco + rocephin from 2/24 sec to fever and low BP  -ID following, discussed with Dr. Gonsalves 2/24      Hypotensive episode:  ml iv  hold lasix for SBP less than 100  Hold Torol for SBP less than 120  Hold Entresto for SBP less than 140      prolonged QTc.  hx VT.  -c/w telemetry monitoring.  -keep potassium >=4.0 and magnesium >=2.0.; check in am again  -EP interrogated AICD; normal  daily EKG    hypokalemia.  -monitor/supplement; 4.0 today    mild acute upon chronic HFrEF-AICD.  -2+ LE pitting edema,   -BNP 3.5K.  -CXR, mild pulmonary vascular congestion.  -Lasix changed to 40mg po bid from 2/24 d/t low BP.   -Cardiology following.    hx type 2 DM.  -hold oral hypoglycemics.  -correction insulin coverage.    hx asthma.  -stable.  -c/w LABA/ICS.    DVT prophylaxis.  -UFH sq.    poc discussed with pt, team; Dr. Gonsalves

## 2018-02-24 NOTE — PROGRESS NOTE ADULT - SUBJECTIVE AND OBJECTIVE BOX
58 yo female with PMH of chronic systolic CHF, s/p bi-v AICD, HTN, HLD, CAD, DM2, h/o vtach, asthma with recent admission 2/5-2/8 for asthma exacerbation and CHF exacerbation presents with 2 weeks of pedal edema. Pt states she has been having progressively worsening pedal edema over the past 2 weeks. She has been taking her lasix twice a day and also metolazone without improvement. She states she has pain on both legs which feels like something is stabbing her and she has difficulty ambulating. She has subjective fevers and chills. Has not taken her temperature. States her legs have been itching and she has been scratching at them. She does have multiple scratch marks on legs from scratching and she has noted to have increasing erythema to both legs. Pain worse around calves. Dopplers in ED negative for DVT. Denies any chest pain or SOB. In ED pt given vanco and ceftriaxone for possible b/l lower ext cellulitis.    Today 2/24 patient had fever, mild hypotension and worsening pain in right foot    Date of service: 02-24-18 @ 13:43    MEDICATIONS  (STANDING):  amiodarone    Tablet 200 milliGRAM(s) Oral daily  aspirin  chewable 81 milliGRAM(s) Oral daily  buDESOnide 160 MICROgram(s)/formoterol 4.5 MICROgram(s) Inhaler 2 Puff(s) Inhalation two times a day  dextrose 5%. 1000 milliLiter(s) (50 mL/Hr) IV Continuous <Continuous>  dextrose 50% Injectable 12.5 Gram(s) IV Push once  dextrose 50% Injectable 25 Gram(s) IV Push once  dextrose 50% Injectable 25 Gram(s) IV Push once  doxycycline hyclate Capsule 100 milliGRAM(s) Oral every 12 hours  furosemide   Injectable 40 milliGRAM(s) IV Push every 12 hours  heparin  Injectable 5000 Unit(s) SubCutaneous every 8 hours  insulin lispro (HumaLOG) corrective regimen sliding scale   SubCutaneous three times a day before meals  insulin lispro (HumaLOG) corrective regimen sliding scale   SubCutaneous at bedtime  magnesium oxide 400 milliGRAM(s) Oral two times a day with meals  metoprolol succinate ER 75 milliGRAM(s) Oral daily  pantoprazole    Tablet 40 milliGRAM(s) Oral before breakfast  potassium chloride    Tablet ER 20 milliEquivalent(s) Oral daily  potassium phosphate / sodium phosphate powder 1 Packet(s) Oral four times a day  sacubitril 49 mG/valsartan 51 mG 1 Tablet(s) Oral two times a day    MEDICATIONS  (PRN):  acetaminophen   Tablet 650 milliGRAM(s) Oral every 6 hours PRN For Temp greater than 38 C (100.4 F)  dextrose Gel 1 Dose(s) Oral once PRN Blood Glucose LESS THAN 70 milliGRAM(s)/deciliter  glucagon  Injectable 1 milliGRAM(s) IntraMuscular once PRN Glucose LESS THAN 70 milligrams/deciliter  oxyCODONE    5 mG/acetaminophen 325 mG 1 Tablet(s) Oral every 6 hours PRN Moderate Pain (4 - 6)  oxyCODONE    5 mG/acetaminophen 325 mG 2 Tablet(s) Oral every 6 hours PRN Severe Pain (7 - 10)      Vital Signs Last 24 Hrs  T(C): 38.3 (24 Feb 2018 10:24), Max: 38.3 (24 Feb 2018 10:24)  T(F): 101 (24 Feb 2018 10:24), Max: 101 (24 Feb 2018 10:24)  HR: 90 (24 Feb 2018 10:24) (79 - 90)  BP: 90/52 (24 Feb 2018 10:45) (88/59 - 95/61)  BP(mean): --  RR: 18 (24 Feb 2018 10:24) (17 - 18)  SpO2: 99% (24 Feb 2018 10:24) (95% - 99%)    Physical Exam:            PE:  Constitutional: frail looking  HEENT: NC/AT, EOMI, PERRLA  Neck: supple  Back: no tenderness  Respiratory: decreased breath sounds  Cardiovascular: S1S2 regular, no murmurs  Abdomen: soft, not tender, not distended, positive BS  Genitourinary: deferred  Rectal: deferred  Musculoskeletal: no muscle tenderness, no joint swelling or tenderness  Extremities: pedal edema with bullous lesions and  erythema, edema, tenderness, various excoriation and abrasions noted along LE's  Neurological:  no focal deficits  Skin: no rashes    Labs:             Labs:                        11.8   8.3   )-----------( 236      ( 24 Feb 2018 06:44 )             39.2     02-24    136  |  95<L>  |  28<H>  ----------------------------<  105<H>  4.0   |  34<H>  |  0.97    Ca    9.0      24 Feb 2018 06:44  Phos  2.4     02-24  Mg     1.9     02-24         Vancomycin Level, Trough: 15.0 ug/mL (02-23 @ 04:58)  Vancomycin Level, Trough: 38.0 ug/mL (02-22 @ 18:00)      Cultures:       Culture - Urine (collected 02-22-18 @ 06:05)  Source: .Urine Clean Catch (Midstream)  Final Report (02-23-18 @ 11:30):    No growth    Culture - Blood (collected 02-20-18 @ 12:35)  Source: .Blood None  Preliminary Report (02-21-18 @ 18:02):    No growth to date.    Culture - Blood (collected 02-20-18 @ 12:35)  Source: .Blood None  Preliminary Report (02-21-18 @ 18:02):    No growth to date.                                11.9   8.0   )-----------( 181      ( 22 Feb 2018 06:12 )             39.0     02-22    136  |  95<L>  |  29<H>  ----------------------------<  84  3.1<L>   |  34<H>  |  0.96    Ca    8.5      22 Feb 2018 06:12  Phos  3.6     02-21  Mg     1.7     02-21               Radiology:  < from: US Duplex Venous Lower Ext Complete, Bilateral (02.20.18 @ 12:55) >    EXAM:  US DPLX LWR EXT VEINS COMPL BI                            PROCEDURE DATE:  02/20/2018          INTERPRETATION:  Exam Date: 2/20/2018 12:55 PM  Indication: Bilateral lower extremity swelling and calf tenderness   Comparison: 4/14/2013    COMMENT:    TECHNIQUE: Real-time duplex sonography of the deep veins of both lower   extremities with color and spectral Doppler, with and without   compression.   Image quality is good.    FINDINGS:    RIGHT:  There is normal compressibility of the common femoral, femoral,   and popliteal veins.  Visualized posterior tibial veins are within normal   limits.    Doppler examination shows normal spontaneous and phasic flow.    LEFT:  There is normal compressibility of the common femoral, femoral,   and popliteal veins. Posterior tibial veins were not well delineated..    Doppler examination shows normal spontaneous and phasic flow.    Mild bilateral calf edema.    IMPRESSION:     No sonographic evidence of acute deep venous thrombosis in the   femoropopliteal systems bilaterally.  If concern for acute deep vein   thrombosis persists, consider follow-up evaluation in 5-7 days.    Mild bilateral calf edema.    < end of copied text >      < from: Xray Chest 2 Views PA/Lat (02.20.18 @ 13:44) >    EXAM:  XR CHEST PA LAT 2V                            PROCEDURE DATE:  02/20/2018          INTERPRETATION:  Chest erect AP and lateral 2 views:    Clinical history:    Shortness of breath. Dyspnea. Pain. Infection both legs.    Findings:    Unchanged global cardiomegaly. Aorta appears normal. Left subclavian   pacemaker. Pulmonary venous congestion.    Compared to the prior radiograph of 2/6/2018, no significant change.    Impression:    Persisting cardiomegaly with mild pulmonary venous congestion.      Advanced directives addressed: full resuscitation

## 2018-02-25 LAB
ALBUMIN SERPL ELPH-MCNC: 2.6 G/DL — LOW (ref 3.3–5)
ANION GAP SERPL CALC-SCNC: 8 MMOL/L — SIGNIFICANT CHANGE UP (ref 5–17)
BUN SERPL-MCNC: 28 MG/DL — HIGH (ref 7–23)
CALCIUM SERPL-MCNC: 8.7 MG/DL — SIGNIFICANT CHANGE UP (ref 8.5–10.1)
CHLORIDE SERPL-SCNC: 94 MMOL/L — LOW (ref 96–108)
CO2 SERPL-SCNC: 32 MMOL/L — HIGH (ref 22–31)
CREAT SERPL-MCNC: 0.95 MG/DL — SIGNIFICANT CHANGE UP (ref 0.5–1.3)
CULTURE RESULTS: SIGNIFICANT CHANGE UP
CULTURE RESULTS: SIGNIFICANT CHANGE UP
GLUCOSE BLDC GLUCOMTR-MCNC: 100 MG/DL — HIGH (ref 70–99)
GLUCOSE BLDC GLUCOMTR-MCNC: 110 MG/DL — HIGH (ref 70–99)
GLUCOSE BLDC GLUCOMTR-MCNC: 120 MG/DL — HIGH (ref 70–99)
GLUCOSE BLDC GLUCOMTR-MCNC: 132 MG/DL — HIGH (ref 70–99)
GLUCOSE SERPL-MCNC: 115 MG/DL — HIGH (ref 70–99)
MAGNESIUM SERPL-MCNC: 1.9 MG/DL — SIGNIFICANT CHANGE UP (ref 1.6–2.6)
PHOSPHATE SERPL-MCNC: 3.3 MG/DL — SIGNIFICANT CHANGE UP (ref 2.5–4.5)
POTASSIUM SERPL-MCNC: 4.2 MMOL/L — SIGNIFICANT CHANGE UP (ref 3.5–5.3)
POTASSIUM SERPL-SCNC: 4.2 MMOL/L — SIGNIFICANT CHANGE UP (ref 3.5–5.3)
SODIUM SERPL-SCNC: 134 MMOL/L — LOW (ref 135–145)
SPECIMEN SOURCE: SIGNIFICANT CHANGE UP
SPECIMEN SOURCE: SIGNIFICANT CHANGE UP

## 2018-02-25 PROCEDURE — 93010 ELECTROCARDIOGRAM REPORT: CPT

## 2018-02-25 RX ORDER — SACUBITRIL AND VALSARTAN 24; 26 MG/1; MG/1
1 TABLET, FILM COATED ORAL
Qty: 0 | Refills: 0 | Status: DISCONTINUED | OUTPATIENT
Start: 2018-02-25 | End: 2018-02-26

## 2018-02-25 RX ADMIN — HEPARIN SODIUM 5000 UNIT(S): 5000 INJECTION INTRAVENOUS; SUBCUTANEOUS at 12:18

## 2018-02-25 RX ADMIN — Medication 250 MILLIGRAM(S): at 17:43

## 2018-02-25 RX ADMIN — PANTOPRAZOLE SODIUM 40 MILLIGRAM(S): 20 TABLET, DELAYED RELEASE ORAL at 05:23

## 2018-02-25 RX ADMIN — Medication 81 MILLIGRAM(S): at 12:16

## 2018-02-25 RX ADMIN — MAGNESIUM OXIDE 400 MG ORAL TABLET 400 MILLIGRAM(S): 241.3 TABLET ORAL at 17:43

## 2018-02-25 RX ADMIN — Medication 20 MILLIEQUIVALENT(S): at 12:16

## 2018-02-25 RX ADMIN — Medication 1 PACKET(S): at 01:15

## 2018-02-25 RX ADMIN — AMIODARONE HYDROCHLORIDE 200 MILLIGRAM(S): 400 TABLET ORAL at 05:22

## 2018-02-25 RX ADMIN — MAGNESIUM OXIDE 400 MG ORAL TABLET 400 MILLIGRAM(S): 241.3 TABLET ORAL at 11:13

## 2018-02-25 RX ADMIN — Medication 40 MILLIGRAM(S): at 07:13

## 2018-02-25 RX ADMIN — Medication 75 MILLIGRAM(S): at 05:22

## 2018-02-25 RX ADMIN — Medication 250 MILLIGRAM(S): at 07:12

## 2018-02-25 RX ADMIN — CEFTRIAXONE 1000 MILLIGRAM(S): 500 INJECTION, POWDER, FOR SOLUTION INTRAMUSCULAR; INTRAVENOUS at 12:19

## 2018-02-25 RX ADMIN — SACUBITRIL AND VALSARTAN 1 TABLET(S): 24; 26 TABLET, FILM COATED ORAL at 05:22

## 2018-02-25 RX ADMIN — HEPARIN SODIUM 5000 UNIT(S): 5000 INJECTION INTRAVENOUS; SUBCUTANEOUS at 22:06

## 2018-02-25 RX ADMIN — BUDESONIDE AND FORMOTEROL FUMARATE DIHYDRATE 2 PUFF(S): 160; 4.5 AEROSOL RESPIRATORY (INHALATION) at 19:18

## 2018-02-25 RX ADMIN — HEPARIN SODIUM 5000 UNIT(S): 5000 INJECTION INTRAVENOUS; SUBCUTANEOUS at 07:12

## 2018-02-25 NOTE — PROGRESS NOTE ADULT - ASSESSMENT
57F.  admitted 02/20/18.  resently admitted to  02/05-02/08 for asthma and HF exacerbation.  presents to ED due to leg swelling.  onset 2 weeks prior to admission.  reports not responding to diuretics.  developed pain and redness in her calves and feet.    PMHx:  HFrEF-AICD, VT;  CAD;  type 2 DM;  HTN;  hyperlipidemia;  asthma.    b/l LE cellulitis.  -resistant and gram negative organisms are possible.  -b/l LE venous dopplers, (-) DVT.  ABX changed to po doxy on2/23 but put back on vanco + rocephin from 2/24 sec to fever and low BP  -ID following, discussed with Dr. Gonsalves 2/24, 2/25      Hypotensive episode:  ml iv  hold Lasix for SBP less than 100  Hold Toprol for SBP less than 120  Hold Entresto for SBP less than 140      prolonged QTc.  hx VT.  -c/w telemetry monitoring.  -keep potassium >=4.0 and magnesium >=2.0.; check in am again  -EP interrogated AICD; normal  daily EKG    hypokalemia.  -monitor/supplement; 4.2 today    mild acute upon chronic HFrEF- AICD.  -2+ LE pitting edema,   -BNP 3.5K.  -CXR, mild pulmonary vascular congestion.  -Lasix changed to 40mg po bid from 2/24 d/t low BP.   -Cardiology following.    hx type 2 DM.  -hold oral hypoglycemics.  -correction insulin coverage.    hx asthma.  -stable.  -c/w LABA/ICS.    DVT prophylaxis.  -UFH sq.    poc discussed with pt, her  at bedside, team; Dr. Gonsalves

## 2018-02-25 NOTE — PROGRESS NOTE ADULT - SUBJECTIVE AND OBJECTIVE BOX
56 yo female with PMH of chronic systolic CHF, s/p bi-v AICD, HTN, HLD, CAD, DM2, h/o VT, AICD, asthma. Recent admission 2/5-2/8 for asthma exacerbation and CHF exacerbation presents with 2 weeks of progressive leg edema despite being on diuretics.  After admission she was diagnosed to have bilateral leg edema and cellulitis. She is comfortable and is in normal sinus rhythm on telemetry, she still has significant leg edema but the inflammation in lower extremities is improving with antibiotics. Her  is at bedside. Discussed at length with patient's  regarding her noncompliance with diet and medications. She was referred to advanced heart failure center at [facility], but patient never followed up.     PAST MEDICAL & SURGICAL HISTORY:  HFrEF CHF (congestive heart failure)  NICM  Non obstructive CAD  AICD  HTN (hypertension)  HLD (hyperlipidemia)  Asthma  DM (diabetes mellitus)  History of cholecystectomy      PREVIOUS CARDIAC WORKUP:    Echo:  Severe reduced EF  Cardiac Cath:  8/24/17 - Non obstructive      MEDICATIONS  (STANDING):  amiodarone    Tablet 200 milliGRAM(s) Oral daily  aspirin  chewable 81 milliGRAM(s) Oral daily  buDESOnide 160 MICROgram(s)/formoterol 4.5 MICROgram(s) Inhaler 2 Puff(s) Inhalation two times a day  cefTRIAXone Injectable. 1000 milliGRAM(s) IV Push every 24 hours  dextrose 5%. 1000 milliLiter(s) (50 mL/Hr) IV Continuous <Continuous>  dextrose 50% Injectable 12.5 Gram(s) IV Push once  dextrose 50% Injectable 25 Gram(s) IV Push once  dextrose 50% Injectable 25 Gram(s) IV Push once  furosemide   Injectable 40 milliGRAM(s) IV Push every 12 hours  heparin  Injectable 5000 Unit(s) SubCutaneous every 8 hours  insulin lispro (HumaLOG) corrective regimen sliding scale   SubCutaneous three times a day before meals  insulin lispro (HumaLOG) corrective regimen sliding scale   SubCutaneous at bedtime  magnesium oxide 400 milliGRAM(s) Oral two times a day with meals  metoprolol succinate ER 75 milliGRAM(s) Oral daily  pantoprazole    Tablet 40 milliGRAM(s) Oral before breakfast  potassium chloride    Tablet ER 20 milliEquivalent(s) Oral daily  sacubitril 24 mG/valsartan 26 mG 1 Tablet(s) Oral two times a day  vancomycin  IVPB 1000 milliGRAM(s) IV Intermittent every 12 hours    MEDICATIONS  (PRN):  acetaminophen   Tablet 650 milliGRAM(s) Oral every 6 hours PRN For Temp greater than 38 C (100.4 F)  acetaminophen   Tablet. 650 milliGRAM(s) Oral every 6 hours PRN Mild Pain (1 - 3)  dextrose Gel 1 Dose(s) Oral once PRN Blood Glucose LESS THAN 70 milliGRAM(s)/deciliter  glucagon  Injectable 1 milliGRAM(s) IntraMuscular once PRN Glucose LESS THAN 70 milligrams/deciliter  oxyCODONE    5 mG/acetaminophen 325 mG 1 Tablet(s) Oral every 6 hours PRN Moderate Pain (4 - 6)  oxyCODONE    5 mG/acetaminophen 325 mG 2 Tablet(s) Oral every 6 hours PRN Severe Pain (7 - 10)          REVIEW OF SYSTEM:  Pertinent items are noted in HPI.  Constitutional	Negative for chills, fevers, sweats.    Eyes: 	Negative for visual disturbance.  Ears, nose, mouth, throat, and face: Negative for epistaxis, nasal congestion, sore throat and tinnitus.  Neck:	Negative for enlargement, pain and difficulty in swallowing  Respiration : Negative for cough,  pleuritic chest pain and wheezing  Cardiovascular: Negative for chest pain, and palpitations    Gastrointestinal : Negative for abdominal pain, diarrhea, nausea and vomiting  Genitourinary: Negative for dysuria, frequency and urinary incontinence .  Skin: Negative for  rash, pruritus, swelling, dryness .  	  Hematologic/lymphatic: Negative for bleeding and easy bruising  Musculoskeletal: Negative for arthralgias, back pain and muscle weakness.  Neurological: Negative for dizziness, headaches, seizures and tremors  Behavioral/Psych: Negative for mood change, depression.  Endocrine:	Negative for blurry vision, polydipsia and polyuria, diaphoresis.   Allergic/Immunologic:	Negative for anaphylaxis, angioedema and urticaria.      Vital Signs Last 24 Hrs  T(C): 37.1 (25 Feb 2018 10:09), Max: 37.2 (24 Feb 2018 15:16)  T(F): 98.8 (25 Feb 2018 10:09), Max: 98.9 (24 Feb 2018 15:16)  HR: 79 (25 Feb 2018 10:09) (79 - 92)  BP: 84/53 (25 Feb 2018 10:09) (84/53 - 119/62)  BP(mean): --  RR: 17 (25 Feb 2018 10:09) (17 - 18)  SpO2: 99% (25 Feb 2018 10:09) (96% - 99%)    I&O's Summary    PHYSICAL EXAM  General Appearance: cooperative, no acute distress,   HEENT: PERRL, conjunctiva clear, EOM's intact, non injected pharynx, no exudate .   Neck: Supple, , no adenopathy, thyroid: not enlarged, no carotid bruit or JVD  Back: Symmetric, no  tenderness,no soft tissue tenderness  Lungs: Clear to auscultation bilateral,no adventitious breath sounds, normal   expiratory phase  Heart: Regular rate and rhythm, S1, S2 normal, rade 1/6 holosystolic murmur, no rub or gallop  Abdomen: Soft, non-tender, bowel sounds active , no hepatosplenomegaly  Extremities: no cyanosis or edema, no joint swelling  Skin: Skin color, texture normal, no rashes   Neurologic: Alert and oriented X3 , cranial nerves intact, sensory and motor normal,        INTERPRETATION OF TELEMETRY: NSR PVCS            LABS:                          11.8   8.3   )-----------( 236      ( 24 Feb 2018 06:44 )             39.2     02-25    134<L>  |  94<L>  |  28<H>  ----------------------------<  115<H>  4.2   |  32<H>  |  0.95    Ca    8.7      25 Feb 2018 06:25  Phos  3.3     02-25  Mg     1.9     02-25    TPro  x   /  Alb  2.6<L>  /  TBili  x   /  DBili  x   /  AST  x   /  ALT  x   /  AlkPhos  x   02-25          Pro BNP  4006 02-24 @ 06:44  D Dimer  -- 02-24 @ 06:44  Pro BNP  3548 02-20 @ 12:35  D Dimer  -- 02-20 @ 12:35

## 2018-02-25 NOTE — PROGRESS NOTE ADULT - ASSESSMENT
B/L LE cellulitis  Hypokalemia  Acute on Chronic HFrEF  NICM  Non obstructive CAD  HTN  AICD    Suggest:    Continue ABx, Leg elevation, Supportive skin care  Supplement K, Keep > 4.   Continue treatment of CHF with diuretics.    IV lasix.  Due to borderline low BP decrease Entresto dose.  No aldactone because BPO is low.    follow low sodium, low cholesterol, ADA diet.  Discussed with patient's .  Dr Stevens will follow up tomorrow .  Fluid restriction 1.5 lit/day  monitor Intake & output  Daily weights.  Follow up electrolytes, renal function.   Continue beta blockers.  Monitor QTc after correcting K

## 2018-02-25 NOTE — PROGRESS NOTE ADULT - SUBJECTIVE AND OBJECTIVE BOX
CC:  Patient is a 57y old  Female who presents with a chief complaint of pedal edema (2018 15:40)    : no complaints  K 3.2    : feeling weak today  fever of 101  BP 88/57  Phos 2.4  Mg 1.9    : afebrile today  Mg 1.9  says , legs look better      PHYSICAL EXAM:    Daily     Daily Weight in k (2018 11:26)    ICU Vital Signs Last 24 Hrs  T(C): 37.1 (2018 10:09), Max: 37.1 (2018 10:09)  T(F): 98.8 (2018 10:09), Max: 98.8 (2018 10:09)  HR: 79 (2018 10:09) (79 - 92)  BP: 88/56 (2018 11:26) (84/53 - 119/62)  BP(mean): --  ABP: --  ABP(mean): --  RR: 17 (2018 10:09) (17 - 18)  SpO2: 99% (2018 10:09) (96% - 99%)      Constitutional: Weak appearing  HEENT: Atraumatic,   Respiratory: Breath Sounds normal, no rhonchi/wheeze  Cardiovascular: N S1S2; IZABELA present  Gastrointestinal: Abdomen soft, non tender, Bowel Sounds present  Extremities: 1+ edema b/l, peripheral pulses present  Neurological: AAO x 3, no gross focal motor deficits  Skin: lower leg cellulitis                        11.8   8.3   )-----------( 236      ( 2018 06:44 )             39.2       CBC Full  -  ( 2018 06:44 )  WBC Count : 8.3 K/uL  Hemoglobin : 11.8 g/dL  Hematocrit : 39.2 %  Platelet Count - Automated : 236 K/uL  Mean Cell Volume : 81.5 fl  Mean Cell Hemoglobin : 24.6 pg  Mean Cell Hemoglobin Concentration : 30.1 gm/dL  Auto Neutrophil # : x  Auto Lymphocyte # : x  Auto Monocyte # : x  Auto Eosinophil # : x  Auto Basophil # : x  Auto Neutrophil % : x  Auto Lymphocyte % : x  Auto Monocyte % : x  Auto Eosinophil % : x  Auto Basophil % : x      02-25    134<L>  |  94<L>  |  28<H>  ----------------------------<  115<H>  4.2   |  32<H>  |  0.95    Ca    8.7      2018 06:25  Phos  3.3     02-25  Mg     1.9         TPro  x   /  Alb  2.6<L>  /  TBili  x   /  DBili  x   /  AST  x   /  ALT  x   /  AlkPhos  x         LIVER FUNCTIONS - ( 2018 06:25 )  Alb: 2.6 g/dL / Pro: x     / ALK PHOS: x     / ALT: x     / AST: x     / GGT: x                               MEDICATIONS  (STANDING):  amiodarone    Tablet 200 milliGRAM(s) Oral daily  aspirin  chewable 81 milliGRAM(s) Oral daily  buDESOnide 160 MICROgram(s)/formoterol 4.5 MICROgram(s) Inhaler 2 Puff(s) Inhalation two times a day  cefTRIAXone Injectable. 1000 milliGRAM(s) IV Push every 24 hours  dextrose 5%. 1000 milliLiter(s) (50 mL/Hr) IV Continuous <Continuous>  dextrose 50% Injectable 12.5 Gram(s) IV Push once  dextrose 50% Injectable 25 Gram(s) IV Push once  dextrose 50% Injectable 25 Gram(s) IV Push once  furosemide   Injectable 40 milliGRAM(s) IV Push every 12 hours  heparin  Injectable 5000 Unit(s) SubCutaneous every 8 hours  insulin lispro (HumaLOG) corrective regimen sliding scale   SubCutaneous three times a day before meals  insulin lispro (HumaLOG) corrective regimen sliding scale   SubCutaneous at bedtime  magnesium oxide 400 milliGRAM(s) Oral two times a day with meals  metoprolol succinate ER 75 milliGRAM(s) Oral daily  pantoprazole    Tablet 40 milliGRAM(s) Oral before breakfast  potassium chloride    Tablet ER 20 milliEquivalent(s) Oral daily  sacubitril 24 mG/valsartan 26 mG 1 Tablet(s) Oral two times a day  vancomycin  IVPB 1000 milliGRAM(s) IV Intermittent every 12 hours

## 2018-02-26 LAB
GLUCOSE BLDC GLUCOMTR-MCNC: 106 MG/DL — HIGH (ref 70–99)
GLUCOSE BLDC GLUCOMTR-MCNC: 122 MG/DL — HIGH (ref 70–99)
GLUCOSE BLDC GLUCOMTR-MCNC: 150 MG/DL — HIGH (ref 70–99)
GLUCOSE BLDC GLUCOMTR-MCNC: 192 MG/DL — HIGH (ref 70–99)

## 2018-02-26 PROCEDURE — 93010 ELECTROCARDIOGRAM REPORT: CPT

## 2018-02-26 RX ADMIN — CEFTRIAXONE 1000 MILLIGRAM(S): 500 INJECTION, POWDER, FOR SOLUTION INTRAMUSCULAR; INTRAVENOUS at 14:37

## 2018-02-26 RX ADMIN — Medication 40 MILLIGRAM(S): at 06:15

## 2018-02-26 RX ADMIN — AMIODARONE HYDROCHLORIDE 200 MILLIGRAM(S): 400 TABLET ORAL at 06:09

## 2018-02-26 RX ADMIN — Medication 40 MILLIGRAM(S): at 17:52

## 2018-02-26 RX ADMIN — Medication 1: at 17:49

## 2018-02-26 RX ADMIN — SACUBITRIL AND VALSARTAN 1 TABLET(S): 24; 26 TABLET, FILM COATED ORAL at 06:12

## 2018-02-26 RX ADMIN — HEPARIN SODIUM 5000 UNIT(S): 5000 INJECTION INTRAVENOUS; SUBCUTANEOUS at 06:10

## 2018-02-26 RX ADMIN — Medication 81 MILLIGRAM(S): at 12:14

## 2018-02-26 RX ADMIN — HEPARIN SODIUM 5000 UNIT(S): 5000 INJECTION INTRAVENOUS; SUBCUTANEOUS at 21:36

## 2018-02-26 RX ADMIN — MAGNESIUM OXIDE 400 MG ORAL TABLET 400 MILLIGRAM(S): 241.3 TABLET ORAL at 17:52

## 2018-02-26 RX ADMIN — Medication 250 MILLIGRAM(S): at 18:06

## 2018-02-26 RX ADMIN — PANTOPRAZOLE SODIUM 40 MILLIGRAM(S): 20 TABLET, DELAYED RELEASE ORAL at 06:14

## 2018-02-26 RX ADMIN — Medication 20 MILLIEQUIVALENT(S): at 12:14

## 2018-02-26 RX ADMIN — Medication 250 MILLIGRAM(S): at 06:10

## 2018-02-26 RX ADMIN — MAGNESIUM OXIDE 400 MG ORAL TABLET 400 MILLIGRAM(S): 241.3 TABLET ORAL at 12:14

## 2018-02-26 RX ADMIN — HEPARIN SODIUM 5000 UNIT(S): 5000 INJECTION INTRAVENOUS; SUBCUTANEOUS at 14:46

## 2018-02-26 NOTE — PROGRESS NOTE ADULT - SUBJECTIVE AND OBJECTIVE BOX
56 yo female with PMH of chronic systolic CHF, s/p bi-v AICD, HTN, HLD, CAD, DM2, h/o vtach, asthma with recent admission 2/5-2/8 for asthma exacerbation and CHF exacerbation presents with 2 weeks of pedal edema. Pt states she has been having progressively worsening pedal edema over the past 2 weeks. She has been taking her lasix twice a day and also metolazone without improvement. She states she has pain on both legs which feels like something is stabbing her and she has difficulty ambulating. She has subjective fevers and chills. Has not taken her temperature. States her legs have been itching and she has been scratching at them. She does have multiple scratch marks on legs from scratching and she has noted to have increasing erythema to both legs. Pain worse around calves. Dopplers in ED negative for DVT. Denies any chest pain or SOB. In ED pt given vanco and ceftriaxone for possible b/l lower ext cellulitis.      Date of service: 02-26-18 @ 13:27    Legs feel less swollen  No fever or chill  No pain    MEDICATIONS  (STANDING):  amiodarone    Tablet 200 milliGRAM(s) Oral daily  aspirin  chewable 81 milliGRAM(s) Oral daily  buDESOnide 160 MICROgram(s)/formoterol 4.5 MICROgram(s) Inhaler 2 Puff(s) Inhalation two times a day  cefTRIAXone Injectable. 1000 milliGRAM(s) IV Push every 24 hours  dextrose 5%. 1000 milliLiter(s) (50 mL/Hr) IV Continuous <Continuous>  dextrose 50% Injectable 12.5 Gram(s) IV Push once  dextrose 50% Injectable 25 Gram(s) IV Push once  dextrose 50% Injectable 25 Gram(s) IV Push once  furosemide   Injectable 40 milliGRAM(s) IV Push every 12 hours  heparin  Injectable 5000 Unit(s) SubCutaneous every 8 hours  insulin lispro (HumaLOG) corrective regimen sliding scale   SubCutaneous three times a day before meals  insulin lispro (HumaLOG) corrective regimen sliding scale   SubCutaneous at bedtime  magnesium oxide 400 milliGRAM(s) Oral two times a day with meals  metolazone 5 milliGRAM(s) Oral daily  metoprolol succinate ER 75 milliGRAM(s) Oral daily  pantoprazole    Tablet 40 milliGRAM(s) Oral before breakfast  potassium chloride    Tablet ER 20 milliEquivalent(s) Oral daily  vancomycin  IVPB 1000 milliGRAM(s) IV Intermittent every 12 hours    MEDICATIONS  (PRN):  acetaminophen   Tablet 650 milliGRAM(s) Oral every 6 hours PRN For Temp greater than 38 C (100.4 F)  acetaminophen   Tablet. 650 milliGRAM(s) Oral every 6 hours PRN Mild Pain (1 - 3)  dextrose Gel 1 Dose(s) Oral once PRN Blood Glucose LESS THAN 70 milliGRAM(s)/deciliter  glucagon  Injectable 1 milliGRAM(s) IntraMuscular once PRN Glucose LESS THAN 70 milligrams/deciliter  oxyCODONE    5 mG/acetaminophen 325 mG 1 Tablet(s) Oral every 6 hours PRN Moderate Pain (4 - 6)  oxyCODONE    5 mG/acetaminophen 325 mG 2 Tablet(s) Oral every 6 hours PRN Severe Pain (7 - 10)      Vital Signs Last 24 Hrs  T(C): 36.9 (26 Feb 2018 10:49), Max: 36.9 (25 Feb 2018 17:26)  T(F): 98.5 (26 Feb 2018 10:49), Max: 98.5 (25 Feb 2018 17:26)  HR: 76 (26 Feb 2018 10:49) (74 - 81)  BP: 90/55 (26 Feb 2018 10:49) (90/55 - 109/87)  BP(mean): --  RR: 18 (26 Feb 2018 10:49) (18 - 18)  SpO2: 95% (26 Feb 2018 10:49) (94% - 100%)    Physical Exam:      Constitutional: frail looking  HEENT: NC/AT, EOMI, PERRLA  Neck: supple  Back: no tenderness  Respiratory: decreased breath sounds  Cardiovascular: S1S2 regular, no murmurs  Abdomen: soft, not tender, not distended, positive BS  Genitourinary: deferred  Rectal: deferred  Musculoskeletal: no muscle tenderness, no joint swelling or tenderness  Extremities: pedal edema with erythema, edema, various excoriation and abrasions noted along LE's - edema is much improved; no warmth or tendeness  Neurological:  no focal deficits  Skin: no rashes    Labs:                          11.8   8.3   )-----------( 236      ( 24 Feb 2018 06:44 )             39.2     02-24    136  |  95<L>  |  28<H>  ----------------------------<  105<H>  4.0   |  34<H>  |  0.97    Ca    9.0      24 Feb 2018 06:44  Phos  2.4     02-24  Mg     1.9     02-24         Vancomycin Level, Trough: 15.0 ug/mL (02-23 @ 04:58)  Vancomycin Level, Trough: 38.0 ug/mL (02-22 @ 18:00)      Cultures:       Culture - Urine (collected 02-22-18 @ 06:05)  Source: .Urine Clean Catch (Midstream)  Final Report (02-23-18 @ 11:30):    No growth    Culture - Blood (collected 02-20-18 @ 12:35)  Source: .Blood None  Preliminary Report (02-21-18 @ 18:02):    No growth to date.    Culture - Blood (collected 02-20-18 @ 12:35)  Source: .Blood None  Preliminary Report (02-21-18 @ 18:02):    No growth to date.                                11.9   8.0   )-----------( 181      ( 22 Feb 2018 06:12 )             39.0     02-22    136  |  95<L>  |  29<H>  ----------------------------<  84  3.1<L>   |  34<H>  |  0.96    Ca    8.5      22 Feb 2018 06:12  Phos  3.6     02-21  Mg     1.7     02-21               Radiology:  < from: US Duplex Venous Lower Ext Complete, Bilateral (02.20.18 @ 12:55) >    EXAM:  US DPLX LWR EXT VEINS COMPL BI                            PROCEDURE DATE:  02/20/2018          INTERPRETATION:  Exam Date: 2/20/2018 12:55 PM  Indication: Bilateral lower extremity swelling and calf tenderness   Comparison: 4/14/2013    COMMENT:    TECHNIQUE: Real-time duplex sonography of the deep veins of both lower   extremities with color and spectral Doppler, with and without   compression.   Image quality is good.    FINDINGS:    RIGHT:  There is normal compressibility of the common femoral, femoral,   and popliteal veins.  Visualized posterior tibial veins are within normal   limits.    Doppler examination shows normal spontaneous and phasic flow.    LEFT:  There is normal compressibility of the common femoral, femoral,   and popliteal veins. Posterior tibial veins were not well delineated..    Doppler examination shows normal spontaneous and phasic flow.    Mild bilateral calf edema.    IMPRESSION:     No sonographic evidence of acute deep venous thrombosis in the   femoropopliteal systems bilaterally.  If concern for acute deep vein   thrombosis persists, consider follow-up evaluation in 5-7 days.    Mild bilateral calf edema.    < end of copied text >      < from: Xray Chest 2 Views PA/Lat (02.20.18 @ 13:44) >    EXAM:  XR CHEST PA LAT 2V                            PROCEDURE DATE:  02/20/2018          INTERPRETATION:  Chest erect AP and lateral 2 views:    Clinical history:    Shortness of breath. Dyspnea. Pain. Infection both legs.    Findings:    Unchanged global cardiomegaly. Aorta appears normal. Left subclavian   pacemaker. Pulmonary venous congestion.    Compared to the prior radiograph of 2/6/2018, no significant change.    Impression:    Persisting cardiomegaly with mild pulmonary venous congestion.      Advanced directives addressed: full resuscitation

## 2018-02-26 NOTE — PROGRESS NOTE ADULT - SUBJECTIVE AND OBJECTIVE BOX
58 yo female with PMH of chronic systolic CHF, s/p bi-v AICD, HTN, HLD, CAD, DM2, h/o VT, AICD, asthma. Recent admission 2/5-2/8 for asthma exacerbation and CHF exacerbation presents with 2 weeks of progressive leg edema despite being on diuretics. Denies non compliance to diet (sodium , fluid intake). She had pain on both legs which felt like something was stabbing her and she had difficulty ambulating. She had fevers and chills. Legs had been itching and she had been scratching them. She does have multiple scratch marks on legs from scratching and she has noted to have increasing erythema to both legs. Pain worse around calves. Dopplers in ED negative for DVT.     Today, she feels better. No fever. No new events. Wants to go home.     PAST MEDICAL & SURGICAL HISTORY:  HFrEF CHF (congestive heart failure)  NICM  Non obstructive CAD  AICD  HTN (hypertension)  HLD (hyperlipidemia)  Asthma  DM (diabetes mellitus)  History of cholecystectomy      PREVIOUS CARDIAC WORKUP:    Echo:  Severe reduced EF  Cardiac Cath:  8/24/17 - Non obstructive    Allergies:   No Known Allergies      MEDICATIONS  (STANDING):  amiodarone    Tablet 200 milliGRAM(s) Oral daily  aspirin  chewable 81 milliGRAM(s) Oral daily  buDESOnide 160 MICROgram(s)/formoterol 4.5 MICROgram(s) Inhaler 2 Puff(s) Inhalation two times a day  cefTRIAXone Injectable. 1000 milliGRAM(s) IV Push every 24 hours  dextrose 5%. 1000 milliLiter(s) (50 mL/Hr) IV Continuous <Continuous>  dextrose 50% Injectable 12.5 Gram(s) IV Push once  dextrose 50% Injectable 25 Gram(s) IV Push once  dextrose 50% Injectable 25 Gram(s) IV Push once  furosemide   Injectable 40 milliGRAM(s) IV Push every 12 hours  heparin  Injectable 5000 Unit(s) SubCutaneous every 8 hours  insulin lispro (HumaLOG) corrective regimen sliding scale   SubCutaneous three times a day before meals  insulin lispro (HumaLOG) corrective regimen sliding scale   SubCutaneous at bedtime  magnesium oxide 400 milliGRAM(s) Oral two times a day with meals  metoprolol succinate ER 75 milliGRAM(s) Oral daily  pantoprazole    Tablet 40 milliGRAM(s) Oral before breakfast  potassium chloride    Tablet ER 20 milliEquivalent(s) Oral daily  sacubitril 24 mG/valsartan 26 mG 1 Tablet(s) Oral two times a day  vancomycin  IVPB 1000 milliGRAM(s) IV Intermittent every 12 hours    MEDICATIONS  (PRN):  acetaminophen   Tablet 650 milliGRAM(s) Oral every 6 hours PRN For Temp greater than 38 C (100.4 F)  acetaminophen   Tablet. 650 milliGRAM(s) Oral every 6 hours PRN Mild Pain (1 - 3)  dextrose Gel 1 Dose(s) Oral once PRN Blood Glucose LESS THAN 70 milliGRAM(s)/deciliter  glucagon  Injectable 1 milliGRAM(s) IntraMuscular once PRN Glucose LESS THAN 70 milligrams/deciliter  oxyCODONE    5 mG/acetaminophen 325 mG 1 Tablet(s) Oral every 6 hours PRN Moderate Pain (4 - 6)  oxyCODONE    5 mG/acetaminophen 325 mG 2 Tablet(s) Oral every 6 hours PRN Severe Pain (7 - 10)      ROS:     Detailed ten system ROS was performed and was negative except for history as eluded to above.    no fever  no chills  no nausea  no vomiting  no diarrhea  no constipation  no melena  no hematochezia  no chest pain  no palpitations  no sob at rest  + dyspnea on exertion  no cough  no wheezing  no anorexia  no headache  no dizziness  no syncope  no weakness  no myalgia  no dysuria  no polyuria  no hematuria   + edema      Vital Signs Last 24 Hrs  T(C): 36.9 (26 Feb 2018 10:49), Max: 36.9 (25 Feb 2018 17:26)  T(F): 98.5 (26 Feb 2018 10:49), Max: 98.5 (25 Feb 2018 17:26)  HR: 76 (26 Feb 2018 10:49) (74 - 81)  BP: 90/55 (26 Feb 2018 10:49) (90/55 - 109/87)  BP(mean): --  RR: 18 (26 Feb 2018 10:49) (18 - 18)  SpO2: 95% (26 Feb 2018 10:49) (94% - 100%)    I&O's Summary      PHYSICAL EXAM:    General:                Comfortable, AAO X 3, in no distress.   HEENT:                  Atraumatic, PERRLA, EOMI, conjunctiva clear.   Neck:                     Supple, no adenopathy, no thyromegaly, no JVD, no bruit.  Back:                     Symmetric, non tender.  Chest:                    Scant rales, B/L symmetric air entry, no tachypnea  Heart:                     S1, S2 normal, no gallop, + murmur.  Abdomen:              Soft, non-tender, bowel sounds active. No palpable masses.  Extremities:           no cyanosis, + edema. Cellulitis B/L LE. multiple excoriation marks  Skin:                      Skin color, texture normal, no rash except for both both legs as described above.  Neurologic:            Grossly nonfocal.       TELEMETRY:   Normal sinus rhythm  with no tachy or lillian events     ECG: A paced, first deg AV block, no ST T changes. QTc prolongation.    LABS:    02-25    134<L>  |  94<L>  |  28<H>  ----------------------------<  115<H>  4.2   |  32<H>  |  0.95    Ca    8.7      25 Feb 2018 06:25  Phos  3.3     02-25  Mg     1.9     02-25    TPro  x   /  Alb  2.6<L>  /  TBili  x   /  DBili  x   /  AST  x   /  ALT  x   /  AlkPhos  x   02-25      Pro BNP  4006 02-24 @ 06:44  Pro BNP  3548 02-20 @ 12:35

## 2018-02-26 NOTE — PROGRESS NOTE ADULT - ASSESSMENT
B/L LE cellulitis  Acute on Chronic HFrEF  NICM. S/p VT  Non obstructive CAD  HTN  AICD    Suggest:    Continue ABx, Leg elevation, Supportive skin care  Continue treatment of CHF with diuretics.   No aldactone because BP is low.  Hold Entresto while BP is low.   Increase diuresis once BP is better.  follow low sodium, low cholesterol, ADA diet.  Fluid restriction 1.5 lit/day  monitor Intake & output  Daily weights.  Follow up electrolytes, renal function.   Continue beta blockers.  Monitor QTc

## 2018-02-26 NOTE — PROGRESS NOTE ADULT - SUBJECTIVE AND OBJECTIVE BOX
CC:  Patient is a 57y old  Female who presents with a chief complaint of pedal edema (2018 15:40)    : no complaints  K 3.2    : feeling weak today  fever of 101  BP 88/57  Phos 2.4  Mg 1.9    : afebrile today  Mg 1.9  says , legs look better    : feeling better  leg edema still present      PHYSICAL EXAM:    Daily     Daily Weight in k.1 (2018 05:59)    ICU Vital Signs Last 24 Hrs  T(C): 37.2 (2018 16:48), Max: 37.2 (2018 16:48)  T(F): 99 (2018 16:48), Max: 99 (2018 16:48)  HR: 80 (2018 16:48) (74 - 81)  BP: 95/63 (2018 16:48) (90/55 - 109/87)  BP(mean): --  ABP: --  ABP(mean): --  RR: 17 (2018 16:48) (17 - 18)  SpO2: 97% (2018 16:48) (94% - 100%)      Constitutional: Weak appearing  HEENT: Atraumatic,   Respiratory: Breath Sounds normal, no rhonchi/wheeze  Cardiovascular: N S1S2; IZABELA present  Gastrointestinal: Abdomen soft, non tender, Bowel Sounds present  Extremities: 1+ edema b/l, peripheral pulses present  Neurological: AAO x 3, no gross focal motor deficits  Skin: lower leg cellulitis                         134<L>  |  94<L>  |  28<H>  ----------------------------<  115<H>  4.2   |  32<H>  |  0.95    Ca    8.7      2018 06:25  Phos  3.3       Mg     1.9         TPro  x   /  Alb  2.6<L>  /  TBili  x   /  DBili  x   /  AST  x   /  ALT  x   /  AlkPhos  x         LIVER FUNCTIONS - ( 2018 06:25 )  Alb: 2.6 g/dL / Pro: x     / ALK PHOS: x     / ALT: x     / AST: x     / GGT: x                               MEDICATIONS  (STANDING):  amiodarone    Tablet 200 milliGRAM(s) Oral daily  aspirin  chewable 81 milliGRAM(s) Oral daily  buDESOnide 160 MICROgram(s)/formoterol 4.5 MICROgram(s) Inhaler 2 Puff(s) Inhalation two times a day  cefTRIAXone Injectable. 1000 milliGRAM(s) IV Push every 24 hours  dextrose 5%. 1000 milliLiter(s) (50 mL/Hr) IV Continuous <Continuous>  dextrose 50% Injectable 12.5 Gram(s) IV Push once  dextrose 50% Injectable 25 Gram(s) IV Push once  dextrose 50% Injectable 25 Gram(s) IV Push once  furosemide   Injectable 40 milliGRAM(s) IV Push every 12 hours  heparin  Injectable 5000 Unit(s) SubCutaneous every 8 hours  insulin lispro (HumaLOG) corrective regimen sliding scale   SubCutaneous three times a day before meals  insulin lispro (HumaLOG) corrective regimen sliding scale   SubCutaneous at bedtime  magnesium oxide 400 milliGRAM(s) Oral two times a day with meals  metolazone 5 milliGRAM(s) Oral daily  metoprolol succinate ER 75 milliGRAM(s) Oral daily  pantoprazole    Tablet 40 milliGRAM(s) Oral before breakfast  potassium chloride    Tablet ER 20 milliEquivalent(s) Oral daily  vancomycin  IVPB 1000 milliGRAM(s) IV Intermittent every 12 hours

## 2018-02-26 NOTE — PROGRESS NOTE ADULT - ASSESSMENT
58 yo female with PMH of chronic systolic CHF, s/p bi-v AICD, HTN, HLD, CAD, DM2, h/o vtach, asthma with recent admission 2/5-2/8 for asthma exacerbation and CHF exacerbation presents with 2 weeks of pedal edema. Pt states she has been having progressively worsening pedal edema over the past 2 weeks. She has been taking her lasix twice a day and also metolazone without improvement. She states she has pain on both legs which feels like something is stabbing her and she has difficulty ambulating. She has subjective fevers and chills. Has not taken her temperature. States her legs have been itching and she has been scratching at them. She does have multiple scratch marks on legs from scratching and she has noted to have increasing erythema to both legs. Pain worse around calves. Dopplers in ED negative for DVT. Denies any chest pain or SOB. In ED pt given vanco and ceftriaxone for possible b/l lower ext cellulitis.      1. B/l LE cellulitis resolving. Pedal edema likely due to CHF.  - improving  - on vancomycin [id number] # 6, trough therapeutic  - on rocephin 1gm daily #6  - continue with antibiotic coverage  - blood cx no growth  - change abx to doxycycline 100 mg PO q12h for 7 more days  - monitor temps  -tolerating abx well so far; no side effects noted  -reason for abx use and side effects reviewed with patient  - supportive care    2. other issues - care per medicine

## 2018-02-27 LAB
ANION GAP SERPL CALC-SCNC: 7 MMOL/L — SIGNIFICANT CHANGE UP (ref 5–17)
BUN SERPL-MCNC: 39 MG/DL — HIGH (ref 7–23)
CALCIUM SERPL-MCNC: 8.9 MG/DL — SIGNIFICANT CHANGE UP (ref 8.5–10.1)
CHLORIDE SERPL-SCNC: 92 MMOL/L — LOW (ref 96–108)
CO2 SERPL-SCNC: 34 MMOL/L — HIGH (ref 22–31)
CREAT SERPL-MCNC: 1.11 MG/DL — SIGNIFICANT CHANGE UP (ref 0.5–1.3)
GLUCOSE BLDC GLUCOMTR-MCNC: 101 MG/DL — HIGH (ref 70–99)
GLUCOSE BLDC GLUCOMTR-MCNC: 109 MG/DL — HIGH (ref 70–99)
GLUCOSE BLDC GLUCOMTR-MCNC: 112 MG/DL — HIGH (ref 70–99)
GLUCOSE BLDC GLUCOMTR-MCNC: 139 MG/DL — HIGH (ref 70–99)
GLUCOSE SERPL-MCNC: 139 MG/DL — HIGH (ref 70–99)
MAGNESIUM SERPL-MCNC: 2.2 MG/DL — SIGNIFICANT CHANGE UP (ref 1.6–2.6)
POTASSIUM SERPL-MCNC: 2.7 MMOL/L — CRITICAL LOW (ref 3.5–5.3)
POTASSIUM SERPL-SCNC: 2.7 MMOL/L — CRITICAL LOW (ref 3.5–5.3)
SODIUM SERPL-SCNC: 133 MMOL/L — LOW (ref 135–145)

## 2018-02-27 PROCEDURE — 93010 ELECTROCARDIOGRAM REPORT: CPT

## 2018-02-27 RX ORDER — POTASSIUM CHLORIDE 20 MEQ
20 PACKET (EA) ORAL
Qty: 0 | Refills: 0 | Status: COMPLETED | OUTPATIENT
Start: 2018-02-27 | End: 2018-02-28

## 2018-02-27 RX ORDER — POTASSIUM CHLORIDE 20 MEQ
40 PACKET (EA) ORAL ONCE
Qty: 0 | Refills: 0 | Status: COMPLETED | OUTPATIENT
Start: 2018-02-27 | End: 2018-02-27

## 2018-02-27 RX ORDER — POTASSIUM CHLORIDE 20 MEQ
10 PACKET (EA) ORAL
Qty: 0 | Refills: 0 | Status: COMPLETED | OUTPATIENT
Start: 2018-02-27 | End: 2018-02-27

## 2018-02-27 RX ADMIN — Medication 40 MILLIGRAM(S): at 17:12

## 2018-02-27 RX ADMIN — HEPARIN SODIUM 5000 UNIT(S): 5000 INJECTION INTRAVENOUS; SUBCUTANEOUS at 05:12

## 2018-02-27 RX ADMIN — Medication 81 MILLIGRAM(S): at 11:24

## 2018-02-27 RX ADMIN — MAGNESIUM OXIDE 400 MG ORAL TABLET 400 MILLIGRAM(S): 241.3 TABLET ORAL at 07:51

## 2018-02-27 RX ADMIN — Medication 100 MILLIEQUIVALENT(S): at 17:12

## 2018-02-27 RX ADMIN — MAGNESIUM OXIDE 400 MG ORAL TABLET 400 MILLIGRAM(S): 241.3 TABLET ORAL at 17:12

## 2018-02-27 RX ADMIN — Medication 20 MILLIEQUIVALENT(S): at 11:24

## 2018-02-27 RX ADMIN — CEFTRIAXONE 1000 MILLIGRAM(S): 500 INJECTION, POWDER, FOR SOLUTION INTRAMUSCULAR; INTRAVENOUS at 12:42

## 2018-02-27 RX ADMIN — BUDESONIDE AND FORMOTEROL FUMARATE DIHYDRATE 2 PUFF(S): 160; 4.5 AEROSOL RESPIRATORY (INHALATION) at 19:44

## 2018-02-27 RX ADMIN — Medication 40 MILLIEQUIVALENT(S): at 17:12

## 2018-02-27 RX ADMIN — Medication 650 MILLIGRAM(S): at 05:13

## 2018-02-27 RX ADMIN — HEPARIN SODIUM 5000 UNIT(S): 5000 INJECTION INTRAVENOUS; SUBCUTANEOUS at 12:41

## 2018-02-27 RX ADMIN — PANTOPRAZOLE SODIUM 40 MILLIGRAM(S): 20 TABLET, DELAYED RELEASE ORAL at 05:38

## 2018-02-27 RX ADMIN — HEPARIN SODIUM 5000 UNIT(S): 5000 INJECTION INTRAVENOUS; SUBCUTANEOUS at 21:32

## 2018-02-27 RX ADMIN — AMIODARONE HYDROCHLORIDE 200 MILLIGRAM(S): 400 TABLET ORAL at 05:12

## 2018-02-27 RX ADMIN — Medication 650 MILLIGRAM(S): at 17:13

## 2018-02-27 RX ADMIN — Medication 40 MILLIGRAM(S): at 05:38

## 2018-02-27 RX ADMIN — Medication 250 MILLIGRAM(S): at 17:12

## 2018-02-27 RX ADMIN — Medication 20 MILLIEQUIVALENT(S): at 23:10

## 2018-02-27 RX ADMIN — Medication 250 MILLIGRAM(S): at 05:13

## 2018-02-27 NOTE — PROGRESS NOTE ADULT - ASSESSMENT
57F.  admitted 02/20/18.  resently admitted to  02/05-02/08 for asthma and HF exacerbation.  presents to ED due to leg swelling.  onset 2 weeks prior to admission.  reports not responding to diuretics.  developed pain and redness in her calves and feet.    PMHx:  HFrEF-AICD, VT;  CAD;  type 2 DM;  HTN;  hyperlipidemia;  asthma.    b/l LE cellulitis.  -resistant and gram negative organisms are possible.  -b/l LE venous dopplers, (-) DVT.  ABX changed to po doxy on2/23 but put back on vanco + rocephin from 2/24 sec to fever and low BP  -ID following, discussed with Dr. Gonsalves 2/24, 2/25  pt asking for horan oil applications/steroids for cellulitis; no topical steroids for it; would get wound care nurse consult      Hypotensive episode + CHF  hold Lasix for SBP less than 100  Hold Toprol for SBP less than 120  d/c Entresto as per dr. Stevens  add Zaroxolyn 5 mg po daily      prolonged QTc.  hx VT.  -c/w telemetry monitoring.  -keep potassium >=4.0 and magnesium >=2.0.; check in am again  -EP interrogated AICD; normal  daily EKG      mild acute upon chronic HFrEF- AICD.  -2+ LE pitting edema,   -BNP 3.5K.  -CXR, mild pulmonary vascular congestion.  -Lasix changed to 40mg po bid from 2/24 d/t low BP.   -Cardiology following.  add Zaroxolyn    hx type 2 DM.  -hold oral hypoglycemics.  -correction insulin coverage.    hx asthma.  -stable.  -c/w LABA/ICS.    DVT prophylaxis.  -UFH sq.    Hypokalemia of 2.7: replace and recheck    poc discussed with pt, her  at bedside via , Thao # [phone], team;

## 2018-02-27 NOTE — PROGRESS NOTE ADULT - SUBJECTIVE AND OBJECTIVE BOX
CC:  Patient is a 57y old  Female who presents with a chief complaint of pedal edema (2018 15:40)    : no complaints  K 3.2    : feeling weak today  fever of 101  BP 88/57  Phos 2.4  Mg 1.9    : afebrile today  Mg 1.9  says , legs look better    : feeling better  leg edema still present    : pt states legs are better  K 2.7  QTc 540 ms      PHYSICAL EXAM:    Daily     Daily Weight in k (2018 10:29)    ICU Vital Signs Last 24 Hrs  T(C): 36.3 (2018 10:29), Max: 37.2 (2018 16:48)  T(F): 97.4 (2018 10:), Max: 99 (2018 16:48)  HR: 87 (2018 10:29) (80 - 90)  BP: 101/65 (2018 10:29) (93/65 - 101/65)  BP(mean): 71 (2018 05:11) (71 - 71)  ABP: --  ABP(mean): --  RR: 87 (2018 10:29) (17 - 87)  SpO2: 96% (2018 10:29) (96% - 98%)        Constitutional: Weak appearing  HEENT: Atraumatic,   Respiratory: Breath Sounds normal, no rhonchi/wheeze  Cardiovascular: N S1S2; IZABELA present  Gastrointestinal: Abdomen soft, non tender, Bowel Sounds present  Extremities: 1+ edema b/l, peripheral pulses present  Neurological: AAO x 3, no gross focal motor deficits  Skin: lower leg cellulitis better            133<L>  |  92<L>  |  39<H>  ----------------------------<  139<H>  2.7<LL>   |  34<H>  |  1.11    Ca    8.9      2018 13:55  Mg     2.2                                   MEDICATIONS  (STANDING):  amiodarone    Tablet 200 milliGRAM(s) Oral daily  aspirin  chewable 81 milliGRAM(s) Oral daily  buDESOnide 160 MICROgram(s)/formoterol 4.5 MICROgram(s) Inhaler 2 Puff(s) Inhalation two times a day  cefTRIAXone Injectable. 1000 milliGRAM(s) IV Push every 24 hours  dextrose 5%. 1000 milliLiter(s) (50 mL/Hr) IV Continuous <Continuous>  dextrose 50% Injectable 12.5 Gram(s) IV Push once  dextrose 50% Injectable 25 Gram(s) IV Push once  dextrose 50% Injectable 25 Gram(s) IV Push once  furosemide   Injectable 40 milliGRAM(s) IV Push every 12 hours  heparin  Injectable 5000 Unit(s) SubCutaneous every 8 hours  insulin lispro (HumaLOG) corrective regimen sliding scale   SubCutaneous three times a day before meals  insulin lispro (HumaLOG) corrective regimen sliding scale   SubCutaneous at bedtime  magnesium oxide 400 milliGRAM(s) Oral two times a day with meals  metolazone 5 milliGRAM(s) Oral daily  metoprolol succinate ER 75 milliGRAM(s) Oral daily  pantoprazole    Tablet 40 milliGRAM(s) Oral before breakfast  potassium chloride    Tablet ER 20 milliEquivalent(s) Oral daily  potassium chloride    Tablet ER 40 milliEquivalent(s) Oral once  potassium chloride  10 mEq/100 mL IVPB 10 milliEquivalent(s) IV Intermittent every 1 hour  vancomycin  IVPB 1000 milliGRAM(s) IV Intermittent every 12 hours

## 2018-02-27 NOTE — PROGRESS NOTE ADULT - ASSESSMENT
B/L LE cellulitis  Acute on Chronic HFrEF  NICM. S/p VT  Non obstructive CAD  HTN  AICD    Suggest:    Continue ABx, Leg elevation, Supportive skin care  Continue treatment of CHF with diuretics.   No aldactone because BP is low.  Hold Entresto while BP is low.   Increase diuresis once BP is better.  Check electrolytes and EKG today. Monitor QTc  follow low sodium, low cholesterol, ADA diet.  Fluid restriction 1.5 lit/day  monitor Intake & output  Daily weights.  Follow up renal function.   Continue beta blockers.    D/W  at bedside

## 2018-02-27 NOTE — PROGRESS NOTE ADULT - SUBJECTIVE AND OBJECTIVE BOX
56 yo female with PMH of chronic systolic CHF, s/p bi-v AICD, HTN, HLD, CAD, DM2, h/o VT, AICD, asthma. Recent admission 2/5-2/8 for asthma exacerbation and CHF exacerbation presents with 2 weeks of progressive leg edema despite being on diuretics. Denies non compliance to diet (sodium , fluid intake). She had pain on both legs which felt like something was stabbing her and she had difficulty ambulating. She had fevers and chills. Legs had been itching and she had been scratching them. She does have multiple scratch marks on legs from scratching and she has noted to have increasing erythema to both legs. Pain worse around calves. Dopplers in ED negative for DVT.     Today, no new complaints. Leg edema persists. No orthopnea.    PAST MEDICAL & SURGICAL HISTORY:  HFrEF CHF (congestive heart failure)  NICM  Non obstructive CAD  AICD  HTN (hypertension)  HLD (hyperlipidemia)  Asthma  DM (diabetes mellitus)  History of cholecystectomy      PREVIOUS CARDIAC WORKUP:    Echo:  Severe reduced EF  Cardiac Cath:  8/24/17 - Non obstructive    Allergies:   No Known Allergies      MEDICATIONS  (STANDING):  amiodarone    Tablet 200 milliGRAM(s) Oral daily  aspirin  chewable 81 milliGRAM(s) Oral daily  buDESOnide 160 MICROgram(s)/formoterol 4.5 MICROgram(s) Inhaler 2 Puff(s) Inhalation two times a day  cefTRIAXone Injectable. 1000 milliGRAM(s) IV Push every 24 hours  dextrose 5%. 1000 milliLiter(s) (50 mL/Hr) IV Continuous <Continuous>  dextrose 50% Injectable 12.5 Gram(s) IV Push once  dextrose 50% Injectable 25 Gram(s) IV Push once  dextrose 50% Injectable 25 Gram(s) IV Push once  furosemide   Injectable 40 milliGRAM(s) IV Push every 12 hours  heparin  Injectable 5000 Unit(s) SubCutaneous every 8 hours  insulin lispro (HumaLOG) corrective regimen sliding scale   SubCutaneous three times a day before meals  insulin lispro (HumaLOG) corrective regimen sliding scale   SubCutaneous at bedtime  magnesium oxide 400 milliGRAM(s) Oral two times a day with meals  metolazone 5 milliGRAM(s) Oral daily  metoprolol succinate ER 75 milliGRAM(s) Oral daily  pantoprazole    Tablet 40 milliGRAM(s) Oral before breakfast  potassium chloride    Tablet ER 20 milliEquivalent(s) Oral daily  vancomycin  IVPB 1000 milliGRAM(s) IV Intermittent every 12 hours    MEDICATIONS  (PRN):  acetaminophen   Tablet 650 milliGRAM(s) Oral every 6 hours PRN For Temp greater than 38 C (100.4 F)  acetaminophen   Tablet. 650 milliGRAM(s) Oral every 6 hours PRN Mild Pain (1 - 3)  dextrose Gel 1 Dose(s) Oral once PRN Blood Glucose LESS THAN 70 milliGRAM(s)/deciliter  glucagon  Injectable 1 milliGRAM(s) IntraMuscular once PRN Glucose LESS THAN 70 milligrams/deciliter  oxyCODONE    5 mG/acetaminophen 325 mG 1 Tablet(s) Oral every 6 hours PRN Moderate Pain (4 - 6)  oxyCODONE    5 mG/acetaminophen 325 mG 2 Tablet(s) Oral every 6 hours PRN Severe Pain (7 - 10)      ROS:     Detailed ten system ROS was performed and was negative except for history as eluded to above.    no fever  no chills  no nausea  no vomiting  no diarrhea  no constipation  no melena  no hematochezia  no chest pain  no palpitations  no sob at rest  + dyspnea on exertion  no cough  no wheezing  no anorexia  no headache  no dizziness  no syncope  no weakness  no myalgia  no dysuria  no polyuria  no hematuria  + edema       Vital Signs Last 24 Hrs  T(C): 36.3 (27 Feb 2018 10:29), Max: 37.2 (26 Feb 2018 16:48)  T(F): 97.4 (27 Feb 2018 10:29), Max: 99 (26 Feb 2018 16:48)  HR: 87 (27 Feb 2018 10:29) (80 - 90)  BP: 101/65 (27 Feb 2018 10:29) (93/65 - 101/65)  BP(mean): 71 (27 Feb 2018 05:11) (71 - 71)  RR: 87 (27 Feb 2018 10:29) (17 - 87)  SpO2: 96% (27 Feb 2018 10:29) (96% - 98%)    I&O's Summary      PHYSICAL EXAM:    General:                Comfortable, AAO X 3, in no distress.   HEENT:                  Atraumatic, PERRLA, EOMI, conjunctiva clear.   Neck:                     Supple, no adenopathy, no thyromegaly, no JVD, no bruit.  Back:                     Symmetric, non tender.  Chest:                    Scant rales, B/L symmetric air entry, no tachypnea  Heart:                     S1, S2 normal, no gallop, + murmur.  Abdomen:              Soft, non-tender, bowel sounds active. No palpable masses.  Extremities:           no cyanosis, + edema. Cellulitis B/L LE. multiple excoriation marks  Skin:                      Skin color, texture normal, no rash except for both both legs as described above.  Neurologic:            Grossly nonfocal.       TELEMETRY:   Normal sinus rhythm  with no tachy or lillian events     ECG: QTC 499ms, NSR, first deg AV block    LABS:    Labs pending today    RADIOLOGY & ADDITIONAL STUDIES:

## 2018-02-28 LAB
ANION GAP SERPL CALC-SCNC: 8 MMOL/L — SIGNIFICANT CHANGE UP (ref 5–17)
BUN SERPL-MCNC: 38 MG/DL — HIGH (ref 7–23)
CALCIUM SERPL-MCNC: 9 MG/DL — SIGNIFICANT CHANGE UP (ref 8.5–10.1)
CHLORIDE SERPL-SCNC: 96 MMOL/L — SIGNIFICANT CHANGE UP (ref 96–108)
CO2 SERPL-SCNC: 27 MMOL/L — SIGNIFICANT CHANGE UP (ref 22–31)
CREAT SERPL-MCNC: 1.27 MG/DL — SIGNIFICANT CHANGE UP (ref 0.5–1.3)
GLUCOSE BLDC GLUCOMTR-MCNC: 106 MG/DL — HIGH (ref 70–99)
GLUCOSE BLDC GLUCOMTR-MCNC: 120 MG/DL — HIGH (ref 70–99)
GLUCOSE BLDC GLUCOMTR-MCNC: 145 MG/DL — HIGH (ref 70–99)
GLUCOSE BLDC GLUCOMTR-MCNC: 92 MG/DL — SIGNIFICANT CHANGE UP (ref 70–99)
GLUCOSE SERPL-MCNC: 125 MG/DL — HIGH (ref 70–99)
HCT VFR BLD CALC: 39.6 % — SIGNIFICANT CHANGE UP (ref 34.5–45)
HGB BLD-MCNC: 12.1 G/DL — SIGNIFICANT CHANGE UP (ref 11.5–15.5)
MCHC RBC-ENTMCNC: 24.9 PG — LOW (ref 27–34)
MCHC RBC-ENTMCNC: 30.6 GM/DL — LOW (ref 32–36)
MCV RBC AUTO: 81.4 FL — SIGNIFICANT CHANGE UP (ref 80–100)
PLATELET # BLD AUTO: 291 K/UL — SIGNIFICANT CHANGE UP (ref 150–400)
POTASSIUM SERPL-MCNC: 3.8 MMOL/L — SIGNIFICANT CHANGE UP (ref 3.5–5.3)
POTASSIUM SERPL-SCNC: 3.8 MMOL/L — SIGNIFICANT CHANGE UP (ref 3.5–5.3)
RBC # BLD: 4.87 M/UL — SIGNIFICANT CHANGE UP (ref 3.8–5.2)
RBC # FLD: 18.4 % — HIGH (ref 10.3–14.5)
SODIUM SERPL-SCNC: 131 MMOL/L — LOW (ref 135–145)
WBC # BLD: 8.6 K/UL — SIGNIFICANT CHANGE UP (ref 3.8–10.5)
WBC # FLD AUTO: 8.6 K/UL — SIGNIFICANT CHANGE UP (ref 3.8–10.5)

## 2018-02-28 PROCEDURE — 93010 ELECTROCARDIOGRAM REPORT: CPT

## 2018-02-28 RX ORDER — SPIRONOLACTONE 25 MG/1
25 TABLET, FILM COATED ORAL DAILY
Qty: 0 | Refills: 0 | Status: DISCONTINUED | OUTPATIENT
Start: 2018-02-28 | End: 2018-03-01

## 2018-02-28 RX ORDER — POTASSIUM CHLORIDE 20 MEQ
40 PACKET (EA) ORAL ONCE
Qty: 0 | Refills: 0 | Status: COMPLETED | OUTPATIENT
Start: 2018-02-28 | End: 2018-02-28

## 2018-02-28 RX ADMIN — BUDESONIDE AND FORMOTEROL FUMARATE DIHYDRATE 2 PUFF(S): 160; 4.5 AEROSOL RESPIRATORY (INHALATION) at 19:42

## 2018-02-28 RX ADMIN — Medication 40 MILLIGRAM(S): at 17:15

## 2018-02-28 RX ADMIN — Medication 650 MILLIGRAM(S): at 23:54

## 2018-02-28 RX ADMIN — PANTOPRAZOLE SODIUM 40 MILLIGRAM(S): 20 TABLET, DELAYED RELEASE ORAL at 06:11

## 2018-02-28 RX ADMIN — Medication 81 MILLIGRAM(S): at 11:24

## 2018-02-28 RX ADMIN — Medication 40 MILLIEQUIVALENT(S): at 17:14

## 2018-02-28 RX ADMIN — BUDESONIDE AND FORMOTEROL FUMARATE DIHYDRATE 2 PUFF(S): 160; 4.5 AEROSOL RESPIRATORY (INHALATION) at 08:44

## 2018-02-28 RX ADMIN — Medication 20 MILLIEQUIVALENT(S): at 11:24

## 2018-02-28 RX ADMIN — Medication 75 MILLIGRAM(S): at 06:11

## 2018-02-28 RX ADMIN — MAGNESIUM OXIDE 400 MG ORAL TABLET 400 MILLIGRAM(S): 241.3 TABLET ORAL at 17:15

## 2018-02-28 RX ADMIN — Medication 20 MILLIEQUIVALENT(S): at 01:50

## 2018-02-28 RX ADMIN — HEPARIN SODIUM 5000 UNIT(S): 5000 INJECTION INTRAVENOUS; SUBCUTANEOUS at 13:18

## 2018-02-28 RX ADMIN — Medication 250 MILLIGRAM(S): at 06:11

## 2018-02-28 RX ADMIN — AMIODARONE HYDROCHLORIDE 200 MILLIGRAM(S): 400 TABLET ORAL at 06:11

## 2018-02-28 RX ADMIN — OXYCODONE AND ACETAMINOPHEN 1 TABLET(S): 5; 325 TABLET ORAL at 01:50

## 2018-02-28 RX ADMIN — HEPARIN SODIUM 5000 UNIT(S): 5000 INJECTION INTRAVENOUS; SUBCUTANEOUS at 06:12

## 2018-02-28 RX ADMIN — MAGNESIUM OXIDE 400 MG ORAL TABLET 400 MILLIGRAM(S): 241.3 TABLET ORAL at 08:11

## 2018-02-28 RX ADMIN — SPIRONOLACTONE 25 MILLIGRAM(S): 25 TABLET, FILM COATED ORAL at 17:15

## 2018-02-28 RX ADMIN — Medication 40 MILLIGRAM(S): at 06:56

## 2018-02-28 RX ADMIN — CEFTRIAXONE 1000 MILLIGRAM(S): 500 INJECTION, POWDER, FOR SOLUTION INTRAMUSCULAR; INTRAVENOUS at 13:18

## 2018-02-28 RX ADMIN — Medication 650 MILLIGRAM(S): at 06:15

## 2018-02-28 RX ADMIN — Medication 250 MILLIGRAM(S): at 18:28

## 2018-02-28 NOTE — PROGRESS NOTE ADULT - ASSESSMENT
B/L LE cellulitis  Acute on Chronic HFrEF  NICM. S/p VT  Non obstructive CAD  HTN  AICD    Suggest:    Continue ABx, Leg elevation, Supportive skin care  ID evaluation reg fever  Continue treatment of CHF with diuretics.   No aldactone because BP is low.  Hold Entresto while BP is low.   Increase diuresis once BP is better.  Check electrolytes and EKG today. Monitor QTc  follow low sodium, low cholesterol, ADA diet.  Fluid restriction 1.5 lit/day  monitor Intake & output  Daily weights.  Follow up renal function.   Continue beta blockers.    D/W  at bedside B/L LE cellulitis  Acute on Chronic HFrEF  NICM. S/p VT  Non obstructive CAD  HTN  AICD    Suggest:    Continue ABx, Leg elevation, Supportive skin care  ID evaluation reg recurrent fever  Continue treatment of CHF with diuretics.   Potassium supplement.  F/u QTc on EKG daily  Hold Entresto while BP is low.   Resume Aldactone now that BP is better.  follow low sodium, low cholesterol, ADA diet.  Fluid restriction 1.5 lit/day  monitor Intake & output  Daily weights.  Follow up electrolytes and renal function.   Continue beta blockers.

## 2018-02-28 NOTE — ADVANCED PRACTICE NURSE CONSULT - ASSESSMENT
This is a 57 year old female that was admitted to the hospital on 2/20/2018 for cellulitis of the lower extremities and hypokalemia.  PMH- HFrEF-AICD, VT;  CAD;  type 2 DM;  HTN;  hyperlipidemia;  asthma.    Requested by MD to speak to patient about options to moisturize lower extremities. Patient presents sitting in a terry at the bedside. Patient's lower extremities noted to be intact with scattered scabs noted. No open, draining wounds noted. Encouraging patient to use lotion/cream daily to keep extremities moisturized. Educated patient not to scratch her legs as that can introduce bacteria and cause an infection. Patient receptive and remains sitting in chair at the bedside.

## 2018-02-28 NOTE — PROGRESS NOTE ADULT - SUBJECTIVE AND OBJECTIVE BOX
58 yo female with PMH of chronic systolic CHF, s/p bi-v AICD, HTN, HLD, CAD, DM2, h/o VT, AICD, asthma. Recent admission 2/5-2/8 for asthma exacerbation and CHF exacerbation presents with 2 weeks of progressive leg edema despite being on diuretics. Denies non compliance to diet (sodium , fluid intake). She had pain on both legs which felt like something was stabbing her and she had difficulty ambulating. She had fevers and chills. Legs had been itching and she had been scratching them. She does have multiple scratch marks on legs from scratching and she has noted to have increasing erythema to both legs. Pain worse around calves. Dopplers in ED negative for DVT. B/L LE cellulitis improved with antibiotics. Edema persists. No orthopnea. On diuresis for CHF. Entresto held because of low BP.     Today, no orthopnea. Had fever last night. Leg edema persists. Walking in the hallway and denies any shortness of breath.    PAST MEDICAL & SURGICAL HISTORY:  HFrEF CHF (congestive heart failure)  NICM  Non obstructive CAD  AICD  HTN (hypertension)  HLD (hyperlipidemia)  Asthma  DM (diabetes mellitus)  History of cholecystectomy      PREVIOUS CARDIAC WORKUP:    Echo:  Severe reduced EF  Cardiac Cath:  8/24/17 - Non obstructive    Allergies:   No Known Allergies      MEDICATIONS  (STANDING):  amiodarone    Tablet 200 milliGRAM(s) Oral daily  aspirin  chewable 81 milliGRAM(s) Oral daily  buDESOnide 160 MICROgram(s)/formoterol 4.5 MICROgram(s) Inhaler 2 Puff(s) Inhalation two times a day  cefTRIAXone Injectable. 1000 milliGRAM(s) IV Push every 24 hours  dextrose 5%. 1000 milliLiter(s) (50 mL/Hr) IV Continuous <Continuous>  dextrose 50% Injectable 12.5 Gram(s) IV Push once  dextrose 50% Injectable 25 Gram(s) IV Push once  dextrose 50% Injectable 25 Gram(s) IV Push once  furosemide   Injectable 40 milliGRAM(s) IV Push every 12 hours  heparin  Injectable 5000 Unit(s) SubCutaneous every 8 hours  insulin lispro (HumaLOG) corrective regimen sliding scale   SubCutaneous three times a day before meals  insulin lispro (HumaLOG) corrective regimen sliding scale   SubCutaneous at bedtime  magnesium oxide 400 milliGRAM(s) Oral two times a day with meals  metolazone 5 milliGRAM(s) Oral daily  metoprolol succinate ER 75 milliGRAM(s) Oral daily  pantoprazole    Tablet 40 milliGRAM(s) Oral before breakfast  potassium chloride    Tablet ER 20 milliEquivalent(s) Oral daily  vancomycin  IVPB 1000 milliGRAM(s) IV Intermittent every 12 hours    MEDICATIONS  (PRN):  acetaminophen   Tablet 650 milliGRAM(s) Oral every 6 hours PRN For Temp greater than 38 C (100.4 F)  acetaminophen   Tablet. 650 milliGRAM(s) Oral every 6 hours PRN Mild Pain (1 - 3)  dextrose Gel 1 Dose(s) Oral once PRN Blood Glucose LESS THAN 70 milliGRAM(s)/deciliter  glucagon  Injectable 1 milliGRAM(s) IntraMuscular once PRN Glucose LESS THAN 70 milligrams/deciliter      ROS:     Detailed ten system ROS was performed and was negative except for history as eluded to above.    + fever  no chills  no nausea  no vomiting  no diarrhea  no constipation  no melena  no hematochezia  no chest pain  no palpitations  no sob at rest  no dyspnea on exertion  no cough  no wheezing  no anorexia  no headache  no dizziness  no syncope  no weakness  no myalgia  no dysuria  no polyuria  no hematuria   + edema      Vital Signs Last 24 Hrs  T(C): 36.6 (28 Feb 2018 05:34), Max: 38.3 (27 Feb 2018 16:59)  T(F): 97.8 (28 Feb 2018 05:34), Max: 100.9 (27 Feb 2018 16:59)  HR: 102 (28 Feb 2018 05:34) (70 - 102)  BP: 121/83 (28 Feb 2018 05:34) (101/65 - 121/83)  BP(mean): --  RR: 18 (28 Feb 2018 05:34) (18 - 87)  SpO2: 96% (28 Feb 2018 05:34) (96% - 97%)    I&O's Summary      PHYSICAL EXAM:    General:                Comfortable, AAO X 3, in no distress.   HEENT:                  Atraumatic, PERRLA, EOMI, conjunctiva clear.   Neck:                     Supple, no adenopathy, no thyromegaly, no JVD, no bruit.  Back:                     Symmetric, non tender.  Chest:                    Scant rales, B/L symmetric air entry, no tachypnea  Heart:                     S1, S2 normal, no gallop, + murmur.  Abdomen:              Soft, non-tender, bowel sounds active. No palpable masses.  Extremities:           no cyanosis, + edema. Cellulitis B/L LE. multiple excoriation marks  Skin:                      Skin color, texture normal, no rash except for both both legs as described above.  Neurologic:            Grossly nonfocal.       TELEMETRY:   Normal sinus rhythm  with no tachy or lillian events     ECG:  ms, NSR, first deg AV block    LABS:                        12.1   8.6   )-----------( 291      ( 28 Feb 2018 06:42 )             39.6     02-28    131<L>  |  96  |  38<H>  ----------------------------<  125<H>  3.8   |  27  |  1.27    Ca    9.0      28 Feb 2018 06:42  Mg     2.2     02-27      Pro BNP  4006 02-24 @ 06:44

## 2018-02-28 NOTE — PROGRESS NOTE ADULT - SUBJECTIVE AND OBJECTIVE BOX
CC:  Patient is a 57y old  Female who presents with a chief complaint of pedal edema (2018 15:40)    : no complaints  K 3.2    : feeling weak today  fever of 101  BP 88/57  Phos 2.4  Mg 1.9    : afebrile today  Mg 1.9  says , legs look better    : feeling better  leg edema still present    : pt states legs are better  K 2.7  QTc 540 ms    : fever of 100.9 last evening  k 3.8  QTc of 513 ms      PHYSICAL EXAM:    Daily     Daily Weight in k.5 (2018 05:34)    ICU Vital Signs Last 24 Hrs  T(C): 36.3 (2018 10:50), Max: 38.3 (2018 16:59)  T(F): 97.4 (2018 10:50), Max: 100.9 (2018 16:59)  HR: 104 (2018 10:50) (70 - 104)  BP: 99/62 (2018 10:50) (99/62 - 121/83)  BP(mean): --  ABP: --  ABP(mean): --  RR: 18 (2018 10:50) (18 - 18)  SpO2: 97% (2018 10:50) (96% - 97%)      Constitutional: Well appearing  HEENT: Atraumatic,   Respiratory: Breath Sounds normal, no rhonchi/wheeze  Cardiovascular: N S1S2; IZABELA present  Gastrointestinal: Abdomen soft, non tender, Bowel Sounds present  Extremities: 1+ edema b/l, peripheral pulses present  Neurological: AAO x 3, no gross focal motor deficits  Skin: lower leg cellulitis, better                        12.1   8.6   )-----------( 291      ( 2018 06:42 )             39.6       CBC Full  -  ( 2018 06:42 )  WBC Count : 8.6 K/uL  Hemoglobin : 12.1 g/dL  Hematocrit : 39.6 %  Platelet Count - Automated : 291 K/uL  Mean Cell Volume : 81.4 fl  Mean Cell Hemoglobin : 24.9 pg  Mean Cell Hemoglobin Concentration : 30.6 gm/dL  Auto Neutrophil # : x  Auto Lymphocyte # : x  Auto Monocyte # : x  Auto Eosinophil # : x  Auto Basophil # : x  Auto Neutrophil % : x  Auto Lymphocyte % : x  Auto Monocyte % : x  Auto Eosinophil % : x  Auto Basophil % : x          131<L>  |  96  |  38<H>  ----------------------------<  125<H>  3.8   |  27  |  1.27    Ca    9.0      2018 06:42  Mg     2.2                                   MEDICATIONS  (STANDING):  amiodarone    Tablet 200 milliGRAM(s) Oral daily  aspirin  chewable 81 milliGRAM(s) Oral daily  buDESOnide 160 MICROgram(s)/formoterol 4.5 MICROgram(s) Inhaler 2 Puff(s) Inhalation two times a day  cefTRIAXone Injectable. 1000 milliGRAM(s) IV Push every 24 hours  dextrose 5%. 1000 milliLiter(s) (50 mL/Hr) IV Continuous <Continuous>  dextrose 50% Injectable 12.5 Gram(s) IV Push once  dextrose 50% Injectable 25 Gram(s) IV Push once  dextrose 50% Injectable 25 Gram(s) IV Push once  furosemide   Injectable 40 milliGRAM(s) IV Push every 12 hours  heparin  Injectable 5000 Unit(s) SubCutaneous every 8 hours  insulin lispro (HumaLOG) corrective regimen sliding scale   SubCutaneous three times a day before meals  insulin lispro (HumaLOG) corrective regimen sliding scale   SubCutaneous at bedtime  magnesium oxide 400 milliGRAM(s) Oral two times a day with meals  metolazone 5 milliGRAM(s) Oral daily  metoprolol succinate ER 75 milliGRAM(s) Oral daily  pantoprazole    Tablet 40 milliGRAM(s) Oral before breakfast  potassium chloride    Tablet ER 20 milliEquivalent(s) Oral daily  potassium chloride    Tablet ER 40 milliEquivalent(s) Oral once  spironolactone 25 milliGRAM(s) Oral daily  vancomycin  IVPB 1000 milliGRAM(s) IV Intermittent every 12 hours

## 2018-03-01 LAB
ANION GAP SERPL CALC-SCNC: 22 MMOL/L — HIGH (ref 5–17)
BUN SERPL-MCNC: 54 MG/DL — HIGH (ref 7–23)
CALCIUM SERPL-MCNC: 10.2 MG/DL — HIGH (ref 8.5–10.1)
CHLORIDE SERPL-SCNC: 90 MMOL/L — LOW (ref 96–108)
CO2 SERPL-SCNC: 17 MMOL/L — LOW (ref 22–31)
CREAT SERPL-MCNC: 2.45 MG/DL — HIGH (ref 0.5–1.3)
GLUCOSE BLDC GLUCOMTR-MCNC: 112 MG/DL — HIGH (ref 70–99)
GLUCOSE BLDC GLUCOMTR-MCNC: 152 MG/DL — HIGH (ref 70–99)
GLUCOSE BLDC GLUCOMTR-MCNC: 159 MG/DL — HIGH (ref 70–99)
GLUCOSE BLDC GLUCOMTR-MCNC: 229 MG/DL — HIGH (ref 70–99)
GLUCOSE BLDC GLUCOMTR-MCNC: 27 MG/DL — CRITICAL LOW (ref 70–99)
GLUCOSE BLDC GLUCOMTR-MCNC: 31 MG/DL — CRITICAL LOW (ref 70–99)
GLUCOSE BLDC GLUCOMTR-MCNC: 31 MG/DL — CRITICAL LOW (ref 70–99)
GLUCOSE BLDC GLUCOMTR-MCNC: 32 MG/DL — CRITICAL LOW (ref 70–99)
GLUCOSE BLDC GLUCOMTR-MCNC: 34 MG/DL — CRITICAL LOW (ref 70–99)
GLUCOSE BLDC GLUCOMTR-MCNC: 395 MG/DL — HIGH (ref 70–99)
GLUCOSE BLDC GLUCOMTR-MCNC: 41 MG/DL — CRITICAL LOW (ref 70–99)
GLUCOSE SERPL-MCNC: 152 MG/DL — HIGH (ref 70–99)
HCT VFR BLD CALC: 43.7 % — SIGNIFICANT CHANGE UP (ref 34.5–45)
HGB BLD-MCNC: 13.3 G/DL — SIGNIFICANT CHANGE UP (ref 11.5–15.5)
MCHC RBC-ENTMCNC: 25.2 PG — LOW (ref 27–34)
MCHC RBC-ENTMCNC: 30.5 GM/DL — LOW (ref 32–36)
MCV RBC AUTO: 82.8 FL — SIGNIFICANT CHANGE UP (ref 80–100)
PLATELET # BLD AUTO: 320 K/UL — SIGNIFICANT CHANGE UP (ref 150–400)
POTASSIUM SERPL-MCNC: 5.9 MMOL/L — HIGH (ref 3.5–5.3)
POTASSIUM SERPL-SCNC: 5.9 MMOL/L — HIGH (ref 3.5–5.3)
RBC # BLD: 5.28 M/UL — HIGH (ref 3.8–5.2)
RBC # FLD: 18.9 % — HIGH (ref 10.3–14.5)
SODIUM SERPL-SCNC: 129 MMOL/L — LOW (ref 135–145)
WBC # BLD: 12.8 K/UL — HIGH (ref 3.8–10.5)
WBC # FLD AUTO: 12.8 K/UL — HIGH (ref 3.8–10.5)

## 2018-03-01 PROCEDURE — 93010 ELECTROCARDIOGRAM REPORT: CPT

## 2018-03-01 RX ORDER — DEXTROSE 50 % IN WATER 50 %
1 SYRINGE (ML) INTRAVENOUS ONCE
Qty: 0 | Refills: 0 | Status: DISCONTINUED | OUTPATIENT
Start: 2018-03-01 | End: 2018-03-04

## 2018-03-01 RX ORDER — DEXTROSE 50 % IN WATER 50 %
1 SYRINGE (ML) INTRAVENOUS ONCE
Qty: 0 | Refills: 0 | Status: COMPLETED | OUTPATIENT
Start: 2018-03-01 | End: 2018-03-01

## 2018-03-01 RX ORDER — SODIUM CHLORIDE 9 MG/ML
1000 INJECTION, SOLUTION INTRAVENOUS
Qty: 0 | Refills: 0 | Status: DISCONTINUED | OUTPATIENT
Start: 2018-03-01 | End: 2018-03-01

## 2018-03-01 RX ORDER — SODIUM CHLORIDE 9 MG/ML
1000 INJECTION INTRAMUSCULAR; INTRAVENOUS; SUBCUTANEOUS
Qty: 0 | Refills: 0 | Status: DISCONTINUED | OUTPATIENT
Start: 2018-03-01 | End: 2018-03-02

## 2018-03-01 RX ORDER — DEXTROSE 50 % IN WATER 50 %
25 SYRINGE (ML) INTRAVENOUS ONCE
Qty: 0 | Refills: 0 | Status: COMPLETED | OUTPATIENT
Start: 2018-03-01 | End: 2018-03-01

## 2018-03-01 RX ADMIN — HEPARIN SODIUM 5000 UNIT(S): 5000 INJECTION INTRAVENOUS; SUBCUTANEOUS at 21:15

## 2018-03-01 RX ADMIN — Medication 81 MILLIGRAM(S): at 11:32

## 2018-03-01 RX ADMIN — SODIUM CHLORIDE 75 MILLILITER(S): 9 INJECTION INTRAMUSCULAR; INTRAVENOUS; SUBCUTANEOUS at 12:14

## 2018-03-01 RX ADMIN — BUDESONIDE AND FORMOTEROL FUMARATE DIHYDRATE 2 PUFF(S): 160; 4.5 AEROSOL RESPIRATORY (INHALATION) at 19:40

## 2018-03-01 RX ADMIN — Medication 40 MILLIGRAM(S): at 06:32

## 2018-03-01 RX ADMIN — MAGNESIUM OXIDE 400 MG ORAL TABLET 400 MILLIGRAM(S): 241.3 TABLET ORAL at 07:44

## 2018-03-01 RX ADMIN — SPIRONOLACTONE 25 MILLIGRAM(S): 25 TABLET, FILM COATED ORAL at 05:26

## 2018-03-01 RX ADMIN — MAGNESIUM OXIDE 400 MG ORAL TABLET 400 MILLIGRAM(S): 241.3 TABLET ORAL at 17:00

## 2018-03-01 RX ADMIN — HEPARIN SODIUM 5000 UNIT(S): 5000 INJECTION INTRAVENOUS; SUBCUTANEOUS at 13:30

## 2018-03-01 RX ADMIN — Medication 25 GRAM(S): at 05:08

## 2018-03-01 RX ADMIN — Medication 100 MILLIGRAM(S): at 17:00

## 2018-03-01 RX ADMIN — AMIODARONE HYDROCHLORIDE 200 MILLIGRAM(S): 400 TABLET ORAL at 05:26

## 2018-03-01 RX ADMIN — Medication 1 DOSE(S): at 04:07

## 2018-03-01 RX ADMIN — Medication 75 MILLIGRAM(S): at 05:25

## 2018-03-01 RX ADMIN — Medication 1 DOSE(S): at 03:52

## 2018-03-01 RX ADMIN — Medication 250 MILLIGRAM(S): at 05:26

## 2018-03-01 RX ADMIN — SODIUM CHLORIDE 50 MILLILITER(S): 9 INJECTION, SOLUTION INTRAVENOUS at 04:11

## 2018-03-01 RX ADMIN — Medication 1: at 17:00

## 2018-03-01 RX ADMIN — PANTOPRAZOLE SODIUM 40 MILLIGRAM(S): 20 TABLET, DELAYED RELEASE ORAL at 05:26

## 2018-03-01 RX ADMIN — Medication 5: at 11:32

## 2018-03-01 NOTE — PROGRESS NOTE ADULT - SUBJECTIVE AND OBJECTIVE BOX
58 yo female with PMH of chronic systolic CHF, s/p bi-v AICD, HTN, HLD, CAD, DM2, h/o VT, AICD, asthma. Recent admission 2/5-2/8 for asthma exacerbation and CHF exacerbation presents with 2 weeks of progressive leg edema despite being on diuretics. Denies non compliance to diet (sodium , fluid intake). She had pain on both legs which felt like something was stabbing her and she had difficulty ambulating. She had fevers and chills. Legs had been itching and she had been scratching them. She does have multiple scratch marks on legs from scratching and she has noted to have increasing erythema to both legs. Pain worse around calves. Dopplers in ED negative for DVT. B/L LE cellulitis improved with antibiotics. Edema persists. No orthopnea. On diuresis for CHF. Entresto held because of low BP.     Events of last night noted. Became hypoglycemic and confused. Treated with D50    Azotemia and renal failure noted after aggressive diuresis. Currently on IV fluids. Diuretics on hold today.      PAST MEDICAL & SURGICAL HISTORY:  HFrEF CHF (congestive heart failure)  NICM  Non obstructive CAD  AICD  HTN (hypertension)  HLD (hyperlipidemia)  Asthma  DM (diabetes mellitus)  History of cholecystectomy      PREVIOUS CARDIAC WORKUP:    Echo:  Severe reduced EF  Cardiac Cath:  8/24/17 - Non obstructive    Allergies:   No Known Allergies      MEDICATIONS  (STANDING):  amiodarone    Tablet 200 milliGRAM(s) Oral daily  aspirin  chewable 81 milliGRAM(s) Oral daily  buDESOnide 160 MICROgram(s)/formoterol 4.5 MICROgram(s) Inhaler 2 Puff(s) Inhalation two times a day  dextrose 5%. 1000 milliLiter(s) (50 mL/Hr) IV Continuous <Continuous>  dextrose 50% Injectable 12.5 Gram(s) IV Push once  dextrose 50% Injectable 25 Gram(s) IV Push once  dextrose 50% Injectable 25 Gram(s) IV Push once  doxycycline hyclate Capsule 100 milliGRAM(s) Oral every 12 hours  heparin  Injectable 5000 Unit(s) SubCutaneous every 8 hours  insulin lispro (HumaLOG) corrective regimen sliding scale   SubCutaneous three times a day before meals  insulin lispro (HumaLOG) corrective regimen sliding scale   SubCutaneous at bedtime  magnesium oxide 400 milliGRAM(s) Oral two times a day with meals  metoprolol succinate ER 75 milliGRAM(s) Oral daily  pantoprazole    Tablet 40 milliGRAM(s) Oral before breakfast  sodium chloride 0.9%. 1000 milliLiter(s) (75 mL/Hr) IV Continuous <Continuous>    MEDICATIONS  (PRN):  acetaminophen   Tablet 650 milliGRAM(s) Oral every 6 hours PRN For Temp greater than 38 C (100.4 F)  acetaminophen   Tablet. 650 milliGRAM(s) Oral every 6 hours PRN Mild Pain (1 - 3)  dextrose Gel 1 Dose(s) Oral once PRN Blood Glucose LESS THAN 70 milliGRAM(s)/deciliter  dextrose Gel 1 Dose(s) Oral once PRN Blood Glucose LESS THAN 70 milliGRAM(s)/deciliter  glucagon  Injectable 1 milliGRAM(s) IntraMuscular once PRN Glucose LESS THAN 70 milligrams/deciliter      ROS:     Detailed ten system ROS was performed and was negative except for history as eluded to above.    no fever  no chills  no nausea  no vomiting  no diarrhea  no constipation  no melena  no hematochezia  no chest pain  no palpitations  no sob at rest  no dyspnea on exertion  no cough  no wheezing  no anorexia  no headache  no dizziness  no syncope  + weakness  no myalgia  no dysuria  no polyuria  no hematuria       Vital Signs Last 24 Hrs  T(C): 37 (01 Mar 2018 10:15), Max: 37 (01 Mar 2018 10:15)  T(F): 98.6 (01 Mar 2018 10:15), Max: 98.6 (01 Mar 2018 10:15)  HR: 80 (01 Mar 2018 10:15) (80 - 85)  BP: 105/63 (01 Mar 2018 10:15) (97/70 - 126/107)  RR: 18 (01 Mar 2018 10:15) (18 - 18)  SpO2: 96% (01 Mar 2018 10:15) (95% - 100%)    I&O's Summary      PHYSICAL EXAM:    General:                Comfortable, AAO X 3, in no distress.   HEENT:                  Atraumatic, PERRLA, EOMI, conjunctiva clear.   Neck:                     Supple, no adenopathy, no thyromegaly, no JVD, no bruit.  Back:                     Symmetric, non tender.  Chest:                    Scant rales, B/L symmetric air entry, no tachypnea  Heart:                     S1, S2 normal, no gallop, + murmur.  Abdomen:              Soft, non-tender, bowel sounds active. No palpable masses.  Extremities:           no cyanosis, + edema. Cellulitis B/L LE. multiple excoriation marks  Skin:                      Skin color, texture normal, no rash except for both both legs as described above.  Neurologic:            Grossly nonfocal.       TELEMETRY:   Normal sinus rhythm  with no tachy or lillian events     ECG:  ms, NSR, first deg AV block, IVCD    LABS:                        13.3   12.8  )-----------( 320      ( 01 Mar 2018 05:54 )             43.7     03-01    129<L>  |  90<L>  |  54<H>  ----------------------------<  152<H>  5.9<H>   |  17<L>  |  2.45<H>    Ca    10.2<H>      01 Mar 2018 05:54

## 2018-03-01 NOTE — PROVIDER CONTACT NOTE (MEDICATION) - BACKGROUND
Second Glutose gel administered. BGM taken 15 mins later - 32. MD notified again. Advised to start D5W @ 50 ml/hr. Repeat BGM 15 mins later - 34. Kelly HEDRICK. Juice and whole milk given.

## 2018-03-01 NOTE — CHART NOTE - NSCHARTNOTEFT_GEN_A_CORE
Called by nurse for low blood sugars. Pt was given multiple PO treatments, started on d5 IVF, and finally given 1 amp d50 before have sugars increase from 30s to 130s. Pt was symptomatic initially (diaphoretic, shaky), but reported feeling better after receiving the treatments.    Continue to monitor fingersticks carefully  Would hold insulin for now    Discussed w/ senior resident

## 2018-03-01 NOTE — PROGRESS NOTE ADULT - SUBJECTIVE AND OBJECTIVE BOX
56 yo female, h/o DM, Htn, Asthma, Chf, VT s/p Bi-V AICD,  recently admitted to  02/05-02/08 for asthma and CHF exacerbation,  presents to ED due to leg swelling.  -found to have Chf exacerbation and started on Lasix/Aldactone/Metolazone with subsequent prerenal azotemia and ARF.   -treated for LE cellulitis as well    3.1: no distress, no cp, no sob        REVIEW OF SYSTEMS:    CONSTITUTIONAL: No weakness, No fevers or chills  ENT: No ear ache, No sorethroat  NECK: No pain, No stiffness  RESPIRATORY: No cough, No wheezing, No hemoptysis; No dyspnea  CARDIOVASCULAR: No chest pain, No palpitations  GASTROINTESTINAL: No abd pain, No nausea, No vomiting, No hematemesis, No diarrhea or constipation. No melena, No hematochezia.  GENITOURINARY: No dysuria, No  hematuria  NEUROLOGICAL: No diplopia, No paresthesia, No motor dysfunction  MUSCULOSKELETAL: No arthralgia, No myalgia  SKIN: +rashes, or lesions   PSYCH: no anxiety, no suicidal ideation    All other review of systems is negative unless indicated above    Vital Signs Last 24 Hrs  T(C): 37 (01 Mar 2018 10:15), Max: 37 (01 Mar 2018 10:15)  T(F): 98.6 (01 Mar 2018 10:15), Max: 98.6 (01 Mar 2018 10:15)  HR: 80 (01 Mar 2018 10:15) (80 - 85)  BP: 105/63 (01 Mar 2018 10:15) (97/70 - 126/107)  RR: 18 (01 Mar 2018 10:15) (18 - 18)  SpO2: 96% (01 Mar 2018 10:15) (95% - 100%)    PHYSICAL EXAM:    GENERAL: NAD, Well nourished  HEENT:  NC/AT, EOMI, PERRLA, No scleral icterus, Moist mucous membranes  NECK: Supple, No JVD  CNS:  Alert & Oriented X3, Motor Strength 5/5 B/L upper and lower extremities; DTRs 2+ intact   LUNG: Normal Breath sounds, Clear to auscultation bilaterally, No rales, No rhonchi, No wheezing  HEART: RRR; No murmurs, No rubs  ABDOMEN: +BS, ST/ND/NT  GENITOURINARY: Voiding, Bladder not distended  EXTREMITIES:  2+ Peripheral Pulses, No clubbing, No cyanosis, No tibial edema  MUSCULOSKELTAL: Joints normal ROM, No TTP, No effusion  VAGINAL: deferred  SKIN: no rashes  RECTAL: deferred, not indicated  BREAST: deferred                          13.3   12.8  )-----------( 320      ( 01 Mar 2018 05:54 )             43.7     03-01    129<L>  |  90<L>  |  54<H>  ----------------------------<  152<H>  5.9<H>   |  17<L>  |  2.45<H>    Ca    10.2<H>      01 Mar 2018 05:54      Vancomycin levels:   Cultures:     MEDICATIONS  (STANDING):  amiodarone    Tablet 200 milliGRAM(s) Oral daily  aspirin  chewable 81 milliGRAM(s) Oral daily  buDESOnide 160 MICROgram(s)/formoterol 4.5 MICROgram(s) Inhaler 2 Puff(s) Inhalation two times a day  heparin  Injectable 5000 Unit(s) SubCutaneous every 8 hours  insulin lispro (HumaLOG) corrective regimen sliding scale   SubCutaneous three times a day before meals  insulin lispro (HumaLOG) corrective regimen sliding scale   SubCutaneous at bedtime  magnesium oxide 400 milliGRAM(s) Oral two times a day with meals  metoprolol succinate ER 75 milliGRAM(s) Oral daily  pantoprazole    Tablet 40 milliGRAM(s) Oral before breakfast  sodium chloride 0.9%. 1000 milliLiter(s) (75 mL/Hr) IV Continuous <Continuous>    MEDICATIONS  (PRN):  acetaminophen   Tablet 650 milliGRAM(s) Oral every 6 hours PRN For Temp greater than 38 C (100.4 F)  acetaminophen   Tablet. 650 milliGRAM(s) Oral every 6 hours PRN Mild Pain (1 - 3)  dextrose Gel 1 Dose(s) Oral once PRN Blood Glucose LESS THAN 70 milliGRAM(s)/deciliter  dextrose Gel 1 Dose(s) Oral once PRN Blood Glucose LESS THAN 70 milliGRAM(s)/deciliter  glucagon  Injectable 1 milliGRAM(s) IntraMuscular once PRN Glucose LESS THAN 70 milligrams/deciliter          Assessment and Plan:      1. ARF due to prerenal azotemia  d/c all diurrhetic for now  gentle IV hydration for 12hrs  f/u Cr in AM    2. b/l LE cellulitis.  -resistant and gram negative organisms are possible.  -b/l LE venous dopplers, (-) DVT.  -s/p 7days of broad spectrum Abx  change to oral Doxy    3. Hypotensive episode + CHF: BP better now   resolved    4. Prolonged QTc.  hx VT  on Amiodarone  -c/w telemetry monitoring.  -keep potassium >=4.0 and magnesium >=2.0.; check in am again  -EP interrogated AICD; normal  daily EKG    5. Acute on chronic left systolic Chf:  -2+ LE pitting edema,   -BNP 3.5K.  s/p vigorous diurhetic now with prerenal azotemia     6. DM type II:  -hold oral hypoglycemics.  Novolog SS    7. Asthma.  -stable.  -c/w LABA/ICS.

## 2018-03-01 NOTE — PROVIDER CONTACT NOTE (MEDICATION) - RECOMMENDATIONS
Patient reports feeling better, more alert and less tremulous. Will continue to monitor BGMs. Safety maintained.

## 2018-03-01 NOTE — PROVIDER CONTACT NOTE (MEDICATION) - SITUATION
Patient found to be acutely confused, tremulous, and clammy. VSS. BGM 31. As per Hypoglycemic protocol Glutose gel given and BGM checked 15 mins later - 27. Dr. Crum notified.

## 2018-03-01 NOTE — PROGRESS NOTE ADULT - ASSESSMENT
B/L LE cellulitis  Acute on Chronic HFrEF  Hypoglycemia  Hyperkalemia  ARF  NICM. S/p VT  Non obstructive CAD  HTN  AICD    Suggest:    Off diuretics today.   Hold Aldactone.  Stop Potassium supplement  Follow up electrolytes and renal function with hydration  Continue ABx, Leg elevation, Supportive skin care  F/u QTc on EKG daily  Continue other treatment.

## 2018-03-01 NOTE — PROVIDER CONTACT NOTE (MEDICATION) - ASSESSMENT
Another 2 Glutose gels administered. BGM taken 15 mins later - 31. MD notified. Gave another Glutose gel and took BGM 15 mins later - 41. Dr. Crum and Dr. Jhaveri came to evaluate patient and ordered 25g of D50 amp. 15 mins later, . D5W infusing @50ml/hr as per Dr. Jhaveri orders.

## 2018-03-02 LAB
ANION GAP SERPL CALC-SCNC: 9 MMOL/L — SIGNIFICANT CHANGE UP (ref 5–17)
BUN SERPL-MCNC: 65 MG/DL — HIGH (ref 7–23)
CALCIUM SERPL-MCNC: 8.7 MG/DL — SIGNIFICANT CHANGE UP (ref 8.5–10.1)
CHLORIDE SERPL-SCNC: 94 MMOL/L — LOW (ref 96–108)
CO2 SERPL-SCNC: 27 MMOL/L — SIGNIFICANT CHANGE UP (ref 22–31)
CREAT SERPL-MCNC: 2.09 MG/DL — HIGH (ref 0.5–1.3)
GLUCOSE BLDC GLUCOMTR-MCNC: 118 MG/DL — HIGH (ref 70–99)
GLUCOSE BLDC GLUCOMTR-MCNC: 130 MG/DL — HIGH (ref 70–99)
GLUCOSE BLDC GLUCOMTR-MCNC: 149 MG/DL — HIGH (ref 70–99)
GLUCOSE BLDC GLUCOMTR-MCNC: 161 MG/DL — HIGH (ref 70–99)
GLUCOSE BLDC GLUCOMTR-MCNC: 172 MG/DL — HIGH (ref 70–99)
GLUCOSE SERPL-MCNC: 138 MG/DL — HIGH (ref 70–99)
HCT VFR BLD CALC: 34.9 % — SIGNIFICANT CHANGE UP (ref 34.5–45)
HGB BLD-MCNC: 11 G/DL — LOW (ref 11.5–15.5)
MCHC RBC-ENTMCNC: 25 PG — LOW (ref 27–34)
MCHC RBC-ENTMCNC: 31.7 GM/DL — LOW (ref 32–36)
MCV RBC AUTO: 79 FL — LOW (ref 80–100)
PLATELET # BLD AUTO: 258 K/UL — SIGNIFICANT CHANGE UP (ref 150–400)
POTASSIUM SERPL-MCNC: 4.8 MMOL/L — SIGNIFICANT CHANGE UP (ref 3.5–5.3)
POTASSIUM SERPL-SCNC: 4.8 MMOL/L — SIGNIFICANT CHANGE UP (ref 3.5–5.3)
RBC # BLD: 4.41 M/UL — SIGNIFICANT CHANGE UP (ref 3.8–5.2)
RBC # FLD: 18.1 % — HIGH (ref 10.3–14.5)
SODIUM SERPL-SCNC: 130 MMOL/L — LOW (ref 135–145)
WBC # BLD: 11.6 K/UL — HIGH (ref 3.8–10.5)
WBC # FLD AUTO: 11.6 K/UL — HIGH (ref 3.8–10.5)

## 2018-03-02 RX ADMIN — Medication 100 MILLIGRAM(S): at 05:57

## 2018-03-02 RX ADMIN — AMIODARONE HYDROCHLORIDE 200 MILLIGRAM(S): 400 TABLET ORAL at 05:58

## 2018-03-02 RX ADMIN — HEPARIN SODIUM 5000 UNIT(S): 5000 INJECTION INTRAVENOUS; SUBCUTANEOUS at 05:58

## 2018-03-02 RX ADMIN — Medication 81 MILLIGRAM(S): at 11:30

## 2018-03-02 RX ADMIN — Medication 650 MILLIGRAM(S): at 06:04

## 2018-03-02 RX ADMIN — PANTOPRAZOLE SODIUM 40 MILLIGRAM(S): 20 TABLET, DELAYED RELEASE ORAL at 05:22

## 2018-03-02 RX ADMIN — HEPARIN SODIUM 5000 UNIT(S): 5000 INJECTION INTRAVENOUS; SUBCUTANEOUS at 21:56

## 2018-03-02 RX ADMIN — Medication 100 MILLIGRAM(S): at 17:14

## 2018-03-02 RX ADMIN — MAGNESIUM OXIDE 400 MG ORAL TABLET 400 MILLIGRAM(S): 241.3 TABLET ORAL at 17:14

## 2018-03-02 RX ADMIN — Medication 1: at 12:21

## 2018-03-02 RX ADMIN — HEPARIN SODIUM 5000 UNIT(S): 5000 INJECTION INTRAVENOUS; SUBCUTANEOUS at 15:14

## 2018-03-02 RX ADMIN — MAGNESIUM OXIDE 400 MG ORAL TABLET 400 MILLIGRAM(S): 241.3 TABLET ORAL at 08:49

## 2018-03-02 NOTE — PROGRESS NOTE ADULT - SUBJECTIVE AND OBJECTIVE BOX
CURRENT CARDIAC WORKUP:       Echo:  Stress Test:  Cardiac Cath:    Allergies:   No Known Allergies      MEDICATIONS  (STANDING):  amiodarone    Tablet 200 milliGRAM(s) Oral daily  aspirin  chewable 81 milliGRAM(s) Oral daily  buDESOnide 160 MICROgram(s)/formoterol 4.5 MICROgram(s) Inhaler 2 Puff(s) Inhalation two times a day  dextrose 5%. 1000 milliLiter(s) (50 mL/Hr) IV Continuous <Continuous>  dextrose 50% Injectable 12.5 Gram(s) IV Push once  dextrose 50% Injectable 25 Gram(s) IV Push once  dextrose 50% Injectable 25 Gram(s) IV Push once  doxycycline hyclate Capsule 100 milliGRAM(s) Oral every 12 hours  heparin  Injectable 5000 Unit(s) SubCutaneous every 8 hours  insulin lispro (HumaLOG) corrective regimen sliding scale   SubCutaneous three times a day before meals  insulin lispro (HumaLOG) corrective regimen sliding scale   SubCutaneous at bedtime  magnesium oxide 400 milliGRAM(s) Oral two times a day with meals  metoprolol succinate ER 75 milliGRAM(s) Oral daily  pantoprazole    Tablet 40 milliGRAM(s) Oral before breakfast  sodium chloride 0.9%. 1000 milliLiter(s) (75 mL/Hr) IV Continuous <Continuous>    MEDICATIONS  (PRN):  acetaminophen   Tablet 650 milliGRAM(s) Oral every 6 hours PRN For Temp greater than 38 C (100.4 F)  acetaminophen   Tablet. 650 milliGRAM(s) Oral every 6 hours PRN Mild Pain (1 - 3)  dextrose Gel 1 Dose(s) Oral once PRN Blood Glucose LESS THAN 70 milliGRAM(s)/deciliter  dextrose Gel 1 Dose(s) Oral once PRN Blood Glucose LESS THAN 70 milliGRAM(s)/deciliter  glucagon  Injectable 1 milliGRAM(s) IntraMuscular once PRN Glucose LESS THAN 70 milligrams/deciliter      ROS:     .ros      Vital Signs Last 24 Hrs  T(C): 36.7 (02 Mar 2018 10:22), Max: 37.7 (01 Mar 2018 17:34)  T(F): 98 (02 Mar 2018 10:22), Max: 99.9 (01 Mar 2018 17:34)  HR: 74 (02 Mar 2018 10:22) (74 - 82)  BP: 93/60 (02 Mar 2018 10:22) (93/60 - 99/71)  BP(mean): --  RR: 16 (02 Mar 2018 10:22) (16 - 17)  SpO2: 96% (02 Mar 2018 10:22) (96% - 97%)    I&O's Summary      PHYSICAL EXAM:    .phy      INTERPRETATION OF TELEMETRY:    ECG:    LABS:                        11.0   11.6  )-----------( 258      ( 02 Mar 2018 07:19 )             34.9     03-02    130<L>  |  94<L>  |  65<H>  ----------------------------<  138<H>  4.8   |  27  |  2.09<H>    Ca    8.7      02 Mar 2018 07:19            Pro BNP  4006 02-24 @ 06:44  D Dimer  -- 02-24 @ 06:44              RADIOLOGY & ADDITIONAL STUDIES: 56 yo female with PMH of chronic systolic CHF, s/p bi-v AICD, HTN, HLD, CAD, DM2, h/o VT, AICD, asthma. Recent admission 2/5-2/8 for asthma exacerbation and CHF exacerbation presents with 2 weeks of progressive leg edema despite being on diuretics. Denies non compliance to diet (sodium , fluid intake). She had pain on both legs which felt like something was stabbing her and she had difficulty ambulating. She had fevers and chills. Legs had been itching and she had been scratching them. She does have multiple scratch marks on legs from scratching and she has noted to have increasing erythema to both legs. Pain worse around calves. Dopplers in ED negative for DVT. B/L LE cellulitis improved with antibiotics. Edema persists. No orthopnea. On diuresis for CHF. Entresto held because of low BP.  Diuretics reduced and IV fluids given for azotemia and hypotension. Transient hypoglycemia improved.     No new complaints. She feels well today. No chest pain. No new events. No orthopnea.      PAST MEDICAL & SURGICAL HISTORY:  HFrEF CHF (congestive heart failure)  NICM  Non obstructive CAD  AICD  HTN (hypertension)  HLD (hyperlipidemia)  Asthma  DM (diabetes mellitus)  History of cholecystectomy      PREVIOUS CARDIAC WORKUP:    Echo:  Severe reduced EF  Cardiac Cath:  8/24/17 - Non obstructive    Allergies:   No Known Allergies      MEDICATIONS  (STANDING):  amiodarone    Tablet 200 milliGRAM(s) Oral daily  aspirin  chewable 81 milliGRAM(s) Oral daily  buDESOnide 160 MICROgram(s)/formoterol 4.5 MICROgram(s) Inhaler 2 Puff(s) Inhalation two times a day  dextrose 5%. 1000 milliLiter(s) (50 mL/Hr) IV Continuous <Continuous>  dextrose 50% Injectable 12.5 Gram(s) IV Push once  dextrose 50% Injectable 25 Gram(s) IV Push once  dextrose 50% Injectable 25 Gram(s) IV Push once  doxycycline hyclate Capsule 100 milliGRAM(s) Oral every 12 hours  heparin  Injectable 5000 Unit(s) SubCutaneous every 8 hours  insulin lispro (HumaLOG) corrective regimen sliding scale   SubCutaneous three times a day before meals  insulin lispro (HumaLOG) corrective regimen sliding scale   SubCutaneous at bedtime  magnesium oxide 400 milliGRAM(s) Oral two times a day with meals  metoprolol succinate ER 75 milliGRAM(s) Oral daily  pantoprazole    Tablet 40 milliGRAM(s) Oral before breakfast  sodium chloride 0.9%. 1000 milliLiter(s) (75 mL/Hr) IV Continuous <Continuous>    MEDICATIONS  (PRN):  acetaminophen   Tablet 650 milliGRAM(s) Oral every 6 hours PRN For Temp greater than 38 C (100.4 F)  acetaminophen   Tablet. 650 milliGRAM(s) Oral every 6 hours PRN Mild Pain (1 - 3)  dextrose Gel 1 Dose(s) Oral once PRN Blood Glucose LESS THAN 70 milliGRAM(s)/deciliter  dextrose Gel 1 Dose(s) Oral once PRN Blood Glucose LESS THAN 70 milliGRAM(s)/deciliter  glucagon  Injectable 1 milliGRAM(s) IntraMuscular once PRN Glucose LESS THAN 70 milligrams/deciliter      ROS:     Detailed ten system ROS was performed and was negative except for history as eluded to above.    no fever  no chills  no nausea  no vomiting  no diarrhea  no constipation  no melena  no hematochezia  no chest pain  no palpitations  no sob at rest  no dyspnea on exertion  no cough  no wheezing  no anorexia  no headache  no dizziness  no syncope  + weakness  no myalgia  no dysuria  no polyuria  no hematuria       Vital Signs Last 24 Hrs  T(C): 36.7 (02 Mar 2018 10:22), Max: 37.7 (01 Mar 2018 17:34)  T(F): 98 (02 Mar 2018 10:22), Max: 99.9 (01 Mar 2018 17:34)  HR: 74 (02 Mar 2018 10:22) (74 - 82)  BP: 93/60 (02 Mar 2018 10:22) (93/60 - 99/71)  BP(mean): --  RR: 16 (02 Mar 2018 10:22) (16 - 17)  SpO2: 96% (02 Mar 2018 10:22) (96% - 97%)    I&O's Summary      PHYSICAL EXAM:    General:                Comfortable, AAO X 3, in no distress.   HEENT:                  Atraumatic, PERRLA, EOMI, conjunctiva clear.   Neck:                     Supple, no adenopathy, no thyromegaly, no JVD, no bruit.  Back:                     Symmetric, non tender.  Chest:                    Scant rales, B/L symmetric air entry, no tachypnea  Heart:                     S1, S2 normal, no gallop, + murmur.  Abdomen:              Soft, non-tender, bowel sounds active. No palpable masses.  Extremities:           no cyanosis, + edema. Cellulitis B/L LE. multiple excoriation marks  Skin:                      Skin color, texture normal, no rash except for both both legs as described above.  Neurologic:            Grossly nonfocal.       TELEMETRY:   Normal sinus rhythm  with no tachy or lillian events     ECG:  3/1/18  ms, NSR, first deg AV block, IVCD    LABS:                        11.0   11.6  )-----------( 258      ( 02 Mar 2018 07:19 )             34.9     03-02    130<L>  |  94<L>  |  65<H>  ----------------------------<  138<H>  4.8   |  27  |  2.09<H>    Ca    8.7      02 Mar 2018 07:19      RADIOLOGY & ADDITIONAL STUDIES:

## 2018-03-02 NOTE — PROGRESS NOTE ADULT - ASSESSMENT
B/L LE cellulitis  Acute on Chronic HFrEF  Hypoglycemia  Hyperkalemia  ARF  NICM. S/p VT  Non obstructive CAD  HTN  AICD    Suggest:    Off diuretics today.   Hold Aldactone.  Stop Potassium supplement  Follow up electrolytes and renal function with hydration  Continue ABx, Leg elevation, Supportive skin care  F/u QTc on EKG daily  Continue other treatment. B/L LE cellulitis  Acute on Chronic HFrEF  Hypoglycemia  Hyperkalemia  ARF  NICM. S/p VT  Non obstructive CAD  HTN  AICD    Suggest:    Off diuretics today. Resume home dosing from tomorrow.  Hold Aldactone for hyperkalemia  Stop Potassium supplement.   Follow up electrolytes and renal function with hydration  Continue ABx, Leg elevation, Supportive skin care  F/u QTc on EKG daily  Continue other treatment.   Will F/u PRN over the weekend

## 2018-03-02 NOTE — PROGRESS NOTE ADULT - SUBJECTIVE AND OBJECTIVE BOX
58 yo female, h/o DM, Htn, Asthma, Chf, VT s/p Bi-V AICD,  recently admitted to  02/05-02/08 for asthma and CHF exacerbation,  presents to ED due to leg swelling.  -found to have Chf exacerbation and started on Lasix/Aldactone/Metolazone with subsequent prerenal azotemia and ARF.   -treated for LE cellulitis as well  Patient was overdiurrhesed and has developed prerenal azotemia and ARF, diurrhetic place on hold since 3.1      3.1: no distress, no cp, no sob  3.2: no distress, no cp, no sob, no n/v/d        REVIEW OF SYSTEMS:    CONSTITUTIONAL: No weakness, No fevers or chills  ENT: No ear ache, No sorethroat  NECK: No pain, No stiffness  RESPIRATORY: No cough, No wheezing, No hemoptysis; No dyspnea  CARDIOVASCULAR: No chest pain, No palpitations  GASTROINTESTINAL: No abd pain, No nausea, No vomiting, No hematemesis, No diarrhea or constipation. No melena, No hematochezia.  GENITOURINARY: No dysuria, No  hematuria  NEUROLOGICAL: No diplopia, No paresthesia, No motor dysfunction  MUSCULOSKELETAL: No arthralgia, No myalgia  SKIN: +rashes, or lesions   PSYCH: no anxiety, no suicidal ideation    All other review of systems is negative unless indicated above    Vital Signs Last 24 Hrs  T(C): 36.8 (02 Mar 2018 13:37), Max: 37.7 (01 Mar 2018 17:34)  T(F): 98.2 (02 Mar 2018 13:37), Max: 99.9 (01 Mar 2018 17:34)  HR: 79 (02 Mar 2018 13:37) (74 - 87)  BP: 94/68 (02 Mar 2018 13:37) (89/73 - 99/71)  RR: 18 (02 Mar 2018 13:37) (16 - 18)  SpO2: 100% (02 Mar 2018 13:37) (96% - 100%)  PHYSICAL EXAM:    GENERAL: NAD, Well nourished  HEENT:  NC/AT, EOMI, PERRLA, No scleral icterus, Moist mucous membranes  NECK: Supple, No JVD  CNS:  Alert & Oriented X3, Motor Strength 5/5 B/L upper and lower extremities; DTRs 2+ intact   LUNG: Normal Breath sounds, Clear to auscultation bilaterally, No rales, No rhonchi, No wheezing  HEART: RRR; No murmurs, No rubs  ABDOMEN: +BS, ST/ND/NT  GENITOURINARY: Voiding, Bladder not distended  EXTREMITIES:  2+ Peripheral Pulses, No clubbing, No cyanosis, No tibial edema  MUSCULOSKELTAL: Joints normal ROM, No TTP, No effusion  VAGINAL: deferred  SKIN: mild chronic stasis dermatitis both legs  RECTAL: deferred, not indicated  BREAST: deferred               MEDICATIONS  (STANDING):  amiodarone    Tablet 200 milliGRAM(s) Oral daily  aspirin  chewable 81 milliGRAM(s) Oral daily  buDESOnide 160 MICROgram(s)/formoterol 4.5 MICROgram(s) Inhaler 2 Puff(s) Inhalation two times a day  doxycycline hyclate Capsule 100 milliGRAM(s) Oral every 12 hours  heparin  Injectable 5000 Unit(s) SubCutaneous every 8 hours  insulin lispro (HumaLOG) corrective regimen sliding scale   SubCutaneous three times a day before meals  insulin lispro (HumaLOG) corrective regimen sliding scale   SubCutaneous at bedtime  magnesium oxide 400 milliGRAM(s) Oral two times a day with meals  metoprolol succinate ER 75 milliGRAM(s) Oral daily  pantoprazole    Tablet 40 milliGRAM(s) Oral before breakfast          Assessment and Plan:      1. ARF due to prerenal azotemia, diurrhetic induced  hold all diurrhetic for now  s/p IV hydration for 12hrs  f/u Cr in AM    2. b/l LE cellulitis.  -resistant and gram negative organisms are possible.  -b/l LE venous dopplers, (-) DVT.  -s/p 7days of broad spectrum Abx  change to oral Doxy 100mg bid x 7days    3. Hypotensive episode + CHF: BP better now   resolved    4. Prolonged QTc.  hx VT  on Amiodarone  -c/w telemetry monitoring.  -keep potassium >=4.0 and magnesium >=2.0.; check in am again  -EP interrogated AICD; normal  daily EKG    5. Acute on chronic left systolic Chf:  ef 25%  s/p vigorous diurhetic now with prerenal azotemia; hold diurrhetic today  restart Entresto when Cr improves  restart Metolazone tomorrow if Cr improves    On discharge, once Cr improves, may be discharged on Metolazone 2.5mg/day, and Entresto....  do not give Aldactone due to hyperkalemia    6. DM type II:  -hold oral hypoglycemics.  Novolog SS    7. Asthma.  -stable.  -c/w LABA/ICS.    d/c home when Cr<1.7

## 2018-03-03 LAB
ANION GAP SERPL CALC-SCNC: 10 MMOL/L — SIGNIFICANT CHANGE UP (ref 5–17)
BUN SERPL-MCNC: 60 MG/DL — HIGH (ref 7–23)
CALCIUM SERPL-MCNC: 8.7 MG/DL — SIGNIFICANT CHANGE UP (ref 8.5–10.1)
CHLORIDE SERPL-SCNC: 96 MMOL/L — SIGNIFICANT CHANGE UP (ref 96–108)
CO2 SERPL-SCNC: 27 MMOL/L — SIGNIFICANT CHANGE UP (ref 22–31)
CREAT SERPL-MCNC: 1.77 MG/DL — HIGH (ref 0.5–1.3)
GLUCOSE BLDC GLUCOMTR-MCNC: 101 MG/DL — HIGH (ref 70–99)
GLUCOSE BLDC GLUCOMTR-MCNC: 115 MG/DL — HIGH (ref 70–99)
GLUCOSE BLDC GLUCOMTR-MCNC: 138 MG/DL — HIGH (ref 70–99)
GLUCOSE BLDC GLUCOMTR-MCNC: 209 MG/DL — HIGH (ref 70–99)
GLUCOSE SERPL-MCNC: 134 MG/DL — HIGH (ref 70–99)
POTASSIUM SERPL-MCNC: 4.3 MMOL/L — SIGNIFICANT CHANGE UP (ref 3.5–5.3)
POTASSIUM SERPL-SCNC: 4.3 MMOL/L — SIGNIFICANT CHANGE UP (ref 3.5–5.3)
SODIUM SERPL-SCNC: 133 MMOL/L — LOW (ref 135–145)

## 2018-03-03 RX ADMIN — PANTOPRAZOLE SODIUM 40 MILLIGRAM(S): 20 TABLET, DELAYED RELEASE ORAL at 05:47

## 2018-03-03 RX ADMIN — HEPARIN SODIUM 5000 UNIT(S): 5000 INJECTION INTRAVENOUS; SUBCUTANEOUS at 21:02

## 2018-03-03 RX ADMIN — Medication 100 MILLIGRAM(S): at 05:46

## 2018-03-03 RX ADMIN — HEPARIN SODIUM 5000 UNIT(S): 5000 INJECTION INTRAVENOUS; SUBCUTANEOUS at 05:46

## 2018-03-03 RX ADMIN — Medication 81 MILLIGRAM(S): at 12:20

## 2018-03-03 RX ADMIN — HEPARIN SODIUM 5000 UNIT(S): 5000 INJECTION INTRAVENOUS; SUBCUTANEOUS at 14:23

## 2018-03-03 RX ADMIN — MAGNESIUM OXIDE 400 MG ORAL TABLET 400 MILLIGRAM(S): 241.3 TABLET ORAL at 08:56

## 2018-03-03 RX ADMIN — MAGNESIUM OXIDE 400 MG ORAL TABLET 400 MILLIGRAM(S): 241.3 TABLET ORAL at 17:49

## 2018-03-03 RX ADMIN — AMIODARONE HYDROCHLORIDE 200 MILLIGRAM(S): 400 TABLET ORAL at 05:46

## 2018-03-03 RX ADMIN — Medication 2: at 08:36

## 2018-03-03 RX ADMIN — Medication 100 MILLIGRAM(S): at 17:49

## 2018-03-03 NOTE — PROGRESS NOTE ADULT - SUBJECTIVE AND OBJECTIVE BOX
CHIEF COMPLAINT:    SUBJECTIVE:     REVIEW OF SYSTEMS:    CONSTITUTIONAL: No weakness, fevers or chills  EYES/ENT: No visual changes;  No vertigo or throat pain   NECK: No pain or stiffness  RESPIRATORY: No cough, wheezing, hemoptysis; No shortness of breath  CARDIOVASCULAR: No chest pain or palpitations  GASTROINTESTINAL: No abdominal or epigastric pain. No nausea, vomiting, or hematemesis; No diarrhea or constipation. No melena or hematochezia.  GENITOURINARY: No dysuria, frequency or hematuria  NEUROLOGICAL: No numbness or weakness  SKIN: No itching, burning, rashes, or lesions   All other review of systems is negative unless indicated above    Vital Signs Last 24 Hrs  T(C): 36.6 (03 Mar 2018 04:52), Max: 36.8 (02 Mar 2018 13:37)  T(F): 97.9 (03 Mar 2018 04:52), Max: 98.2 (02 Mar 2018 13:37)  HR: 77 (03 Mar 2018 04:52) (74 - 87)  BP: 107/68 (03 Mar 2018 04:52) (89/73 - 107/68)  BP(mean): --  RR: 17 (03 Mar 2018 04:52) (16 - 18)  SpO2: 100% (03 Mar 2018 04:52) (96% - 100%)    I&O's Summary      CAPILLARY BLOOD GLUCOSE      POCT Blood Glucose.: 172 mg/dL (02 Mar 2018 21:49)  POCT Blood Glucose.: 149 mg/dL (02 Mar 2018 16:47)  POCT Blood Glucose.: 161 mg/dL (02 Mar 2018 11:43)      PHYSICAL EXAM:    Constitutional: NAD, awake and alert, well-developed  HEENT: PERR, EOMI, Normal Hearing, MMM  Neck: Soft and supple, No LAD, No JVD  Respiratory: Breath sounds are clear bilaterally, No wheezing, rales or rhonchi  Cardiovascular: S1 and S2, regular rate and rhythm, no Murmurs, gallops or rubs  Gastrointestinal: Bowel Sounds present, soft, nontender, nondistended, no guarding, no rebound  Extremities: No peripheral edema  Vascular: 2+ peripheral pulses  Neurological: A/O x 3, no focal deficits  Musculoskeletal: 5/5 strength b/l upper and lower extremities  Skin: No rashes    MEDICATIONS:  MEDICATIONS  (STANDING):  amiodarone    Tablet 200 milliGRAM(s) Oral daily  aspirin  chewable 81 milliGRAM(s) Oral daily  buDESOnide 160 MICROgram(s)/formoterol 4.5 MICROgram(s) Inhaler 2 Puff(s) Inhalation two times a day  dextrose 5%. 1000 milliLiter(s) (50 mL/Hr) IV Continuous <Continuous>  dextrose 50% Injectable 12.5 Gram(s) IV Push once  dextrose 50% Injectable 25 Gram(s) IV Push once  dextrose 50% Injectable 25 Gram(s) IV Push once  doxycycline hyclate Capsule 100 milliGRAM(s) Oral every 12 hours  heparin  Injectable 5000 Unit(s) SubCutaneous every 8 hours  insulin lispro (HumaLOG) corrective regimen sliding scale   SubCutaneous three times a day before meals  insulin lispro (HumaLOG) corrective regimen sliding scale   SubCutaneous at bedtime  magnesium oxide 400 milliGRAM(s) Oral two times a day with meals  metoprolol succinate ER 75 milliGRAM(s) Oral daily  pantoprazole    Tablet 40 milliGRAM(s) Oral before breakfast      LABS: All Labs Reviewed:                        11.0   11.6  )-----------( 258      ( 02 Mar 2018 07:19 )             34.9     03-03    133<L>  |  96  |  60<H>  ----------------------------<  134<H>  4.3   |  27  |  1.77<H>    Ca    8.7      03 Mar 2018 06:35          Assessment and Plan    56 yo female, h/o DM, Htn, Asthma, Chf, VT s/p Bi-V AICD,  recently admitted to  02/05-02/08 for asthma and CHF exacerbation,  presents to ED due to leg swelling.  -found to have Chf exacerbation and started on Lasix/Aldactone/Metolazone with subsequent prerenal azotemia and ARF.   -treated for LE cellulitis as well  Patient was overdiurrhesed and has developed prerenal azotemia and ARF, diurrhetic place on hold since 3.1        1. ARF due to prerenal azotemia, diurrhetic induced  hold all diurrhetic for now  s/p IV hydration for 12hrs  f/u Cr in AM    2. b/l LE cellulitis.  -resistant and gram negative organisms are possible.  -b/l LE venous dopplers, (-) DVT.  -s/p 7days of broad spectrum Abx  change to oral Doxy 100mg bid x 7days    3. Hypotensive episode + CHF: BP better now   resolved    4. Prolonged QTc.  hx VT  on Amiodarone  -c/w telemetry monitoring.  -keep potassium >=4.0 and magnesium >=2.0.; check in am again  -EP interrogated AICD; normal  daily EKG    5. Acute on chronic left systolic Chf:  ef 25%  s/p vigorous diurhetic now with prerenal azotemia; hold diurrhetic today  restart Entresto when Cr improves  restart Metolazone tomorrow if Cr improves    On discharge, once Cr improves, may be discharged on Metolazone 2.5mg/day, and Entresto....  do not give Aldactone due to hyperkalemia    6. DM type II:  -hold oral hypoglycemics.  Novolog SS    7. Asthma.  -stable.  -c/w LABA/ICS.    d/c home when Cr<1.7 CHIEF COMPLAINT/Diagnosis: chf exacerbation/ acute renal failure/ lower extremity edema    SUBJECTIVE: no complaints    REVIEW OF SYSTEMS:    CONSTITUTIONAL: No weakness, fevers or chills  EYES/ENT: No visual changes;  No vertigo or throat pain   NECK: No pain or stiffness  RESPIRATORY: No cough, wheezing, hemoptysis; No shortness of breath  CARDIOVASCULAR: No chest pain or palpitations  GASTROINTESTINAL: No abdominal or epigastric pain. No nausea, vomiting, or hematemesis; No diarrhea or constipation. No melena or hematochezia.  GENITOURINARY: No dysuria, frequency or hematuria  NEUROLOGICAL: No numbness or weakness  SKIN: No itching, burning, rashes, or lesions   All other review of systems is negative unless indicated above    Vital Signs Last 24 Hrs  T(C): 36.6 (03 Mar 2018 04:52), Max: 36.8 (02 Mar 2018 13:37)  T(F): 97.9 (03 Mar 2018 04:52), Max: 98.2 (02 Mar 2018 13:37)  HR: 77 (03 Mar 2018 04:52) (74 - 87)  BP: 107/68 (03 Mar 2018 04:52) (89/73 - 107/68)  BP(mean): --  RR: 17 (03 Mar 2018 04:52) (16 - 18)  SpO2: 100% (03 Mar 2018 04:52) (96% - 100%)    I&O's Summary      CAPILLARY BLOOD GLUCOSE      POCT Blood Glucose.: 172 mg/dL (02 Mar 2018 21:49)  POCT Blood Glucose.: 149 mg/dL (02 Mar 2018 16:47)  POCT Blood Glucose.: 161 mg/dL (02 Mar 2018 11:43)      PHYSICAL EXAM:    Constitutional: NAD, awake and alert, well-developed  HEENT: PERR, EOMI, Normal Hearing, MMM  Neck: Soft and supple, No LAD, No JVD  Respiratory: Breath sounds are clear bilaterally, No wheezing, rales or rhonchi  Cardiovascular: S1 and S2, regular rate and rhythm, no Murmurs, gallops or rubs  Gastrointestinal: Bowel Sounds present, soft, nontender, nondistended, no guarding, no rebound  Extremities: No peripheral edema  Vascular: 2+ peripheral pulses  Neurological: A/O x 3, no focal deficits  Musculoskeletal: 5/5 strength b/l upper and lower extremities  Skin: No rashes    MEDICATIONS:  MEDICATIONS  (STANDING):  amiodarone    Tablet 200 milliGRAM(s) Oral daily  aspirin  chewable 81 milliGRAM(s) Oral daily  buDESOnide 160 MICROgram(s)/formoterol 4.5 MICROgram(s) Inhaler 2 Puff(s) Inhalation two times a day  dextrose 5%. 1000 milliLiter(s) (50 mL/Hr) IV Continuous <Continuous>  dextrose 50% Injectable 12.5 Gram(s) IV Push once  dextrose 50% Injectable 25 Gram(s) IV Push once  dextrose 50% Injectable 25 Gram(s) IV Push once  doxycycline hyclate Capsule 100 milliGRAM(s) Oral every 12 hours  heparin  Injectable 5000 Unit(s) SubCutaneous every 8 hours  insulin lispro (HumaLOG) corrective regimen sliding scale   SubCutaneous three times a day before meals  insulin lispro (HumaLOG) corrective regimen sliding scale   SubCutaneous at bedtime  magnesium oxide 400 milliGRAM(s) Oral two times a day with meals  metoprolol succinate ER 75 milliGRAM(s) Oral daily  pantoprazole    Tablet 40 milliGRAM(s) Oral before breakfast      LABS: All Labs Reviewed:                        11.0   11.6  )-----------( 258      ( 02 Mar 2018 07:19 )             34.9     03-03    133<L>  |  96  |  60<H>  ----------------------------<  134<H>  4.3   |  27  |  1.77<H>    Ca    8.7      03 Mar 2018 06:35          Assessment and Plan    56 yo female, h/o DM, Htn, Asthma, Chf, VT s/p Bi-V AICD,  recently admitted to  02/05-02/08 for asthma and CHF exacerbation,  presents to ED due to leg swelling.  -found to have Chf exacerbation and started on Lasix/Aldactone/Metolazone with subsequent prerenal azotemia and ARF.   -treated for LE cellulitis as well  Patient was overdiurrhesed and has developed prerenal azotemia and ARF, diurrhetic place on hold since 3.1        1. ARF due to prerenal azotemia, diurrhetic induced  hold all diurrhetic for now  -creatinine improving; possibly some underying CKD?  -will consult with nephrology for continued outpatient f/u on discharge.     2. b/l LE cellulitis  -resistant and gram negative organisms are possible.  -b/l LE venous dopplers, (-) DVT.  -s/p 7days of broad spectrum Abx  change to oral Doxy 100mg bid x 7days    3. Hypotensive episode + CHF: BP better now   resolved    4. Prolonged QTc.  hx VT  on Amiodarone  -c/w telemetry monitoring.  -keep potassium >=4.0 and magnesium >=2.0.; check in am again  -EP interrogated AICD; normal  daily EKG    5. Acute on chronic left systolic Chf:  ef 25%  s/p vigorous diurhetic now with prerenal azotemia; hold diurrhetic today  restart Entresto when Cr improves  restart Metolazone tomorrow if Cr improves    On discharge, once Cr improves, may be discharged on Metolazone 2.5mg/day, and Entresto....  do not give Aldactone due to hyperkalemia    6. DM type II:  -hold oral hypoglycemics.  Novolog SS    7. Asthma.  -stable.  -c/w LABA/ICS.    PLan: will consult with nephrology and hopeful dischare in AM

## 2018-03-03 NOTE — PROGRESS NOTE ADULT - PROVIDER SPECIALTY LIST ADULT
Cardiology
Hospitalist
Infectious Disease
Hospitalist
Infectious Disease
Cardiology
Cardiology

## 2018-03-04 ENCOUNTER — TRANSCRIPTION ENCOUNTER (OUTPATIENT)
Age: 58
End: 2018-03-04

## 2018-03-04 VITALS — WEIGHT: 184.31 LBS

## 2018-03-04 LAB
ANION GAP SERPL CALC-SCNC: 10 MMOL/L — SIGNIFICANT CHANGE UP (ref 5–17)
APPEARANCE UR: CLEAR — SIGNIFICANT CHANGE UP
BACTERIA # UR AUTO: (no result)
BILIRUB UR-MCNC: NEGATIVE — SIGNIFICANT CHANGE UP
BUN SERPL-MCNC: 58 MG/DL — HIGH (ref 7–23)
CALCIUM SERPL-MCNC: 8.6 MG/DL — SIGNIFICANT CHANGE UP (ref 8.5–10.1)
CHLORIDE SERPL-SCNC: 99 MMOL/L — SIGNIFICANT CHANGE UP (ref 96–108)
CO2 SERPL-SCNC: 21 MMOL/L — LOW (ref 22–31)
COLOR SPEC: YELLOW — SIGNIFICANT CHANGE UP
CREAT ?TM UR-MCNC: 49 MG/DL — SIGNIFICANT CHANGE UP
CREAT SERPL-MCNC: 1.52 MG/DL — HIGH (ref 0.5–1.3)
DIFF PNL FLD: (no result)
EPI CELLS # UR: (no result)
GLUCOSE BLDC GLUCOMTR-MCNC: 112 MG/DL — HIGH (ref 70–99)
GLUCOSE BLDC GLUCOMTR-MCNC: 179 MG/DL — HIGH (ref 70–99)
GLUCOSE SERPL-MCNC: 190 MG/DL — HIGH (ref 70–99)
GLUCOSE UR QL: NEGATIVE MG/DL — SIGNIFICANT CHANGE UP
KETONES UR-MCNC: NEGATIVE — SIGNIFICANT CHANGE UP
LEUKOCYTE ESTERASE UR-ACNC: (no result)
NITRITE UR-MCNC: NEGATIVE — SIGNIFICANT CHANGE UP
PH UR: 6 — SIGNIFICANT CHANGE UP (ref 5–8)
POTASSIUM SERPL-MCNC: 5.1 MMOL/L — SIGNIFICANT CHANGE UP (ref 3.5–5.3)
POTASSIUM SERPL-SCNC: 5.1 MMOL/L — SIGNIFICANT CHANGE UP (ref 3.5–5.3)
PROT ?TM UR-MCNC: 65 MG/DL — HIGH (ref 0–12)
PROT UR-MCNC: 30 MG/DL
PROT/CREAT UR-RTO: 1.3 RATIO — HIGH (ref 0–0.2)
RBC CASTS # UR COMP ASSIST: (no result) /HPF (ref 0–4)
SODIUM SERPL-SCNC: 130 MMOL/L — LOW (ref 135–145)
SODIUM UR-SCNC: <20 MMOL/L — SIGNIFICANT CHANGE UP
SP GR SPEC: 1.01 — SIGNIFICANT CHANGE UP (ref 1.01–1.02)
UROBILINOGEN FLD QL: 4 MG/DL
WBC UR QL: NEGATIVE — SIGNIFICANT CHANGE UP

## 2018-03-04 RX ORDER — BUDESONIDE AND FORMOTEROL FUMARATE DIHYDRATE 160; 4.5 UG/1; UG/1
2 AEROSOL RESPIRATORY (INHALATION)
Qty: 1 | Refills: 2 | OUTPATIENT
Start: 2018-03-04 | End: 2018-06-01

## 2018-03-04 RX ORDER — BUDESONIDE AND FORMOTEROL FUMARATE DIHYDRATE 160; 4.5 UG/1; UG/1
2 AEROSOL RESPIRATORY (INHALATION)
Qty: 0 | Refills: 0 | COMMUNITY

## 2018-03-04 RX ORDER — METFORMIN HYDROCHLORIDE 850 MG/1
1 TABLET ORAL
Qty: 0 | Refills: 0 | COMMUNITY

## 2018-03-04 RX ORDER — SACUBITRIL AND VALSARTAN 24; 26 MG/1; MG/1
1 TABLET, FILM COATED ORAL
Qty: 0 | Refills: 0 | COMMUNITY

## 2018-03-04 RX ADMIN — Medication 100 MILLIGRAM(S): at 05:37

## 2018-03-04 RX ADMIN — PANTOPRAZOLE SODIUM 40 MILLIGRAM(S): 20 TABLET, DELAYED RELEASE ORAL at 05:37

## 2018-03-04 RX ADMIN — MAGNESIUM OXIDE 400 MG ORAL TABLET 400 MILLIGRAM(S): 241.3 TABLET ORAL at 08:25

## 2018-03-04 RX ADMIN — HEPARIN SODIUM 5000 UNIT(S): 5000 INJECTION INTRAVENOUS; SUBCUTANEOUS at 05:37

## 2018-03-04 RX ADMIN — Medication 81 MILLIGRAM(S): at 12:38

## 2018-03-04 RX ADMIN — AMIODARONE HYDROCHLORIDE 200 MILLIGRAM(S): 400 TABLET ORAL at 05:37

## 2018-03-04 NOTE — CONSULT NOTE ADULT - SUBJECTIVE AND OBJECTIVE BOX
COVERING FOR DR MORGAN     56 yo female with PMHx  of chronic systolic CHF, bi-v aicd, htn, hyperlipidemia, asthma vtach on amiodarone, DM, recently discharged for asthma exacerbation 2/5 for  parainfluenza infection and KEYSHA - seen by Dr Morgan   with Cr of 1.7 - left with Cr 1.49  now presenting with le edema and  shortness of breath. She has been taking her lasix twice a day and also metolazone without improvement. She states she has pain on both legs which feels like something is stabbing her and she has difficulty ambulating. She has subjective fevers and chills. States her legs have been itching and she has been scratching at them. She does have multiple scratch marks on legs from scratching and she has noted to have increasing erythema to both legs. Pain worse around calves. Dopplers in ED negative for DVT. Denies any chest pain or SOB.   Cr was 0.9 on this admission - started on IV lasix, metalozone, and aldactone during this admission when Cr stared to rise 2.45 on 2/28 - diuretics were stopped since then .  renal eval called for KEYSHA on CKD     today   pt feeling well    no sob or cp    c.o le pains - swelling has improved      PAST MEDICAL & SURGICAL HISTORY:  CHF (congestive heart failure)  Pacemaker  HTN (hypertension)  HLD (hyperlipidemia)  Asthma  DM (diabetes mellitus)  CAD (coronary artery disease)  History of cholecystectomy  Artificial cardiac pacemaker      MEDICATIONS  (STANDING):  amiodarone    Tablet 200 milliGRAM(s) Oral daily  aspirin  chewable 81 milliGRAM(s) Oral daily  buDESOnide 160 MICROgram(s)/formoterol 4.5 MICROgram(s) Inhaler 2 Puff(s) Inhalation two times a day  dextrose 5%. 1000 milliLiter(s) (50 mL/Hr) IV Continuous <Continuous>  dextrose 50% Injectable 12.5 Gram(s) IV Push once  dextrose 50% Injectable 25 Gram(s) IV Push once  dextrose 50% Injectable 25 Gram(s) IV Push once  doxycycline hyclate Capsule 100 milliGRAM(s) Oral every 12 hours  heparin  Injectable 5000 Unit(s) SubCutaneous every 8 hours  insulin lispro (HumaLOG) corrective regimen sliding scale   SubCutaneous three times a day before meals  insulin lispro (HumaLOG) corrective regimen sliding scale   SubCutaneous at bedtime  magnesium oxide 400 milliGRAM(s) Oral two times a day with meals  metoprolol succinate ER 75 milliGRAM(s) Oral daily  pantoprazole    Tablet 40 milliGRAM(s) Oral before breakfast      Allergies    No Known Allergies    Intolerances        SOCIAL HISTORY:  Denies ETOh,Smoking,     FAMILY HISTORY:  Family history of other heart disease (Mother)      REVIEW OF SYSTEMS:    CONSTITUTIONAL: No weakness, fevers or chills  EYES/ENT: No visual changes;  No vertigo or throat pain   NECK: No pain or stiffness  RESPIRATORY: No cough, wheezing, hemoptysis; No shortness of breath  CARDIOVASCULAR: No chest pain or palpitations  GASTROINTESTINAL: No abdominal or epigastric pain. No nausea, vomiting, or hematemesis; No diarrhea or constipation. No melena or hematochezia.  GENITOURINARY: No dysuria, frequency or hematuria  NEUROLOGICAL: No numbness or weakness  SKIN: No itching, burning, rashes, or lesions   All other review of systems is negative unless indicated above.    VITAL:  T(C): , Max: 36.9 (03-03-18 @ 16:57)  T(F): , Max: 98.5 (03-03-18 @ 16:57)  HR: 84 (03-04-18 @ 05:01)  BP: 108/77 (03-04-18 @ 05:01)  BP(mean): --  RR: 18 (03-04-18 @ 05:01)  SpO2: 95% (03-04-18 @ 05:01)  Wt(kg): --    I&O's Summary        PHYSICAL EXAM:    Constitutional: NAD  HEENT: PERRLA, EOMI,  MMM  Neck: No LAD, No JVD  Respiratory: diminished AE with rales at bases   Cardiovascular: S1 and S2  Gastrointestinal: BS+, soft, NT/ND  Extremities: ++ peripheral edema, w multiple scratch marks , erythema ++  Neurological: A/O x 3, no focal deficits  Psychiatric: Normal mood, normal affect  : No Locke  Skin: No rashes  Access: Not applicable    LABS:      130    |  99     |  58     ----------------------------<  190       04 Mar 2018 08:20  5.1     |  21     |  1.52     133    |  96     |  60     ----------------------------<  134       03 Mar 2018 06:35  4.3     |  27     |  1.77     130    |  94     |  65     ----------------------------<  138       02 Mar 2018 07:19  4.8     |  27     |  2.09     Ca    8.6        04 Mar 2018 08:20  Ca    8.7        03 Mar 2018 06:35    Albumin, Serum: 2.6 g/dL (02.25.18 @ 06:25)      Urine Studies:    Urine Microscopic-Add On (NC) (02.22.18 @ 04:00)    Bacteria: Few    Epithelial Cells: Moderate    Red Blood Cell - Urine: 0-2 /HPF    White Blood Cell - Urine: 3-5  Urinalysis (02.22.18 @ 04:00)    pH Urine: 7.0    Glucose Qualitative, Urine: Negative mg/dL    Blood, Urine: Small    Color: Yellow    Urine Appearance: Clear    Bilirubin: Negative    Ketone - Urine: Negative    Specific Gravity: 1.010    Protein, Urine: 30 mg/dL    Urobilinogen: 4 mg/dL    Nitrite: Negative    Leukocyte Esterase Concentration: Small      Serum Pro-Brain Natriuretic Peptide: 4006 pg/mL (02.24.18 @ 06:44)        RADIOLOGY & ADDITIONAL STUDIES:          EXAM:  XR CHEST PA LAT 2V                            PROCEDURE DATE:  02/20/2018          INTERPRETATION:  Chest erect AP and lateral 2 views:    Clinical history:    Shortness of breath. Dyspnea. Pain. Infection both legs.    Findings:    Unchanged global cardiomegaly. Aorta appears normal. Left subclavian   pacemaker. Pulmonary venous congestion.    Compared to the prior radiograph of 2/6/2018, no significant change.    Impression:    Persisting cardiomegaly with mild pulmonary venous congestion.

## 2018-03-04 NOTE — DISCHARGE NOTE ADULT - PLAN OF CARE
continue with Lasix at 40mg once a day; HOLD metolazone; HOLD Entresto; HOLD metformin f/u outpatient with cardiologist w/ in 5 days f/u outpatient W/ w/ in 5 days completed treatment

## 2018-03-04 NOTE — DISCHARGE NOTE ADULT - CARE PROVIDER_API CALL
Luke Chan), Internal Medicine; Nephrology  [address] [place] [address]  Phone: [phone]  Fax: [phone]    Juanita Stevens), Cardiology; Interventional Cardiology  [address] [facility]  [place] [address]  Phone: [phone]  Fax: [phone]

## 2018-03-04 NOTE — DISCHARGE NOTE ADULT - CARE PLAN
Principal Discharge DX:	Chronic diastolic congestive heart failure  Goal:	continue with Lasix at 40mg once a day; HOLD metolazone; HOLD Entresto; HOLD metformin  Assessment and plan of treatment:	f/u outpatient with cardiologist w/ in 5 days  Secondary Diagnosis:	Acute renal failure, unspecified acute renal failure type  Goal:	f/u outpatient W/ w/ in 5 days  Assessment and plan of treatment:	continue with Lasix at 40mg once a day; HOLD metolazone; HOLD Entresto; HOLD metformin  Secondary Diagnosis:	Cellulitis, unspecified cellulitis site  Goal:	completed treatment

## 2018-03-04 NOTE — CONSULT NOTE ADULT - ASSESSMENT
58 yo female with PMHX of chronic systolic CHF and HTN, DM and readmitted with now with LE cellulitis, and acute CHF      KEYSHA - on CKD  - Cr risen in setting of diuresis and hypotension SBP 80 - 90 - improving as diuretics on hold    - monitor volume status and slowly resume this - avoid SBP < 110 for renal perfusion    - may need to keep slighty prerenal to keep euvolemic     - check urine lytes, urine protein.cr ratio    - renal sono     Systolic  CHF   - limit fluid intake to 1 quart  and low na diet    - elevate legs   - low albumin state contributing to LE swelling - check urine protein     LE cellultiis   -  abx per Medicine     Dr Chan to resume care 3/5     Thank you for the courtesy of this consult. We will follow this patient with you.   Management is subject to change if new information becomes available or patient condition changes.

## 2018-03-04 NOTE — DISCHARGE NOTE ADULT - PATIENT PORTAL LINK FT
You can access the [facility], offered by [facility], by registering with the following website: [email or web address]

## 2018-03-04 NOTE — DISCHARGE NOTE ADULT - MEDICATION SUMMARY - MEDICATIONS TO TAKE
I will START or STAY ON the medications listed below when I get home from the hospital:    aspirin 81 mg oral tablet, chewable  -- 1 tab(s) by mouth once a day  -- Indication: For Cad    amiodarone 200 mg oral tablet  -- 1 tab(s) by mouth once a day   -- Avoid grapefruit and grapefruit juice while taking this medication.  Avoid prolonged or excessive exposure to direct and/or artificial sunlight while taking this medication.  Do not take this drug if you are pregnant.  It is very important that you take or use this exactly as directed.  Do not skip doses or discontinue unless directed by your doctor.  May cause drowsiness or dizziness.    -- Indication: For afib    Januvia 50 mg oral tablet  -- 1 tab(s) by mouth once a day for diabetes  -- Do not drink alcoholic beverages when taking this medication.    -- Indication: For diabetes    metoprolol succinate 50 mg oral tablet, extended release  -- 1.5 tab(s) by mouth once a day  -- Indication: For Htn    Symbicort 160 mcg-4.5 mcg/inh inhalation aerosol  -- 2 puff(s) inhaled 2 times a day  -- Indication: For Asthma    furosemide 40 mg oral tablet  -- 1 tab(s) by mouth once a day  -- Indication: For Congestive cardiomyopathy    Protonix 40 mg oral delayed release tablet  -- 1 tab(s) by mouth once a day   -- It is very important that you take or use this exactly as directed.  Do not skip doses or discontinue unless directed by your doctor.  Obtain medical advice before taking any non-prescription drugs as some may affect the action of this medication.  Swallow whole.  Do not crush.    -- Indication: For gerd

## 2018-03-04 NOTE — DISCHARGE NOTE ADULT - HOSPITAL COURSE
Vital Signs Last 24 Hrs  T(C): 36.7 (04 Mar 2018 11:09), Max: 36.9 (03 Mar 2018 16:57)  T(F): 98.1 (04 Mar 2018 11:09), Max: 98.5 (03 Mar 2018 16:57)  HR: 84 (04 Mar 2018 11:09) (84 - 87)  BP: 111/93 (04 Mar 2018 11:09) (108/77 - 112/65)  BP(mean): --  RR: 20 (04 Mar 2018 11:09) (18 - 20)  SpO2: 97% (04 Mar 2018 11:09) (95% - 100%)    HEENT:   pupils equal and reactive, EOMI, no oropharyngeal lesions, erythema, exudates, oral thrush    NECK:   supple, no carotid bruits, no palpable lymph nodes, no thyromegaly    CV:  +S1, +S2, regular, no murmurs or rubs    RESP:   lungs clear to auscultation bilaterally, no wheezing, rales, rhonchi, good air entry bilaterally    BREAST:  not examined    GI:  abdomen soft, non-tender, non-distended, normal BS, no bruits, no abdominal masses, no palpable masses    RECTAL:  not examined    :  not examined    MSK:   normal muscle tone, no atrophy, no rigidity, no contractions    EXT:   no clubbing, no cyanosis, no edema, no calf pain, swelling or erythema    VASCULAR:  pulses equal and symmetric in the upper and lower extremities    NEURO:  AAOX3, no focal neurological deficits, follows all commands, able to move extremities spontaneously    SKIN:  no ulcers, lesions or rashes    Urinalysis Basic - ( 04 Mar 2018 13:02 )    Color: Yellow / Appearance: Clear / S.010 / pH: x  Gluc: x / Ketone: Negative  / Bili: Negative / Urobili: 4 mg/dL   Blood: x / Protein: 30 mg/dL / Nitrite: Negative   Leuk Esterase: Trace / RBC: x / WBC x   Sq Epi: x / Non Sq Epi: x / Bacteria: x    04 Mar 2018 08:20    130    |  99     |  58     ----------------------------<  190    5.1     |  21     |  1.52     Ca    8.6        04 Mar 2018 08:20          Hospital course:    58 yo female, h/o DM, Htn, Asthma, Chf w/ EF 20%, VT s/p Bi-V AICD,  recently admitted to  - for asthma and CHF exacerbation,  presents to ED due to leg swelling.  Was found to have Chf exacerbation and started on Lasix/Aldactone/Metolazone with subsequent prerenal azotemia and ARF. Her diuretics have been on hold since, however she has been nicely diuresed. She has no sob any longer, and edema has improved significantly since admit.  She had an extended hospitalization secondary to monitoring of the renal function. Her renal fucntion has been improving steadily back to normal range, but not quite there yet. She is adised to hold all diuretics and Ace/arb except lasix. will resume her lasix secondary to residual edema in legs and high possibility of getting into chf with out it. She understands that she must follow up with Dr. Stevens w/ in few days , whom is her cardiologist. She must have blood work repeated w/ in 3-4 days.       2-b/l JADON cellulitis  -completed therapy while inpatient.

## 2018-03-04 NOTE — DISCHARGE NOTE ADULT - MEDICATION SUMMARY - MEDICATIONS TO STOP TAKING
I will STOP taking the medications listed below when I get home from the hospital:    Entresto 49 mg-51 mg oral tablet  -- 1 tab(s) by mouth 2 times a day    metFORMIN 500 mg oral tablet  -- 1 tab(s) by mouth 2 times a day    metOLazone 5 mg oral tablet  -- 1 tab(s) by mouth once a day

## 2018-03-19 ENCOUNTER — INPATIENT (INPATIENT)
Facility: HOSPITAL | Age: 58
LOS: 10 days | Discharge: ANOTHER HOSPITAL | End: 2018-03-30
Attending: STUDENT IN AN ORGANIZED HEALTH CARE EDUCATION/TRAINING PROGRAM | Admitting: STUDENT IN AN ORGANIZED HEALTH CARE EDUCATION/TRAINING PROGRAM
Payer: COMMERCIAL

## 2018-03-19 VITALS — WEIGHT: 199.96 LBS

## 2018-03-19 DIAGNOSIS — Z95.0 PRESENCE OF CARDIAC PACEMAKER: Chronic | ICD-10-CM

## 2018-03-19 DIAGNOSIS — Z90.49 ACQUIRED ABSENCE OF OTHER SPECIFIED PARTS OF DIGESTIVE TRACT: Chronic | ICD-10-CM

## 2018-03-19 LAB
ALBUMIN SERPL ELPH-MCNC: 2.7 G/DL — LOW (ref 3.3–5)
ALP SERPL-CCNC: 247 U/L — HIGH (ref 40–120)
ALT FLD-CCNC: 57 U/L — SIGNIFICANT CHANGE UP (ref 12–78)
ANION GAP SERPL CALC-SCNC: 10 MMOL/L — SIGNIFICANT CHANGE UP (ref 5–17)
APPEARANCE UR: CLEAR — SIGNIFICANT CHANGE UP
APTT BLD: 25 SEC — LOW (ref 27.5–37.4)
AST SERPL-CCNC: 52 U/L — HIGH (ref 15–37)
BACTERIA # UR AUTO: (no result)
BASOPHILS # BLD AUTO: 0.09 K/UL — SIGNIFICANT CHANGE UP (ref 0–0.2)
BASOPHILS NFR BLD AUTO: 0.9 % — SIGNIFICANT CHANGE UP (ref 0–2)
BILIRUB SERPL-MCNC: 1.7 MG/DL — HIGH (ref 0.2–1.2)
BILIRUB UR-MCNC: NEGATIVE — SIGNIFICANT CHANGE UP
BUN SERPL-MCNC: 71 MG/DL — HIGH (ref 7–23)
CALCIUM SERPL-MCNC: 8.6 MG/DL — SIGNIFICANT CHANGE UP (ref 8.5–10.1)
CHLORIDE SERPL-SCNC: 102 MMOL/L — SIGNIFICANT CHANGE UP (ref 96–108)
CO2 SERPL-SCNC: 26 MMOL/L — SIGNIFICANT CHANGE UP (ref 22–31)
COLOR SPEC: YELLOW — SIGNIFICANT CHANGE UP
COMMENT - URINE: SIGNIFICANT CHANGE UP
CREAT SERPL-MCNC: 2.01 MG/DL — HIGH (ref 0.5–1.3)
DIFF PNL FLD: (no result)
EOSINOPHIL # BLD AUTO: 0 K/UL — SIGNIFICANT CHANGE UP (ref 0–0.5)
EOSINOPHIL NFR BLD AUTO: 0 % — SIGNIFICANT CHANGE UP (ref 0–6)
EPI CELLS # UR: SIGNIFICANT CHANGE UP
GLUCOSE BLDC GLUCOMTR-MCNC: 116 MG/DL — HIGH (ref 70–99)
GLUCOSE SERPL-MCNC: 174 MG/DL — HIGH (ref 70–99)
GLUCOSE UR QL: NEGATIVE MG/DL — SIGNIFICANT CHANGE UP
HCT VFR BLD CALC: 38.2 % — SIGNIFICANT CHANGE UP (ref 34.5–45)
HGB BLD-MCNC: 11.5 G/DL — SIGNIFICANT CHANGE UP (ref 11.5–15.5)
HYALINE CASTS # UR AUTO: (no result) /LPF
IMM GRANULOCYTES NFR BLD AUTO: 1 % — SIGNIFICANT CHANGE UP (ref 0–1.5)
INR BLD: 1.32 RATIO — HIGH (ref 0.88–1.16)
KETONES UR-MCNC: NEGATIVE — SIGNIFICANT CHANGE UP
LEUKOCYTE ESTERASE UR-ACNC: (no result)
LYMPHOCYTES # BLD AUTO: 1.51 K/UL — SIGNIFICANT CHANGE UP (ref 1–3.3)
LYMPHOCYTES # BLD AUTO: 15.7 % — SIGNIFICANT CHANGE UP (ref 13–44)
MCHC RBC-ENTMCNC: 24.9 PG — LOW (ref 27–34)
MCHC RBC-ENTMCNC: 30.1 GM/DL — LOW (ref 32–36)
MCV RBC AUTO: 82.9 FL — SIGNIFICANT CHANGE UP (ref 80–100)
MONOCYTES # BLD AUTO: 0.88 K/UL — SIGNIFICANT CHANGE UP (ref 0–0.9)
MONOCYTES NFR BLD AUTO: 9.2 % — SIGNIFICANT CHANGE UP (ref 2–14)
NEUTROPHILS # BLD AUTO: 7.02 K/UL — SIGNIFICANT CHANGE UP (ref 1.8–7.4)
NEUTROPHILS NFR BLD AUTO: 73.2 % — SIGNIFICANT CHANGE UP (ref 43–77)
NITRITE UR-MCNC: NEGATIVE — SIGNIFICANT CHANGE UP
NRBC # BLD: 0 /100 WBCS — SIGNIFICANT CHANGE UP (ref 0–0)
NT-PROBNP SERPL-SCNC: 5905 PG/ML — HIGH (ref 0–125)
PH UR: 5 — SIGNIFICANT CHANGE UP (ref 5–8)
PLATELET # BLD AUTO: 160 K/UL — SIGNIFICANT CHANGE UP (ref 150–400)
POTASSIUM SERPL-MCNC: 4 MMOL/L — SIGNIFICANT CHANGE UP (ref 3.5–5.3)
POTASSIUM SERPL-SCNC: 4 MMOL/L — SIGNIFICANT CHANGE UP (ref 3.5–5.3)
PROT SERPL-MCNC: 6.9 GM/DL — SIGNIFICANT CHANGE UP (ref 6–8.3)
PROT UR-MCNC: 30 MG/DL
PROTHROM AB SERPL-ACNC: 14.3 SEC — HIGH (ref 9.8–12.7)
RBC # BLD: 4.61 M/UL — SIGNIFICANT CHANGE UP (ref 3.8–5.2)
RBC # FLD: 24.8 % — HIGH (ref 10.3–14.5)
RBC CASTS # UR COMP ASSIST: (no result) /HPF (ref 0–4)
SODIUM SERPL-SCNC: 138 MMOL/L — SIGNIFICANT CHANGE UP (ref 135–145)
SP GR SPEC: 1.01 — SIGNIFICANT CHANGE UP (ref 1.01–1.02)
TROPONIN I SERPL-MCNC: 0.02 NG/ML — SIGNIFICANT CHANGE UP (ref 0.01–0.04)
TROPONIN I SERPL-MCNC: 0.02 NG/ML — SIGNIFICANT CHANGE UP (ref 0.01–0.04)
UROBILINOGEN FLD QL: 4 MG/DL
WBC # BLD: 9.6 K/UL — SIGNIFICANT CHANGE UP (ref 3.8–10.5)
WBC # FLD AUTO: 9.6 K/UL — SIGNIFICANT CHANGE UP (ref 3.8–10.5)
WBC UR QL: (no result)

## 2018-03-19 PROCEDURE — 71045 X-RAY EXAM CHEST 1 VIEW: CPT | Mod: 26

## 2018-03-19 PROCEDURE — 99285 EMERGENCY DEPT VISIT HI MDM: CPT

## 2018-03-19 PROCEDURE — 93010 ELECTROCARDIOGRAM REPORT: CPT

## 2018-03-19 RX ORDER — ALBUTEROL 90 UG/1
2.5 AEROSOL, METERED ORAL EVERY 6 HOURS
Qty: 0 | Refills: 0 | Status: DISCONTINUED | OUTPATIENT
Start: 2018-03-19 | End: 2018-03-30

## 2018-03-19 RX ORDER — ACETAMINOPHEN 500 MG
650 TABLET ORAL EVERY 6 HOURS
Qty: 0 | Refills: 0 | Status: DISCONTINUED | OUTPATIENT
Start: 2018-03-19 | End: 2018-03-30

## 2018-03-19 RX ORDER — ASPIRIN/CALCIUM CARB/MAGNESIUM 324 MG
81 TABLET ORAL DAILY
Qty: 0 | Refills: 0 | Status: DISCONTINUED | OUTPATIENT
Start: 2018-03-20 | End: 2018-03-30

## 2018-03-19 RX ORDER — BUDESONIDE AND FORMOTEROL FUMARATE DIHYDRATE 160; 4.5 UG/1; UG/1
2 AEROSOL RESPIRATORY (INHALATION)
Qty: 0 | Refills: 0 | Status: DISCONTINUED | OUTPATIENT
Start: 2018-03-19 | End: 2018-03-30

## 2018-03-19 RX ORDER — PANTOPRAZOLE SODIUM 20 MG/1
40 TABLET, DELAYED RELEASE ORAL
Qty: 0 | Refills: 0 | Status: DISCONTINUED | OUTPATIENT
Start: 2018-03-20 | End: 2018-03-30

## 2018-03-19 RX ORDER — AMIODARONE HYDROCHLORIDE 400 MG/1
1 TABLET ORAL
Qty: 0 | Refills: 0 | COMMUNITY

## 2018-03-19 RX ORDER — METOPROLOL TARTRATE 50 MG
75 TABLET ORAL DAILY
Qty: 0 | Refills: 0 | Status: DISCONTINUED | OUTPATIENT
Start: 2018-03-20 | End: 2018-03-23

## 2018-03-19 RX ORDER — METOPROLOL TARTRATE 50 MG
5 TABLET ORAL EVERY 6 HOURS
Qty: 0 | Refills: 0 | Status: DISCONTINUED | OUTPATIENT
Start: 2018-03-19 | End: 2018-03-24

## 2018-03-19 RX ORDER — ACETAMINOPHEN 500 MG
2 TABLET ORAL
Qty: 0 | Refills: 0 | COMMUNITY

## 2018-03-19 RX ORDER — ASPIRIN/CALCIUM CARB/MAGNESIUM 324 MG
1 TABLET ORAL
Qty: 0 | Refills: 0 | COMMUNITY

## 2018-03-19 RX ORDER — HEPARIN SODIUM 5000 [USP'U]/ML
5000 INJECTION INTRAVENOUS; SUBCUTANEOUS EVERY 12 HOURS
Qty: 0 | Refills: 0 | Status: DISCONTINUED | OUTPATIENT
Start: 2018-03-19 | End: 2018-03-30

## 2018-03-19 RX ORDER — DOCUSATE SODIUM 100 MG
100 CAPSULE ORAL
Qty: 0 | Refills: 0 | Status: DISCONTINUED | OUTPATIENT
Start: 2018-03-19 | End: 2018-03-30

## 2018-03-19 RX ORDER — ONDANSETRON 8 MG/1
4 TABLET, FILM COATED ORAL EVERY 4 HOURS
Qty: 0 | Refills: 0 | Status: DISCONTINUED | OUTPATIENT
Start: 2018-03-19 | End: 2018-03-30

## 2018-03-19 RX ORDER — FUROSEMIDE 40 MG
40 TABLET ORAL EVERY 12 HOURS
Qty: 0 | Refills: 0 | Status: DISCONTINUED | OUTPATIENT
Start: 2018-03-19 | End: 2018-03-22

## 2018-03-19 RX ORDER — FUROSEMIDE 40 MG
40 TABLET ORAL EVERY 12 HOURS
Qty: 0 | Refills: 0 | Status: DISCONTINUED | OUTPATIENT
Start: 2018-03-19 | End: 2018-03-19

## 2018-03-19 RX ORDER — AMIODARONE HYDROCHLORIDE 400 MG/1
200 TABLET ORAL DAILY
Qty: 0 | Refills: 0 | Status: DISCONTINUED | OUTPATIENT
Start: 2018-03-20 | End: 2018-03-29

## 2018-03-19 RX ORDER — FUROSEMIDE 40 MG
20 TABLET ORAL ONCE
Qty: 0 | Refills: 0 | Status: COMPLETED | OUTPATIENT
Start: 2018-03-19 | End: 2018-03-19

## 2018-03-19 RX ORDER — FUROSEMIDE 40 MG
1 TABLET ORAL
Qty: 0 | Refills: 0 | COMMUNITY

## 2018-03-19 RX ADMIN — HEPARIN SODIUM 5000 UNIT(S): 5000 INJECTION INTRAVENOUS; SUBCUTANEOUS at 20:31

## 2018-03-19 RX ADMIN — Medication 20 MILLIGRAM(S): at 15:44

## 2018-03-19 NOTE — ED ADULT TRIAGE NOTE - CHIEF COMPLAINT QUOTE
Pt presents to ED c/o b/l LE edema and KHAN. Pt reports she was sent by Dr Stevens. Pt reports abd distention worse than normal. Pt denies CP. Pt reports hx of diabetes

## 2018-03-19 NOTE — ED STATDOCS - NS_ ATTENDINGSCRIBEDETAILS _ED_A_ED_FT
I, Emilio Gross MD, performed the initial face to face bedside interview with this patient regarding history of present illness and determined that the patient should be seen in the main ED.  The history, was documented by the scribe in my presence and I attest to the accuracy of the documentation.

## 2018-03-19 NOTE — ED ADULT NURSE REASSESSMENT NOTE - NS ED NURSE REASSESS COMMENT FT1
pt received from Ai Guadarrama RN. pt alert and oriented, updated on plan of care. pt admitted to tele. Safety maintained, will continue to monitor.

## 2018-03-19 NOTE — ED PROVIDER NOTE - OBJECTIVE STATEMENT
56 y/o female with a PMHx of asthma, CAD, CHF, DM, HLD, HTN, s/p pacemaker presents to the ED c/o worsening b/l LE edema x2 days. Pt reports dyspnea. Pt saw PMD this morning who advised pt to come to PMD. No fever or any other acute complaints at this time. Cardio: Dr. Landon.

## 2018-03-19 NOTE — ED ADULT NURSE NOTE - OBJECTIVE STATEMENT
Pt presents to ED from home, ambulatory, pt alert and orientedx4, Inc HR of 149, irregular, pt c/o bilateral leg swelling with SOB for the apst 2 days, pt states she has hx of kidney dysfunction, pt denies fevers, safety maintained, pain in legs with movement, will continue to monitor

## 2018-03-19 NOTE — ED STATDOCS - PROGRESS NOTE DETAILS
58 y/o Female with a PMHx of CHF, CAD, HTN, HLD, PPM, asthma presents to ED with  c/o worsening LE edema. Will send pt to main for further eval. Noé Rdz: 58 y/o Female with a PMHx of CHF, CAD, HTN, HLD, PPM, asthma presents to ED with  c/o worsening LE edema. Will send pt to main for further eval.

## 2018-03-19 NOTE — ED PROVIDER NOTE - CARDIAC, MLM
Normal rate, regular rhythm.  Heart sounds S1, S2.  No murmurs, rubs or gallops. b/l 2+ pitting edema

## 2018-03-19 NOTE — H&P ADULT - HISTORY OF PRESENT ILLNESS
This is a 57 y.o  Female with PMH of diastolic CHF and other cormorbidites sent to the ED from Cardiologist's office for evaluation of worsening dyspnea. Patient reports 11lb weight gain over 3 days and states being compliant with all medications including Lasix. Per her , she eats "[place] food which has salt it in". No recent fevers / chills or illnesses. Of note patient was recently admitted to  and discharged on 3/4/18 (2 weeks ago) for CHF and leg cellulitis. During that admission she suffered KEYSHA with Scr peaked at 2.0 and thus several diuretics were withheld upon discharge including Entresto and Zaroxyln.     In the ED, notable labs include elevated BNP ~6000 and Scr 2.0, normal trop X 1 and normal CBC. CXR with massive cardiomegaly. Physical exam pertinent for severe +3 LE pitting edema bilaterally. Patient given IV Lasix 20mg and admission requested. Currently seen denying chest pain and c/o ongoing KHAN and + orthopnea at home.     ROS: stated above.       Past Medical History:  Asthma    CAD (coronary artery disease)    CHF (congestive heart failure)    DM (diabetes mellitus)    HLD (hyperlipidemia)    HTN (hypertension)    Pacemaker.    Past Surgical History:  Artificial cardiac pacemaker - BiV pacer.   History of cholecystectomy.    Family History:  Mother  Still living? No  Family history of other heart disease, Age at diagnosis: Age Unknown.    NKDA  Meds: reviewed w/ patient.   Social Hx: nonsmoker, no ETOH or drug use. Lives at home with .     Vital Signs Last 24 Hrs  T(C): 36.4 (19 Mar 2018 15:27), Max: 36.5 (19 Mar 2018 13:43)  T(F): 97.6 (19 Mar 2018 15:27), Max: 97.7 (19 Mar 2018 13:43)  HR: 88 (19 Mar 2018 15:27) (69 - 88)  BP: 105/76 (19 Mar 2018 15:27) (81/60 - 105/76)  BP(mean): --  RR: 18 (19 Mar 2018 15:27) (18 - 19)  SpO2: 100% (19 Mar 2018 15:27) (99% - 100%)    PHYSICAL EXAM:  Constitutional: middle aged  female in moderate respiratory distress with prolonged conversation, awake and alert, well-developed  HEENT: PERR, EOMI, Normal Hearing, MMM  Neck: Soft and supple, No LAD, No JVD  Respiratory: Breath sounds are decreased at bases, no wheezing.   Cardiovascular: S1 and S2, tachycardic.   Gastrointestinal: Bowel Sounds present, soft, nontender, nondistended, no guarding, no rebound  Extremities: +3 LE peripheral edema Bilaterally to thighs.   Vascular: 2+ peripheral pulses  Neurological: A/O x 3, no focal deficits  Musculoskeletal: 5/5 strength b/l upper and lower extremities  Skin: red abrasions on calfs, does not appear infected.     MEDICATIONS  (STANDING):  buDESOnide 160 MICROgram(s)/formoterol 4.5 MICROgram(s) Inhaler 2 Puff(s) Inhalation two times a day  furosemide   Injectable 40 milliGRAM(s) IV Push every 12 hours  heparin  Injectable 5000 Unit(s) SubCutaneous every 12 hours    LABS: All Labs Reviewed:                        11.5   9.60  )-----------( 160      ( 19 Mar 2018 14:11 )             38.2     03-19    138  |  102  |  71<H>  ----------------------------<  174<H>  4.0   |  26  |  2.01<H>    Ca    8.6      19 Mar 2018 14:11    TPro  6.9  /  Alb  2.7<L>  /  TBili  1.7<H>  /  DBili  x   /  AST  52<H>  /  ALT  57  /  AlkPhos  247<H>  03-19  PT/INR - ( 19 Mar 2018 14:11 )   PT: 14.3 sec;   INR: 1.32 ratio         PTT - ( 19 Mar 2018 14:11 )  PTT:25.0 sec  CARDIAC MARKERS ( 19 Mar 2018 14:11 )  0.018 ng/mL / x     / x     / x     / x        EKG: AV paced. HR 70    Xray Chest 1 View AP/PA. (03.19.18 @ 14:43) >  Massive cardiomegaly.  Clear lungs. This is a 57 y.o  Female with PMH of diastolic CHF and other cormorbidites sent to the ED from Cardiologist's office for evaluation of worsening dyspnea. Patient reports 11lb weight gain over 3 days and states being compliant with all medications including Lasix. Per her , she eats "[place] food which has salt it in". No recent fevers / chills or illnesses. Of note patient was recently admitted to  and discharged on 3/4/18 (2 weeks ago) for CHF and leg cellulitis. During that admission she suffered KEYSHA with Scr peaked at 2.0 and thus several diuretics were withheld upon discharge including Entresto and Zaroxyln.     In the ED, notable labs include elevated BNP ~6000 and Scr 2.0, normal trop X 1 and normal CBC. CXR with massive cardiomegaly. Physical exam pertinent for severe +3 LE pitting edema bilaterally. Patient given IV Lasix 20mg and admission requested. Currently seen denying chest pain and c/o ongoing KHAN and + orthopnea at home.     ROS: stated above.     Past Medical History:  Asthma    CAD (coronary artery disease)    CHF (congestive heart failure)    DM (diabetes mellitus)    HLD (hyperlipidemia)    HTN (hypertension)    Pacemaker.    Past Surgical History:  Artificial cardiac pacemaker - BiV pacer.   History of cholecystectomy.    Family History:  Mother  Still living? No  Family history of other heart disease, Age at diagnosis: Age Unknown.    NKDA  Meds: reviewed w/ patient.   Social Hx: nonsmoker, no ETOH or drug use. Lives at home with .     Vital Signs Last 24 Hrs  T(C): 36.4 (19 Mar 2018 15:27), Max: 36.5 (19 Mar 2018 13:43)  T(F): 97.6 (19 Mar 2018 15:27), Max: 97.7 (19 Mar 2018 13:43)  HR: 88 (19 Mar 2018 15:27) (69 - 88)  BP: 105/76 (19 Mar 2018 15:27) (81/60 - 105/76)  BP(mean): --  RR: 18 (19 Mar 2018 15:27) (18 - 19)  SpO2: 100% (19 Mar 2018 15:27) (99% - 100%)    PHYSICAL EXAM:  Constitutional: middle aged  female in moderate respiratory distress with prolonged conversation, awake and alert, well-developed  HEENT: PERR, EOMI, Normal Hearing, MMM  Neck: Soft and supple, No LAD, No JVD  Respiratory: Breath sounds are decreased at bases, no wheezing.   Cardiovascular: S1 and S2, tachycardic.   Gastrointestinal: Bowel Sounds present, soft, nontender, nondistended, no guarding, no rebound  Extremities: +3 LE peripheral edema Bilaterally to thighs.   Vascular: 2+ peripheral pulses  Neurological: A/O x 3, no focal deficits  Musculoskeletal: 5/5 strength b/l upper and lower extremities  Skin: red abrasions on calfs, does not appear infected.     MEDICATIONS  (STANDING):  buDESOnide 160 MICROgram(s)/formoterol 4.5 MICROgram(s) Inhaler 2 Puff(s) Inhalation two times a day  furosemide   Injectable 40 milliGRAM(s) IV Push every 12 hours  heparin  Injectable 5000 Unit(s) SubCutaneous every 12 hours    LABS: All Labs Reviewed:                        11.5   9.60  )-----------( 160      ( 19 Mar 2018 14:11 )             38.2     03-19    138  |  102  |  71<H>  ----------------------------<  174<H>  4.0   |  26  |  2.01<H>    Ca    8.6      19 Mar 2018 14:11    TPro  6.9  /  Alb  2.7<L>  /  TBili  1.7<H>  /  DBili  x   /  AST  52<H>  /  ALT  57  /  AlkPhos  247<H>  03-19  PT/INR - ( 19 Mar 2018 14:11 )   PT: 14.3 sec;   INR: 1.32 ratio         PTT - ( 19 Mar 2018 14:11 )  PTT:25.0 sec  CARDIAC MARKERS ( 19 Mar 2018 14:11 )  0.018 ng/mL / x     / x     / x     / x        EKG: AV paced. HR 70    Xray Chest 1 View AP/PA. (03.19.18 @ 14:43) >  Massive cardiomegaly.  Clear lungs.      ASSESSMENT AND PLAN:   This is a 57 y.o  Female with PMH of diastolic CHF and other cormorbidites sent to the ED from Cardiologist's office for evaluation of worsening dyspnea found to have acute on chronic diastolic CHF:     # Acute Hypoxic Respiratory Distress  # Acute on Chronic Diastolic CHF - decompensated likely 2/2 noncompliance with low sodium diet + recent change in diuretics.   - will need IV Diuresis and close monitoring of renal function.   - s/p IV 20mg lasix --> will dose Lasix 40mg IV BID.   - monitor strict I's/O's, daily weights.   - may need Entresto added back to regimen and evaulation for pulmonary HTN.   - consult Cardiologist for further recs.   - recent dopplers 2 weeks ago negative for DVT.     # Acute on Chronic Renal Failure - possibly 2/2 prerenal from volume overload.   - discussed case with Nephro --> agree with diuresis and renal function monitoring.   - avoid further nephrotoxins.     # Hx of Vtach - continue Amiodarone 200mg daily.     # HTN - cont BB with monitoring.     #DM2  - A1c 7.3 (1/30/18)  - hold januvia  - ISS  - consistent carb diet    #Asthma - stable  - continue Symbicort add prn nebs for wheezing.     #DVT prophylaxis- heparin sq    Dispo: admit to telemetry.   Total time > 80 mins.

## 2018-03-20 LAB
ANION GAP SERPL CALC-SCNC: 10 MMOL/L — SIGNIFICANT CHANGE UP (ref 5–17)
BUN SERPL-MCNC: 77 MG/DL — HIGH (ref 7–23)
CALCIUM SERPL-MCNC: 8.8 MG/DL — SIGNIFICANT CHANGE UP (ref 8.5–10.1)
CHLORIDE SERPL-SCNC: 104 MMOL/L — SIGNIFICANT CHANGE UP (ref 96–108)
CO2 SERPL-SCNC: 25 MMOL/L — SIGNIFICANT CHANGE UP (ref 22–31)
CREAT SERPL-MCNC: 1.87 MG/DL — HIGH (ref 0.5–1.3)
GLUCOSE BLDC GLUCOMTR-MCNC: 89 MG/DL — SIGNIFICANT CHANGE UP (ref 70–99)
GLUCOSE SERPL-MCNC: 92 MG/DL — SIGNIFICANT CHANGE UP (ref 70–99)
MAGNESIUM SERPL-MCNC: 2.3 MG/DL — SIGNIFICANT CHANGE UP (ref 1.6–2.6)
POTASSIUM SERPL-MCNC: 4.2 MMOL/L — SIGNIFICANT CHANGE UP (ref 3.5–5.3)
POTASSIUM SERPL-SCNC: 4.2 MMOL/L — SIGNIFICANT CHANGE UP (ref 3.5–5.3)
SODIUM SERPL-SCNC: 139 MMOL/L — SIGNIFICANT CHANGE UP (ref 135–145)

## 2018-03-20 RX ADMIN — PANTOPRAZOLE SODIUM 40 MILLIGRAM(S): 20 TABLET, DELAYED RELEASE ORAL at 06:21

## 2018-03-20 RX ADMIN — HEPARIN SODIUM 5000 UNIT(S): 5000 INJECTION INTRAVENOUS; SUBCUTANEOUS at 22:03

## 2018-03-20 RX ADMIN — Medication 75 MILLIGRAM(S): at 08:34

## 2018-03-20 RX ADMIN — BUDESONIDE AND FORMOTEROL FUMARATE DIHYDRATE 2 PUFF(S): 160; 4.5 AEROSOL RESPIRATORY (INHALATION) at 07:54

## 2018-03-20 RX ADMIN — Medication 40 MILLIGRAM(S): at 08:34

## 2018-03-20 RX ADMIN — Medication 40 MILLIGRAM(S): at 17:17

## 2018-03-20 RX ADMIN — Medication 81 MILLIGRAM(S): at 11:06

## 2018-03-20 RX ADMIN — HEPARIN SODIUM 5000 UNIT(S): 5000 INJECTION INTRAVENOUS; SUBCUTANEOUS at 08:34

## 2018-03-20 RX ADMIN — BUDESONIDE AND FORMOTEROL FUMARATE DIHYDRATE 2 PUFF(S): 160; 4.5 AEROSOL RESPIRATORY (INHALATION) at 20:19

## 2018-03-20 RX ADMIN — AMIODARONE HYDROCHLORIDE 200 MILLIGRAM(S): 400 TABLET ORAL at 08:35

## 2018-03-20 NOTE — CONSULT NOTE ADULT - ASSESSMENT
Acute on Chronic HFrEF  Hypoglycemia  Hyperkalemia  ARF  NICM. S/p VT  Non obstructive CAD  HTN  AICD      Suggest: Acute on Chronic HFrEF  Hypoglycemia  Hyperkalemia  ARF  NICM. S/p VT  Non obstructive CAD  HTN  AICD      Suggest:    Observe patient on telemetry.  follow low sodium, low cholesterol, ADA diet.  Fluid restriction 1.5 lit/day  monitor Intake & output  Daily weights.  Follow up electrolytes, renal function.   Get echocardiogram to evaluate left ventricular ejection fraction and valves.  Follow up cardiac enzymes  Resume IV lasix. Increase dose as tolerated by BP  No ACEi at present till renal function stabilizes.   Hold beta blockers if BP low  D/W  at bedside

## 2018-03-20 NOTE — CONSULT NOTE ADULT - SUBJECTIVE AND OBJECTIVE BOX
Patient is a 57y old  Female who presents with a chief complaint of Shortness of breath, weight gain, leg edema. (19 Mar 2018 16:51)      HPI:  This is a 57 y.o  Female with PMH of diastolic CHF and other cormorbidites sent to the ED from Cardiologist's office for evaluation of worsening dyspnea. Patient reports 11lb weight gain over 3 days and states being compliant with all medications including Lasix. Per her , she eats "[place]iel food which has salt it in". No recent fevers / chills or illnesses. Of note patient was recently admitted to  and discharged on 3/4/18 (2 weeks ago) for CHF and leg cellulitis. During that admission she suffered KEYSHA with Scr peaked at 2.0 and thus several diuretics were withheld upon discharge including Entresto and Zaroxyln.     In the ED, notable labs include elevated BNP ~6000 and Scr 2.0, normal trop X 1 and normal CBC. CXR with massive cardiomegaly. Physical exam pertinent for severe +3 LE pitting edema bilaterally. Patient given IV Lasix 20mg and admission requested. Currently seen denying chest pain and c/o ongoing KHAN and + orthopnea at home.           PAST MEDICAL & SURGICAL HISTORY:  HFrEF CHF (congestive heart failure)  NICM  Non obstructive CAD  AICD  HTN (hypertension)  HLD (hyperlipidemia)  Asthma  DM (diabetes mellitus)  History of cholecystectomy      PREVIOUS CARDIAC WORKUP:      Echo:  Severe reduced EF  Cardiac Cath:  17 - Non obstructive    ALLERGIES:    No Known Allergies       MEDICATIONS  (STANDING):  amiodarone    Tablet 200 milliGRAM(s) Oral daily  aspirin  chewable 81 milliGRAM(s) Oral daily  buDESOnide 160 MICROgram(s)/formoterol 4.5 MICROgram(s) Inhaler 2 Puff(s) Inhalation two times a day  furosemide   Injectable 40 milliGRAM(s) IV Push every 12 hours  heparin  Injectable 5000 Unit(s) SubCutaneous every 12 hours  metoprolol succinate ER 75 milliGRAM(s) Oral daily  pantoprazole    Tablet 40 milliGRAM(s) Oral before breakfast    MEDICATIONS  (PRN):  acetaminophen   Tablet. 650 milliGRAM(s) Oral every 6 hours PRN Mild Pain (1 - 3)  ALBUTerol    0.083% 2.5 milliGRAM(s) Nebulizer every 6 hours PRN Shortness of Breath and/or Wheezing  docusate sodium 100 milliGRAM(s) Oral two times a day PRN Constipation  metoprolol    tartrate Injectable 5 milliGRAM(s) IV Push every 6 hours PRN Give for HR >130, SBP>100  ondansetron Injectable 4 milliGRAM(s) IV Push every 4 hours PRN Nausea and/or Vomiting      FAMILY HISTORY:  Family history of other heart disease (Mother)        SOCIAL HISTORY:  Nonsmoker. No ETOH abuse. No illicit drugs.     ROS:     Detailed ten system ROS was performed and was negative except for history as eluded to above.    no fever  no chills  no nausea  no vomiting  no diarrhea  no constipation  no melena  no hematochezia  no chest pain  no palpitations  + sob at rest  + dyspnea on exertion  no cough  no wheezing  no anorexia  no headache  no dizziness  no syncope  no weakness  no myalgia  no dysuria  no polyuria  no hematuria  + edema    Vital Signs Last 24 Hrs  T(C): 36.7 (20 Mar 2018 04:54), Max: 36.7 (20 Mar 2018 04:54)  T(F): 98.1 (20 Mar 2018 04:54), Max: 98.1 (20 Mar 2018 04:54)  HR: 76 (20 Mar 2018 06:20) (69 - 117)  BP: 96/75 (20 Mar 2018 06:20) (81/60 - 105/76)  BP(mean): --  RR: 18 (19 Mar 2018 22:50) (18 - 19)  SpO2: 100% (20 Mar 2018 04:54) (93% - 100%)    I&O's Summary      PHYSICAL EXAM:    General:                Comfortable, AAO X 3, in no distress.   HEENT:                  Atraumatic, PERRLA, EOMI, conjunctiva clear.   Neck:                     Supple, no adenopathy, no thyromegaly, no JVD, no bruit.  Back:                     Symmetric, non tender.  Chest:                    Scant rales, B/L symmetric air entry, no tachypnea  Heart:                     S1, S2 normal, no gallop, + murmur.  Abdomen:              Soft, non-tender, bowel sounds active. No palpable masses.  Extremities:           no cyanosis, + edema B/L LE.   Skin:                      Skin color, texture normal, no rash  Neurologic:            Grossly nonfocal.       TELEMETRY:   Normal sinus rhythm  with no tachy or lillian events     ECG:    LABS:                          11.5   9.60  )-----------( 160      ( 19 Mar 2018 14:11 )             38.2     03-    139  |  104  |  77<H>  ----------------------------<  92  4.2   |  25  |  1.87<H>    Ca    8.8      20 Mar 2018 05:19  Mg     2.3     -20    TPro  6.9  /  Alb  2.7<L>  /  TBili  1.7<H>  /  DBili  x   /  AST  52<H>  /  ALT  57  /  AlkPhos  247<H>   @ 17:24  Trop-I  0.018     @ 14:11  Trop-I  0.018    Pro BNP  5905  @ 14:11    PT/INR - ( 19 Mar 2018 14:11 )   PT: 14.3 sec;   INR: 1.32 ratio    PTT - ( 19 Mar 2018 14:11 )  PTT:25.0 sec    Urinalysis Basic - ( 19 Mar 2018 14:11 )    Color: Yellow / Appearance: Clear / S.015 / pH: x  Gluc: x / Ketone: Negative  / Bili: Negative / Urobili: 4 mg/dL   Blood: x / Protein: 30 mg/dL / Nitrite: Negative   Leuk Esterase: Trace / RBC: 6-10 /HPF / WBC 6-10   Sq Epi: x / Non Sq Epi: Few / Bacteria: Few      RADIOLOGY & ADDITIONAL STUDIES:    Xray Chest 1 View AP/PA. (18 @ 14:43) >  FINDINGS: There is a left-sided AICD, with leads unchanged in position. The heart is severely enlarged. The lungs are clear. There is no pleural effusion, pneumothorax or evidence of CHF. The soft tissues and bones are unremarkable. Chief complaint of Shortness of breath, weight gain, leg edema. (19 Mar 2018 16:51)    HPI:  57 y.o  Female with PMH of diastolic CHF and other cormorbidites sent to the ED for evaluation of worsening dyspnea. Patient reports 11lb weight gain over 3 days and states being compliant with all medications including Lasix. Per her , she eats "[place] food which has salt it in". No recent fevers / chills or illnesses. She has been non compliant to office follow up. Missed 3 out pt visits last week. Of note patient was recently admitted to  and discharged on 3/4/18 (2 weeks ago) for CHF and leg cellulitis. During that admission she suffered KEYSHA with Scr peaked at 2.0 and thus several diuretics were withheld upon discharge including Entresto and Zaroxyln.  She has worsening leg edema.       PAST MEDICAL & SURGICAL HISTORY:  HFrEF CHF (congestive heart failure)  NICM  Non obstructive CAD  AICD  HTN (hypertension)  HLD (hyperlipidemia)  Asthma  DM (diabetes mellitus)  History of cholecystectomy      PREVIOUS CARDIAC WORKUP:      Echo:  Severe reduced EF  Cardiac Cath:  17 - Non obstructive    ALLERGIES:    No Known Allergies       MEDICATIONS  (STANDING):  amiodarone    Tablet 200 milliGRAM(s) Oral daily  aspirin  chewable 81 milliGRAM(s) Oral daily  buDESOnide 160 MICROgram(s)/formoterol 4.5 MICROgram(s) Inhaler 2 Puff(s) Inhalation two times a day  furosemide   Injectable 40 milliGRAM(s) IV Push every 12 hours  heparin  Injectable 5000 Unit(s) SubCutaneous every 12 hours  metoprolol succinate ER 75 milliGRAM(s) Oral daily  pantoprazole    Tablet 40 milliGRAM(s) Oral before breakfast    MEDICATIONS  (PRN):  acetaminophen   Tablet. 650 milliGRAM(s) Oral every 6 hours PRN Mild Pain (1 - 3)  ALBUTerol    0.083% 2.5 milliGRAM(s) Nebulizer every 6 hours PRN Shortness of Breath and/or Wheezing  docusate sodium 100 milliGRAM(s) Oral two times a day PRN Constipation  metoprolol    tartrate Injectable 5 milliGRAM(s) IV Push every 6 hours PRN Give for HR >130, SBP>100  ondansetron Injectable 4 milliGRAM(s) IV Push every 4 hours PRN Nausea and/or Vomiting      FAMILY HISTORY:  Family history of other heart disease (Mother)        SOCIAL HISTORY:  Nonsmoker. No ETOH abuse. No illicit drugs.     ROS:     Detailed ten system ROS was performed and was negative except for history as eluded to above.    no fever  no chills  no nausea  no vomiting  no diarrhea  no constipation  no melena  no hematochezia  no chest pain  no palpitations  + sob at rest  + dyspnea on exertion  no cough  no wheezing  no anorexia  no headache  no dizziness  no syncope  no weakness  no myalgia  no dysuria  no polyuria  no hematuria  + edema    Vital Signs Last 24 Hrs  T(C): 36.7 (20 Mar 2018 04:54), Max: 36.7 (20 Mar 2018 04:54)  T(F): 98.1 (20 Mar 2018 04:54), Max: 98.1 (20 Mar 2018 04:54)  HR: 76 (20 Mar 2018 06:20) (69 - 117)  BP: 96/75 (20 Mar 2018 06:20) (81/60 - 105/76)  BP(mean): --  RR: 18 (19 Mar 2018 22:50) (18 - 19)  SpO2: 100% (20 Mar 2018 04:54) (93% - 100%)    I&O's Summary      PHYSICAL EXAM:    General:                Comfortable, AAO X 3, in no distress.   HEENT:                  Atraumatic, PERRLA, EOMI, conjunctiva clear.   Neck:                     Supple, no adenopathy, no thyromegaly, no JVD, no bruit.  Back:                     Symmetric, non tender.  Chest:                    Scant rales, B/L symmetric air entry, no tachypnea  Heart:                     S1, S2 normal, no gallop, + murmur.  Abdomen:              Soft, non-tender, bowel sounds active. No palpable masses.  Extremities:           no cyanosis, + edema B/L LE.   Skin:                      Skin color, texture normal, no rash  Neurologic:            Grossly nonfocal.       TELEMETRY:   A paced rhythm  with no tachy or lillian events     ECG:  A paced, IVCD    LABS:                          11.5   9.60  )-----------( 160      ( 19 Mar 2018 14:11 )             38.2     03-    139  |  104  |  77<H>  ----------------------------<  92  4.2   |  25  |  1.87<H>    Ca    8.8      20 Mar 2018 05:19  Mg     2.3     20    TPro  6.9  /  Alb  2.7<L>  /  TBili  1.7<H>  /  DBili  x   /  AST  52<H>  /  ALT  57  /  AlkPhos  247<H>   @ 17:24  Trop-I  0.018     @ 14:11  Trop-I  0.018    Pro BNP  5905  @ 14:11    PT/INR - ( 19 Mar 2018 14:11 )   PT: 14.3 sec;   INR: 1.32 ratio    PTT - ( 19 Mar 2018 14:11 )  PTT:25.0 sec    Urinalysis Basic - ( 19 Mar 2018 14:11 )    Color: Yellow / Appearance: Clear / S.015 / pH: x  Gluc: x / Ketone: Negative  / Bili: Negative / Urobili: 4 mg/dL   Blood: x / Protein: 30 mg/dL / Nitrite: Negative   Leuk Esterase: Trace / RBC: 6-10 /HPF / WBC 6-10   Sq Epi: x / Non Sq Epi: Few / Bacteria: Few      RADIOLOGY & ADDITIONAL STUDIES:    Xray Chest 1 View AP/PA. (18 @ 14:43) >  FINDINGS: There is a left-sided AICD, with leads unchanged in position. The heart is severely enlarged. The lungs are clear. There is no pleural effusion, pneumothorax or evidence of CHF. The soft tissues and bones are unremarkable.

## 2018-03-20 NOTE — PROGRESS NOTE ADULT - SUBJECTIVE AND OBJECTIVE BOX
Patient known to Dr. Chan from prior admission, consult information passed to her for nephrology follow up and evaluation

## 2018-03-20 NOTE — PROGRESS NOTE ADULT - ASSESSMENT
ASSESSMENT / PLAN:   57 y.o  Woman sent to the ED from Cardiologist's office for evaluation of worsening dyspnea found to have acute on chronic HFrEF     Acute Hypoxic Respiratory Distress superimposed from Acute on Chronic HFrEF (20%)  - decompensated likely 2/2 dietary noncompliance   - con't ongoing diuresis with IV lasix 40mg IV BID.   - monitor strict I's/O's, daily weights--> not well documented  - may need Entresto added back to regimen and evaluation for pulmonary HTN.   - appreciate cardiology input   - recent dopplers 2 weeks ago negative for DVT.   - educated on importance of dietary compliance    KEYSHA on CKD - possibly 2/2 prerenal from volume overload.   - f/ by renal team  - avoid further nephrotoxins.     Vtach/CAD  - no significant ectopy on tele   - con't asa / toprol / Amiodarone / statin  - ck lipids with am labs    HTN   - cont BB   - monitor BP per protocol    T2DM  - A1c 7.3 (1/30/18)  - holding januvia  - con't POCT with HEMA ; consistent carb diet    Asthma - stable  - continue Symbicort add prn nebs for wheezing.     Obesity  - nutrition consult  - educated on importance of appropriate wt loss plan    #DVT prophylaxis- heparin sq

## 2018-03-20 NOTE — PROGRESS NOTE ADULT - SUBJECTIVE AND OBJECTIVE BOX
Chart reviewed.  Patient seen and examined.    CC: worsening shortness of breathShortness of breath, weight gain, leg sidney    HPI: 57 y.o  woman with PMH/PSH of HFrEF 20%, Non obstructive CAD, AICD, HTN, HLD, Asthma, DM, cholecystectomy  sent to the ED from Cardiologist's office for evaluation of worsening dyspnea. Patient reports 11lb weight gain over 3 days and states being compliant with all medications including Lasix. Per her , she eats "Eliel food which has salt it in". No recent fevers / chills or illnesses. Of note patient was recently admitted to  and discharged on 3/4/18 (2 weeks ago) for CHF and leg cellulitis. During that admission she suffered KEYSHA with Scr peaked at 2.0 and thus several diuretics were withheld upon discharge including Entresto and Zaroxyln.     In the ED, notable labs include elevated BNP ~6000 and Scr 2.0, normal trop X 1 and normal CBC. CXR with massive cardiomegaly. Physical exam pertinent for severe +3 LE pitting edema bilaterally. Patient given IV Lasix 20mg and admission requested. Currently seen denying chest pain and c/o ongoing KHAN and + orthopnea at home.     Subjective:   3/20: she acknowledged drinking much fluid at home and "sopa"; feels better than when she came in, is happy she can walk better unlike on admission, BLE swelling is down some    REVIEW OF SYSTEMS: All systems reviewed and found to be negative with the exception as above.      VITALS:  Vital Signs Last 24 Hrs  T(C): 36.4 (20 Mar 2018 11:25), Max: 36.7 (20 Mar 2018 04:54)  T(F): 97.5 (20 Mar 2018 11:25), Max: 98.1 (20 Mar 2018 04:54)  HR: 75 (20 Mar 2018 11:25) (69 - 117)  BP: 90/71 (20 Mar 2018 11:25) (81/60 - 120/100)  BP(mean): --  RR: 18 (20 Mar 2018 11:25) (18 - 19)  SpO2: 100% (20 Mar 2018 11:25) (93% - 100%)    PHYSICAL EXAM:  GENERAL: obese, OOB to rm chair, NAD  HEENT:  pupils equal and reactive, EOMI, no oropharyngeal lesions, erythema, exudates, oral thrush  NECK:   supple, no carotid bruits, no palpable lymph nodes, no thyromegaly  CV:  RRR, S1S2, soft murmur, no c/r  RESP:   lungs clear to auscultation bilaterally, no wheezing, rales, rhonchi, good air entry bilaterally  GI:  abdomen soft, non-tender, non-distended, normal BS, no bruits, no abdominal masses, no palpable masses  MSK:   normal muscle tone, no atrophy, no rigidity, no contractions  EXT:   4+ BLE edema with chronic vascular changes  NEURO:  AAOX3, no focal neurological deficits, follows all commands, able to move extremities spontaneously  SKIN:  no ulcers, lesions or rashes      LABS:                        11.5   9.60  )-----------( 160      ( 19 Mar 2018 14:11 )             38.2     03-20    139  |  104  |  77<H>  ----------------------------<  92  4.2   |  25  |  1.87<H>    Ca    8.8      20 Mar 2018 05:19  Mg     2.3     03-20    TPro  6.9  /  Alb  2.7<L>  /  TBili  1.7<H>  /  DBili  x   /  AST  52<H>  /  ALT  57  /  AlkPhos  247<H>  03-19    CARDIAC MARKERS ( 19 Mar 2018 17:24 )  0.018 ng/mL / x     / x     / x     / x      CARDIAC MARKERS ( 19 Mar 2018 14:11 )  0.018 ng/mL / x     / x     / x     / x        LIVER FUNCTIONS - ( 19 Mar 2018 14:11 )  Alb: 2.7 g/dL / Pro: 6.9 gm/dL / ALK PHOS: 247 U/L / ALT: 57 U/L / AST: 52 U/L / GGT: x           PT/INR - ( 19 Mar 2018 14:11 )   PT: 14.3 sec;   INR: 1.32 ratio       PTT - ( 19 Mar 2018 14:11 )  PTT:25.0 sec  Urinalysis Basic - ( 19 Mar 2018 14:11 )    Color: Yellow / Appearance: Clear / S.015 / pH: x  Gluc: x / Ketone: Negative  / Bili: Negative / Urobili: 4 mg/dL   Blood: x / Protein: 30 mg/dL / Nitrite: Negative   Leuk Esterase: Trace / RBC: 6-10 /HPF / WBC 6-10   Sq Epi: x / Non Sq Epi: Few / Bacteria: Few    Blood, Urine: Moderate ( @ 14:11)    Serum Pro-Brain Natriuretic Peptide: 5905 pg/mL (18 @ 14:11)    RADIOLOGY  < from: Xray Chest 1 View AP/PA. (18 @ 14:43) >  IMPRESSION: Massive cardiomegaly. Clear lungs.    < end of copied text >    < from: Transthoracic Echocardiogram (17 @ 09:49) >     Summary   The left ventricle is severely dilated with severe global hypokinesis.   Estimated left ventricular ejection fraction is 20 % via bi-plane method.   Left atriumis moderately dilated.  The right atrium appears moderately dilated. A device wire is seen in the   RV and RA.  The right ventricle is normal in size and contractility.   The aortic valve appears normal.  There is thickening of both mitral valve leaflet tips. The leaflet   opening  is normal. Moderate mitral regurgitation is present based on a calculated   ERO of .32cm2.  The tricuspid valve leaflets are thin and pliable. Moderate (2+)   tricuspid  valve regurgitation is present. Moderate pulmonary hypertension.   Trace pericardial effusion is present.     < end of copied text >    MEDICATIONS  (STANDING):  amiodarone    Tablet 200 milliGRAM(s) Oral daily  aspirin  chewable 81 milliGRAM(s) Oral daily  buDESOnide 160 MICROgram(s)/formoterol 4.5 MICROgram(s) Inhaler 2 Puff(s) Inhalation two times a day  furosemide   Injectable 40 milliGRAM(s) IV Push every 12 hours  heparin  Injectable 5000 Unit(s) SubCutaneous every 12 hours  metoprolol succinate ER 75 milliGRAM(s) Oral daily  pantoprazole    Tablet 40 milliGRAM(s) Oral before breakfast    MEDICATIONS  (PRN):  acetaminophen   Tablet. 650 milliGRAM(s) Oral every 6 hours PRN Mild Pain (1 - 3)  ALBUTerol    0.083% 2.5 milliGRAM(s) Nebulizer every 6 hours PRN Shortness of Breath and/or Wheezing  docusate sodium 100 milliGRAM(s) Oral two times a day PRN Constipation  metoprolol    tartrate Injectable 5 milliGRAM(s) IV Push every 6 hours PRN Give for HR >130, SBP>100  ondansetron Injectable 4 milliGRAM(s) IV Push every 4 hours PRN Nausea and/or Vomiting

## 2018-03-21 DIAGNOSIS — I50.9 HEART FAILURE, UNSPECIFIED: ICD-10-CM

## 2018-03-21 LAB
-  AMPICILLIN: SIGNIFICANT CHANGE UP
-  CIPROFLOXACIN: SIGNIFICANT CHANGE UP
-  NITROFURANTOIN: SIGNIFICANT CHANGE UP
-  TETRACYCLINE: SIGNIFICANT CHANGE UP
-  VANCOMYCIN: SIGNIFICANT CHANGE UP
ANION GAP SERPL CALC-SCNC: 9 MMOL/L — SIGNIFICANT CHANGE UP (ref 5–17)
BUN SERPL-MCNC: 74 MG/DL — HIGH (ref 7–23)
CALCIUM SERPL-MCNC: 8.6 MG/DL — SIGNIFICANT CHANGE UP (ref 8.5–10.1)
CHLORIDE SERPL-SCNC: 104 MMOL/L — SIGNIFICANT CHANGE UP (ref 96–108)
CO2 SERPL-SCNC: 24 MMOL/L — SIGNIFICANT CHANGE UP (ref 22–31)
CREAT SERPL-MCNC: 2.06 MG/DL — HIGH (ref 0.5–1.3)
CULTURE RESULTS: SIGNIFICANT CHANGE UP
GLUCOSE SERPL-MCNC: 95 MG/DL — SIGNIFICANT CHANGE UP (ref 70–99)
HCT VFR BLD CALC: 38.3 % — SIGNIFICANT CHANGE UP (ref 34.5–45)
HGB BLD-MCNC: 11.7 G/DL — SIGNIFICANT CHANGE UP (ref 11.5–15.5)
MCHC RBC-ENTMCNC: 24.8 PG — LOW (ref 27–34)
MCHC RBC-ENTMCNC: 30.5 GM/DL — LOW (ref 32–36)
MCV RBC AUTO: 81.3 FL — SIGNIFICANT CHANGE UP (ref 80–100)
METHOD TYPE: SIGNIFICANT CHANGE UP
NRBC # BLD: 0 /100 WBCS — SIGNIFICANT CHANGE UP (ref 0–0)
ORGANISM # SPEC MICROSCOPIC CNT: SIGNIFICANT CHANGE UP
ORGANISM # SPEC MICROSCOPIC CNT: SIGNIFICANT CHANGE UP
PLATELET # BLD AUTO: 210 K/UL — SIGNIFICANT CHANGE UP (ref 150–400)
POTASSIUM SERPL-MCNC: 4.5 MMOL/L — SIGNIFICANT CHANGE UP (ref 3.5–5.3)
POTASSIUM SERPL-SCNC: 4.5 MMOL/L — SIGNIFICANT CHANGE UP (ref 3.5–5.3)
RBC # BLD: 4.71 M/UL — SIGNIFICANT CHANGE UP (ref 3.8–5.2)
RBC # FLD: 24.7 % — HIGH (ref 10.3–14.5)
SODIUM SERPL-SCNC: 137 MMOL/L — SIGNIFICANT CHANGE UP (ref 135–145)
SPECIMEN SOURCE: SIGNIFICANT CHANGE UP
WBC # BLD: 10.67 K/UL — HIGH (ref 3.8–10.5)
WBC # FLD AUTO: 10.67 K/UL — HIGH (ref 3.8–10.5)

## 2018-03-21 PROCEDURE — 74176 CT ABD & PELVIS W/O CONTRAST: CPT | Mod: 26

## 2018-03-21 RX ORDER — FUROSEMIDE 40 MG
40 TABLET ORAL ONCE
Qty: 0 | Refills: 0 | Status: COMPLETED | OUTPATIENT
Start: 2018-03-21 | End: 2018-03-21

## 2018-03-21 RX ADMIN — HEPARIN SODIUM 5000 UNIT(S): 5000 INJECTION INTRAVENOUS; SUBCUTANEOUS at 09:00

## 2018-03-21 RX ADMIN — Medication 40 MILLIGRAM(S): at 09:00

## 2018-03-21 RX ADMIN — Medication 40 MILLIGRAM(S): at 12:43

## 2018-03-21 RX ADMIN — Medication 650 MILLIGRAM(S): at 17:01

## 2018-03-21 RX ADMIN — Medication 81 MILLIGRAM(S): at 12:43

## 2018-03-21 RX ADMIN — PANTOPRAZOLE SODIUM 40 MILLIGRAM(S): 20 TABLET, DELAYED RELEASE ORAL at 06:04

## 2018-03-21 RX ADMIN — AMIODARONE HYDROCHLORIDE 200 MILLIGRAM(S): 400 TABLET ORAL at 06:04

## 2018-03-21 RX ADMIN — HEPARIN SODIUM 5000 UNIT(S): 5000 INJECTION INTRAVENOUS; SUBCUTANEOUS at 21:09

## 2018-03-21 RX ADMIN — Medication 650 MILLIGRAM(S): at 12:47

## 2018-03-21 RX ADMIN — BUDESONIDE AND FORMOTEROL FUMARATE DIHYDRATE 2 PUFF(S): 160; 4.5 AEROSOL RESPIRATORY (INHALATION) at 08:17

## 2018-03-21 RX ADMIN — Medication 75 MILLIGRAM(S): at 09:01

## 2018-03-21 NOTE — PROGRESS NOTE ADULT - ASSESSMENT
Acute on Chronic HFrEF  Hypoglycemia  Hyperkalemia  ARF  NICM. S/p VT  Non obstructive CAD  HTN  AICD      Suggest:    Observe patient on telemetry.  follow low sodium, low cholesterol, ADA diet.  Fluid restriction 1.5 lit/day  monitor Intake & output  Daily weights.  Follow up electrolytes, renal function.   Get echocardiogram to evaluate left ventricular ejection fraction and valves.  Follow up cardiac enzymes  Resume IV lasix. Increase dose as tolerated by BP  Transfer to CCU and trial of Dopamine to help sustain BP if she becomes hypotensive.  Can try Bumex or Add zaroxolyn  No ACEi at present till renal function stabilizes.   Hold beta blockers if BP low

## 2018-03-21 NOTE — CONSULT NOTE ADULT - ASSESSMENT
Patient is a 58 y.o. female with PMH of CKD2/3 (baseline SCr 1.0), HTN, DM2, chronic systolic CHF (s/p bi-v AICD), VTach on Amiodarone, Hyperlipidemia discharged 3/4/18 s/p CHF exacerbation with edema requiring IV diuresis with KEYSHA peak 2.45 - diuretics (Metolazone and Aldactone) were held, Metformin was stopped, and she was discharged with SCr 1.5 on Lasix 40mg daily. Now she returns with volume overload and KEYSHA (SCr 2.0) - renal called for evaluation.    1. KEYSHA on CKD3 - baseline 1.0, recently 1.5 as outpatient, now 2.0  - likely due to poor hemodynamics from CHF / volume overload  - agree with IV diuresis - cont Lasix 40mg IV bid  - albumin low - add Nepro supplement tid  - non-nephrotic range proteinuria  - lytes acceptable - no urgent need for dialysis  - diabetes relatively controlled  - dietary adjustment - to eat lower salt - discussed options    2. HTN - BP on lower end  - if unable to diurese well, may need Lasix drip    3. Proteinura - 1.3g/g  - due to diabetic nephopathy  - no ACEI / ARB while +KEYSHA and BP on lower end    4. CHF   - BB, diuresis    If SCr rises further, order renal sono. Add protein supplement Nepro tid. Will consider po diuretic if BP tolerates tomorrow.    Thank you for consult. Will follow.

## 2018-03-21 NOTE — PROGRESS NOTE ADULT - SUBJECTIVE AND OBJECTIVE BOX
Chart reviewed.  Patient seen and examined.    CC: worsening shortness of breath weight gain, leg sidney    HPI: 57 y.o  woman with PMH/PSH of HFrEF 20%, Non obstructive CAD, AICD, HTN, HLD, Asthma, DM, cholecystectomy  sent to the ED from Cardiologist's office for evaluation of worsening dyspnea. Patient reports 11lb weight gain over 3 days and states being compliant with all medications including Lasix. Per her , she eats "Eliel food which has salt it in". No recent fevers / chills or illnesses. Of note patient was recently admitted to  and discharged on 3/4/18 (2 weeks ago) for CHF and leg cellulitis. During that admission she suffered KEYSHA with Scr peaked at 2.0 and thus several diuretics were withheld upon discharge including Entresto and Zaroxyln.     In the ED, notable labs include elevated BNP ~6000 and Scr 2.0, normal trop X 1 and normal CBC. CXR with massive cardiomegaly. Physical exam pertinent for severe +3 LE pitting edema bilaterally. Patient given IV Lasix 20mg and admission requested. Currently seen denying chest pain and c/o ongoing KHAN and + orthopnea at home.     Subjective:   3/20: she acknowledged drinking much fluid at home and "sopa"; feels better than when she came in, is happy she can walk better unlike on admission, BLE swelling is down some  3/21: denies shortness of breath at rest or with ambulation to bathrm, slept well last night;  at bedside offers no complaints; received xtra 40mg IV of lasix    REVIEW OF SYSTEMS: All systems reviewed and found to be negative with the exception as above.    VITALS:  Vital Signs Last 24 Hrs  T(C): 36.4 (21 Mar 2018 05:04), Max: 36.4 (20 Mar 2018 11:25)  T(F): 97.6 (21 Mar 2018 05:04), Max: 97.6 (21 Mar 2018 05:04)  HR: 70 (21 Mar 2018 05:04) (70 - 87)  BP: 97/68 (21 Mar 2018 05:04) (90/71 - 97/68)  BP(mean): --  RR: 18 (20 Mar 2018 11:25) (18 - 18)  SpO2: 98% (21 Mar 2018 05:04) (95% - 100%)      PHYSICAL EXAM:    GENERAL: obese, NAD  HEENT:  pupils equal and reactive, EOMI, no oropharyngeal lesions, erythema, exudates, oral thrush  NECK:   supple, no carotid bruits, no palpable lymph nodes, no thyromegaly  CV:  +S1, +S2, regular, no murmurs or rubs  RESP:   lungs clear to auscultation bilaterally, no wheezing, rales, rhonchi, good air entry bilaterally  GI:  obese abdomen soft, non-tender, non-distended, normal BS, no bruits, no abdominal masses, no palpable masses  MSK:   normal muscle tone, no atrophy, no rigidity, no contractions  EXT/vasc:  4+ pitting edema, extending to thighs, +sacral edema, chronic vascular changes, diminished pedal pulses  NEURO:  AAOX3, no focal neurological deficits, follows all commands, able to move extremities spontaneously  SKIN:  no ulcers, lesions or rashes    LABS:                         11.7   10.67 )-----------( 210      ( 21 Mar 2018 05:00 )             38.3     03-21    137  |  104  |  74<H>  ----------------------------<  95  4.5   |  24  |  2.06<H>    Ca    8.6      21 Mar 2018 05:00  Mg     2.3     03-20    TPro  6.9  /  Alb  2.7<L>  /  TBili  1.7<H>  /  DBili  x   /  AST  52<H>  /  ALT  57  /  AlkPhos  247<H>  03-19    CARDIAC MARKERS ( 19 Mar 2018 17:24 )  0.018 ng/mL / x     / x     / x     / x      CARDIAC MARKERS ( 19 Mar 2018 14:11 )  0.018 ng/mL / x     / x     / x     / x        LIVER FUNCTIONS - ( 19 Mar 2018 14:11 )  Alb: 2.7 g/dL / Pro: 6.9 gm/dL / ALK PHOS: 247 U/L / ALT: 57 U/L / AST: 52 U/L / GGT: x           PT/INR - ( 19 Mar 2018 14:11 )   PT: 14.3 sec;   INR: 1.32 ratio         PTT - ( 19 Mar 2018 14:11 )  PTT:25.0 sec  Urinalysis Basic - ( 19 Mar 2018 14:11 )    Color: Yellow / Appearance: Clear / S.015 / pH: x  Gluc: x / Ketone: Negative  / Bili: Negative / Urobili: 4 mg/dL   Blood: x / Protein: 30 mg/dL / Nitrite: Negative   Leuk Esterase: Trace / RBC: 6-10 /HPF / WBC 6-10   Sq Epi: x / Non Sq Epi: Few / Bacteria: Few    Serum Pro-Brain Natriuretic Peptide: 5905 pg/mL (18 @ 14:11)    Culture - Urine (collected 18 @ 14:11)  Source: .Urine Clean Catch (Midstream)  Preliminary Report (18 @ 19:51):    50,000 - 99,000 CFU/mL Enterococcus faecium    <10,000 CFU/ml Normal Urogenital tara present    RADIOLOGY  < from: Xray Chest 1 View AP/PA. (18 @ 14:43) >  IMPRESSION: Massive cardiomegaly. Clear lungs.    < end of copied text >    < from: Transthoracic Echocardiogram (17 @ 09:49) >     Summary   The left ventricle is severely dilated with severe global hypokinesis.   Estimated left ventricular ejection fraction is 20 % via bi-plane method.   Left atriumis moderately dilated.  The right atrium appears moderately dilated. A device wire is seen in the   RV and RA.  The right ventricle is normal in size and contractility.   The aortic valve appears normal.  There is thickening of both mitral valve leaflet tips. The leaflet   opening  is normal. Moderate mitral regurgitation is present based on a calculated   ERO of .32cm2.  The tricuspid valve leaflets are thin and pliable. Moderate (2+)   tricuspid  valve regurgitation is present. Moderate pulmonary hypertension.   Trace pericardial effusion is present.     < end of copied text >    MEDICATIONS  (STANDING):  amiodarone    Tablet 200 milliGRAM(s) Oral daily  aspirin  chewable 81 milliGRAM(s) Oral daily  buDESOnide 160 MICROgram(s)/formoterol 4.5 MICROgram(s) Inhaler 2 Puff(s) Inhalation two times a day  furosemide   Injectable 40 milliGRAM(s) IV Push every 12 hours  furosemide   Injectable 40 milliGRAM(s) IV Push once  heparin  Injectable 5000 Unit(s) SubCutaneous every 12 hours  metoprolol succinate ER 75 milliGRAM(s) Oral daily  pantoprazole    Tablet 40 milliGRAM(s) Oral before breakfast    MEDICATIONS  (PRN):  acetaminophen   Tablet. 650 milliGRAM(s) Oral every 6 hours PRN Mild Pain (1 - 3)  ALBUTerol    0.083% 2.5 milliGRAM(s) Nebulizer every 6 hours PRN Shortness of Breath and/or Wheezing  docusate sodium 100 milliGRAM(s) Oral two times a day PRN Constipation  metoprolol    tartrate Injectable 5 milliGRAM(s) IV Push every 6 hours PRN Give for HR >130, SBP>100  ondansetron Injectable 4 milliGRAM(s) IV Push every 4 hours PRN Nausea and/or Vomiting

## 2018-03-21 NOTE — PROGRESS NOTE ADULT - SUBJECTIVE AND OBJECTIVE BOX
Patient is a 58y old  Female who presents with a chief complaint of Shortness of breath, weight gain, leg edema. (19 Mar 2018 16:51)    HPI:  This is a 57 y.o  Female with PMH of diastolic CHF and other cormorbidites sent to the ED from Cardiologist's office for evaluation of worsening dyspnea. Patient reports 11lb weight gain over 3 days and states being compliant with all medications including Lasix. Per her , she eats "Swiss food which has salt it in". No recent fevers / chills or illnesses. Of note patient was recently admitted to  and discharged on 3/4/18 (2 weeks ago) for CHF and leg cellulitis. During that admission she suffered KEYSHA with Scr peaked at 2.0 and thus several diuretics were withheld upon discharge including Entresto and Zaroxyln.     In the ED, notable labs include elevated BNP ~6000 and Scr 2.0, normal trop X 1 and normal CBC. CXR with massive cardiomegaly. Physical exam pertinent for severe +3 LE pitting edema bilaterally. Patient given IV Lasix 20mg and admission requested. Currently seen denying chest pain and c/o ongoing KHAN and + orthopnea at home.     3/21/18- pt states she is feeling better, but the leg swelling is still bothering her.  Legs appear very edema borderline weeping.  States she lives in a room, and has no kitchen to prepare meals.  All of her meals are bought at the [facility] and she does not cooperate with dietary restrictions.  States that she is compliant with medications.  Aware of her poor prognosis if she continues to be dietary non-compliant.  Case management and social work aware of her case.  Highly probability of readmission.      ROS: stated above.     Past Medical History:  Asthma    CAD (coronary artery disease)    CHF (congestive heart failure)    DM (diabetes mellitus)    HLD (hyperlipidemia)    HTN (hypertension)    Pacemaker.    Past Surgical History:  Artificial cardiac pacemaker - BiV pacer.   History of cholecystectomy.    Family History:  Mother  Still living? No  Family history of other heart disease, Age at diagnosis: Age Unknown.    NKDA  Meds: reviewed w/ patient.   Social Hx: nonsmoker, no ETOH or drug use. Lives at home with .     Vital Signs Last 24 Hrs  T(C): 36.4 (19 Mar 2018 15:27), Max: 36.5 (19 Mar 2018 13:43)  T(F): 97.6 (19 Mar 2018 15:27), Max: 97.7 (19 Mar 2018 13:43)  HR: 88 (19 Mar 2018 15:27) (69 - 88)  BP: 105/76 (19 Mar 2018 15:27) (81/60 - 105/76)  BP(mean): --  RR: 18 (19 Mar 2018 15:27) (18 - 19)  SpO2: 100% (19 Mar 2018 15:27) (99% - 100%)    PHYSICAL EXAM:  Constitutional: middle aged  female in moderate respiratory distress with prolonged conversation, awake and alert, well-developed  HEENT: PERR, EOMI, Normal Hearing, MMM  Neck: Soft and supple, No LAD, No JVD  Respiratory: Breath sounds are decreased at bases, no wheezing.   Cardiovascular: S1 and S2, tachycardic.   Gastrointestinal: Bowel Sounds present, soft, nontender, nondistended, no guarding, no rebound  Extremities: +3 LE peripheral edema Bilaterally to thighs.   Vascular: 2+ peripheral pulses  Neurological: A/O x 3, no focal deficits  Musculoskeletal: 5/5 strength b/l upper and lower extremities  Skin: red abrasions on calfs, does not appear infected.     MEDICATIONS  (STANDING):  buDESOnide 160 MICROgram(s)/formoterol 4.5 MICROgram(s) Inhaler 2 Puff(s) Inhalation two times a day  furosemide   Injectable 40 milliGRAM(s) IV Push every 12 hours  heparin  Injectable 5000 Unit(s) SubCutaneous every 12 hours    LABS: All Labs Reviewed:                        11.5   9.60  )-----------( 160      ( 19 Mar 2018 14:11 )             38.2     03-19    138  |  102  |  71<H>  ----------------------------<  174<H>  4.0   |  26  |  2.01<H>    Ca    8.6      19 Mar 2018 14:11    TPro  6.9  /  Alb  2.7<L>  /  TBili  1.7<H>  /  DBili  x   /  AST  52<H>  /  ALT  57  /  AlkPhos  247<H>  03-19  PT/INR - ( 19 Mar 2018 14:11 )   PT: 14.3 sec;   INR: 1.32 ratio         PTT - ( 19 Mar 2018 14:11 )  PTT:25.0 sec  CARDIAC MARKERS ( 19 Mar 2018 14:11 )  0.018 ng/mL / x     / x     / x     / x        EKG: AV paced. HR 70    Xray Chest 1 View AP/PA. (03.19.18 @ 14:43) >  Massive cardiomegaly.  Clear lungs.      ASSESSMENT AND PLAN:   This is a 57 y.o  Female with PMH of diastolic CHF and other cormorbidites sent to the ED from Cardiologist's office for evaluation of worsening dyspnea found to have acute on chronic diastolic CHF:     # Acute Hypoxic Respiratory Distress  # Acute on Chronic Diastolic CHF - decompensated likely 2/2 noncompliance with low sodium diet + recent change in diuretics.   - will need IV Diuresis and close monitoring of renal function.   - s/p IV 20mg lasix --> will dose Lasix 40mg IV BID.   - monitor strict I's/O's, daily weights.   - may need Entresto added back to regimen and evaulation for pulmonary HTN.   - consult Cardiologist for further recs.   - recent dopplers 2 weeks ago negative for DVT.     # Acute on Chronic Renal Failure - possibly 2/2 prerenal from volume overload.   - discussed case with Nephro --> agree with diuresis and renal function monitoring.   - avoid further nephrotoxins.     # Hx of Vtach - continue Amiodarone 200mg daily.     # HTN - cont BB with monitoring.     #DM2  - A1c 7.3 (1/30/18)  - hold januvia  - ISS  - consistent carb diet    #Asthma - stable  - continue Symbicort add prn nebs for wheezing.     #DVT prophylaxis- heparin sq    Dispo: admit to telemetry.   Total time > 80 mins. (19 Mar 2018 16:51)    HEALTH ISSUES - PROBLEM Dx:          Daily     Daily     T(C): 36.4 (03-21-18 @ 05:04), Max: 36.4 (03-20-18 @ 11:25)  HR: 70 (03-21-18 @ 05:04) (70 - 87)  BP: 97/68 (03-21-18 @ 05:04) (90/71 - 97/68)  RR: 18 (03-20-18 @ 11:25) (18 - 18)  SpO2: 98% (03-21-18 @ 05:04) (95% - 100%)    Home Medications:  amiodarone 200 mg oral tablet: 1 tab(s) orally once a day (19 Mar 2018 16:47)  aspirin 81 mg oral tablet, chewable: 1 tab(s) orally once a day (19 Mar 2018 16:47)  Colace 100 mg oral capsule: 1 cap(s) orally every other day  (19 Mar 2018 16:47)  furosemide 40 mg oral tablet: 1 tab(s) orally once a day (19 Mar 2018 16:49)  metoprolol succinate 50 mg oral tablet, extended release: 1.5 tab(s) orally once a day (19 Mar 2018 16:47)  Tylenol 325 mg oral tablet: 2 tab(s) orally every 4 hours, As Needed for headache (19 Mar 2018 16:47)      Transthoracic Echocardiogram:    EXAM:  2D ECHOCARDIOGRAM AD         PROCEDURE DATE:  06/07/2017        INTERPRETATION:  Transthoracic Echocardiography Report (TTE)     Demographics     Patient name           FREDO SAMPSON       Age           57 year(s)     Med Rec # [id number]            Gender        Female     Account #              [id number]              Date of Birth 1960     Interpreting Physician Fadi Pride MD Room Number   0347     Referring Physician    elvin maria      Sonographer   Vinnie Alfaro     Date of study          06/07/2017 09:20 AM     Height                 60.63 in             Weight        229.28 pounds    Type of Study:     TTE procedure: 2D echocardiogram     HR: 78 bpmBP: 112/66 mmHg     Study Location: 3ETechnical Quality: Good    Indications   1) I50.40 - Unspecified combined systolic congestive and diastolic      congestive heart failure    M-Mode Measurements (cm)     LVEDd: 7.12 cm            LVESd: 6.69 cm   IVSEd: 1.12 cm   LVPWd: 1.04 cm            AO Root Dimension: 3.5 cm                             ACS: 1.7 cm                             LA: 4.3 cm                             LVOT: 2 cm    Doppler Measurements:     AV Velocity:175 cm/s                MV Peak E-Wave: 140 cm/s   AV Peak Gradient: 12.25mmHg        MV Peak A-Wave: 128 cm/s                                       MV E/A Ratio: 1.09 %   TR Velocity:341 cm/s                MV Peak Gradient: 7.84 mmHg   TR Gradient:46.5124 mmHg   Estimated RAP:10 mmHg   RVSP:53 mmHg     Findings     Mitral Valve   There is thickening of both mitral valve leaflet tips. The leaflet   opening   is normal. Moderate mitral regurgitation is present based on a calculated   ERO of .32cm2.     Aortic Valve   The aortic valve appears normal.     Tricuspid Valve   The tricuspid valve leaflets are thin and pliable. Moderate (2+)   tricuspid   valve regurgitation is present. Moderate pulmonary hypertension.     Pulmonic Valve   The pulmonic valve appears normal.     Left Atrium   Left atrium is moderately dilated.     Left Ventricle   The left ventricle is severely dilated with severe global hypokinesis.   Estimated left ventricular ejection fraction is 20 % via bi-plane method.     Right Atrium   The right atrium appears moderately dilated. A device wire is seen in the   RV and RA.     Right Ventricle   The right ventricle is normal in size and contractility.     Pericardial Effusion   Trace pericardial effusion is present.     Pleural Effusion   No evidence of pleural effusion.     Miscellaneous   All visualized extra cardiac structures appears to be normal.     Impression     Summary     The left ventricle is severely dilated with severe global hypokinesis.   Estimated left ventricular ejection fraction is 20 % via bi-plane method.   Left atriumis moderately dilated.   The right atrium appears moderately dilated. A device wire is seen in the   RV and RA.   The right ventricle is normal in size and contractility.   The aortic valve appears normal.   There is thickening of both mitral valve leaflet tips. The leaflet   opening   is normal. Moderate mitral regurgitation is present based on a calculated   ERO of .32cm2.   The tricuspid valve leaflets are thin and pliable. Moderate (2+)   tricuspid   valve regurgitation is present. Moderate pulmonary hypertension.   Trace pericardial effusion is present.     Signature     ----------------------------------------------------------------   Electronically signed by Fadi Pride MD(Interpreting   physician) on 06/07/2017 01:05 PM   ----------------------------------------------------------------    Valves     Mitral Valve     Peak E-Wave: 140 cm/s   Peak A-Wave: 128 cm/s   Peak Gradient: 7.84 mmHg                MR Velocity: 513 cm/s                                           MR VTI: 176 cm   Alias Velocity: 53.8 cm/s               E/A Ratio: 1.09   HENRY Volumetric: 0.32 cm^2     Aortic Valve     Peak Velocity: 175 cm/s   Peak Gradient: 12.25 mmHg     Cusp Separation: 1.7 cm     Tricuspid Valve     TR Velocity: 341 cm/s     Estimated RAP: 10 mmHg   TR Gradient: 46.5124 mmHg              Estimated RVSP: 53 mmHg     Pulmonic Valve              Estimated PASP: 56.51 mmHg     LVOT     LVOT Diameter: 2 cm    Structures     Left Atrium     LA Dimension: 4.3 cm          LA Area: 27.7 cm^2   LA/Aorta: 1.23                LA Volume/Index: 98 ml /49m^2     Left Ventricle     Diastolic Dimension: 7.12 cm          Systolic Dimension: 6.69 cm   Septum Diastolic: 1.12 cm   PW Diastolic: 1.04 cm                 Area Systolic:41.1 cm^2   Area Diastolic: 47.3 cm^2     FS: 6.04 %   LV Length: 9.3 cm   LVOT Diameter: 2 cm     Right Atrium     RA Systolic Pressure: 10 mmHg     Right Ventricle              RV Systolic Pressure: 56.51 mmHg     Miscellaneous     Aorta     AorticRoot: 3.5 cm   Ascending Aorta: 3.6 cm   LVOT Diameter: 2 cm                    FADI PRIDE M.D., ATTENDING CARDIOLOGIST  This document has been electronically signed. Jun 7 2017  1:05PM             (06-07-17 @ 09:49)

## 2018-03-21 NOTE — CONSULT NOTE ADULT - SUBJECTIVE AND OBJECTIVE BOX
Patient is a 58 y.o. female with PMH of CKD2/3 (baseline SCr 1.0), HTN, DM2, chronic systolic CHF (s/p bi-v AICD), VTach on Amiodarone, Hyperlipidemia discharged 3/4/18 s/p CHF exacerbation with edema requiring IV diuresis with KEYSHA peak 2.45 - diuretics (Metolazone and Aldactone) were held, Metformin was stopped, and she was discharged with SCr 1.5 on Lasix 40mg daily. Now she returns with volume overload and KEYSHA (SCr 2.0) - renal called for evaluation.    Reports eating high salt diet. Eats out often.  Since admission, received IV Lasix with improvement in breathing and edema.    Dopplers negative for DVT on prior admission.   On 18 - admission for asthma exacerbation due to parainfluenza infection and KEYSHA 1.7 with hyperkalemia 5.9. Entresto was stopped.      PAST MEDICAL & SURGICAL HISTORY:  CHF (congestive heart failure)  Pacemaker  HTN (hypertension)  HLD (hyperlipidemia)  Asthma  DM (diabetes mellitus)  CAD (coronary artery disease)  History of cholecystectomy  Artificial cardiac pacemaker      MEDICATIONS  (STANDING):  amiodarone    Tablet 200 milliGRAM(s) Oral daily  aspirin  chewable 81 milliGRAM(s) Oral daily  buDESOnide 160 MICROgram(s)/formoterol 4.5 MICROgram(s) Inhaler 2 Puff(s) Inhalation two times a day  furosemide   Injectable 40 milliGRAM(s) IV Push every 12 hours  furosemide   Injectable 40 milliGRAM(s) IV Push once  heparin  Injectable 5000 Unit(s) SubCutaneous every 12 hours  metoprolol succinate ER 75 milliGRAM(s) Oral daily  pantoprazole    Tablet 40 milliGRAM(s) Oral before breakfast      Allergies    No Known Allergies    Intolerances        SOCIAL HISTORY:  Denies ETOh, Smoking, IVDU    FAMILY HISTORY:  Family history of other heart disease (Mother)      REVIEW OF SYSTEMS:    CONSTITUTIONAL: No weakness, fevers or chills  EYES/ENT: No visual changes;  No vertigo or throat pain   NECK: No pain or stiffness  RESPIRATORY: No cough, wheezing, hemoptysis; ++ shortness of breath  CARDIOVASCULAR: No chest pain or palpitations  GASTROINTESTINAL: No abdominal or epigastric pain. No nausea, vomiting, or hematemesis; No diarrhea or constipation. No melena or hematochezia.  GENITOURINARY: No dysuria, frequency or hematuria  NEUROLOGICAL: No numbness or weakness  SKIN: No itching, burning, rashes, or lesions   All other review of systems is negative unless indicated above.    Vital Signs Last 24 Hrs  T(C): 36.5 (21 Mar 2018 11:12), Max: 36.5 (21 Mar 2018 11:12)  T(F): 97.7 (21 Mar 2018 11:12), Max: 97.7 (21 Mar 2018 11:12)  HR: 71 (21 Mar 2018 11:12) (70 - 87)  BP: 98/72 (21 Mar 2018 11:12) (90/71 - 98/72)  BP(mean): --  RR: 18 (21 Mar 2018 11:12) (18 - 18)  SpO2: 100% (21 Mar 2018 11:12) (95% - 100%)    I and O's:        PHYSICAL EXAM:    Constitutional: NAD, awake, alert, conversing without dyspnea  HEENT: PERRLA, EOMI,  MMM  Neck: No LAD, No JVD  Respiratory: CTAB, no crackles  Cardiovascular: S1 and S2  Gastrointestinal: BS+, soft, NT/ND  Extremities: 2+ peripheral edema to thighs  Neurological: A/O x 3, no focal deficits  Psychiatric: Normal mood, normal affect  : No Locke  Skin: No rashes  Access: Not applicable    LABS:                                   11.7   10.67 )-----------( 210      ( 21 Mar 2018 05:00 )             38.3                         11.5   9.60  )-----------( 160      ( 19 Mar 2018 14:11 )             38.2     137    |  104    |  74     ----------------------------<  95        21 Mar 2018 05:00  4.5     |  24     |  2.06     139    |  104    |  77     ----------------------------<  92        20 Mar 2018 05:19  4.2     |  25     |  1.87     138    |  102    |  71     ----------------------------<  174       19 Mar 2018 14:11  4.0     |  26     |  2.01     Ca    8.6        21 Mar 2018 05:00  Ca    8.8        20 Mar 2018 05:19      Mg     2.3       20 Mar 2018 05:19    TPro  6.9    /  Alb  2.7    /  TBili  1.7    /        19 Mar 2018 14:11  DBili  x      /  AST  52     /  ALT  57     /  AlkPhos  247            Urine Studies:  Urinalysis Basic - ( 19 Mar 2018 14:11 )    Color: Yellow / Appearance: Clear / S.015 / pH: x  Gluc: x / Ketone: Negative  / Bili: Negative / Urobili: 4 mg/dL   Blood: mod / Protein: 30 mg/dL / Nitrite: Negative   Leuk Esterase: Trace / RBC: 6-10 /HPF / WBC 6-10   Sq Epi: x / Non Sq Epi: Few / Bacteria: Few      Up/cr 1.3g/g  UCx negative    RADIOLOGY & ADDITIONAL STUDIES:

## 2018-03-21 NOTE — PROGRESS NOTE ADULT - PROBLEM SELECTOR PLAN 1
CHF education done with patient  Educational handouts provided including:  Signs and symptoms of CHF exacerbation   Daily Weights  What to do if symptoms worsen  How, when, and why to take medication  lifestyle changes  limiting sodium intake to 1500mg-2000mg daily  when to contact HCP  Ejection Fraction 20%      Post d/c follow up appointment to be made by unit clerk when medically stable

## 2018-03-21 NOTE — PROGRESS NOTE ADULT - SUBJECTIVE AND OBJECTIVE BOX
CURRENT CARDIAC WORKUP:       Echo:  Stress Test:  Cardiac Cath:    Allergies:   No Known Allergies      MEDICATIONS  (STANDING):  amiodarone    Tablet 200 milliGRAM(s) Oral daily  aspirin  chewable 81 milliGRAM(s) Oral daily  buDESOnide 160 MICROgram(s)/formoterol 4.5 MICROgram(s) Inhaler 2 Puff(s) Inhalation two times a day  furosemide   Injectable 40 milliGRAM(s) IV Push every 12 hours  heparin  Injectable 5000 Unit(s) SubCutaneous every 12 hours  metoprolol succinate ER 75 milliGRAM(s) Oral daily  pantoprazole    Tablet 40 milliGRAM(s) Oral before breakfast    MEDICATIONS  (PRN):  acetaminophen   Tablet. 650 milliGRAM(s) Oral every 6 hours PRN Mild Pain (1 - 3)  ALBUTerol    0.083% 2.5 milliGRAM(s) Nebulizer every 6 hours PRN Shortness of Breath and/or Wheezing  docusate sodium 100 milliGRAM(s) Oral two times a day PRN Constipation  metoprolol    tartrate Injectable 5 milliGRAM(s) IV Push every 6 hours PRN Give for HR >130, SBP>100  ondansetron Injectable 4 milliGRAM(s) IV Push every 4 hours PRN Nausea and/or Vomiting      ROS:     .ros      Vital Signs Last 24 Hrs  T(C): 36.4 (21 Mar 2018 05:04), Max: 36.4 (20 Mar 2018 11:25)  T(F): 97.6 (21 Mar 2018 05:04), Max: 97.6 (21 Mar 2018 05:04)  HR: 70 (21 Mar 2018 05:04) (70 - 89)  BP: 97/68 (21 Mar 2018 05:04) (90/71 - 120/100)  BP(mean): --  RR: 18 (20 Mar 2018 11:25) (18 - 18)  SpO2: 98% (21 Mar 2018 05:04) (95% - 100%)    I&O's Summary      PHYSICAL EXAM:    .phy      INTERPRETATION OF TELEMETRY:    ECG:    LABS:                        11.7   10 )-----------( 210      ( 21 Mar 2018 05:00 )             38.3     03-21    137  |  104  |  74<H>  ----------------------------<  95  4.5   |  24  |  2.06<H>    Ca    8.6      21 Mar 2018 05:00  Mg     2.3     03-20    TPro  6.9  /  Alb  2.7<L>  /  TBili  1.7<H>  /  DBili  x   /  AST  52<H>  /  ALT  57  /  AlkPhos  247<H>   @ 17:24  Trop-I 0.018  CK     --  CK MB  --     @ 14:11  Trop-I 0.018  CK     --  CK MB  --    Pro BNP  5905  @ 14:11  D Dimer  --  @ 14:11    PT/INR - ( 19 Mar 2018 14:11 )   PT: 14.3 sec;   INR: 1.32 ratio         PTT - ( 19 Mar 2018 14:11 )  PTT:25.0 sec  Urinalysis Basic - ( 19 Mar 2018 14:11 )    Color: Yellow / Appearance: Clear / S.015 / pH: x  Gluc: x / Ketone: Negative  / Bili: Negative / Urobili: 4 mg/dL   Blood: x / Protein: 30 mg/dL / Nitrite: Negative   Leuk Esterase: Trace / RBC: 6-10 /HPF / WBC 6-10   Sq Epi: x / Non Sq Epi: Few / Bacteria: Few            RADIOLOGY & ADDITIONAL STUDIES: 57 y.o  Female with PMH of diastolic CHF and other cormorbidites sent to the ED for evaluation of worsening dyspnea. Patient reports 11lb weight gain over 3 days and states being compliant with all medications including Lasix. Per her , she eats "[place] food which has salt it in". No recent fevers / chills or illnesses. She has been non compliant to office follow up. Missed 3 out pt visits last week. Of note patient was recently admitted to  and discharged on 3/4/18 (2 weeks ago) for CHF and leg cellulitis. During that admission she suffered KEYSHA with Scr peaked at 2.0 and thus several diuretics were withheld upon discharge including Entresto and Zaroxyln.  She has worsening leg edema.     Today, no orthopnea. No chest pain. Leg edema persists    PAST MEDICAL & SURGICAL HISTORY:  HFrEF CHF (congestive heart failure)  NICM  Non obstructive CAD  AICD  HTN (hypertension)  HLD (hyperlipidemia)  Asthma  DM (diabetes mellitus)  History of cholecystectomy      PREVIOUS CARDIAC WORKUP:      Echo:  Severe reduced EF  Cardiac Cath:  8/24/17 - Non obstructive    CURRENT CARDIAC WORKUP:       Echo:  Stress Test:  Cardiac Cath:    Allergies:   No Known Allergies      MEDICATIONS  (STANDING):  amiodarone    Tablet 200 milliGRAM(s) Oral daily  aspirin  chewable 81 milliGRAM(s) Oral daily  buDESOnide 160 MICROgram(s)/formoterol 4.5 MICROgram(s) Inhaler 2 Puff(s) Inhalation two times a day  furosemide   Injectable 40 milliGRAM(s) IV Push every 12 hours  heparin  Injectable 5000 Unit(s) SubCutaneous every 12 hours  metoprolol succinate ER 75 milliGRAM(s) Oral daily  pantoprazole    Tablet 40 milliGRAM(s) Oral before breakfast    MEDICATIONS  (PRN):  acetaminophen   Tablet. 650 milliGRAM(s) Oral every 6 hours PRN Mild Pain (1 - 3)  ALBUTerol    0.083% 2.5 milliGRAM(s) Nebulizer every 6 hours PRN Shortness of Breath and/or Wheezing  docusate sodium 100 milliGRAM(s) Oral two times a day PRN Constipation  metoprolol    tartrate Injectable 5 milliGRAM(s) IV Push every 6 hours PRN Give for HR >130, SBP>100  ondansetron Injectable 4 milliGRAM(s) IV Push every 4 hours PRN Nausea and/or Vomiting      ROS:     Detailed ten system ROS was performed and was negative except for history as eluded to above.    no fever  no chills  no nausea  no vomiting  no diarrhea  no constipation  no melena  no hematochezia  no chest pain  no palpitations  no sob at rest  + dyspnea on exertion  no cough  no wheezing  no anorexia  no headache  no dizziness  no syncope  no weakness  no myalgia  no dysuria  no polyuria  no hematuria   + edema      Vital Signs Last 24 Hrs  T(C): 36.4 (21 Mar 2018 05:04), Max: 36.4 (20 Mar 2018 11:25)  T(F): 97.6 (21 Mar 2018 05:04), Max: 97.6 (21 Mar 2018 05:04)  HR: 70 (21 Mar 2018 05:04) (70 - 89)  BP: 97/68 (21 Mar 2018 05:04) (90/71 - 120/100)  BP(mean): --  RR: 18 (20 Mar 2018 11:25) (18 - 18)  SpO2: 98% (21 Mar 2018 05:04) (95% - 100%)        PHYSICAL EXAM:    General:                Comfortable, AAO X 3, in no distress.   HEENT:                  Atraumatic, PERRLA, EOMI, conjunctiva clear.   Neck:                     Supple, no adenopathy, no thyromegaly, no JVD, no bruit.  Back:                     Symmetric, non tender.  Chest:                    Scant rales, B/L symmetric air entry, no tachypnea  Heart:                     S1, S2 normal, no gallop, + murmur.  Abdomen:              Soft, non-tender, bowel sounds active. No palpable masses.  Extremities:           no cyanosis, + edema B/L LE.   Skin:                      Skin color, texture normal, no rash  Neurologic:            Grossly nonfocal.       TELEMETRY:   A paced rhythm  with no tachy or lillian events     ECG:  A paced, IVCD    LABS:                        11.7   10.67 )-----------( 210      ( 21 Mar 2018 05:00 )             38.3     03-21    137  |  104  |  74<H>  ----------------------------<  95  4.5   |  24  |  2.06<H>    Ca    8.6      21 Mar 2018 05:00  Mg     2.3     03-20    TPro  6.9  /  Alb  2.7<L>  /  TBili  1.7<H>  /  DBili  x   /  AST  52<H>  /  ALT  57  /  AlkPhos  247<H>  03-19      Pro BNP  5905 03-19 @ 14:11

## 2018-03-21 NOTE — PROGRESS NOTE ADULT - ASSESSMENT
ASSESSMENT / PLAN:   57 y.o  Woman sent to the ED from Cardiologist's office for evaluation of worsening dyspnea found to have acute on chronic HFrEF     Acute Hypoxic Respiratory Distress superimposed from Acute on Chronic HFrEF (20%)  - decompensated likely 2/2 dietary noncompliance   - con't ongoing diuresis with IV lasix 40mg IV BID;   - monitor strict I's/O's, daily weights--> not well documented (reinforce importance of efficient reporting to help guide therapy)  - may need Entresto added back to regimen and evaluation for pulmonary HTN.   - appreciate cardiology input   - recent dopplers 2 weeks ago negative for DVT.   - educated on importance of dietary compliance  - compression stocking    KEYSHA on CKD - possibly 2/2 prerenal from volume overload.   - awaiting renal team input  - avoid nephrotoxins where possible    Vtach/CAD  - no significant ectopy on tele   - con't asa / toprol / Amiodarone / statin  - monitor LFT's    HTN   - cont BB   - monitor BP per protocol    T2DM  - A1c 7.3 (1/30/18)  - holding januvia  - con't POCT with HEMA ; consistent carb diet    Asthma - stable  - continue Symbicort add prn nebs for wheezing.     Obesity  - nutrition consult  - educated on importance of appropriate wt loss plan    #DVT prophylaxis- heparin sq    Dispo  - full code  - pt and  at bedside updated on plan of care

## 2018-03-21 NOTE — PROGRESS NOTE ADULT - ASSESSMENT
PHYSICAL EXAM:  GENERAL: NAD, well-developed  HEAD:  Atraumatic, Normocephalic  EYES: EOMI, PERRLA, conjunctiva and sclera yellowed  NECK: Supple, No JVD  CHEST/LUNG: Clear to auscultation bilaterally  HEART: Regular rate and rhythm; No murmurs, rubs, or gallops  ABDOMEN: Soft, Nontender, distended and obese; Bowel sounds present  EXTREMITIES:  2+ Peripheral Pulses, No clubbing, cyanosis +4 BLLE pitting edema   PSYCH: AAOx3  NEUROLOGY: non-focal  SKIN: No rashes or lesions

## 2018-03-22 LAB
ALBUMIN SERPL ELPH-MCNC: 2.3 G/DL — LOW (ref 3.3–5)
ALP SERPL-CCNC: 218 U/L — HIGH (ref 40–120)
ALT FLD-CCNC: 52 U/L — SIGNIFICANT CHANGE UP (ref 12–78)
ANION GAP SERPL CALC-SCNC: 11 MMOL/L — SIGNIFICANT CHANGE UP (ref 5–17)
AST SERPL-CCNC: 64 U/L — HIGH (ref 15–37)
BILIRUB DIRECT SERPL-MCNC: 0.7 MG/DL — HIGH (ref 0–0.2)
BILIRUB INDIRECT FLD-MCNC: 0.8 MG/DL — SIGNIFICANT CHANGE UP (ref 0.2–1)
BILIRUB SERPL-MCNC: 1.5 MG/DL — HIGH (ref 0.2–1.2)
BUN SERPL-MCNC: 74 MG/DL — HIGH (ref 7–23)
CALCIUM SERPL-MCNC: 8.8 MG/DL — SIGNIFICANT CHANGE UP (ref 8.5–10.1)
CHLORIDE SERPL-SCNC: 103 MMOL/L — SIGNIFICANT CHANGE UP (ref 96–108)
CHOLEST SERPL-MCNC: 120 MG/DL — SIGNIFICANT CHANGE UP (ref 10–199)
CO2 SERPL-SCNC: 24 MMOL/L — SIGNIFICANT CHANGE UP (ref 22–31)
CREAT SERPL-MCNC: 2.18 MG/DL — HIGH (ref 0.5–1.3)
GLUCOSE SERPL-MCNC: 84 MG/DL — SIGNIFICANT CHANGE UP (ref 70–99)
HDLC SERPL-MCNC: 18 MG/DL — LOW (ref 40–125)
LIPID PNL WITH DIRECT LDL SERPL: 78 MG/DL — SIGNIFICANT CHANGE UP
POTASSIUM SERPL-MCNC: 5 MMOL/L — SIGNIFICANT CHANGE UP (ref 3.5–5.3)
POTASSIUM SERPL-SCNC: 5 MMOL/L — SIGNIFICANT CHANGE UP (ref 3.5–5.3)
PROT SERPL-MCNC: 6.7 GM/DL — SIGNIFICANT CHANGE UP (ref 6–8.3)
SODIUM SERPL-SCNC: 138 MMOL/L — SIGNIFICANT CHANGE UP (ref 135–145)
TOTAL CHOLESTEROL/HDL RATIO MEASUREMENT: 6.7 RATIO — SIGNIFICANT CHANGE UP (ref 3.3–7.1)
TRIGL SERPL-MCNC: 119 MG/DL — SIGNIFICANT CHANGE UP (ref 10–149)

## 2018-03-22 RX ORDER — BUMETANIDE 0.25 MG/ML
2 INJECTION INTRAMUSCULAR; INTRAVENOUS EVERY 12 HOURS
Qty: 0 | Refills: 0 | Status: DISCONTINUED | OUTPATIENT
Start: 2018-03-22 | End: 2018-03-23

## 2018-03-22 RX ORDER — HYDROCORTISONE 1 %
1 OINTMENT (GRAM) TOPICAL
Qty: 0 | Refills: 0 | Status: DISCONTINUED | OUTPATIENT
Start: 2018-03-22 | End: 2018-03-30

## 2018-03-22 RX ORDER — BUMETANIDE 0.25 MG/ML
1 INJECTION INTRAMUSCULAR; INTRAVENOUS DAILY
Qty: 0 | Refills: 0 | Status: DISCONTINUED | OUTPATIENT
Start: 2018-03-22 | End: 2018-03-22

## 2018-03-22 RX ADMIN — HEPARIN SODIUM 5000 UNIT(S): 5000 INJECTION INTRAVENOUS; SUBCUTANEOUS at 21:27

## 2018-03-22 RX ADMIN — Medication 40 MILLIGRAM(S): at 05:29

## 2018-03-22 RX ADMIN — Medication 650 MILLIGRAM(S): at 02:29

## 2018-03-22 RX ADMIN — Medication 75 MILLIGRAM(S): at 05:29

## 2018-03-22 RX ADMIN — PANTOPRAZOLE SODIUM 40 MILLIGRAM(S): 20 TABLET, DELAYED RELEASE ORAL at 05:29

## 2018-03-22 RX ADMIN — BUDESONIDE AND FORMOTEROL FUMARATE DIHYDRATE 2 PUFF(S): 160; 4.5 AEROSOL RESPIRATORY (INHALATION) at 10:22

## 2018-03-22 RX ADMIN — Medication 650 MILLIGRAM(S): at 02:30

## 2018-03-22 RX ADMIN — AMIODARONE HYDROCHLORIDE 200 MILLIGRAM(S): 400 TABLET ORAL at 05:29

## 2018-03-22 RX ADMIN — HEPARIN SODIUM 5000 UNIT(S): 5000 INJECTION INTRAVENOUS; SUBCUTANEOUS at 08:32

## 2018-03-22 RX ADMIN — Medication 81 MILLIGRAM(S): at 11:53

## 2018-03-22 RX ADMIN — BUDESONIDE AND FORMOTEROL FUMARATE DIHYDRATE 2 PUFF(S): 160; 4.5 AEROSOL RESPIRATORY (INHALATION) at 20:02

## 2018-03-22 NOTE — CONSULT NOTE ADULT - SUBJECTIVE AND OBJECTIVE BOX
Patient is a 58y old  Female who presents with a chief complaint of Shortness of breath, weight gain, leg edema. (19 Mar 2018 16:51)      HPI:  This is a 57 y.o  Female with PMH of diastolic CHF and other cormorbidites sent to the ED from Cardiologist's office for evaluation of worsening dyspnea. Patient reports 11lb weight gain over 3 days and states being compliant with all medications including Lasix. Per her , she eats "Eliel food which has salt it in". No recent fevers / chills or illnesses. Of note patient was recently admitted to  and discharged on 3/4/18 (2 weeks ago) for CHF and leg cellulitis. During that admission she suffered KEYSHA with Scr peaked at 2.0 and thus several diuretics were withheld upon discharge including Entresto and Zaroxyln. Admitted to  with CHF exacerbation/renal failure, UA with some pyuria, no urinary symptoms, cx growing VRE.         PMH: as above    PSH: as above    Meds: per reconciliation sheet, noted below    MEDICATIONS  (STANDING):  amiodarone    Tablet 200 milliGRAM(s) Oral daily  aspirin  chewable 81 milliGRAM(s) Oral daily  buDESOnide 160 MICROgram(s)/formoterol 4.5 MICROgram(s) Inhaler 2 Puff(s) Inhalation two times a day  furosemide   Injectable 40 milliGRAM(s) IV Push every 12 hours  heparin  Injectable 5000 Unit(s) SubCutaneous every 12 hours  metoprolol succinate ER 75 milliGRAM(s) Oral daily  pantoprazole    Tablet 40 milliGRAM(s) Oral before breakfast      Allergies    No Known Allergies    Intolerances        Social: no smoking, no alcohol, no illegal drugs; no recent travel, no exposure to TB    Family history:  Family history of other heart disease (Mother)  No pertinent family history in first degree relatives      ROS: the patient denies fever, no chills, no HA, no dizziness, no sore throat, no blurry vision, no CP, no palpitations, no abdominal pain, no diarrhea, no N/V, no dysuria, no leg pain, no claudication,  no rectal pain or bleeding, no night sweats    Vital Signs Last 24 Hrs  T(C): 36.3 (22 Mar 2018 05:08), Max: 36.5 (21 Mar 2018 11:12)  T(F): 97.4 (22 Mar 2018 05:08), Max: 97.7 (21 Mar 2018 11:12)  HR: 91 (22 Mar 2018 05:08) (70 - 91)  BP: 100/59 (22 Mar 2018 05:08) (91/57 - 100/59)  BP(mean): --  RR: 18 (21 Mar 2018 17:04) (18 - 18)  SpO2: 100% (22 Mar 2018 05:08) (100% - 100%)      PE:  Constitutional: frail looking  HEENT: NC/AT, EOMI, PERRLA  Neck: supple  Back: no tenderness  Respiratory: decreased breath sounds  Cardiovascular: S1S2 regular, no murmurs  Abdomen: soft, not tender, not distended, positive BS  Genitourinary: deferred  Rectal: deferred  Musculoskeletal: no muscle tenderness, no joint swelling or tenderness  Extremities: no pedal edema  Neurological:  no focal deficits  Skin: venous stasis changes b/l LEs    Labs:                        11.7   10.67 )-----------( 210      ( 21 Mar 2018 05:00 )             38.3     03-22    138  |  103  |  74<H>  ----------------------------<  84  5.0   |  24  |  2.18<H>    Ca    8.8      22 Mar 2018 06:48    TPro  6.7  /  Alb  2.3<L>  /  TBili  1.5<H>  /  DBili  0.7<H>  /  AST  64<H>  /  ALT  52  /  AlkPhos  218<H>  03-22     LIVER FUNCTIONS - ( 22 Mar 2018 06:48 )  Alb: 2.3 g/dL / Pro: 6.7 gm/dL / ALK PHOS: 218 U/L / ALT: 52 U/L / AST: 64 U/L / GGT: x           Culture - Urine (03.19.18 @ 14:11)    -  Ampicillin: R >8    -  Ciprofloxacin: R >2    -  Nitrofurantoin: S <=32    -  Tetra/Doxy: R >8    -  Vancomycin: R >16    Specimen Source: .Urine Clean Catch (Midstream)    Culture Results:   50,000 - 99,000 CFU/mL Enterococcus faecium (vancomycin resistant)  <10,000 CFU/ml Normal Urogenital tara present    Organism Identification: Enterococcus faecium (vancomycin resistant)    Organism: Enterococcus faecium (vancomycin resistant)    Method Type: MOSES            Radiology:  < from: Xray Chest 1 View AP/PA. (03.19.18 @ 14:43) >    EXAM:  XR CHEST AP OR PA 1V                            PROCEDURE DATE:  03/19/2018          INTERPRETATION:  Clinical information: CHF, dyspnea    Portable upright chest radiograph from 1430 hours    COMPARISON: February 20, 2018    FINDINGS: Thereis a left-sided AICD, with leads unchanged in position.   The heart is severely enlarged. The lungs are clear. There is no pleural   effusion, pneumothorax or evidence of CHF. The soft tissues and bones are   unremarkable.    IMPRESSION:    Massive cardiomegaly.  Clear lungs.    < end of copied text >      < from: CT Abdomen and Pelvis No Cont (03.21.18 @ 13:13) >  EXAM:  CT ABDOMEN AND PELVIS                            PROCEDURE DATE:  03/21/2018          INTERPRETATION:  Clinical information: Worsening bilateral lower   extremity edema. Rule out obstruction. Assess IVC.    COMPARISON: None    PROCEDURE:   CT of the abdomen and pelvis was performed.  IV contrast:  None  Oral contrast:  None  Coronal and sagittal reformatted images were obtained    FINDINGS:    LOWER CHEST: Small right and trace left pleural effusions. Marked   cardiomegaly. AICD leads noted in the right heart..    LIVER: Within normal limits.  SPLEEN: Within normal limits.  PANCREAS: Within normal limits.  GALLBLADDER: Cholecystectomy.  BILE DUCTS: Normal caliber.  ADRENALS: Within normal limits.  KIDNEYS/URETERS: No mass, stone or hydronephrosis.    RETROPERITONEUM: No lymphadenopathy.    VESSELS:  Evaluation of the IVC and iliac veins is limited due to lack of   intravenous contrast. There is no extrinsic compression upon these   vessels and they are normal in caliber. There isno intraluminal high   density to suggest thrombosis, although evaluation is limited.    BOWEL: No bowel obstruction, wall thickening or inflammatory change.   Appendix normal.  PERITONEUM: Trace amount of ascites. No pneumoperitoneum.    REPRODUCTIVE ORGANS: Uterus and left adnexa are unremarkable. Right   adnexal simple appearing cyst measures 4.8 x 4.1 cm.  BLADDER: Within normal limits.    ABDOMINAL WALL: Moderate diffuse anasarca.  BONES: No acute bony abnormality.    IMPRESSION:   Limited evaluation of the IVC and iliac veins due to lack of intravenous   contrast. They are however normal in caliber and there is no extrinsic   mass effect on these vessels.    Small right and trace left pleural effusions, trace ascites and anasarca.    4.8 cm right adnexal cyst. Recommend ultrasound to further evaluate.        Advanced directives addressed: full resuscitation

## 2018-03-22 NOTE — PROGRESS NOTE ADULT - ASSESSMENT
Acute on Chronic HFrEF  Hypoglycemia  Hyperkalemia  ARF  NICM. S/p VT  Non obstructive CAD  HTN  AICD      Suggest:    Observe patient on telemetry.  follow low sodium, low cholesterol, ADA diet.  Fluid restriction 1.5 lit/day  monitor Intake & output  Daily weights.  Follow up electrolytes, renal function.   Increase Bumex and add zaroxolyn  F/u BP  F/U diuretic response  No ACEi at present till renal function stabilizes.   Hold beta blockers if BP low

## 2018-03-22 NOTE — CONSULT NOTE ADULT - ASSESSMENT
57 y.o  Female with PMH of diastolic CHF and other cormorbidites sent to the ED from Cardiologist's office for evaluation of worsening dyspnea. Patient reports 11lb weight gain over 3 days and states being compliant with all medications including Lasix. Per her , she eats "[place] food which has salt it in". No recent fevers / chills or illnesses. Of note patient was recently admitted to  and discharged on 3/4/18 (2 weeks ago) for CHF and leg cellulitis. During that admission she suffered KEYSHA with Scr peaked at 2.0 and thus several diuretics were withheld upon discharge including Entresto and Zaroxyln. Admitted to  with CHF exacerbation/renal failure, UA with some pyuria, no urinary symptoms, cx growing VRE.     1. asymptomatic bacteruria. urinary colonization with VRE. pyuria. venous stasis. CHF  - VRE in urine is colonization and not infection  - no urinary symptoms, afebrile, normal wbc ct  - monitor off antibiotics for now  - monitor temps  - f/u cbc  - supportive care    2. other issues - care per medicine

## 2018-03-22 NOTE — PROGRESS NOTE ADULT - SUBJECTIVE AND OBJECTIVE BOX
Chart reviewed.  Patient seen and examined.    CC: worsening shortness of breath weight gain, leg sidney    HPI: 57 y.o  woman with PMH/PSH of HFrEF 20%, Non obstructive CAD, AICD, HTN, HLD, Asthma, DM, cholecystectomy  sent to the ED from Cardiologist's office for evaluation of worsening dyspnea. Patient reports 11lb weight gain over 3 days and states being compliant with all medications including Lasix. Per her , she eats "[place] food which has salt it in". No recent fevers / chills or illnesses. Of note patient was recently admitted to  and discharged on 3/4/18 (2 weeks ago) for CHF and leg cellulitis. During that admission she suffered KEYSHA with Scr peaked at 2.0 and thus several diuretics were withheld upon discharge including Entresto and Zaroxyln.     In the ED, notable labs include elevated BNP ~6000 and Scr 2.0, normal trop X 1 and normal CBC. CXR with massive cardiomegaly. Physical exam pertinent for severe +3 LE pitting edema bilaterally. Patient given IV Lasix 20mg and admission requested. Currently seen denying chest pain and c/o ongoing KHAN and + orthopnea at home.     Subjective:   3/20: she acknowledged drinking much fluid at home and "sopa"; feels better than when she came in, is happy she can walk better unlike on admission, BLE swelling is down some  3/21: denies shortness of breath at rest or with ambulation to bathrm, slept well last night;  at bedside offers no complaints; received xtra 40mg IV of lasix  3/22: no issues overnight, no complaints    REVIEW OF SYSTEMS: All systems reviewed and found to be negative with the exception as above.    Vital Signs Last 24 Hrs  T(C): 36.3 (22 Mar 2018 05:08), Max: 36.4 (21 Mar 2018 17:04)  T(F): 97.4 (22 Mar 2018 05:08), Max: 97.6 (21 Mar 2018 17:04)  HR: 91 (22 Mar 2018 05:08) (70 - 91)  BP: 100/59 (22 Mar 2018 05:08) (91/57 - 100/59)  BP(mean): --  RR: 18 (21 Mar 2018 17:04) (18 - 18)  SpO2: 100% (22 Mar 2018 05:08) (100% - 100%)      PHYSICAL EXAM:    GENERAL: obese, NAD  HEENT:  pupils equal and reactive, EOMI, no oropharyngeal lesions, erythema, exudates, oral thrush  NECK:   supple, no carotid bruits, no palpable lymph nodes, no thyromegaly  CV:  +S1, +S2, regular, no murmurs or rubs  RESP:   lungs clear to auscultation bilaterally, no wheezing, rales, rhonchi, good air entry bilaterally  GI:  obese abdomen soft, non-tender, non-distended, normal BS, no bruits, no abdominal masses, no palpable masses  MSK:   normal muscle tone, no atrophy, no rigidity, no contractions  EXT/vasc:  4+ pitting edema, extending to thighs, +sacral edema, chronic vascular changes, diminished pedal pulses  NEURO:  AAOX3, no focal neurological deficits, follows all commands, able to move extremities spontaneously  SKIN:  no ulcers, lesions or rashes    LABS:             11.7   10.67 )-----------( 210      ( 21 Mar 2018 05:00 )             38.3     03-22    138  |  103  |  74<H>  ----------------------------<  84  5.0   |  24  |  2.18<H>    Ca    8.8      22 Mar 2018 06:48    TPro  6.7  /  Alb  2.3<L>  /  TBili  1.5<H>  /  DBili  0.7<H>  /  AST  64<H>  /  ALT  52  /  AlkPhos  218<H>  03-22    LIVER FUNCTIONS - ( 22 Mar 2018 06:48 )  Alb: 2.3 g/dL / Pro: 6.7 gm/dL / ALK PHOS: 218 U/L / ALT: 52 U/L / AST: 64 U/L / GGT: x           Serum Pro-Brain Natriuretic Peptide: 5905 pg/mL (03-19-18 @ 14:11)    Culture - Urine (collected 03-19-18 @ 14:11)  Source: .Urine Clean Catch (Midstream)  Final Report (03-21-18 @ 21:14):    50,000 - 99,000 CFU/mL Enterococcus faecium (vancomycin resistant)    <10,000 CFU/ml Normal Urogenital tara present  Organism: Enterococcus faecium (vancomycin resistant) (03-21-18 @ 21:14)  Organism: Enterococcus faecium (vancomycin resistant) (03-21-18 @ 21:14)      -  Ampicillin: R >8      -  Ciprofloxacin: R >2      -  Nitrofurantoin: S <=32      -  Tetra/Doxy: R >8      -  Vancomycin: R >16      Method Type: MOSES        RADIOLOGY  < from: Xray Chest 1 View AP/PA. (03.19.18 @ 14:43) >  IMPRESSION: Massive cardiomegaly. Clear lungs.    < end of copied text >    < from: Transthoracic Echocardiogram (06.07.17 @ 09:49) >     Summary   The left ventricle is severely dilated with severe global hypokinesis.   Estimated left ventricular ejection fraction is 20 % via bi-plane method.   Left atriumis moderately dilated.  The right atrium appears moderately dilated. A device wire is seen in the   RV and RA.  The right ventricle is normal in size and contractility.   The aortic valve appears normal.  There is thickening of both mitral valve leaflet tips. The leaflet   opening  is normal. Moderate mitral regurgitation is present based on a calculated   ERO of .32cm2.  The tricuspid valve leaflets are thin and pliable. Moderate (2+)   tricuspid  valve regurgitation is present. Moderate pulmonary hypertension.   Trace pericardial effusion is present.     < end of copied text >    MEDICATIONS    MEDICATIONS  (STANDING):  amiodarone    Tablet 200 milliGRAM(s) Oral daily  aspirin  chewable 81 milliGRAM(s) Oral daily  buDESOnide 160 MICROgram(s)/formoterol 4.5 MICROgram(s) Inhaler 2 Puff(s) Inhalation two times a day  furosemide   Injectable 40 milliGRAM(s) IV Push every 12 hours  heparin  Injectable 5000 Unit(s) SubCutaneous every 12 hours  metoprolol succinate ER 75 milliGRAM(s) Oral daily  pantoprazole    Tablet 40 milliGRAM(s) Oral before breakfast    MEDICATIONS  (PRN):  acetaminophen   Tablet. 650 milliGRAM(s) Oral every 6 hours PRN Mild Pain (1 - 3)  ALBUTerol    0.083% 2.5 milliGRAM(s) Nebulizer every 6 hours PRN Shortness of Breath and/or Wheezing  docusate sodium 100 milliGRAM(s) Oral two times a day PRN Constipation  metoprolol    tartrate Injectable 5 milliGRAM(s) IV Push every 6 hours PRN Give for HR >130, SBP>100  ondansetron Injectable 4 milliGRAM(s) IV Push every 4 hours PRN Nausea and/or Vomiting

## 2018-03-22 NOTE — PROGRESS NOTE ADULT - ASSESSMENT
ASSESSMENT / PLAN:   57 y.o  Woman sent to the ED from Cardiologist's office for evaluation of worsening dyspnea found to have acute on chronic HFrEF     Acute Hypoxic Respiratory Distress superimposed from Acute on Chronic HFrEF (20%)  - decompensated likely 2/2 dietary noncompliance   - still with significant volume overload; CT a/p reviewed  - change IV lasix to bumex   - monitor strict I's/O's, daily weights--> not well documented (reinforce importance of efficient reporting to help guide therapy)  - may need Entresto added back to regimen and evaluation for pulmonary HTN.   - appreciate cardiology input   - recent dopplers 2 weeks ago negative for DVT.   - ongoing education on importance of dietary compliance  - compression stocking    KEYSHA on CKD - possibly 2/2 prerenal from volume overload.   - awaiting renal team input  - avoid nephrotoxins where possible    Urine Cx w/ colonized VRE  - she's afebrile / WBC normal  - appreciate ID input --> monitor off abt    Vtach/CAD  - no significant ectopy on tele   - con't asa / toprol / Amiodarone / statin  - monitor LFT's    HTN   - cont BB   - monitor BP per protocol    T2DM  - A1c 7.3 (1/30/18)  - holding januvia  - con't POCT with HEMA ; consistent carb diet    Asthma - stable  - continue Symbicort add prn nebs for wheezing.     Obesity  - nutrition consult  - educated on importance of appropriate wt loss plan    #DVT prophylaxis- heparin sq    Dispo  - full code

## 2018-03-22 NOTE — PROGRESS NOTE ADULT - SUBJECTIVE AND OBJECTIVE BOX
CURRENT CARDIAC WORKUP:       Echo:  Stress Test:  Cardiac Cath:    Allergies:   No Known Allergies      MEDICATIONS  (STANDING):  amiodarone    Tablet 200 milliGRAM(s) Oral daily  aspirin  chewable 81 milliGRAM(s) Oral daily  buDESOnide 160 MICROgram(s)/formoterol 4.5 MICROgram(s) Inhaler 2 Puff(s) Inhalation two times a day  buMETAnide Injectable 2 milliGRAM(s) IV Push every 12 hours  heparin  Injectable 5000 Unit(s) SubCutaneous every 12 hours  metolazone 5 milliGRAM(s) Oral daily  metoprolol succinate ER 75 milliGRAM(s) Oral daily  pantoprazole    Tablet 40 milliGRAM(s) Oral before breakfast    MEDICATIONS  (PRN):  acetaminophen   Tablet. 650 milliGRAM(s) Oral every 6 hours PRN Mild Pain (1 - 3)  ALBUTerol    0.083% 2.5 milliGRAM(s) Nebulizer every 6 hours PRN Shortness of Breath and/or Wheezing  docusate sodium 100 milliGRAM(s) Oral two times a day PRN Constipation  hydrocortisone 1% Ointment 1 Application(s) Topical two times a day PRN Itching  metoprolol    tartrate Injectable 5 milliGRAM(s) IV Push every 6 hours PRN Give for HR >130, SBP>100  ondansetron Injectable 4 milliGRAM(s) IV Push every 4 hours PRN Nausea and/or Vomiting      ROS:     .ros      Vital Signs Last 24 Hrs  T(C): 36.4 (22 Mar 2018 11:17), Max: 36.4 (21 Mar 2018 17:04)  T(F): 97.6 (22 Mar 2018 11:17), Max: 97.6 (21 Mar 2018 17:04)  HR: 86 (22 Mar 2018 11:17) (70 - 91)  BP: 103/56 (22 Mar 2018 11:17) (91/57 - 103/56)  BP(mean): --  RR: 16 (22 Mar 2018 11:17) (16 - 18)  SpO2: 98% (22 Mar 2018 11:17) (98% - 100%)    I&O's Summary      PHYSICAL EXAM:    .phy      INTERPRETATION OF TELEMETRY:    ECG:    LABS:                        11.7   10.67 )-----------( 210      ( 21 Mar 2018 05:00 )             38.3     03-22    138  |  103  |  74<H>  ----------------------------<  84  5.0   |  24  |  2.18<H>    Ca    8.8      22 Mar 2018 06:48    TPro  6.7  /  Alb  2.3<L>  /  TBili  1.5<H>  /  DBili  0.7<H>  /  AST  64<H>  /  ALT  52  /  AlkPhos  218<H>  03-22      Lipid Panel  Chl 120  HDL 18  LDL 78  Trg 119        03-19 @ 17:24  Trop-I 0.018  CK     --  CK MB  --    03-19 @ 14:11  Trop-I 0.018  CK     --  CK MB  --    Pro BNP  5905 03-19 @ 14:11  D Dimer  -- 03-19 @ 14:11              RADIOLOGY & ADDITIONAL STUDIES: 57 y.o  Female with PMH of diastolic CHF and other cormorbidites sent to the ED for evaluation of worsening dyspnea. Patient reports 11lb weight gain over 3 days and states being compliant with all medications including Lasix. Per her , she eats "[place] food which has salt it in". No recent fevers / chills or illnesses. She has been non compliant to office follow up. Missed 3 out pt visits last week. Of note patient was recently admitted to  and discharged on 3/4/18 (2 weeks ago) for CHF and leg cellulitis. During that admission she suffered KEYSHA with Scr peaked at 2.0 and thus several diuretics were withheld upon discharge including Entresto and Zaroxyln.  She has worsening leg edema.     Today, looks fatigued. No orthopnea. No chest pain. Leg edema persists. Some more diuresis with Bumex.     PAST MEDICAL & SURGICAL HISTORY:  HFrEF CHF (congestive heart failure)  NICM  Non obstructive CAD  AICD  HTN (hypertension)  HLD (hyperlipidemia)  Asthma  DM (diabetes mellitus)  History of cholecystectomy      PREVIOUS CARDIAC WORKUP:      Echo:  Severe reduced EF  Cardiac Cath:  8/24/17 - Non obstructive      Allergies:   No Known Allergies      MEDICATIONS  (STANDING):  amiodarone    Tablet 200 milliGRAM(s) Oral daily  aspirin  chewable 81 milliGRAM(s) Oral daily  buDESOnide 160 MICROgram(s)/formoterol 4.5 MICROgram(s) Inhaler 2 Puff(s) Inhalation two times a day  buMETAnide Injectable 2 milliGRAM(s) IV Push every 12 hours  heparin  Injectable 5000 Unit(s) SubCutaneous every 12 hours  metolazone 5 milliGRAM(s) Oral daily  metoprolol succinate ER 75 milliGRAM(s) Oral daily  pantoprazole    Tablet 40 milliGRAM(s) Oral before breakfast    MEDICATIONS  (PRN):  acetaminophen   Tablet. 650 milliGRAM(s) Oral every 6 hours PRN Mild Pain (1 - 3)  ALBUTerol    0.083% 2.5 milliGRAM(s) Nebulizer every 6 hours PRN Shortness of Breath and/or Wheezing  docusate sodium 100 milliGRAM(s) Oral two times a day PRN Constipation  hydrocortisone 1% Ointment 1 Application(s) Topical two times a day PRN Itching  metoprolol    tartrate Injectable 5 milliGRAM(s) IV Push every 6 hours PRN Give for HR >130, SBP>100  ondansetron Injectable 4 milliGRAM(s) IV Push every 4 hours PRN Nausea and/or Vomiting      ROS:     Detailed ten system ROS was performed and was negative except for history as eluded to above.    no fever  no chills  no nausea  no vomiting  no diarrhea  no constipation  no melena  no hematochezia  no chest pain  no palpitations  no sob at rest  + dyspnea on exertion  no cough  no wheezing  no anorexia  no headache  no dizziness  no syncope  no weakness  no myalgia  no dysuria  no polyuria  no hematuria   + edema      Vital Signs Last 24 Hrs  T(C): 36.4 (22 Mar 2018 11:17), Max: 36.4 (21 Mar 2018 17:04)  T(F): 97.6 (22 Mar 2018 11:17), Max: 97.6 (21 Mar 2018 17:04)  HR: 86 (22 Mar 2018 11:17) (70 - 91)  BP: 103/56 (22 Mar 2018 11:17) (91/57 - 103/56)  BP(mean): --  RR: 16 (22 Mar 2018 11:17) (16 - 18)  SpO2: 98% (22 Mar 2018 11:17) (98% - 100%)    I&O's Summary      PHYSICAL EXAM:    General:                Comfortable, AAO X 3, in no distress.   HEENT:                  Atraumatic, PERRLA, EOMI, conjunctiva clear.   Neck:                     Supple, no adenopathy, no thyromegaly, no JVD, no bruit.  Back:                     Symmetric, non tender.  Chest:                    Scant rales, B/L symmetric air entry, no tachypnea  Heart:                     S1, S2 normal, no gallop, + murmur.  Abdomen:              Soft, non-tender, bowel sounds active. No palpable masses.  Extremities:           no cyanosis, + edema B/L LE.   Skin:                      Skin color, texture normal, no rash  Neurologic:            Grossly nonfocal.       TELEMETRY:   A paced rhythm  with no tachy or lillian events     ECG:  A paced, IVCD    LABS:                        11.7   10.67 )-----------( 210      ( 21 Mar 2018 05:00 )             38.3     03-22    138  |  103  |  74<H>  ----------------------------<  84  5.0   |  24  |  2.18<H>    Ca    8.8      22 Mar 2018 06:48    TPro  6.7  /  Alb  2.3<L>  /  TBili  1.5<H>  /  DBili  0.7<H>  /  AST  64<H>  /  ALT  52  /  AlkPhos  218<H>  03-22      Lipid Panel  Chl 120  HDL 18  LDL 78  Trg 119      RADIOLOGY & ADDITIONAL STUDIES:    CT Abdomen and Pelvis No Cont (03.21.18 @ 13:13) >  IMPRESSION:  Limited evaluation of the IVC and iliac veins due to lack of intravenous contrast. They are however normal in caliber and there is no extrinsic mass effect on these vessels. Small right and trace left pleural effusions, trace ascites and anasarca.

## 2018-03-23 LAB
ANION GAP SERPL CALC-SCNC: 11 MMOL/L — SIGNIFICANT CHANGE UP (ref 5–17)
BUN SERPL-MCNC: 75 MG/DL — HIGH (ref 7–23)
CALCIUM SERPL-MCNC: 9 MG/DL — SIGNIFICANT CHANGE UP (ref 8.5–10.1)
CHLORIDE SERPL-SCNC: 103 MMOL/L — SIGNIFICANT CHANGE UP (ref 96–108)
CO2 SERPL-SCNC: 25 MMOL/L — SIGNIFICANT CHANGE UP (ref 22–31)
CREAT SERPL-MCNC: 1.92 MG/DL — HIGH (ref 0.5–1.3)
GLUCOSE SERPL-MCNC: 116 MG/DL — HIGH (ref 70–99)
HCT VFR BLD CALC: 39.5 % — SIGNIFICANT CHANGE UP (ref 34.5–45)
HGB BLD-MCNC: 11.6 G/DL — SIGNIFICANT CHANGE UP (ref 11.5–15.5)
MCHC RBC-ENTMCNC: 24.3 PG — LOW (ref 27–34)
MCHC RBC-ENTMCNC: 29.4 GM/DL — LOW (ref 32–36)
MCV RBC AUTO: 82.8 FL — SIGNIFICANT CHANGE UP (ref 80–100)
NRBC # BLD: 0 /100 WBCS — SIGNIFICANT CHANGE UP (ref 0–0)
NT-PROBNP SERPL-SCNC: 3985 PG/ML — HIGH (ref 0–125)
PLATELET # BLD AUTO: 238 K/UL — SIGNIFICANT CHANGE UP (ref 150–400)
POTASSIUM SERPL-MCNC: 4.5 MMOL/L — SIGNIFICANT CHANGE UP (ref 3.5–5.3)
POTASSIUM SERPL-SCNC: 4.5 MMOL/L — SIGNIFICANT CHANGE UP (ref 3.5–5.3)
RBC # BLD: 4.77 M/UL — SIGNIFICANT CHANGE UP (ref 3.8–5.2)
RBC # FLD: 25.1 % — HIGH (ref 10.3–14.5)
SODIUM SERPL-SCNC: 139 MMOL/L — SIGNIFICANT CHANGE UP (ref 135–145)
WBC # BLD: 9.29 K/UL — SIGNIFICANT CHANGE UP (ref 3.8–10.5)
WBC # FLD AUTO: 9.29 K/UL — SIGNIFICANT CHANGE UP (ref 3.8–10.5)

## 2018-03-23 PROCEDURE — 71046 X-RAY EXAM CHEST 2 VIEWS: CPT | Mod: 26

## 2018-03-23 PROCEDURE — 76700 US EXAM ABDOM COMPLETE: CPT | Mod: 26

## 2018-03-23 RX ORDER — DOBUTAMINE HCL 250MG/20ML
0.5 VIAL (ML) INTRAVENOUS
Qty: 500 | Refills: 0 | Status: DISCONTINUED | OUTPATIENT
Start: 2018-03-23 | End: 2018-03-26

## 2018-03-23 RX ORDER — BUMETANIDE 0.25 MG/ML
2 INJECTION INTRAMUSCULAR; INTRAVENOUS EVERY 12 HOURS
Qty: 0 | Refills: 0 | Status: DISCONTINUED | OUTPATIENT
Start: 2018-03-23 | End: 2018-03-26

## 2018-03-23 RX ADMIN — AMIODARONE HYDROCHLORIDE 200 MILLIGRAM(S): 400 TABLET ORAL at 05:30

## 2018-03-23 RX ADMIN — BUDESONIDE AND FORMOTEROL FUMARATE DIHYDRATE 2 PUFF(S): 160; 4.5 AEROSOL RESPIRATORY (INHALATION) at 08:30

## 2018-03-23 RX ADMIN — Medication 81 MILLIGRAM(S): at 11:36

## 2018-03-23 RX ADMIN — HEPARIN SODIUM 5000 UNIT(S): 5000 INJECTION INTRAVENOUS; SUBCUTANEOUS at 21:14

## 2018-03-23 RX ADMIN — BUMETANIDE 2 MILLIGRAM(S): 0.25 INJECTION INTRAMUSCULAR; INTRAVENOUS at 21:12

## 2018-03-23 RX ADMIN — Medication 1.36 MICROGRAM(S)/KG/MIN: at 17:53

## 2018-03-23 RX ADMIN — HEPARIN SODIUM 5000 UNIT(S): 5000 INJECTION INTRAVENOUS; SUBCUTANEOUS at 08:54

## 2018-03-23 RX ADMIN — BUMETANIDE 2 MILLIGRAM(S): 0.25 INJECTION INTRAMUSCULAR; INTRAVENOUS at 09:09

## 2018-03-23 RX ADMIN — PANTOPRAZOLE SODIUM 40 MILLIGRAM(S): 20 TABLET, DELAYED RELEASE ORAL at 05:31

## 2018-03-23 RX ADMIN — BUDESONIDE AND FORMOTEROL FUMARATE DIHYDRATE 2 PUFF(S): 160; 4.5 AEROSOL RESPIRATORY (INHALATION) at 19:42

## 2018-03-23 NOTE — PROGRESS NOTE ADULT - ATTENDING COMMENTS
Patient seen and examined. Sill with severe leg edema, improving as per patient.  No new complaints. Discussed with patient in length regarding management and d/c plan.   Will give her extra dose of 40 mg IV lasix.   Agree with NP Maya assessment and plan.
Patient seen and examined. Still with ++++ B/L pitting edema. Patient feels better today.  Discussed with Dr Stevens.  Agree with EDWIN Trejo assessment and plan.  Will continue IV lasix  Consult Dr Singer for ICD change
Patient seen and examined. Still with B/L LE severe edema. Lasix changed to bumex and started on Zaroxolyn  Discussed with Dr Stevens yesterday.  Discussed with patient regarding management plan.   Agree with NP Joey assessment and plan.
Patient seen and examined.   Agree with NP Maya assessment and plan.  Started on IV bumex and xaroxolyn  Discussed with Dr Chan.  Discussed with patient regarding management plan.

## 2018-03-23 NOTE — PROGRESS NOTE ADULT - ASSESSMENT
Patient is a 58 y.o. female with PMH of CKD2/3 (baseline SCr 1.0), HTN, DM2, chronic systolic CHF (s/p bi-v AICD), VTach on Amiodarone, Hyperlipidemia discharged 3/4/18 s/p CHF exacerbation with edema requiring IV diuresis with KEYSHA peak 2.45 - diuretics (Metolazone and Aldactone) were held, Metformin was stopped, and she was discharged with SCr 1.5 on Lasix 40mg daily. Now she returns with volume overload and KEYSHA (SCr 2.0) - renal called for evaluation.    1. KEYSHA on CKD3 - baseline 1.0, recently 1.5 as outpatient, now 2.0 improving slightly to 1.9  - likely due to poor hemodynamics from CHF / volume overload  - agree with IV diuresis - cont Bumex IV bid  - add Metolazone 5mg daily - may need to increase to bid as BP tolerates  - albumin low - add Nepro supplement tid  - non-nephrotic range proteinuria  - lytes acceptable - no urgent need for dialysis  - diabetes relatively controlled  - dietary adjustment - to eat lower salt - discussed options - currently continues to eat high salt intake - family providing high salt food during admission    2. HTN - BP on lower end  - if unable to diurese well, may need Lasix drip  - agree with trying Bumex first    3. Proteinura - 1.3g/g  - due to diabetic nephopathy  - no ACEI / ARB while +KEYSHA and BP on lower end    4. CHF   - BB, diuresis    Add protein supplement Nepro tid.   Case d/w Dr. Cordoba and RN.    Dr. Sanchez to cover service 3/24-25.

## 2018-03-23 NOTE — PROGRESS NOTE ADULT - ASSESSMENT
ASSESSMENT / PLAN:   57 y.o  Woman sent to the ED from Cardiologist's office for evaluation of worsening dyspnea found to have acute on chronic HFrEF     Acute Hypoxic Respiratory Distress superimposed from Acute on Chronic HFrEF (20%)  - decompensated likely 2/2 dietary noncompliance   - still with significant volume overload; CT a/p reviewed  - change IV lasix to bumex (did not receive bumex overnight or early am d/t BP)  ** parameters placed with bumex - dosed this am  - monitor strict I's/O's, daily weights--> not well documented (reinforce importance of efficient reporting to help guide therapy)  - may need Entresto added back to regimen and evaluation for pulmonary HTN.   - appreciate cardiology input   - recent dopplers 2 weeks from admission date was negative for DVT.   - ongoing education on importance of dietary compliance  - ACE bandage wrap for compression effect  - Serum Pro-Brain Natriuretic Peptide 3985 pg/mL from 5905 pg/mL on adm     KEYSHA on CKD - possibly 2/2 prerenal from volume overload.   - appreciate renal input  - avoid nephrotoxins where possible    Urine Cx w/ colonized VRE  - she's afebrile / WBC normal  - appreciate ID input --> monitor off abt    Vtach/CAD  - no significant ectopy on tele   - con't asa / toprol / Amiodarone / statin  - monitor LFT's    HTN   - cont BB   - monitor BP per protocol    T2DM  - A1c 7.3 (1/30/18)  - holding januvia  - con't POCT with HEMA ; consistent carb diet    Asthma - stable  - continue Symbicort add prn nebs for wheezing.     Obesity  - nutrition consult  - educated on importance of appropriate wt loss plan    #DVT prophylaxis- heparin sq    Dispo  - full code  - patient and son at bedside updated on plan of care

## 2018-03-23 NOTE — PHYSICAL THERAPY INITIAL EVALUATION ADULT - PERTINENT HX OF CURRENT PROBLEM, REHAB EVAL
HPI: 57 y.o  woman with PMH/PSH of HFrEF 20%, Non obstructive CAD, AICD, HTN, HLD, Asthma, DM, cholecystectomy  sent to the ED from Cardiologist's office for evaluation of worsening dyspnea.

## 2018-03-23 NOTE — PHYSICAL THERAPY INITIAL EVALUATION ADULT - ADDITIONAL COMMENTS
Pt. lives with  in prv home with no step to enter and none inside. Pt. was Ind with all ADL's and amb with NAD + drive - tramadol for pain  - pt states she has outpatient rheumatologist who was trying to put her on biologics  - outpatient follow up  -c/w prednisone 10mgv QD  - IVELISSE recommended

## 2018-03-23 NOTE — PROGRESS NOTE ADULT - ASSESSMENT
Acute on Chronic HFrEF  Hypoglycemia  Hyperkalemia  ARF  NICM. S/p VT  Non obstructive CAD  HTN  AICD      Suggest:    Observe patient on telemetry.  follow low sodium, low cholesterol, ADA diet.  Fluid restriction 1.5 lit/day  monitor Intake & output  Daily weights.  Follow up electrolytes, renal function.   Increase Bumex and add zaroxolyn  F/u BP  F/U diuretic response  No ACEi at present till renal function stabilizes.   Hold beta blockers if BP low Acute on Chronic HFrEF  Hypoglycemia  Hyperkalemia  ARF  NICM. S/p VT  Non obstructive CAD  HTN  AICD      Suggest:    Observe patient on telemetry.  follow low sodium, low cholesterol, ADA diet.  Fluid restriction 1.5 lit/day  monitor Intake & output  Daily weights.  Follow up electrolytes, renal function.   Increase Bumex and add zaroxolyn  F/u BP  F/U diuretic response  No ACEi at present till renal function stabilizes.   Hold beta blockers if BP low    D/W  at bedside.

## 2018-03-23 NOTE — PHYSICAL THERAPY INITIAL EVALUATION ADULT - GENERAL OBSERVATIONS, REHAB EVAL
Pt. rec'ed sitting on bed side chair with cardiac monitors. Pt. was moved from 3E room 327 to CICU . Pt. agreeable to PT.

## 2018-03-23 NOTE — PROGRESS NOTE ADULT - SUBJECTIVE AND OBJECTIVE BOX
CURRENT CARDIAC WORKUP:       Echo:  Stress Test:  Cardiac Cath:    Allergies:   No Known Allergies      MEDICATIONS  (STANDING):  amiodarone    Tablet 200 milliGRAM(s) Oral daily  aspirin  chewable 81 milliGRAM(s) Oral daily  buDESOnide 160 MICROgram(s)/formoterol 4.5 MICROgram(s) Inhaler 2 Puff(s) Inhalation two times a day  buMETAnide Injectable 2 milliGRAM(s) IV Push every 12 hours  heparin  Injectable 5000 Unit(s) SubCutaneous every 12 hours  metolazone 5 milliGRAM(s) Oral daily  pantoprazole    Tablet 40 milliGRAM(s) Oral before breakfast    MEDICATIONS  (PRN):  acetaminophen   Tablet. 650 milliGRAM(s) Oral every 6 hours PRN Mild Pain (1 - 3)  ALBUTerol    0.083% 2.5 milliGRAM(s) Nebulizer every 6 hours PRN Shortness of Breath and/or Wheezing  docusate sodium 100 milliGRAM(s) Oral two times a day PRN Constipation  hydrocortisone 1% Ointment 1 Application(s) Topical two times a day PRN Itching  metoprolol    tartrate Injectable 5 milliGRAM(s) IV Push every 6 hours PRN Give for HR >130, SBP>100  ondansetron Injectable 4 milliGRAM(s) IV Push every 4 hours PRN Nausea and/or Vomiting      ROS:     .ros      Vital Signs Last 24 Hrs  T(C): 36.7 (23 Mar 2018 05:24), Max: 36.7 (22 Mar 2018 16:59)  T(F): 98.1 (23 Mar 2018 05:24), Max: 98.1 (23 Mar 2018 05:24)  HR: 69 (23 Mar 2018 08:53) (69 - 86)  BP: 101/78 (23 Mar 2018 08:53) (86/61 - 103/56)  BP(mean): 75 (23 Mar 2018 05:24) (75 - 75)  RR: 16 (22 Mar 2018 16:59) (16 - 16)  SpO2: 100% (23 Mar 2018 08:53) (98% - 100%)    I&O's Summary    22 Mar 2018 07:01  -  23 Mar 2018 07:00  --------------------------------------------------------  IN: 480 mL / OUT: 0 mL / NET: 480 mL        PHYSICAL EXAM:    .phy      INTERPRETATION OF TELEMETRY:    ECG:    LABS:                        11.6   9.29  )-----------( 238      ( 23 Mar 2018 05:45 )             39.5     03-23    139  |  103  |  75<H>  ----------------------------<  116<H>  4.5   |  25  |  1.92<H>    Ca    9.0      23 Mar 2018 05:45    TPro  6.7  /  Alb  2.3<L>  /  TBili  1.5<H>  /  DBili  0.7<H>  /  AST  64<H>  /  ALT  52  /  AlkPhos  218<H>  03-22      Lipid Panel  Chl 120  HDL 18  LDL 78  Trg 119        03-19 @ 17:24  Trop-I 0.018  CK     --  CK MB  --    03-19 @ 14:11  Trop-I 0.018  CK     --  CK MB  --    Pro BNP  3985 03-23 @ 05:45  D Dimer  -- 03-23 @ 05:45  Pro BNP  5905 03-19 @ 14:11  D Dimer  -- 03-19 @ 14:11              RADIOLOGY & ADDITIONAL STUDIES: 57 y.o  Female with PMH of diastolic CHF and other cormorbidites sent to the ED for evaluation of worsening dyspnea. Patient reports 11lb weight gain over 3 days and states being compliant with all medications including Lasix. Per her , she eats "[place] food which has salt it in". No recent fevers / chills or illnesses. She has been non compliant to office follow up. Missed 3 out pt visits last week. Of note patient was recently admitted to  and discharged on 3/4/18 (2 weeks ago) for CHF and leg cellulitis. During that admission she suffered KEYSHA with Scr peaked at 2.0 and thus several diuretics were withheld upon discharge including Entresto and Zaroxyln.  She has worsening leg edema.     Today, she is upset. Wants to go home. No chest pain. No orthopnea. Leg swelling is less with compression stockings.     PAST MEDICAL & SURGICAL HISTORY:  HFrEF CHF (congestive heart failure)  NICM  Non obstructive CAD  AICD  HTN (hypertension)  HLD (hyperlipidemia)  Asthma  DM (diabetes mellitus)  History of cholecystectomy      PREVIOUS CARDIAC WORKUP:      Echo:  Severe reduced EF  Cardiac Cath:  8/24/17 - Non obstructive      Allergies:   No Known Allergies      MEDICATIONS  (STANDING):  amiodarone    Tablet 200 milliGRAM(s) Oral daily  aspirin  chewable 81 milliGRAM(s) Oral daily  buDESOnide 160 MICROgram(s)/formoterol 4.5 MICROgram(s) Inhaler 2 Puff(s) Inhalation two times a day  buMETAnide Injectable 2 milliGRAM(s) IV Push every 12 hours  heparin  Injectable 5000 Unit(s) SubCutaneous every 12 hours  metolazone 5 milliGRAM(s) Oral daily  pantoprazole    Tablet 40 milliGRAM(s) Oral before breakfast    MEDICATIONS  (PRN):  acetaminophen   Tablet. 650 milliGRAM(s) Oral every 6 hours PRN Mild Pain (1 - 3)  ALBUTerol    0.083% 2.5 milliGRAM(s) Nebulizer every 6 hours PRN Shortness of Breath and/or Wheezing  docusate sodium 100 milliGRAM(s) Oral two times a day PRN Constipation  hydrocortisone 1% Ointment 1 Application(s) Topical two times a day PRN Itching  metoprolol    tartrate Injectable 5 milliGRAM(s) IV Push every 6 hours PRN Give for HR >130, SBP>100  ondansetron Injectable 4 milliGRAM(s) IV Push every 4 hours PRN Nausea and/or Vomiting      ROS:     Detailed ten system ROS was performed and was negative except for history as eluded to above.    no fever  no chills  no nausea  no vomiting  no diarrhea  no constipation  no melena  no hematochezia  no chest pain  no palpitations  no sob at rest  + dyspnea on exertion  no cough  no wheezing  no anorexia  no headache  no dizziness  no syncope  no weakness  no myalgia  no dysuria  no polyuria  no hematuria   + edema      Vital Signs Last 24 Hrs  T(C): 36.7 (23 Mar 2018 05:24), Max: 36.7 (22 Mar 2018 16:59)  T(F): 98.1 (23 Mar 2018 05:24), Max: 98.1 (23 Mar 2018 05:24)  HR: 69 (23 Mar 2018 08:53) (69 - 86)  BP: 101/78 (23 Mar 2018 08:53) (86/61 - 103/56)  BP(mean): 75 (23 Mar 2018 05:24) (75 - 75)  RR: 16 (22 Mar 2018 16:59) (16 - 16)  SpO2: 100% (23 Mar 2018 08:53) (98% - 100%)        PHYSICAL EXAM:    General:                Comfortable, AAO X 3, in no distress.   HEENT:                  Atraumatic, PERRLA, EOMI, conjunctiva clear.   Neck:                     Supple, no adenopathy, no thyromegaly, no JVD, no bruit.  Back:                     Symmetric, non tender.  Chest:                    Scant rales, B/L symmetric air entry, no tachypnea  Heart:                     S1, S2 normal, no gallop, + murmur.  Abdomen:              Soft, non-tender, bowel sounds active. No palpable masses.  Extremities:           no cyanosis, + edema B/L LE.   Skin:                      Skin color, texture normal, no rash  Neurologic:            Grossly nonfocal.       TELEMETRY:   A paced rhythm  with no tachy or lillian events     ECG:  A paced, IVCD    LABS:                        11.6   9.29  )-----------( 238      ( 23 Mar 2018 05:45 )             39.5     03-23    139  |  103  |  75<H>  ----------------------------<  116<H>  4.5   |  25  |  1.92<H>    Ca    9.0      23 Mar 2018 05:45    TPro  6.7  /  Alb  2.3<L>  /  TBili  1.5<H>  /  DBili  0.7<H>  /  AST  64<H>  /  ALT  52  /  AlkPhos  218<H>  03-22      Lipid Panel  Chl 120  HDL 18  LDL 78  Trg 119      Pro BNP  3985 03-23 @ 05:45    Pro BNP  5905 03-19 @ 14:11      RADIOLOGY & ADDITIONAL STUDIES:    CT Abdomen and Pelvis No Cont (03.21.18 @ 13:13) >  IMPRESSION:  Limited evaluation of the IVC and iliac veins due to lack of intravenous contrast. They are however normal in caliber and there is no extrinsic mass effect on these vessels. Small right and trace left pleural effusions, trace ascites and anasarca.

## 2018-03-23 NOTE — PHYSICAL THERAPY INITIAL EVALUATION ADULT - PRECAUTIONS/LIMITATIONS, REHAB EVAL
VRE precautions/fall precautions/isolation precautions VRE precautions/cardiac precautions/fall precautions/isolation precautions

## 2018-03-23 NOTE — PROGRESS NOTE ADULT - SUBJECTIVE AND OBJECTIVE BOX
Patient is a 58 y.o. female with PMH of CKD2/3 (baseline SCr 1.0), HTN, DM2, chronic systolic CHF (s/p bi-v AICD), VTach on Amiodarone, Hyperlipidemia discharged 3/4/18 s/p CHF exacerbation with edema requiring IV diuresis with KEYSHA peak 2.45 - diuretics (Metolazone and Aldactone) were held, Metformin was stopped, and she was discharged with SCr 1.5 on Lasix 40mg daily. Now she returns with volume overload and KEYSHA (SCr 2.0) - renal called for evaluation.    Reports eating high salt diet. Eats out often.  Since admission, received IV Lasix with improvement in breathing and edema.    Dopplers negative for DVT on prior admission.   On 18 - admission for asthma exacerbation due to parainfluenza infection and KEYSHA 1.7 with hyperkalemia 5.9. Entresto was stopped.  3/23 - wants to leave, edema improved with compression dressings, did not receive loop last night due to hypotension SBP 80s - to receive dose this AM. Metolazone added.      PAST MEDICAL & SURGICAL HISTORY:  CHF (congestive heart failure)  Pacemaker  HTN (hypertension)  HLD (hyperlipidemia)  Asthma  DM (diabetes mellitus)  CAD (coronary artery disease)  History of cholecystectomy  Artificial cardiac pacemaker      MEDICATIONS  (STANDING):  amiodarone    Tablet 200 milliGRAM(s) Oral daily  aspirin  chewable 81 milliGRAM(s) Oral daily  buDESOnide 160 MICROgram(s)/formoterol 4.5 MICROgram(s) Inhaler 2 Puff(s) Inhalation two times a day  buMETAnide Injectable 2 milliGRAM(s) IV Push every 12 hours  heparin  Injectable 5000 Unit(s) SubCutaneous every 12 hours  pantoprazole    Tablet 40 milliGRAM(s) Oral before breakfast        Allergies    No Known Allergies    Intolerances        SOCIAL HISTORY:  Denies ETOh, Smoking, IVDU    FAMILY HISTORY:  Family history of other heart disease (Mother)      REVIEW OF SYSTEMS:    CONSTITUTIONAL: No weakness, fevers or chills  EYES/ENT: No visual changes;  No vertigo or throat pain   NECK: No pain or stiffness  RESPIRATORY: No cough, wheezing, hemoptysis; ++ shortness of breath (resolved)  CARDIOVASCULAR: No chest pain or palpitations  GASTROINTESTINAL: No abdominal or epigastric pain. No nausea, vomiting, or hematemesis; No diarrhea or constipation. No melena or hematochezia.  GENITOURINARY: No dysuria, frequency or hematuria  NEUROLOGICAL: No numbness or weakness  SKIN: No itching, burning, rashes, or lesions   All other review of systems is negative unless indicated above.    ICU Vital Signs Last 24 Hrs  T(C): 36.7 (23 Mar 2018 05:24), Max: 36.7 (22 Mar 2018 16:59)  T(F): 98.1 (23 Mar 2018 05:24), Max: 98.1 (23 Mar 2018 05:24)  HR: 69 (23 Mar 2018 08:53) (69 - 86)  BP: 101/78 (23 Mar 2018 08:53) (86/61 - 103/56)  BP(mean): 75 (23 Mar 2018 05:24) (75 - 75)  ABP: --  ABP(mean): --  RR: 16 (22 Mar 2018 16:59) (16 - 16)  SpO2: 100% (23 Mar 2018 08:53) (98% - 100%)      I and O's:        PHYSICAL EXAM:    Constitutional: NAD, awake, alert, conversing without dyspnea  HEENT: PERRLA, EOMI,  MMM  Neck: No LAD, No JVD  Respiratory: CTAB, no crackles  Cardiovascular: S1 and S2  Gastrointestinal: BS+, soft, NT/ND  Extremities: 2+ peripheral edema to abdomen, legs wrapped in ace bandages  Neurological: A/O x 3, no focal deficits  Psychiatric: Normal mood, normal affect  : No Locke  Skin: No rashes  Access: Not applicable    LABS:                                   11.6   9.29  )-----------( 238      ( 23 Mar 2018 05:45 )             39.5     139    |  103    |  75     ----------------------------<  116       23 Mar 2018 05:45  4.5     |  25     |  1.92     138    |  103    |  74     ----------------------------<  84        22 Mar 2018 06:48  5.0     |  24     |  2.18     Ca    9.0        23 Mar 2018 05:45  Ca    8.8        22 Mar 2018 06:48      Mg     2.3       20 Mar 2018 05:19    TPro  6.7    /  Alb  2.3    /  TBili  1.5    /        22 Mar 2018 06:48  DBili  0.7    /  AST  64     /  ALT  52     /  AlkPhos  218      TPro  6.9    /  Alb  2.7    /  TBili  1.7    /        19 Mar 2018 14:11  DBili  x      /  AST  52     /  ALT  57     /  AlkPhos  247          137    |  104    |  74     ----------------------------<  95        21 Mar 2018 05:00  4.5     |  24     |  2.06     139    |  104    |  77     ----------------------------<  92        20 Mar 2018 05:19  4.2     |  25     |  1.87     138    |  102    |  71     ----------------------------<  174       19 Mar 2018 14:11  4.0     |  26     |  2.01     Ca    8.6        21 Mar 2018 05:00  Ca    8.8        20 Mar 2018 05:19      Mg     2.3       20 Mar 2018 05:19    TPro  6.9    /  Alb  2.7    /  TBili  1.7    /        19 Mar 2018 14:11  DBili  x      /  AST  52     /  ALT  57     /  AlkPhos  247            Urine Studies:  Urinalysis Basic - ( 19 Mar 2018 14:11 )    Color: Yellow / Appearance: Clear / S.015 / pH: x  Gluc: x / Ketone: Negative  / Bili: Negative / Urobili: 4 mg/dL   Blood: mod / Protein: 30 mg/dL / Nitrite: Negative   Leuk Esterase: Trace / RBC: 6-10 /HPF / WBC 6-10   Sq Epi: x / Non Sq Epi: Few / Bacteria: Few      Up/cr 1.3g/g  UCx negative    RADIOLOGY & ADDITIONAL STUDIES:

## 2018-03-23 NOTE — PROGRESS NOTE ADULT - SUBJECTIVE AND OBJECTIVE BOX
Chart reviewed.  Patient seen and examined.    CC: worsening shortness of breath weight gain, leg sidney    HPI: 57 y.o  woman with PMH/PSH of HFrEF 20%, Non obstructive CAD, AICD, HTN, HLD, Asthma, DM, cholecystectomy  sent to the ED from Cardiologist's office for evaluation of worsening dyspnea. Patient reports 11lb weight gain over 3 days and states being compliant with all medications including Lasix. Per her , she eats "Eliel food which has salt it in". No recent fevers / chills or illnesses. Of note patient was recently admitted to  and discharged on 3/4/18 (2 weeks ago) for CHF and leg cellulitis. During that admission she suffered KEYSHA with Scr peaked at 2.0 and thus several diuretics were withheld upon discharge including Entresto and Zaroxyln.     In the ED, notable labs include elevated BNP ~6000 and Scr 2.0, normal trop X 1 and normal CBC. CXR with massive cardiomegaly. Physical exam pertinent for severe +3 LE pitting edema bilaterally. Patient given IV Lasix 20mg and admission requested. Currently seen denying chest pain and c/o ongoing KHAN and + orthopnea at home.     Subjective:   3/20: she acknowledged drinking much fluid at home and "sopa"; feels better than when she came in, is happy she can walk better unlike on admission, BLE swelling is down some  3/21: denies shortness of breath at rest or with ambulation to bathrm, slept well last night;  at bedside offers no complaints; received xtra 40mg IV of lasix  3/22: no issues overnight, no complaints  3/23: Very upset that she is getting bills (TV bills), [place]ish speaking RN educated pt on her right not to order TV service--> she still desires to continue ordering TV service; also upset about her weight gain, wants to be discharged because she feels her wt gain is d/t increase eating while hospitalized; otherwise OK;  at bedside repeated pt's complaints     REVIEW OF SYSTEMS: All systems reviewed and found to be negative with the exception as above.    Vital Signs Last 24 Hrs  T(C): 36.3 (23 Mar 2018 10:49), Max: 36.7 (22 Mar 2018 16:59)  T(F): 97.4 (23 Mar 2018 10:49), Max: 98.1 (23 Mar 2018 05:24)  HR: 71 (23 Mar 2018 10:49) (69 - 75)  BP: 108/68 (23 Mar 2018 10:49) (86/61 - 108/68)  BP(mean): 75 (23 Mar 2018 05:24) (75 - 75)  RR: 18 (23 Mar 2018 10:49) (16 - 18)  SpO2: 100% (23 Mar 2018 10:49) (100% - 100%)    PHYSICAL EXAM:    GENERAL: obese, NAD  HEENT: some facial puffiness noted, pupils equal and reactive, EOMI, no oropharyngeal lesions, erythema, exudates, oral thrush  NECK:   supple, no carotid bruits, no palpable lymph nodes, no thyromegaly  CV:  +S1, +S2, regular, no murmurs or rubs  RESP:  lungs clear to auscultation bilaterally, no wheezing, rales, rhonchi, good air entry bilaterally  GI:  obese abdomen soft, non-tender, +distention (she reports it's because she is eating much here) normal BS, no bruits, no abdominal masses, no palpable masses  MSK:   normal muscle tone, no atrophy, no rigidity, no contractions  EXT/vasc:  4+ pitting edema, extending to thighs, +sacral edema, chronic vascular changes, diminished pedal pulses  NEURO:  AAOX3, no focal neurological deficits, follows all commands, able to move extremities spontaneously  SKIN:  no ulcers, lesions or rashes    LABS:             11.6   9.29  )-----------( 238      ( 23 Mar 2018 05:45 )             39.5     03-23    139  |  103  |  75<H>  ----------------------------<  116<H>  4.5   |  25  |  1.92<H>    Ca    9.0      23 Mar 2018 05:45    TPro  6.7  /  Alb  2.3<L>  /  TBili  1.5<H>  /  DBili  0.7<H>  /  AST  64<H>  /  ALT  52  /  AlkPhos  218<H>  03-22    LIVER FUNCTIONS - ( 22 Mar 2018 06:48 )  Alb: 2.3 g/dL / Pro: 6.7 gm/dL / ALK PHOS: 218 U/L / ALT: 52 U/L / AST: 64 U/L / GGT: x           Serum Pro-Brain Natriuretic Peptide: 3985 pg/mL (03-23-18 @ 05:45)  Serum Pro-Brain Natriuretic Peptide: 5905 pg/mL (03-19-18 @ 14:11)    Culture - Urine (collected 03-19-18 @ 14:11)  Source: .Urine Clean Catch (Midstream)  Final Report (03-21-18 @ 21:14):    50,000 - 99,000 CFU/mL Enterococcus faecium (vancomycin resistant)    <10,000 CFU/ml Normal Urogenital tara present  Organism: Enterococcus faecium (vancomycin resistant) (03-21-18 @ 21:14)  Organism: Enterococcus faecium (vancomycin resistant) (03-21-18 @ 21:14)      -  Ampicillin: R >8      -  Ciprofloxacin: R >2      -  Nitrofurantoin: S <=32      -  Tetra/Doxy: R >8      -  Vancomycin: R >16      Method Type: MOSES    RADIOLOGY  < from: Xray Chest 1 View AP/PA. (03.19.18 @ 14:43) >  IMPRESSION: Massive cardiomegaly. Clear lungs.    < end of copied text >    < from: Transthoracic Echocardiogram (06.07.17 @ 09:49) >     Summary   The left ventricle is severely dilated with severe global hypokinesis.   Estimated left ventricular ejection fraction is 20 % via bi-plane method.   Left atriumis moderately dilated.  The right atrium appears moderately dilated. A device wire is seen in the   RV and RA.  The right ventricle is normal in size and contractility.   The aortic valve appears normal.  There is thickening of both mitral valve leaflet tips. The leaflet   opening  is normal. Moderate mitral regurgitation is present based on a calculated   ERO of .32cm2.  The tricuspid valve leaflets are thin and pliable. Moderate (2+)   tricuspid  valve regurgitation is present. Moderate pulmonary hypertension.   Trace pericardial effusion is present.     < end of copied text >    < from: CT Abdomen and Pelvis No Cont (03.21.18 @ 13:13) >    IMPRESSION:   Limited evaluation of the IVC and iliac veins due to lack of intravenous   contrast. They are however normal in caliber and there is no extrinsic   mass effect on these vessels.    Small right and trace left pleural effusions, trace ascites and anasarca.    4.8 cm right adnexal cyst. Recommend ultrasound to further evaluate.    < end of copied text >      MEDICATIONS    MEDICATIONS  (STANDING):  amiodarone    Tablet 200 milliGRAM(s) Oral daily  aspirin  chewable 81 milliGRAM(s) Oral daily  buDESOnide 160 MICROgram(s)/formoterol 4.5 MICROgram(s) Inhaler 2 Puff(s) Inhalation two times a day  buMETAnide Injectable 2 milliGRAM(s) IV Push every 12 hours  heparin  Injectable 5000 Unit(s) SubCutaneous every 12 hours  metolazone 5 milliGRAM(s) Oral daily  metolazone 5 milliGRAM(s) Oral every 24 hours  pantoprazole    Tablet 40 milliGRAM(s) Oral before breakfast    MEDICATIONS  (PRN):  acetaminophen   Tablet. 650 milliGRAM(s) Oral every 6 hours PRN Mild Pain (1 - 3)  ALBUTerol    0.083% 2.5 milliGRAM(s) Nebulizer every 6 hours PRN Shortness of Breath and/or Wheezing  docusate sodium 100 milliGRAM(s) Oral two times a day PRN Constipation  hydrocortisone 1% Ointment 1 Application(s) Topical two times a day PRN Itching  metoprolol    tartrate Injectable 5 milliGRAM(s) IV Push every 6 hours PRN Give for HR >130, SBP>100  ondansetron Injectable 4 milliGRAM(s) IV Push every 4 hours PRN Nausea and/or Vomiting Chart reviewed.  Patient seen and examined.    CC: worsening shortness of breath weight gain, leg sidney    HPI: 57 y.o  woman with PMH/PSH of HFrEF 20%, Non obstructive CAD, AICD, HTN, HLD, Asthma, DM, cholecystectomy  sent to the ED from Cardiologist's office for evaluation of worsening dyspnea. Patient reports 11lb weight gain over 3 days and states being compliant with all medications including Lasix. Per her , she eats "Eliel food which has salt it in". No recent fevers / chills or illnesses. Of note patient was recently admitted to  and discharged on 3/4/18 (2 weeks ago) for CHF and leg cellulitis. During that admission she suffered KEYSHA with Scr peaked at 2.0 and thus several diuretics were withheld upon discharge including Entresto and Zaroxyln.     In the ED, notable labs include elevated BNP ~6000 and Scr 2.0, normal trop X 1 and normal CBC. CXR with massive cardiomegaly. Physical exam pertinent for severe +3 LE pitting edema bilaterally. Patient given IV Lasix 20mg and admission requested. Currently seen denying chest pain and c/o ongoing KHAN and + orthopnea at home.     Subjective:   3/20: she acknowledged drinking much fluid at home and "sopa"; feels better than when she came in, is happy she can walk better unlike on admission, BLE swelling is down some  3/21: denies shortness of breath at rest or with ambulation to bathrm, slept well last night;  at bedside offers no complaints; received xtra 40mg IV of lasix  3/22: no issues overnight, no complaints  3/23: Very upset that she is getting bills (TV bills), [place]vian speaking RN educated pt on her right not to order TV service--> she still desires to continue ordering TV service; also upset about her weight gain, wants to be discharged because she feels her wt gain is d/t increase eating while hospitalized; otherwise OK;  at bedside repeated pt's complaints.       REVIEW OF SYSTEMS: All systems reviewed and found to be negative with the exception as above.    Vital Signs Last 24 Hrs  T(C): 36.3 (23 Mar 2018 10:49), Max: 36.7 (22 Mar 2018 16:59)  T(F): 97.4 (23 Mar 2018 10:49), Max: 98.1 (23 Mar 2018 05:24)  HR: 71 (23 Mar 2018 10:49) (69 - 75)  BP: 108/68 (23 Mar 2018 10:49) (86/61 - 108/68)  BP(mean): 75 (23 Mar 2018 05:24) (75 - 75)  RR: 18 (23 Mar 2018 10:49) (16 - 18)  SpO2: 100% (23 Mar 2018 10:49) (100% - 100%)    PHYSICAL EXAM:    GENERAL: obese, NAD  HEENT: some facial puffiness noted, pupils equal and reactive, EOMI, no oropharyngeal lesions, erythema, exudates, oral thrush  NECK:   supple, no carotid bruits, no palpable lymph nodes, no thyromegaly  CV:  +S1, +S2, regular, no murmurs or rubs  RESP:  lungs clear to auscultation bilaterally, no wheezing, rales, rhonchi, good air entry bilaterally  GI:  obese abdomen soft, non-tender, +distention (she reports it's because she is eating much here) normal BS, no bruits, no abdominal masses, no palpable masses  MSK:   normal muscle tone, no atrophy, no rigidity, no contractions  EXT/vasc:  4+ pitting edema, extending to thighs, +sacral edema, chronic vascular changes, diminished pedal pulses  NEURO:  AAOX3, no focal neurological deficits, follows all commands, able to move extremities spontaneously  SKIN:  no ulcers, lesions or rashes    LABS:             11.6   9.29  )-----------( 238      ( 23 Mar 2018 05:45 )             39.5     03-23    139  |  103  |  75<H>  ----------------------------<  116<H>  4.5   |  25  |  1.92<H>    Ca    9.0      23 Mar 2018 05:45    TPro  6.7  /  Alb  2.3<L>  /  TBili  1.5<H>  /  DBili  0.7<H>  /  AST  64<H>  /  ALT  52  /  AlkPhos  218<H>  03-22    LIVER FUNCTIONS - ( 22 Mar 2018 06:48 )  Alb: 2.3 g/dL / Pro: 6.7 gm/dL / ALK PHOS: 218 U/L / ALT: 52 U/L / AST: 64 U/L / GGT: x           Serum Pro-Brain Natriuretic Peptide: 3985 pg/mL (03-23-18 @ 05:45)  Serum Pro-Brain Natriuretic Peptide: 5905 pg/mL (03-19-18 @ 14:11)    Culture - Urine (collected 03-19-18 @ 14:11)  Source: .Urine Clean Catch (Midstream)  Final Report (03-21-18 @ 21:14):    50,000 - 99,000 CFU/mL Enterococcus faecium (vancomycin resistant)    <10,000 CFU/ml Normal Urogenital tara present  Organism: Enterococcus faecium (vancomycin resistant) (03-21-18 @ 21:14)  Organism: Enterococcus faecium (vancomycin resistant) (03-21-18 @ 21:14)      -  Ampicillin: R >8      -  Ciprofloxacin: R >2      -  Nitrofurantoin: S <=32      -  Tetra/Doxy: R >8      -  Vancomycin: R >16      Method Type: MOSES    RADIOLOGY  < from: Xray Chest 1 View AP/PA. (03.19.18 @ 14:43) >  IMPRESSION: Massive cardiomegaly. Clear lungs.    < end of copied text >    < from: Transthoracic Echocardiogram (06.07.17 @ 09:49) >     Summary   The left ventricle is severely dilated with severe global hypokinesis.   Estimated left ventricular ejection fraction is 20 % via bi-plane method.   Left atriumis moderately dilated.  The right atrium appears moderately dilated. A device wire is seen in the   RV and RA.  The right ventricle is normal in size and contractility.   The aortic valve appears normal.  There is thickening of both mitral valve leaflet tips. The leaflet   opening  is normal. Moderate mitral regurgitation is present based on a calculated   ERO of .32cm2.  The tricuspid valve leaflets are thin and pliable. Moderate (2+)   tricuspid  valve regurgitation is present. Moderate pulmonary hypertension.   Trace pericardial effusion is present.     < end of copied text >    < from: CT Abdomen and Pelvis No Cont (03.21.18 @ 13:13) >    IMPRESSION:   Limited evaluation of the IVC and iliac veins due to lack of intravenous   contrast. They are however normal in caliber and there is no extrinsic   mass effect on these vessels.    Small right and trace left pleural effusions, trace ascites and anasarca.    4.8 cm right adnexal cyst. Recommend ultrasound to further evaluate.    < end of copied text >      MEDICATIONS    MEDICATIONS  (STANDING):  amiodarone    Tablet 200 milliGRAM(s) Oral daily  aspirin  chewable 81 milliGRAM(s) Oral daily  buDESOnide 160 MICROgram(s)/formoterol 4.5 MICROgram(s) Inhaler 2 Puff(s) Inhalation two times a day  buMETAnide Injectable 2 milliGRAM(s) IV Push every 12 hours  heparin  Injectable 5000 Unit(s) SubCutaneous every 12 hours  metolazone 5 milliGRAM(s) Oral daily  metolazone 5 milliGRAM(s) Oral every 24 hours  pantoprazole    Tablet 40 milliGRAM(s) Oral before breakfast    MEDICATIONS  (PRN):  acetaminophen   Tablet. 650 milliGRAM(s) Oral every 6 hours PRN Mild Pain (1 - 3)  ALBUTerol    0.083% 2.5 milliGRAM(s) Nebulizer every 6 hours PRN Shortness of Breath and/or Wheezing  docusate sodium 100 milliGRAM(s) Oral two times a day PRN Constipation  hydrocortisone 1% Ointment 1 Application(s) Topical two times a day PRN Itching  metoprolol    tartrate Injectable 5 milliGRAM(s) IV Push every 6 hours PRN Give for HR >130, SBP>100  ondansetron Injectable 4 milliGRAM(s) IV Push every 4 hours PRN Nausea and/or Vomiting

## 2018-03-24 LAB
ALBUMIN SERPL ELPH-MCNC: 2.2 G/DL — LOW (ref 3.3–5)
ALP SERPL-CCNC: 225 U/L — HIGH (ref 40–120)
ALT FLD-CCNC: 44 U/L — SIGNIFICANT CHANGE UP (ref 12–78)
ANION GAP SERPL CALC-SCNC: 10 MMOL/L — SIGNIFICANT CHANGE UP (ref 5–17)
ANION GAP SERPL CALC-SCNC: 10 MMOL/L — SIGNIFICANT CHANGE UP (ref 5–17)
AST SERPL-CCNC: 43 U/L — HIGH (ref 15–37)
BILIRUB DIRECT SERPL-MCNC: 0.4 MG/DL — HIGH (ref 0–0.2)
BILIRUB INDIRECT FLD-MCNC: 1.1 MG/DL — HIGH (ref 0.2–1)
BILIRUB SERPL-MCNC: 1.5 MG/DL — HIGH (ref 0.2–1.2)
BUN SERPL-MCNC: 51 MG/DL — HIGH (ref 7–23)
BUN SERPL-MCNC: 56 MG/DL — HIGH (ref 7–23)
CALCIUM SERPL-MCNC: 8.5 MG/DL — SIGNIFICANT CHANGE UP (ref 8.5–10.1)
CALCIUM SERPL-MCNC: 8.7 MG/DL — SIGNIFICANT CHANGE UP (ref 8.5–10.1)
CHLORIDE SERPL-SCNC: 100 MMOL/L — SIGNIFICANT CHANGE UP (ref 96–108)
CHLORIDE SERPL-SCNC: 103 MMOL/L — SIGNIFICANT CHANGE UP (ref 96–108)
CO2 SERPL-SCNC: 25 MMOL/L — SIGNIFICANT CHANGE UP (ref 22–31)
CO2 SERPL-SCNC: 33 MMOL/L — HIGH (ref 22–31)
CREAT SERPL-MCNC: 1.3 MG/DL — SIGNIFICANT CHANGE UP (ref 0.5–1.3)
CREAT SERPL-MCNC: 1.39 MG/DL — HIGH (ref 0.5–1.3)
GLUCOSE BLDC GLUCOMTR-MCNC: 206 MG/DL — HIGH (ref 70–99)
GLUCOSE SERPL-MCNC: 176 MG/DL — HIGH (ref 70–99)
GLUCOSE SERPL-MCNC: 78 MG/DL — SIGNIFICANT CHANGE UP (ref 70–99)
HCT VFR BLD CALC: 38.1 % — SIGNIFICANT CHANGE UP (ref 34.5–45)
HGB BLD-MCNC: 11.3 G/DL — LOW (ref 11.5–15.5)
MAGNESIUM SERPL-MCNC: 1.6 MG/DL — SIGNIFICANT CHANGE UP (ref 1.6–2.6)
MAGNESIUM SERPL-MCNC: 1.9 MG/DL — SIGNIFICANT CHANGE UP (ref 1.6–2.6)
MCHC RBC-ENTMCNC: 24.5 PG — LOW (ref 27–34)
MCHC RBC-ENTMCNC: 29.7 GM/DL — LOW (ref 32–36)
MCV RBC AUTO: 82.6 FL — SIGNIFICANT CHANGE UP (ref 80–100)
NRBC # BLD: 0 /100 WBCS — SIGNIFICANT CHANGE UP (ref 0–0)
PLATELET # BLD AUTO: 196 K/UL — SIGNIFICANT CHANGE UP (ref 150–400)
POTASSIUM SERPL-MCNC: 2.7 MMOL/L — CRITICAL LOW (ref 3.5–5.3)
POTASSIUM SERPL-MCNC: 3.4 MMOL/L — LOW (ref 3.5–5.3)
POTASSIUM SERPL-SCNC: 2.7 MMOL/L — CRITICAL LOW (ref 3.5–5.3)
POTASSIUM SERPL-SCNC: 3.4 MMOL/L — LOW (ref 3.5–5.3)
PROT SERPL-MCNC: 6.6 GM/DL — SIGNIFICANT CHANGE UP (ref 6–8.3)
RBC # BLD: 4.61 M/UL — SIGNIFICANT CHANGE UP (ref 3.8–5.2)
RBC # FLD: 24.2 % — HIGH (ref 10.3–14.5)
SODIUM SERPL-SCNC: 138 MMOL/L — SIGNIFICANT CHANGE UP (ref 135–145)
SODIUM SERPL-SCNC: 143 MMOL/L — SIGNIFICANT CHANGE UP (ref 135–145)
WBC # BLD: 8.4 K/UL — SIGNIFICANT CHANGE UP (ref 3.8–10.5)
WBC # FLD AUTO: 8.4 K/UL — SIGNIFICANT CHANGE UP (ref 3.8–10.5)

## 2018-03-24 RX ORDER — MAGNESIUM SULFATE 500 MG/ML
1 VIAL (ML) INJECTION ONCE
Qty: 0 | Refills: 0 | Status: COMPLETED | OUTPATIENT
Start: 2018-03-24 | End: 2018-03-24

## 2018-03-24 RX ORDER — POTASSIUM CHLORIDE 20 MEQ
40 PACKET (EA) ORAL DAILY
Qty: 0 | Refills: 0 | Status: COMPLETED | OUTPATIENT
Start: 2018-03-25 | End: 2018-03-25

## 2018-03-24 RX ORDER — POTASSIUM CHLORIDE 20 MEQ
40 PACKET (EA) ORAL EVERY 4 HOURS
Qty: 0 | Refills: 0 | Status: COMPLETED | OUTPATIENT
Start: 2018-03-24 | End: 2018-03-24

## 2018-03-24 RX ORDER — CARVEDILOL PHOSPHATE 80 MG/1
3.12 CAPSULE, EXTENDED RELEASE ORAL EVERY 12 HOURS
Qty: 0 | Refills: 0 | Status: DISCONTINUED | OUTPATIENT
Start: 2018-03-24 | End: 2018-03-26

## 2018-03-24 RX ORDER — POTASSIUM CHLORIDE 20 MEQ
40 PACKET (EA) ORAL ONCE
Qty: 0 | Refills: 0 | Status: COMPLETED | OUTPATIENT
Start: 2018-03-24 | End: 2018-03-24

## 2018-03-24 RX ORDER — POTASSIUM CHLORIDE 20 MEQ
10 PACKET (EA) ORAL
Qty: 0 | Refills: 0 | Status: COMPLETED | OUTPATIENT
Start: 2018-03-24 | End: 2018-03-25

## 2018-03-24 RX ORDER — POTASSIUM CHLORIDE 20 MEQ
20 PACKET (EA) ORAL ONCE
Qty: 0 | Refills: 0 | Status: COMPLETED | OUTPATIENT
Start: 2018-03-24 | End: 2018-03-24

## 2018-03-24 RX ORDER — LANOLIN ALCOHOL/MO/W.PET/CERES
5 CREAM (GRAM) TOPICAL AT BEDTIME
Qty: 0 | Refills: 0 | Status: DISCONTINUED | OUTPATIENT
Start: 2018-03-24 | End: 2018-03-30

## 2018-03-24 RX ADMIN — CARVEDILOL PHOSPHATE 3.12 MILLIGRAM(S): 80 CAPSULE, EXTENDED RELEASE ORAL at 17:12

## 2018-03-24 RX ADMIN — Medication 40 MILLIEQUIVALENT(S): at 10:27

## 2018-03-24 RX ADMIN — Medication 81 MILLIGRAM(S): at 11:23

## 2018-03-24 RX ADMIN — HEPARIN SODIUM 5000 UNIT(S): 5000 INJECTION INTRAVENOUS; SUBCUTANEOUS at 07:51

## 2018-03-24 RX ADMIN — Medication 650 MILLIGRAM(S): at 20:44

## 2018-03-24 RX ADMIN — Medication 1 APPLICATION(S): at 22:33

## 2018-03-24 RX ADMIN — Medication 40 MILLIEQUIVALENT(S): at 22:33

## 2018-03-24 RX ADMIN — Medication 40 MILLIEQUIVALENT(S): at 07:51

## 2018-03-24 RX ADMIN — Medication 650 MILLIGRAM(S): at 21:00

## 2018-03-24 RX ADMIN — HEPARIN SODIUM 5000 UNIT(S): 5000 INJECTION INTRAVENOUS; SUBCUTANEOUS at 20:44

## 2018-03-24 RX ADMIN — AMIODARONE HYDROCHLORIDE 200 MILLIGRAM(S): 400 TABLET ORAL at 05:27

## 2018-03-24 RX ADMIN — BUDESONIDE AND FORMOTEROL FUMARATE DIHYDRATE 2 PUFF(S): 160; 4.5 AEROSOL RESPIRATORY (INHALATION) at 19:34

## 2018-03-24 RX ADMIN — BUMETANIDE 2 MILLIGRAM(S): 0.25 INJECTION INTRAMUSCULAR; INTRAVENOUS at 18:15

## 2018-03-24 RX ADMIN — PANTOPRAZOLE SODIUM 40 MILLIGRAM(S): 20 TABLET, DELAYED RELEASE ORAL at 05:27

## 2018-03-24 RX ADMIN — Medication 20 MILLIEQUIVALENT(S): at 14:23

## 2018-03-24 RX ADMIN — Medication 100 GRAM(S): at 23:01

## 2018-03-24 RX ADMIN — BUMETANIDE 2 MILLIGRAM(S): 0.25 INJECTION INTRAMUSCULAR; INTRAVENOUS at 09:23

## 2018-03-24 NOTE — PROGRESS NOTE ADULT - ASSESSMENT
Acute on Chronic HFrEF  CRI .  NICM. S/p VT  Non obstructive CAD  HTN  AICD          Continue with current cardiac meds.  Supplement K, Keep > 4.   Continue  with diuretics & dobutrex.  follow low sodium, low cholesterol, ADA diet.  Fluid restriction 1.5 lit/day  monitor Intake & output  Daily weights.  Follow up electrolytes, renal function.    No ACEi at present till renal function stabilizes.   Hold beta blockers if BP low

## 2018-03-24 NOTE — PROGRESS NOTE ADULT - ASSESSMENT
ASSESSMENT / PLAN:   57 y.o  Woman sent to the ED from Cardiologist's office for evaluation of worsening dyspnea found to have acute on chronic HFrEF     Acute Hypoxic Respiratory Distress superimposed from Acute on Chronic HFrEF (20%)  - decompensated likely 2/2 dietary noncompliance   - still with significant volume overload; CT a/p reviewed  - change IV lasix to bumex IV BID + Zaroxyln BID (prior to Bumex).   - continue Dobutamine drip for BP support, goal SBP> 90 while undergoing diuresis.     - monitor strict I's/O's, daily weights--> not well documented (reinforce importance of efficient reporting to help guide therapy)  - may need Entresto added back to regimen and evaluation for pulmonary HTN.   - appreciate cardiology input   - recent dopplers 2 weeks from admission date was negative for DVT.   - ongoing education on importance of dietary compliance  - ACE bandage wrap for compression effect  - Serum Pro-Brain Natriuretic Peptide 3985 pg/mL from 5905 pg/mL on adm     KEYSHA on CKD - possibly 2/2 prerenal from volume overload.   - appreciate renal input  - avoid nephrotoxins where possible  - IMPROIVING, Today Scr = 1.39    Urine Cx w/ colonized VRE  - she's afebrile / WBC normal  - appreciate ID input --> monitor off abt    Vtach/CAD  - no significant ectopy on tele   - con't asa / toprol / Amiodarone / statin  - monitor LFT's, normal US Liver.     HTN - currently hypotensive while undergoing diuresis.   - BB on hold.     T2DM  - A1c 7.3 (1/30/18)  - holding januvia  - con't POCT with HEMA ; consistent carb diet    Asthma - stable  - continue Symbicort add prn nebs for wheezing.     Obesity  - nutrition consult  - educated on importance of appropriate wt loss plan    #DVT prophylaxis- heparin sq    Dispo: Remain inpatient on CCU. Discussed w/ Dr. Tucker, RN and patient.   - full code  Total time > 45 mins.

## 2018-03-24 NOTE — PROGRESS NOTE ADULT - SUBJECTIVE AND OBJECTIVE BOX
Chart reviewed.  Patient seen and examined.    CC: worsening shortness of breath weight gain, leg sidney    HPI: 57 y.o  woman with PMH/PSH of HFrEF 20%, Non obstructive CAD, AICD, HTN, HLD, Asthma, DM, cholecystectomy  sent to the ED from Cardiologist's office for evaluation of worsening dyspnea. Patient reports 11lb weight gain over 3 days and states being compliant with all medications including Lasix. Per her , she eats "Eliel food which has salt it in". No recent fevers / chills or illnesses. Of note patient was recently admitted to  and discharged on 3/4/18 (2 weeks ago) for CHF and leg cellulitis. During that admission she suffered KEYSHA with Scr peaked at 2.0 and thus several diuretics were withheld upon discharge including Entresto and Zaroxyln.     In the ED, notable labs include elevated BNP ~6000 and Scr 2.0, normal trop X 1 and normal CBC. CXR with massive cardiomegaly. Physical exam pertinent for severe +3 LE pitting edema bilaterally. Patient given IV Lasix 20mg and admission requested. Currently seen denying chest pain and c/o ongoing KHAN and + orthopnea at home.     HOSPITAL COURSE: Admitted for advanced CHF exac w/ IV Lasix diuresis also requiring Dobutamine drip for BP support given extensive volume overload and tenuous BP. Noted to have asymptomatic VRE colonization for which ID recs observation off abx.     Subjective:   3/24/18: Frustrated to still be hospitalized, denies CP/ SOB but has extensive pitting B/L LE Edema to thighs. Pulled out IV last night.      REVIEW OF SYSTEMS: All systems reviewed and found to be negative with the exception as above.    Vital Signs Last 24 Hrs  T(C): 36.2 (24 Mar 2018 04:29), Max: 36.6 (23 Mar 2018 20:39)  T(F): 97.1 (24 Mar 2018 04:29), Max: 97.9 (23 Mar 2018 20:39)  HR: 71 (24 Mar 2018 08:00) (69 - 76)  BP: 98/50 (24 Mar 2018 08:00) (92/60 - 111/59)  BP(mean): 63 (24 Mar 2018 08:00) (63 - 89)  RR: 20 (24 Mar 2018 08:00) (13 - 23)  SpO2: 100% (24 Mar 2018 08:00) (95% - 100%)    PHYSICAL EXAM:    GENERAL: obese middle aged  female comfortable but appears depressed  HEENT: some facial puffiness noted, pupils equal and reactive, EOMI, no oropharyngeal lesions, erythema, exudates, oral thrush  NECK:   supple, no carotid bruits, no palpable lymph nodes, no thyromegaly  CV:  +S1, +S2, regular, no murmurs or rubs  RESP:  lungs clear to auscultation bilaterally, no wheezing, rales, rhonchi, good air entry bilaterally  GI:  obese abdomen soft, non-tender, +distention normal BS, no bruits, no abdominal masses, no palpable masses  MSK:   normal muscle tone, no atrophy, no rigidity, no contractions  EXT/vasc:  4+ pitting edema, extending to thighs, +sacral edema, chronic vascular changes, diminished pedal pulses  NEURO:  AAOX3, no focal neurological deficits, follows all commands, able to move extremities spontaneously  SKIN:  no ulcers, lesions or rashes    LABS:                        11.3   8.40  )-----------( 196      ( 24 Mar 2018 05:18 )             38.1   03-24    138  |  103  |  56<H>  ----------------------------<  78  3.4<L>   |  25  |  1.39<H>    Ca    8.7      24 Mar 2018 05:18  Mg     1.9     03-24    TPro  6.6  /  Alb  2.2<L>  /  TBili  1.5<H>  /  DBili  0.4<H>  /  AST  43<H>  /  ALT  44  /  AlkPhos  225<H>  03-24    LIVER FUNCTIONS - ( 22 Mar 2018 06:48 )  Alb: 2.3 g/dL / Pro: 6.7 gm/dL / ALK PHOS: 218 U/L / ALT: 52 U/L / AST: 64 U/L / GGT: x           Serum Pro-Brain Natriuretic Peptide: 3985 pg/mL (03-23-18 @ 05:45)  Serum Pro-Brain Natriuretic Peptide: 5905 pg/mL (03-19-18 @ 14:11)    Culture - Urine (collected 03-19-18 @ 14:11)  Source: .Urine Clean Catch (Midstream)  Final Report (03-21-18 @ 21:14):    50,000 - 99,000 CFU/mL Enterococcus faecium (vancomycin resistant)    <10,000 CFU/ml Normal Urogenital tara present  Organism: Enterococcus faecium (vancomycin resistant) (03-21-18 @ 21:14)  Organism: Enterococcus faecium (vancomycin resistant) (03-21-18 @ 21:14)      -  Ampicillin: R >8      -  Ciprofloxacin: R >2      -  Nitrofurantoin: S <=32      -  Tetra/Doxy: R >8      -  Vancomycin: R >16      Method Type: MOSES    RADIOLOGY  < from: Xray Chest 1 View AP/PA. (03.19.18 @ 14:43) >  IMPRESSION: Massive cardiomegaly. Clear lungs.    < end of copied text >    < from: Transthoracic Echocardiogram (06.07.17 @ 09:49) >     Summary   The left ventricle is severely dilated with severe global hypokinesis.   Estimated left ventricular ejection fraction is 20 % via bi-plane method.   Left atriumis moderately dilated.  The right atrium appears moderately dilated. A device wire is seen in the   RV and RA.  The right ventricle is normal in size and contractility.   The aortic valve appears normal.  There is thickening of both mitral valve leaflet tips. The leaflet   opening  is normal. Moderate mitral regurgitation is present based on a calculated   ERO of .32cm2.  The tricuspid valve leaflets are thin and pliable. Moderate (2+)   tricuspid  valve regurgitation is present. Moderate pulmonary hypertension.   Trace pericardial effusion is present.     CT Abdomen and Pelvis No Cont (03.21.18 @ 13:13) >    IMPRESSION:   Limited evaluation of the IVC and iliac veins due to lack of intravenous   contrast. They are however normal in caliber and there is no extrinsic   mass effect on these vessels.    Small right and trace left pleural effusions, trace ascites and anasarca.    4.8 cm right adnexal cyst. Recommend ultrasound to further evaluate.     US Abdomen Complete (03.23.18 @ 16:13) >  IMPRESSION:  No cholelithiasis.     Visualized portions of the IVC and intra-abdominal vasculature are   unremarkable.    Right-sided pleural effusion. Trace perihepatic ascites.      MEDICATIONS    amiodarone    Tablet 200 milliGRAM(s) Oral daily  aspirin  chewable 81 milliGRAM(s) Oral daily  buDESOnide 160 MICROgram(s)/formoterol 4.5 MICROgram(s) Inhaler 2 Puff(s) Inhalation two times a day  buMETAnide Injectable 2 milliGRAM(s) IV Push every 12 hours  DOBUTamine Infusion 0.5 MICROgram(s)/kG/Min (1.361 mL/Hr) IV Continuous <Continuous>  heparin  Injectable 5000 Unit(s) SubCutaneous every 12 hours  metolazone 5 milliGRAM(s) Oral daily  pantoprazole    Tablet 40 milliGRAM(s) Oral before breakfast  potassium chloride    Tablet ER 40 milliEquivalent(s) Oral every 4 hours

## 2018-03-24 NOTE — PROGRESS NOTE ADULT - ASSESSMENT
58 y.o. female with PMH of CKD2/3 (baseline SCr 1.0), HTN, DM2, chronic systolic CHF (s/p bi-v AICD), VTach on Amiodarone, Hyperlipidemia discharged 3/4/18 s/p CHF exacerbation with edema requiring IV diuresis returns with volume overload and KEYSHA (SCr 2.0) - renal called for evaluation.    1. KEYSHA on CKD3  - likely due to poor hemodynamics from CHF / volume overload  -IV diuretics/inotrope support ongoing with improved renal function/volume  -I/o's as able, follow weights.       2. Proteinura - 1.3g/g  - due to diabetic nephopathy  - Eventual benefit likely with use of ACE    3. CHF   - BB, diuresis    Hypokalemia  -replete K    d/c with family

## 2018-03-24 NOTE — PROGRESS NOTE ADULT - SUBJECTIVE AND OBJECTIVE BOX
Patient is a 58y Female who reports no complaints overnight. Breathing much better, UOP increased she reports    REVIEW OF SYSTEMS:    CONSTITUTIONAL: No weakness, fevers or chills  RESPIRATORY: No cough, wheezing, hemoptysis; No shortness of breath  CARDIOVASCULAR: No chest pain or palpitations  GENITOURINARY: No dysuria, frequency or hematuria  All other review of systems is negative unless indicated above.    MEDICATIONS  (STANDING):  amiodarone    Tablet 200 milliGRAM(s) Oral daily  aspirin  chewable 81 milliGRAM(s) Oral daily  buDESOnide 160 MICROgram(s)/formoterol 4.5 MICROgram(s) Inhaler 2 Puff(s) Inhalation two times a day  buMETAnide Injectable 2 milliGRAM(s) IV Push every 12 hours  DOBUTamine Infusion 0.5 MICROgram(s)/kG/Min (1.361 mL/Hr) IV Continuous <Continuous>  heparin  Injectable 5000 Unit(s) SubCutaneous every 12 hours  metolazone 5 milliGRAM(s) Oral daily  pantoprazole    Tablet 40 milliGRAM(s) Oral before breakfast  potassium chloride    Tablet ER 40 milliEquivalent(s) Oral every 4 hours    MEDICATIONS  (PRN):  acetaminophen   Tablet. 650 milliGRAM(s) Oral every 6 hours PRN Mild Pain (1 - 3)  ALBUTerol    0.083% 2.5 milliGRAM(s) Nebulizer every 6 hours PRN Shortness of Breath and/or Wheezing  docusate sodium 100 milliGRAM(s) Oral two times a day PRN Constipation  hydrocortisone 1% Ointment 1 Application(s) Topical two times a day PRN Itching  metoprolol    tartrate Injectable 5 milliGRAM(s) IV Push every 6 hours PRN Give for HR >130, SBP>100  ondansetron Injectable 4 milliGRAM(s) IV Push every 4 hours PRN Nausea and/or Vomiting        T(C): , Max: 36.6 (03-23-18 @ 20:39)  T(F): , Max: 97.9 (03-23-18 @ 20:39)  HR: 76 (03-24-18 @ 09:00)  BP: 105/57 (03-24-18 @ 09:00)  BP(mean): 68 (03-24-18 @ 09:00)  RR: 22 (03-24-18 @ 09:00)  SpO2: 97% (03-24-18 @ 09:00)  Wt(kg): --    03-23 @ 07:01  -  03-24 @ 07:00  --------------------------------------------------------  IN: 375 mL / OUT: 3500 mL / NET: -3125 mL    03-24 @ 07:01  -  03-24 @ 10:10  --------------------------------------------------------  IN: 0 mL / OUT: 500 mL / NET: -500 mL          PHYSICAL EXAM:    Constitutional: NAD, well-groomed, well-developed  HEENT: PERRLA, EOMI,  MMM  Neck: No LAD, No JVD  Respiratory: good aeration  Cardiovascular: S1 and S2, RRR  Gastrointestinal: BS+, soft, NT/ND  Extremities:LE dsgs  Neurological: A/O x 3, no focal deficits  Psychiatric: Normal mood, normal affect  : No Locke  Skin: No rashes  Access: Not applicable        LABS:                        11.3   8.40  )-----------( 196      ( 24 Mar 2018 05:18 )             38.1     24 Mar 2018 05:18    138    |  103    |  56     ----------------------------<  78     3.4     |  25     |  1.39   23 Mar 2018 05:45    139    |  103    |  75     ----------------------------<  116    4.5     |  25     |  1.92   22 Mar 2018 06:48    138    |  103    |  74     ----------------------------<  84     5.0     |  24     |  2.18   21 Mar 2018 05:00    137    |  104    |  74     ----------------------------<  95     4.5     |  24     |  2.06     Ca    8.7        24 Mar 2018 05:18  Ca    9.0        23 Mar 2018 05:45  Ca    8.8        22 Mar 2018 06:48  Ca    8.6        21 Mar 2018 05:00  Mg     1.9       24 Mar 2018 05:18    TPro  6.6    /  Alb  2.2    /  TBili  1.5    /  DBili  0.4    /  AST  43     /  ALT  44     /  AlkPhos  225    24 Mar 2018 05:18  TPro  6.7    /  Alb  2.3    /  TBili  1.5    /  DBili  0.7    /  AST  64     /  ALT  52     /  AlkPhos  218    22 Mar 2018 06:48          Urine Studies:          RADIOLOGY & ADDITIONAL STUDIES:

## 2018-03-24 NOTE — PROGRESS NOTE ADULT - SUBJECTIVE AND OBJECTIVE BOX
HPI :   58 yo female with PMH of chronic systolic CHF, s/p bi-v AICD, HTN, HLD, CAD, DM2, h/o VT, AICD, asthma. Recurrent  admission  for asthma exacerbation and CHF exacerbation . She was readmitted with increasing Dyspnea . After admission she was diagnosed to have bilateral leg edema & acute systolic CHF  She is comfortable and is in normal sinus rhythm on telemetry, she still has significant leg edema , she is now on IV Dobutrex. BP is better. She has been non compliant with her medications & diet.        PAST MEDICAL & SURGICAL HISTORY:  CHF (congestive heart failure)  Pacemaker  HTN (hypertension)  HLD (hyperlipidemia)  Asthma  DM (diabetes mellitus)  CAD (coronary artery disease)  History of cholecystectomy  Artificial cardiac pacemaker          PREVIOUS CARDIAC WORKUP:    Echo:  Severe reduced EF  Cardiac Cath:  8/24/17 - Non obstructive    MEDICATIONS  (STANDING):  amiodarone    Tablet 200 milliGRAM(s) Oral daily  aspirin  chewable 81 milliGRAM(s) Oral daily  buDESOnide 160 MICROgram(s)/formoterol 4.5 MICROgram(s) Inhaler 2 Puff(s) Inhalation two times a day  buMETAnide Injectable 2 milliGRAM(s) IV Push every 12 hours  DOBUTamine Infusion 0.5 MICROgram(s)/kG/Min (1.361 mL/Hr) IV Continuous <Continuous>  heparin  Injectable 5000 Unit(s) SubCutaneous every 12 hours  metolazone 5 milliGRAM(s) Oral daily  pantoprazole    Tablet 40 milliGRAM(s) Oral before breakfast  potassium chloride    Tablet ER 20 milliEquivalent(s) Oral once    MEDICATIONS  (PRN):  acetaminophen   Tablet. 650 milliGRAM(s) Oral every 6 hours PRN Mild Pain (1 - 3)  ALBUTerol    0.083% 2.5 milliGRAM(s) Nebulizer every 6 hours PRN Shortness of Breath and/or Wheezing  docusate sodium 100 milliGRAM(s) Oral two times a day PRN Constipation  hydrocortisone 1% Ointment 1 Application(s) Topical two times a day PRN Itching  metoprolol    tartrate Injectable 5 milliGRAM(s) IV Push every 6 hours PRN Give for HR >130, SBP>100  ondansetron Injectable 4 milliGRAM(s) IV Push every 4 hours PRN Nausea and/or Vomiting      REVIEW OF SYSTEM:  Pertinent items are noted in HPI.  Constitutional	Negative for chills, fevers, sweats.    Eyes: 	Negative for visual disturbance.  Ears, nose, mouth, throat, and face: Negative for epistaxis, nasal congestion, sore throat .  Neck:	Negative for enlargement, pain and difficulty in swallowing  Respiration : Negative for cough ,  pleuritic chest pain and wheezing  Cardiovascular: Negative for chest pain, and palpitations . She has KHAN & leg edema.    Gastrointestinal : Negative for abdominal pain, diarrhea, nausea and vomiting  Genitourinary: Negative for dysuria, frequency and urinary incontinence .  Skin: Negative for  rash, pruritus, swelling, dryness .  	  Hematologic/lymphatic: Negative for bleeding and easy bruising  Musculoskeletal: Negative for arthralgias, back pain and muscle weakness.  Neurological: Negative for dizziness, headaches, seizures and tremors  Behavioral/Psych: Negative for mood change, depression.  Endocrine:	Negative for blurry vision, polydipsia and polyuria, diaphoresis.   Allergic/Immunologic:	Negative for anaphylaxis, angioedema and urticaria.      Vital Signs Last 24 Hrs  T(C): 36.2 (24 Mar 2018 04:29), Max: 36.6 (23 Mar 2018 20:39)  T(F): 97.1 (24 Mar 2018 04:29), Max: 97.9 (23 Mar 2018 20:39)  HR: 77 (24 Mar 2018 10:00) (70 - 77)  BP: 102/79 (24 Mar 2018 10:00) (92/60 - 111/59)  BP(mean): 85 (24 Mar 2018 10:00) (63 - 89)  RR: 18 (24 Mar 2018 10:00) (13 - 23)  SpO2: 100% (24 Mar 2018 10:00) (95% - 100%)    I&O's Summary    23 Mar 2018 07:01  -  24 Mar 2018 07:00  --------------------------------------------------------  IN: 375 mL / OUT: 3500 mL / NET: -3125 mL    24 Mar 2018 07:01  -  24 Mar 2018 11:31  --------------------------------------------------------  IN: 0 mL / OUT: 500 mL / NET: -500 mL      PHYSICAL EXAM  General Appearance: cooperative, no acute distress,   HEENT: PERRL, conjunctiva clear, EOM's intact, non injected pharynx, no exudate .  Neck: Supple, , no adenopathy, thyroid: not enlarged, no carotid bruit. Elevated  JVD  Lungs: Scattered basal rales no rhonchi  Heart: Regular rate and rhythm, S1, S2 normal, grade 1/6 holosystolic  murmur, no rub or gallop  Abdomen: Soft, non-tender, bowel sounds active , no hepatosplenomegaly  Extremities: no cyanosis 2  to 3 +_leg edema, no joint swelling  Skin: Skin color, texture normal, no rashes   Neurologic: Alert and oriented X3 , cranial nerves intact, sensory and motor normal,        INTERPRETATION OF TELEMETRY: NSR with electronic ventricular pacing            LABS:                          11.3   8.40  )-----------( 196      ( 24 Mar 2018 05:18 )             38.1     03-24    138  |  103  |  56<H>  ----------------------------<  78  3.4<L>   |  25  |  1.39<H>    Ca    8.7      24 Mar 2018 05:18  Mg     1.9     03-24    TPro  6.6  /  Alb  2.2<L>  /  TBili  1.5<H>  /  DBili  0.4<H>  /  AST  43<H>  /  ALT  44  /  AlkPhos  225<H>  03-24        Lipid Panel  120  18  78  119    Pro BNP  3985 03-23 @ 05:45  D Dimer  -- 03-23 @ 05:45  Pro BNP  5905 03-19 @ 14:11  D Dimer  -- 03-19 @ 14:11              RADIOLOGY & ADDITIONAL STUDIES:

## 2018-03-25 LAB
ANION GAP SERPL CALC-SCNC: 10 MMOL/L — SIGNIFICANT CHANGE UP (ref 5–17)
ANION GAP SERPL CALC-SCNC: 8 MMOL/L — SIGNIFICANT CHANGE UP (ref 5–17)
BUN SERPL-MCNC: 49 MG/DL — HIGH (ref 7–23)
BUN SERPL-MCNC: 54 MG/DL — HIGH (ref 7–23)
CALCIUM SERPL-MCNC: 8.3 MG/DL — LOW (ref 8.5–10.1)
CALCIUM SERPL-MCNC: 8.6 MG/DL — SIGNIFICANT CHANGE UP (ref 8.5–10.1)
CHLORIDE SERPL-SCNC: 102 MMOL/L — SIGNIFICANT CHANGE UP (ref 96–108)
CHLORIDE SERPL-SCNC: 99 MMOL/L — SIGNIFICANT CHANGE UP (ref 96–108)
CO2 SERPL-SCNC: 34 MMOL/L — HIGH (ref 22–31)
CO2 SERPL-SCNC: 36 MMOL/L — HIGH (ref 22–31)
CREAT SERPL-MCNC: 1.15 MG/DL — SIGNIFICANT CHANGE UP (ref 0.5–1.3)
CREAT SERPL-MCNC: 1.42 MG/DL — HIGH (ref 0.5–1.3)
GLUCOSE SERPL-MCNC: 176 MG/DL — HIGH (ref 70–99)
GLUCOSE SERPL-MCNC: 93 MG/DL — SIGNIFICANT CHANGE UP (ref 70–99)
MAGNESIUM SERPL-MCNC: 1.9 MG/DL — SIGNIFICANT CHANGE UP (ref 1.6–2.6)
MAGNESIUM SERPL-MCNC: 2.4 MG/DL — SIGNIFICANT CHANGE UP (ref 1.6–2.6)
PHOSPHATE SERPL-MCNC: 1.3 MG/DL — LOW (ref 2.5–4.5)
POTASSIUM SERPL-MCNC: 3.5 MMOL/L — SIGNIFICANT CHANGE UP (ref 3.5–5.3)
POTASSIUM SERPL-MCNC: 3.7 MMOL/L — SIGNIFICANT CHANGE UP (ref 3.5–5.3)
POTASSIUM SERPL-SCNC: 3.5 MMOL/L — SIGNIFICANT CHANGE UP (ref 3.5–5.3)
POTASSIUM SERPL-SCNC: 3.7 MMOL/L — SIGNIFICANT CHANGE UP (ref 3.5–5.3)
SODIUM SERPL-SCNC: 144 MMOL/L — SIGNIFICANT CHANGE UP (ref 135–145)
SODIUM SERPL-SCNC: 145 MMOL/L — SIGNIFICANT CHANGE UP (ref 135–145)

## 2018-03-25 RX ORDER — POTASSIUM PHOSPHATE, MONOBASIC POTASSIUM PHOSPHATE, DIBASIC 236; 224 MG/ML; MG/ML
15 INJECTION, SOLUTION INTRAVENOUS ONCE
Qty: 0 | Refills: 0 | Status: COMPLETED | OUTPATIENT
Start: 2018-03-25 | End: 2018-03-25

## 2018-03-25 RX ORDER — POTASSIUM CHLORIDE 20 MEQ
40 PACKET (EA) ORAL EVERY 4 HOURS
Qty: 0 | Refills: 0 | Status: COMPLETED | OUTPATIENT
Start: 2018-03-25 | End: 2018-03-25

## 2018-03-25 RX ORDER — POTASSIUM CHLORIDE 20 MEQ
40 PACKET (EA) ORAL ONCE
Qty: 0 | Refills: 0 | Status: COMPLETED | OUTPATIENT
Start: 2018-03-25 | End: 2018-03-25

## 2018-03-25 RX ORDER — MAGNESIUM SULFATE 500 MG/ML
2 VIAL (ML) INJECTION ONCE
Qty: 0 | Refills: 0 | Status: COMPLETED | OUTPATIENT
Start: 2018-03-25 | End: 2018-03-25

## 2018-03-25 RX ORDER — POTASSIUM CHLORIDE 20 MEQ
20 PACKET (EA) ORAL ONCE
Qty: 0 | Refills: 0 | Status: COMPLETED | OUTPATIENT
Start: 2018-03-25 | End: 2018-03-25

## 2018-03-25 RX ORDER — ALPRAZOLAM 0.25 MG
0.5 TABLET ORAL
Qty: 0 | Refills: 0 | Status: DISCONTINUED | OUTPATIENT
Start: 2018-03-25 | End: 2018-03-30

## 2018-03-25 RX ADMIN — HEPARIN SODIUM 5000 UNIT(S): 5000 INJECTION INTRAVENOUS; SUBCUTANEOUS at 22:07

## 2018-03-25 RX ADMIN — Medication 81 MILLIGRAM(S): at 11:02

## 2018-03-25 RX ADMIN — BUMETANIDE 2 MILLIGRAM(S): 0.25 INJECTION INTRAMUSCULAR; INTRAVENOUS at 06:16

## 2018-03-25 RX ADMIN — CARVEDILOL PHOSPHATE 3.12 MILLIGRAM(S): 80 CAPSULE, EXTENDED RELEASE ORAL at 17:17

## 2018-03-25 RX ADMIN — BUDESONIDE AND FORMOTEROL FUMARATE DIHYDRATE 2 PUFF(S): 160; 4.5 AEROSOL RESPIRATORY (INHALATION) at 09:46

## 2018-03-25 RX ADMIN — BUDESONIDE AND FORMOTEROL FUMARATE DIHYDRATE 2 PUFF(S): 160; 4.5 AEROSOL RESPIRATORY (INHALATION) at 20:13

## 2018-03-25 RX ADMIN — Medication 40 MILLIEQUIVALENT(S): at 11:02

## 2018-03-25 RX ADMIN — BUMETANIDE 2 MILLIGRAM(S): 0.25 INJECTION INTRAMUSCULAR; INTRAVENOUS at 18:43

## 2018-03-25 RX ADMIN — Medication 20 MILLIEQUIVALENT(S): at 13:12

## 2018-03-25 RX ADMIN — Medication 40 MILLIEQUIVALENT(S): at 08:13

## 2018-03-25 RX ADMIN — Medication 100 MILLIEQUIVALENT(S): at 00:07

## 2018-03-25 RX ADMIN — HEPARIN SODIUM 5000 UNIT(S): 5000 INJECTION INTRAVENOUS; SUBCUTANEOUS at 08:13

## 2018-03-25 RX ADMIN — PANTOPRAZOLE SODIUM 40 MILLIGRAM(S): 20 TABLET, DELAYED RELEASE ORAL at 06:17

## 2018-03-25 RX ADMIN — AMIODARONE HYDROCHLORIDE 200 MILLIGRAM(S): 400 TABLET ORAL at 06:16

## 2018-03-25 RX ADMIN — CARVEDILOL PHOSPHATE 3.12 MILLIGRAM(S): 80 CAPSULE, EXTENDED RELEASE ORAL at 06:17

## 2018-03-25 RX ADMIN — Medication 40 MILLIEQUIVALENT(S): at 01:13

## 2018-03-25 RX ADMIN — Medication 0.5 MILLIGRAM(S): at 22:07

## 2018-03-25 RX ADMIN — Medication 40 MILLIEQUIVALENT(S): at 01:52

## 2018-03-25 RX ADMIN — Medication 50 GRAM(S): at 11:02

## 2018-03-25 RX ADMIN — POTASSIUM PHOSPHATE, MONOBASIC POTASSIUM PHOSPHATE, DIBASIC 62.5 MILLIMOLE(S): 236; 224 INJECTION, SOLUTION INTRAVENOUS at 18:40

## 2018-03-25 NOTE — PROGRESS NOTE ADULT - SUBJECTIVE AND OBJECTIVE BOX
Chart reviewed.  Patient seen and examined.    CC: worsening shortness of breath weight gain, leg sidney    HPI: 57 y.o  woman with PMH/PSH of HFrEF 20%, Non obstructive CAD, AICD, HTN, HLD, Asthma, DM, cholecystectomy  sent to the ED from Cardiologist's office for evaluation of worsening dyspnea. Patient reports 11lb weight gain over 3 days and states being compliant with all medications including Lasix. Per her , she eats "Eliel food which has salt it in". No recent fevers / chills or illnesses. Of note patient was recently admitted to  and discharged on 3/4/18 (2 weeks ago) for CHF and leg cellulitis. During that admission she suffered KEYSHA with Scr peaked at 2.0 and thus several diuretics were withheld upon discharge including Entresto and Zaroxyln.     In the ED, notable labs include elevated BNP ~6000 and Scr 2.0, normal trop X 1 and normal CBC. CXR with massive cardiomegaly. Physical exam pertinent for severe +3 LE pitting edema bilaterally. Patient given IV Lasix 20mg and admission requested. Currently seen denying chest pain and c/o ongoing KHAN and + orthopnea at home.     HOSPITAL COURSE: Admitted for advanced CHF exac w/ IV Lasix diuresis also requiring Dobutamine drip for BP support given extensive volume overload and tenuous BP. Noted to have asymptomatic VRE colonization for which ID recs observation off abx.     Subjective:   3/24/18: Frustrated to still be hospitalized, denies CP/ SOB but has extensive pitting B/L LE Edema to thighs. Pulled out IV last night.     3/25/18: No CP, states SOB improving. Depressed. Overnight had 20 beats of Vtach thus Dobutamine drip stopped. Currently SBP low 100's, states urinating frequently. Reinforced low salt diet w/ pt's .     REVIEW OF SYSTEMS: All systems reviewed and found to be negative with the exception as above.    Vital Signs Last 24 Hrs  T(C): 36.5 (25 Mar 2018 06:31), Max: 36.7 (24 Mar 2018 12:03)  T(F): 97.7 (25 Mar 2018 06:31), Max: 98 (24 Mar 2018 12:03)  HR: 78 (25 Mar 2018 08:50) (70 - 82)  BP: 104/56 (25 Mar 2018 08:50) (82/48 - 120/93)  BP(mean): 66 (25 Mar 2018 08:50) (42 - 101)  RR: 16 (25 Mar 2018 08:50) (16 - 26)  SpO2: 99% (25 Mar 2018 08:50) (91% - 100%)    PHYSICAL EXAM:    GENERAL: obese middle aged  female comfortable but appears depressed  HEENT: some facial puffiness noted, pupils equal and reactive, EOMI, no oropharyngeal lesions, erythema, exudates, oral thrush  NECK:   supple, no carotid bruits, no palpable lymph nodes, no thyromegaly  CV:  +S1, +S2, regular, no murmurs or rubs  RESP:  lungs clear to auscultation bilaterally, no wheezing, rales, rhonchi, good air entry bilaterally  GI:  obese abdomen soft, non-tender, +distention normal BS, no bruits, no abdominal masses, no palpable masses  MSK:   normal muscle tone, no atrophy, no rigidity, no contractions  EXT/vasc:  3+ pitting edema, extending to thighs, chronic vascular changes, diminished pedal pulses  NEURO:  AAOX3, no focal neurological deficits, follows all commands, able to move extremities spontaneously  SKIN:  no ulcers, lesions or rashes    LABS:                                11.3   8.40  )-----------( 196      ( 24 Mar 2018 05:18 )      03-25    144  |  102  |  54<H>  ----------------------------<  176<H>  3.5   |  34<H>  |  1.42<H>    Ca    8.3<L>      25 Mar 2018 04:57  Mg     1.9     03-25    TPro  6.6  /  Alb  2.2<L>  /  TBili  1.5<H>  /  DBili  0.4<H>  /  AST  43<H>  /  ALT  44  /  AlkPhos  225<H>  03-24             38.1     LIVER FUNCTIONS - ( 22 Mar 2018 06:48 )  Alb: 2.3 g/dL / Pro: 6.7 gm/dL / ALK PHOS: 218 U/L / ALT: 52 U/L / AST: 64 U/L / GGT: x           Serum Pro-Brain Natriuretic Peptide: 3985 pg/mL (03-23-18 @ 05:45)  Serum Pro-Brain Natriuretic Peptide: 5905 pg/mL (03-19-18 @ 14:11)    Culture - Urine (collected 03-19-18 @ 14:11)  Source: .Urine Clean Catch (Midstream)  Final Report (03-21-18 @ 21:14):    50,000 - 99,000 CFU/mL Enterococcus faecium (vancomycin resistant)    <10,000 CFU/ml Normal Urogenital tara present  Organism: Enterococcus faecium (vancomycin resistant) (03-21-18 @ 21:14)  Organism: Enterococcus faecium (vancomycin resistant) (03-21-18 @ 21:14)      -  Ampicillin: R >8      -  Ciprofloxacin: R >2      -  Nitrofurantoin: S <=32      -  Tetra/Doxy: R >8      -  Vancomycin: R >16      Method Type: MOSES    RADIOLOGY  < from: Xray Chest 1 View AP/PA. (03.19.18 @ 14:43) >  IMPRESSION: Massive cardiomegaly. Clear lungs.    < end of copied text >    < from: Transthoracic Echocardiogram (06.07.17 @ 09:49) >     Summary   The left ventricle is severely dilated with severe global hypokinesis.   Estimated left ventricular ejection fraction is 20 % via bi-plane method.   Left atriumis moderately dilated.  The right atrium appears moderately dilated. A device wire is seen in the   RV and RA.  The right ventricle is normal in size and contractility.   The aortic valve appears normal.  There is thickening of both mitral valve leaflet tips. The leaflet   opening  is normal. Moderate mitral regurgitation is present based on a calculated   ERO of .32cm2.  The tricuspid valve leaflets are thin and pliable. Moderate (2+)   tricuspid  valve regurgitation is present. Moderate pulmonary hypertension.   Trace pericardial effusion is present.     CT Abdomen and Pelvis No Cont (03.21.18 @ 13:13) >    IMPRESSION:   Limited evaluation of the IVC and iliac veins due to lack of intravenous   contrast. They are however normal in caliber and there is no extrinsic   mass effect on these vessels.    Small right and trace left pleural effusions, trace ascites and anasarca.    4.8 cm right adnexal cyst. Recommend ultrasound to further evaluate.     US Abdomen Complete (03.23.18 @ 16:13) >  IMPRESSION:  No cholelithiasis.     Visualized portions of the IVC and intra-abdominal vasculature are   unremarkable.    Right-sided pleural effusion. Trace perihepatic ascites.      MEDICATIONS    MEDICATIONS  (STANDING):  amiodarone    Tablet 200 milliGRAM(s) Oral daily  aspirin  chewable 81 milliGRAM(s) Oral daily  buDESOnide 160 MICROgram(s)/formoterol 4.5 MICROgram(s) Inhaler 2 Puff(s) Inhalation two times a day  buMETAnide Injectable 2 milliGRAM(s) IV Push every 12 hours  carvedilol 3.125 milliGRAM(s) Oral every 12 hours  DOBUTamine Infusion 0.5 MICROgram(s)/kG/Min (1.361 mL/Hr) IV Continuous <Continuous>  heparin  Injectable 5000 Unit(s) SubCutaneous every 12 hours  magnesium sulfate  IVPB 2 Gram(s) IV Intermittent once  metolazone 5 milliGRAM(s) Oral daily  pantoprazole    Tablet 40 milliGRAM(s) Oral before breakfast  potassium chloride    Tablet ER 40 milliEquivalent(s) Oral every 4 hours

## 2018-03-25 NOTE — PROGRESS NOTE ADULT - SUBJECTIVE AND OBJECTIVE BOX
Patient is a 58y Female who had VT ON depsite oral  yesterday AM. 100 given again this AM.     REVIEW OF SYSTEMS:    CONSTITUTIONAL: No weakness, fevers or chills  RESPIRATORY: No cough, wheezing, hemoptysis; No shortness of breath  CARDIOVASCULAR: No chest pain or palpitations  GENITOURINARY: No dysuria, frequency or hematuria  All other review of systems is negative unless indicated above.    MEDICATIONS  (STANDING):  amiodarone    Tablet 200 milliGRAM(s) Oral daily  aspirin  chewable 81 milliGRAM(s) Oral daily  buDESOnide 160 MICROgram(s)/formoterol 4.5 MICROgram(s) Inhaler 2 Puff(s) Inhalation two times a day  buMETAnide Injectable 2 milliGRAM(s) IV Push every 12 hours  carvedilol 3.125 milliGRAM(s) Oral every 12 hours  DOBUTamine Infusion 0.5 MICROgram(s)/kG/Min (1.361 mL/Hr) IV Continuous <Continuous>  heparin  Injectable 5000 Unit(s) SubCutaneous every 12 hours  metolazone 5 milliGRAM(s) Oral daily  pantoprazole    Tablet 40 milliGRAM(s) Oral before breakfast    MEDICATIONS  (PRN):  acetaminophen   Tablet. 650 milliGRAM(s) Oral every 6 hours PRN Mild Pain (1 - 3)  ALBUTerol    0.083% 2.5 milliGRAM(s) Nebulizer every 6 hours PRN Shortness of Breath and/or Wheezing  ALPRAZolam 0.5 milliGRAM(s) Oral two times a day PRN Severe anxiety  docusate sodium 100 milliGRAM(s) Oral two times a day PRN Constipation  hydrocortisone 1% Ointment 1 Application(s) Topical two times a day PRN Itching  melatonin 5 milliGRAM(s) Oral at bedtime PRN Sleep  ondansetron Injectable 4 milliGRAM(s) IV Push every 4 hours PRN Nausea and/or Vomiting        T(C): , Max: 36.7 (03-25-18 @ 12:00)  T(F): , Max: 98 (03-25-18 @ 12:00)  HR: 82 (03-25-18 @ 12:01)  BP: 117/99 (03-25-18 @ 12:01)  BP(mean): 104 (03-25-18 @ 12:01)  RR: 20 (03-25-18 @ 12:01)  SpO2: 98% (03-25-18 @ 12:01)  Wt(kg): --    03-24 @ 07:01  -  03-25 @ 07:00  --------------------------------------------------------  IN: 1353.6 mL / OUT: 3350 mL / NET: -1996.4 mL    03-25 @ 07:01  -  03-25 @ 13:35  --------------------------------------------------------  IN: 740 mL / OUT: 800 mL / NET: -60 mL          PHYSICAL EXAM:    Constitutional: NAD, morbidly obese  HEENT: PERRLA, EOMI,  MMM  Neck: No LAD, No JVD  Respiratory: dist bs  Cardiovascular: S1 and S2, RRR  Gastrointestinal: BS+, soft, NT/ND  Extremities: + dsg/peripheral edema  Neurological: A/O x 3, no focal deficits  Psychiatric: Normal mood, normal affect  : No Locke  Skin: No rashes  Access: Not applicable    LABS:                        11.3   8.40  )-----------( 196      ( 24 Mar 2018 05:18 )             38.1     25 Mar 2018 04:57    144    |  102    |  54     ----------------------------<  176    3.5     |  34     |  1.42   24 Mar 2018 21:22    143    |  100    |  51     ----------------------------<  176    2.7     |  33     |  1.30   24 Mar 2018 05:18    138    |  103    |  56     ----------------------------<  78     3.4     |  25     |  1.39   23 Mar 2018 05:45    139    |  103    |  75     ----------------------------<  116    4.5     |  25     |  1.92   22 Mar 2018 06:48    138    |  103    |  74     ----------------------------<  84     5.0     |  24     |  2.18     Ca    8.3        25 Mar 2018 04:57  Ca    8.5        24 Mar 2018 21:22  Ca    8.7        24 Mar 2018 05:18  Ca    9.0        23 Mar 2018 05:45  Ca    8.8        22 Mar 2018 06:48  Mg     1.9       25 Mar 2018 04:57  Mg     1.6       24 Mar 2018 21:22  Mg     1.9       24 Mar 2018 05:18    TPro  6.6    /  Alb  2.2    /  TBili  1.5    /  DBili  0.4    /  AST  43     /  ALT  44     /  AlkPhos  225    24 Mar 2018 05:18  TPro  6.7    /  Alb  2.3    /  TBili  1.5    /  DBili  0.7    /  AST  64     /  ALT  52     /  AlkPhos  218    22 Mar 2018 06:48          Urine Studies:          RADIOLOGY & ADDITIONAL STUDIES:

## 2018-03-25 NOTE — PROGRESS NOTE ADULT - ASSESSMENT
Acute on Chronic HFrEF  CRI .  NICM. S/p VT  Non obstructive CAD  HTN  AICD  An episode of NSVT .          Continue with current cardiac meds.  Dobutrex stopped due to VT.  Supplement K, Keep > 4.   Continue  with diuretics & dobutrex.  follow low sodium, low cholesterol, ADA diet.  Fluid restriction 1.5 lit/day  monitor Intake & output  Daily weights.  Follow up electrolytes, renal function.    No ACEi at present till renal function stabilizes.   Hold beta blockers if BP low

## 2018-03-25 NOTE — PROGRESS NOTE ADULT - ASSESSMENT
ASSESSMENT / PLAN:   57 y.o  Woman sent to the ED from Cardiologist's office for evaluation of worsening dyspnea found to have acute on chronic HFrEF     Acute Hypoxic Respiratory Distress superimposed from Acute on Chronic HFrEF (20%)  - decompensated likely 2/2 dietary noncompliance   - still with significant volume overload; CT a/p reviewed  - change IV lasix to bumex IV BID + Zaroxyln BID (prior to Bumex).   - OFF Dobutamine drip given Vtach overnight.   - may need to restart for BP support, goal SBP> 90 while undergoing diuresis.   - replete lytes.    - monitor strict I's/O's, daily weights--> not well documented (reinforce importance of efficient reporting to help guide therapy)  - may need Entresto added back to regimen and evaluation for pulmonary HTN.   - appreciate cardiology input   - recent dopplers 2 weeks from admission date was negative for DVT.   - ongoing education on importance of dietary compliance  - ACE bandage wrap for compression effect  - Serum Pro-Brain Natriuretic Peptide 3985 pg/mL from 5905 pg/mL on adm     KEYSHA on CKD - possibly 2/2 prerenal from volume overload.   - appreciate renal input  - avoid nephrotoxins where possible  - IMPROIVING, Today Scr = 1.4    Urine Cx w/ colonized VRE  - she's afebrile / WBC normal  - appreciate ID input --> monitor off abt    Vtach/CAD - chronic  - see above.    - con't asa / toprol / Amiodarone / statin  - monitor LFT's, normal US Liver.     HTN - currently hypotensive while undergoing diuresis.   - BB on hold.     T2DM  - A1c 7.3 (1/30/18)  - holding januvia  - con't POCT with HEMA ; consistent carb diet    Asthma - stable  - continue Symbicort add prn nebs for wheezing.     Obesity  - nutrition consult  - educated on importance of appropriate wt loss plan    #DVT prophylaxis- heparin sq    Dispo: Remain inpatient on CCU. Discussed w/pt and her . Add low dose Xanax for anxiety.  - full code  Total time > 45 mins.

## 2018-03-25 NOTE — PROGRESS NOTE ADULT - ASSESSMENT
58 y.o. female with PMH of CKD2/3 (baseline SCr 1.0), HTN, DM2, chronic systolic CHF (s/p bi-v AICD), VTach on Amiodarone, Hyperlipidemia discharged 3/4/18 s/p CHF exacerbation with edema requiring IV diuresis returns with volume overload and KEYSHA (SCr 2.0) - renal called for evaluation.    1. KEYSHA on CKD3  - likely due to poor hemodynamics from CHF / volume overload  -IV diuretics ongoing, monitor renal closely (slight rise today) which is likely from stop of  vs VT  -Monitor weights and I/O's as able    Lytes  -Aggressive K/Mg  -Repeat value this AM with VT last night    2. Proteinura - 1.3g/g  - due to diabetic nephopathy  - Eventual benefit likely with use of ACE    3. CHF   - BB, diuresis    d/c with RN staff

## 2018-03-25 NOTE — PROGRESS NOTE ADULT - SUBJECTIVE AND OBJECTIVE BOX
HPI :   58 yo female with PMH of chronic systolic CHF, s/p bi-v AICD, HTN, HLD, CAD, DM2, h/o VT, AICD, asthma. Recurrent  admission  for asthma exacerbation and CHF exacerbation . She was readmitted with increasing Dyspnea . After admission she was diagnosed to have bilateral leg edema & acute systolic CHF  She is comfortable and is in normal sinus rhythm on telemetry, she still has significant leg edema , she had an episode of non sustained V tach. so   Dobutrex was stopped . BP is better. She denies any chest pain or SOB. Her  at bedside.        PAST MEDICAL & SURGICAL HISTORY:  CHF (congestive heart failure)  Pacemaker  HTN (hypertension)  HLD (hyperlipidemia)  Asthma  DM (diabetes mellitus)  CAD (coronary artery disease)  History of cholecystectomy  Artificial cardiac pacemaker          PREVIOUS CARDIAC WORKUP:    Echo:  Severe reduced EF  Cardiac Cath:  8/24/17 - Non obstructive        MEDICATIONS  (STANDING):  amiodarone    Tablet 200 milliGRAM(s) Oral daily  aspirin  chewable 81 milliGRAM(s) Oral daily  buDESOnide 160 MICROgram(s)/formoterol 4.5 MICROgram(s) Inhaler 2 Puff(s) Inhalation two times a day  buMETAnide Injectable 2 milliGRAM(s) IV Push every 12 hours  carvedilol 3.125 milliGRAM(s) Oral every 12 hours  DOBUTamine Infusion 0.5 MICROgram(s)/kG/Min (1.361 mL/Hr) IV Continuous <Continuous>  heparin  Injectable 5000 Unit(s) SubCutaneous every 12 hours  metolazone 5 milliGRAM(s) Oral daily  pantoprazole    Tablet 40 milliGRAM(s) Oral before breakfast    MEDICATIONS  (PRN):  acetaminophen   Tablet. 650 milliGRAM(s) Oral every 6 hours PRN Mild Pain (1 - 3)  ALBUTerol    0.083% 2.5 milliGRAM(s) Nebulizer every 6 hours PRN Shortness of Breath and/or Wheezing  ALPRAZolam 0.5 milliGRAM(s) Oral two times a day PRN Severe anxiety  docusate sodium 100 milliGRAM(s) Oral two times a day PRN Constipation  hydrocortisone 1% Ointment 1 Application(s) Topical two times a day PRN Itching  melatonin 5 milliGRAM(s) Oral at bedtime PRN Sleep  ondansetron Injectable 4 milliGRAM(s) IV Push every 4 hours PRN Nausea and/or Vomiting        REVIEW OF SYSTEM:  Pertinent items are noted in HPI.  Constitutional	Negative for chills, fevers, sweats.    Eyes: 	Negative for visual disturbance.  Ears, nose, mouth, throat, and face: Negative for epistaxis, nasal congestion, sore throat ..  Neck:	Negative for enlargement, pain and difficulty in swallowing  Respiration : Negative for cough,  pleuritic chest pain and wheezing  Cardiovascular: Negative for chest pain, and palpitations dyspnea on exertion,,     Gastrointestinal : Negative for abdominal pain, diarrhea, nausea and vomiting  Genitourinary: Negative for dysuria, frequency and urinary incontinence .  Skin: Negative for  rash, pruritus, swelling, dryness .  	  Hematologic/lymphatic: Negative for bleeding and easy bruising  Musculoskeletal: Negative for arthralgias, back pain and muscle weakness.  Neurological: Negative for dizziness, headaches, seizures and tremors  Behavioral/Psych: Negative for mood change, depression.  Endocrine:	Negative for blurry vision, polydipsia and polyuria, diaphoresis.   Allergic/Immunologic:	Negative for anaphylaxis, angioedema and urticaria.      Vital Signs Last 24 Hrs  T(C): 36.7 (25 Mar 2018 12:00), Max: 36.7 (25 Mar 2018 12:00)  T(F): 98 (25 Mar 2018 12:00), Max: 98 (25 Mar 2018 12:00)  HR: 72 (25 Mar 2018 11:00) (70 - 82)  BP: 121/81 (25 Mar 2018 11:00) (82/48 - 121/81)  BP(mean): 91 (25 Mar 2018 11:00) (42 - 101)  RR: 18 (25 Mar 2018 11:00) (16 - 26)  SpO2: 96% (25 Mar 2018 11:00) (91% - 100%)    I&O's Summary    24 Mar 2018 07:01  -  25 Mar 2018 07:00  --------------------------------------------------------  IN: 1353.6 mL / OUT: 3350 mL / NET: -1996.4 mL    25 Mar 2018 07:01  -  25 Mar 2018 12:13  --------------------------------------------------------  IN: 0 mL / OUT: 800 mL / NET: -800 mL      PHYSICAL EXAM  General Appearance: cooperative, no acute distress,   HEENT: PERRL, conjunctiva clear, EOM's intact, non injected pharynx, no exudate .   Neck: Supple, , no adenopathy, thyroid: not enlarged, no carotid bruit. Elevated  JVD  Back: Symmetric, no  tenderness,no soft tissue tenderness  Lungs: Scattered basal rales no rhonchi  Heart: Regular rate and rhythm, S1, S2 normal, grade 1/6 holosystolic  murmur,  No rub or gallop  Abdomen: Soft, non-tender, bowel sounds active , no hepatosplenomegaly  Extremities: no cyanosis  2 + leg  edema, no joint swelling  Skin: Skin color, texture normal, no rashes   Neurologic: Alert and oriented X3 , cranial nerves intact, sensory and motor normal,        INTERPRETATION OF TELEMETRY: NSR with  electronic ventricular pacing            LABS:                          11.3   8.40  )-----------( 196      ( 24 Mar 2018 05:18 )             38.1     03-25    144  |  102  |  54<H>  ----------------------------<  176<H>  3.5   |  34<H>  |  1.42<H>    Ca    8.3<L>      25 Mar 2018 04:57  Mg     1.9     03-25    TPro  6.6  /  Alb  2.2<L>  /  TBili  1.5<H>  /  DBili  0.4<H>  /  AST  43<H>  /  ALT  44  /  AlkPhos  225<H>  03-24        Lipid Panel  120  18  78  119    Pro BNP  3985 03-23 @ 05:45  D Dimer  -- 03-23 @ 05:45  Pro BNP  5905 03-19 @ 14:11  D Dimer  -- 03-19 @ 14:11

## 2018-03-26 LAB
ADD ON TEST-SPECIMEN IN LAB: SIGNIFICANT CHANGE UP
ADD ON TEST-SPECIMEN IN LAB: SIGNIFICANT CHANGE UP
ANION GAP SERPL CALC-SCNC: 7 MMOL/L — SIGNIFICANT CHANGE UP (ref 5–17)
ANION GAP SERPL CALC-SCNC: 9 MMOL/L — SIGNIFICANT CHANGE UP (ref 5–17)
BUN SERPL-MCNC: 50 MG/DL — HIGH (ref 7–23)
BUN SERPL-MCNC: 51 MG/DL — HIGH (ref 7–23)
CALCIUM SERPL-MCNC: 8.6 MG/DL — SIGNIFICANT CHANGE UP (ref 8.5–10.1)
CALCIUM SERPL-MCNC: 8.8 MG/DL — SIGNIFICANT CHANGE UP (ref 8.5–10.1)
CHLORIDE SERPL-SCNC: 96 MMOL/L — SIGNIFICANT CHANGE UP (ref 96–108)
CHLORIDE SERPL-SCNC: 98 MMOL/L — SIGNIFICANT CHANGE UP (ref 96–108)
CO2 SERPL-SCNC: 37 MMOL/L — HIGH (ref 22–31)
CO2 SERPL-SCNC: 39 MMOL/L — HIGH (ref 22–31)
CREAT SERPL-MCNC: 1.18 MG/DL — SIGNIFICANT CHANGE UP (ref 0.5–1.3)
CREAT SERPL-MCNC: 1.21 MG/DL — SIGNIFICANT CHANGE UP (ref 0.5–1.3)
GLUCOSE SERPL-MCNC: 136 MG/DL — HIGH (ref 70–99)
GLUCOSE SERPL-MCNC: 158 MG/DL — HIGH (ref 70–99)
MAGNESIUM SERPL-MCNC: 2.1 MG/DL — SIGNIFICANT CHANGE UP (ref 1.6–2.6)
PHOSPHATE SERPL-MCNC: 2.3 MG/DL — LOW (ref 2.5–4.5)
POTASSIUM SERPL-MCNC: 3.2 MMOL/L — LOW (ref 3.5–5.3)
POTASSIUM SERPL-MCNC: 3.5 MMOL/L — SIGNIFICANT CHANGE UP (ref 3.5–5.3)
POTASSIUM SERPL-SCNC: 3.2 MMOL/L — LOW (ref 3.5–5.3)
POTASSIUM SERPL-SCNC: 3.5 MMOL/L — SIGNIFICANT CHANGE UP (ref 3.5–5.3)
SODIUM SERPL-SCNC: 142 MMOL/L — SIGNIFICANT CHANGE UP (ref 135–145)
SODIUM SERPL-SCNC: 144 MMOL/L — SIGNIFICANT CHANGE UP (ref 135–145)

## 2018-03-26 RX ORDER — MAGNESIUM OXIDE 400 MG ORAL TABLET 241.3 MG
400 TABLET ORAL ONCE
Qty: 0 | Refills: 0 | Status: COMPLETED | OUTPATIENT
Start: 2018-03-26 | End: 2018-03-26

## 2018-03-26 RX ORDER — FUROSEMIDE 40 MG
10 TABLET ORAL
Qty: 500 | Refills: 0 | Status: DISCONTINUED | OUTPATIENT
Start: 2018-03-26 | End: 2018-03-30

## 2018-03-26 RX ORDER — POTASSIUM CHLORIDE 20 MEQ
10 PACKET (EA) ORAL
Qty: 0 | Refills: 0 | Status: DISCONTINUED | OUTPATIENT
Start: 2018-03-26 | End: 2018-03-26

## 2018-03-26 RX ORDER — POTASSIUM CHLORIDE 20 MEQ
40 PACKET (EA) ORAL ONCE
Qty: 0 | Refills: 0 | Status: COMPLETED | OUTPATIENT
Start: 2018-03-26 | End: 2018-03-26

## 2018-03-26 RX ORDER — POTASSIUM CHLORIDE 20 MEQ
40 PACKET (EA) ORAL DAILY
Qty: 0 | Refills: 0 | Status: DISCONTINUED | OUTPATIENT
Start: 2018-03-26 | End: 2018-03-26

## 2018-03-26 RX ORDER — POTASSIUM CHLORIDE 20 MEQ
40 PACKET (EA) ORAL EVERY 4 HOURS
Qty: 0 | Refills: 0 | Status: COMPLETED | OUTPATIENT
Start: 2018-03-26 | End: 2018-03-26

## 2018-03-26 RX ORDER — SODIUM,POTASSIUM PHOSPHATES 278-250MG
1 POWDER IN PACKET (EA) ORAL
Qty: 0 | Refills: 0 | Status: COMPLETED | OUTPATIENT
Start: 2018-03-26 | End: 2018-03-28

## 2018-03-26 RX ORDER — POTASSIUM CHLORIDE 20 MEQ
40 PACKET (EA) ORAL ONCE
Qty: 0 | Refills: 0 | Status: DISCONTINUED | OUTPATIENT
Start: 2018-03-26 | End: 2018-03-26

## 2018-03-26 RX ADMIN — AMIODARONE HYDROCHLORIDE 200 MILLIGRAM(S): 400 TABLET ORAL at 05:50

## 2018-03-26 RX ADMIN — Medication 2.5 MG/HR: at 18:44

## 2018-03-26 RX ADMIN — MAGNESIUM OXIDE 400 MG ORAL TABLET 400 MILLIGRAM(S): 241.3 TABLET ORAL at 18:15

## 2018-03-26 RX ADMIN — Medication 40 MILLIEQUIVALENT(S): at 16:13

## 2018-03-26 RX ADMIN — Medication 1 TABLET(S): at 18:15

## 2018-03-26 RX ADMIN — CARVEDILOL PHOSPHATE 3.12 MILLIGRAM(S): 80 CAPSULE, EXTENDED RELEASE ORAL at 05:53

## 2018-03-26 RX ADMIN — Medication 40 MILLIEQUIVALENT(S): at 20:04

## 2018-03-26 RX ADMIN — Medication 40 MILLIEQUIVALENT(S): at 12:32

## 2018-03-26 RX ADMIN — BUMETANIDE 2 MILLIGRAM(S): 0.25 INJECTION INTRAMUSCULAR; INTRAVENOUS at 07:03

## 2018-03-26 RX ADMIN — HEPARIN SODIUM 5000 UNIT(S): 5000 INJECTION INTRAVENOUS; SUBCUTANEOUS at 08:38

## 2018-03-26 RX ADMIN — HEPARIN SODIUM 5000 UNIT(S): 5000 INJECTION INTRAVENOUS; SUBCUTANEOUS at 20:04

## 2018-03-26 RX ADMIN — Medication 1 TABLET(S): at 23:36

## 2018-03-26 RX ADMIN — Medication 81 MILLIGRAM(S): at 08:38

## 2018-03-26 RX ADMIN — Medication 40 MILLIEQUIVALENT(S): at 06:56

## 2018-03-26 RX ADMIN — Medication 1 TABLET(S): at 12:32

## 2018-03-26 RX ADMIN — PANTOPRAZOLE SODIUM 40 MILLIGRAM(S): 20 TABLET, DELAYED RELEASE ORAL at 05:58

## 2018-03-26 NOTE — PROGRESS NOTE ADULT - SUBJECTIVE AND OBJECTIVE BOX
Patient is a 58 y.o. female with PMH of CKD2/3 (baseline SCr 1.0), HTN, DM2, chronic systolic CHF (s/p bi-v AICD), VTach on Amiodarone, Hyperlipidemia discharged 3/4/18 s/p CHF exacerbation with edema requiring IV diuresis with KEYSHA peak 2.45 - diuretics (Metolazone and Aldactone) were held, Metformin was stopped, and she was discharged with SCr 1.5 on Lasix 40mg daily. Now she returns with volume overload and KEYSHA (SCr 2.0) - renal called for evaluation.  Reports eating high salt diet. Eats out often.  Since admission, received IV Lasix with improvement in breathing and edema.    Dopplers negative for DVT on prior admission.   On 18 - admission for asthma exacerbation due to parainfluenza infection and KEYSHA 1.7 with hyperkalemia 5.9. Entresto was stopped.  3/23 - wants to leave, edema improved with compression dressings, did not receive loop last night due to hypotension SBP 80s - to receive dose this AM. Metolazone added.    3/26 - s/p VT, transferred to CICU. Now denies SOB. Edema improving. +diarrhea yesterday. K depleted.      REVIEW OF SYSTEMS:    CONSTITUTIONAL: No weakness, fevers or chills  RESPIRATORY: No cough, wheezing, hemoptysis; + shortness of breath (improving)  CARDIOVASCULAR: No chest pain or palpitations  GENITOURINARY: No dysuria, frequency or hematuria  All other review of systems is negative unless indicated above.    MEDICATIONS  (STANDING):  amiodarone    Tablet 200 milliGRAM(s) Oral daily  aspirin  chewable 81 milliGRAM(s) Oral daily  buDESOnide 160 MICROgram(s)/formoterol 4.5 MICROgram(s) Inhaler 2 Puff(s) Inhalation two times a day  buMETAnide Injectable 2 milliGRAM(s) IV Push every 12 hours  carvedilol 3.125 milliGRAM(s) Oral every 12 hours  DOBUTamine Infusion 0.5 MICROgram(s)/kG/Min (1.361 mL/Hr) IV Continuous <Continuous>  heparin  Injectable 5000 Unit(s) SubCutaneous every 12 hours  metolazone 5 milliGRAM(s) Oral daily  pantoprazole    Tablet 40 milliGRAM(s) Oral before breakfast  potassium acid phosphate/sodium acid phosphate tablet (K-PHOS No. 2) 1 Tablet(s) Oral four times a day with meals  potassium chloride    Tablet ER 40 milliEquivalent(s) Oral every 4 hours      Vital Signs Last 24 Hrs  T(C): 36.1 (26 Mar 2018 07:52), Max: 36.9 (25 Mar 2018 16:11)  T(F): 97 (26 Mar 2018 07:52), Max: 98.5 (25 Mar 2018 16:11)  HR: 75 (26 Mar 2018 09:25) (70 - 83)  BP: 96/58 (26 Mar 2018 09:25) (75/58 - 121/81)  BP(mean): 69 (26 Mar 2018 09:25) (62 - 104)  RR: 14 (26 Mar 2018 09:25) (14 - 27)  SpO2: 95% (26 Mar 2018 03:00) (91% - 100%)  Daily     Daily Weight in k (26 Mar 2018 06:26)  I&O's Summary    25 Mar 2018 07:01  -  26 Mar 2018 07:00  --------------------------------------------------------  IN: 1110 mL / OUT: 1200 mL / NET: -90 mL              PHYSICAL EXAM:    Constitutional: NAD, morbidly obese  HEENT: PERRLA, EOMI,  MMM  Neck: No LAD, No JVD  Respiratory: dist bs, no crackles  Cardiovascular: S1 and S2, RRR  Gastrointestinal: BS+, soft, NT/ND  Extremities: + dsg / 1-2+peripheral edema (improved)  Neurological: A/O x 3, no focal deficits  Psychiatric: Normal mood, normal affect  : No Locke  Skin: No rashes  Access: Not applicable    LABS:                        11.3   8.40  )-----------( 196      ( 24 Mar 2018 05:18 )             38.1       144  |  98  |  50<H>  ----------------------------<  136<H>  3.2<L>   |  37<H>  |  1.21    Ca    8.8      26 Mar 2018 04:38  Phos  2.3     03-  Mg     2.4     -25    25 Mar 2018 04:57    144    |  102    |  54     ----------------------------<  176    3.5     |  34     |  1.42   24 Mar 2018 21:22    143    |  100    |  51     ----------------------------<  176    2.7     |  33     |  1.30   24 Mar 2018 05:18    138    |  103    |  56     ----------------------------<  78     3.4     |  25     |  1.39   23 Mar 2018 05:45    139    |  103    |  75     ----------------------------<  116    4.5     |  25     |  1.92   22 Mar 2018 06:48    138    |  103    |  74     ----------------------------<  84     5.0     |  24     |  2.18           Urine Studies:          RADIOLOGY & ADDITIONAL STUDIES:

## 2018-03-26 NOTE — PROGRESS NOTE ADULT - ASSESSMENT
58 y.o. female with PMH of CKD2/3 (baseline SCr 1.0), HTN, DM2, chronic systolic CHF (s/p bi-v AICD), VTach on Amiodarone, Hyperlipidemia discharged 3/4/18 s/p CHF exacerbation with edema requiring IV diuresis returns with volume overload and KEYSHA (SCr 2.0) - renal called for evaluation.    1. KEYSHA on CKD3  - likely due to poor hemodynamics from CHF / volume overload  -IV diuretics ongoing, monitor renal closely - today SCr improving to 1.2, remains azotemic  -Monitor weights and I/Os as able    Lytes  -Aggressive K/Mg  -agree with RTC po KCl - 40mEq q8h - can redose daily. Will likely need standing outpatient dosing.  - Phos - s/p IV KPhos - today ordered KPhos po - check level daily  - ok to liberalize diet - no K restriction - ok to have OJ, bananas    2. Proteinura - 1.3g/g  - due to diabetic nephropathy  - Eventual benefit likely with use of ACE    3. CHF   - BB, diuresis    d/c with RN staff

## 2018-03-26 NOTE — PROGRESS NOTE ADULT - SUBJECTIVE AND OBJECTIVE BOX
HPI :   58 yo female with PMH of chronic systolic CHF, s/p bi-v AICD, HTN, HLD, CAD, DM2, h/o VT, AICD, asthma. Recurrent  admission  for asthma exacerbation and CHF exacerbation . She was readmitted with increasing Dyspnea . After admission she was diagnosed to have bilateral leg edema & acute systolic CHF  She is comfortable and is in normal sinus rhythm on telemetry, she still has significant leg edema , No further episode of ventricular tachycardia. Her Dobutrex was stopped after nonsustained ventricular tachycardia. Her  is at bedside.        PAST MEDICAL & SURGICAL HISTORY:  CHF (congestive heart failure)  Pacemaker  HTN (hypertension)  HLD (hyperlipidemia)  Asthma  DM (diabetes mellitus)  CAD (coronary artery disease)  History of cholecystectomy  Artificial cardiac pacemaker          PREVIOUS CARDIAC WORKUP:    Echo:  Severe reduced EF  Cardiac Cath:  8/24/17 - Non obstructive      MEDICATIONS  (STANDING):  amiodarone    Tablet 200 milliGRAM(s) Oral daily  aspirin  chewable 81 milliGRAM(s) Oral daily  buDESOnide 160 MICROgram(s)/formoterol 4.5 MICROgram(s) Inhaler 2 Puff(s) Inhalation two times a day  buMETAnide Injectable 2 milliGRAM(s) IV Push every 12 hours  carvedilol 3.125 milliGRAM(s) Oral every 12 hours  DOBUTamine Infusion 0.5 MICROgram(s)/kG/Min (1.361 mL/Hr) IV Continuous <Continuous>  heparin  Injectable 5000 Unit(s) SubCutaneous every 12 hours  metolazone 5 milliGRAM(s) Oral daily  pantoprazole    Tablet 40 milliGRAM(s) Oral before breakfast  potassium acid phosphate/sodium acid phosphate tablet (K-PHOS No. 2) 1 Tablet(s) Oral four times a day with meals  potassium chloride    Tablet ER 40 milliEquivalent(s) Oral every 4 hours    MEDICATIONS  (PRN):  acetaminophen   Tablet. 650 milliGRAM(s) Oral every 6 hours PRN Mild Pain (1 - 3)  ALBUTerol    0.083% 2.5 milliGRAM(s) Nebulizer every 6 hours PRN Shortness of Breath and/or Wheezing  ALPRAZolam 0.5 milliGRAM(s) Oral two times a day PRN Severe anxiety  docusate sodium 100 milliGRAM(s) Oral two times a day PRN Constipation  hydrocortisone 1% Ointment 1 Application(s) Topical two times a day PRN Itching  melatonin 5 milliGRAM(s) Oral at bedtime PRN Sleep  ondansetron Injectable 4 milliGRAM(s) IV Push every 4 hours PRN Nausea and/or Vomiting          REVIEW OF SYSTEM:  Pertinent items are noted in HPI.  Constitutional	Negative for chills, fevers, sweats.    Eyes: 	Negative for visual disturbance.  Ears, nose, mouth, throat, and face: Negative for epistaxis, nasal congestion, sore throat ..  Neck:	Negative for enlargement, pain and difficulty in swallowing  Respiration : Negative for cough, pleuritic chest pain and wheezing  Cardiovascular: Negative for chest pain,  and palpitations , She has bilateral leg edema and gets short of breath with activity. She is comfortable at rest.    Gastrointestinal : Negative for abdominal pain, diarrhea, nausea and vomiting  Genitourinary: Negative for dysuria, frequency and urinary incontinence .  Skin: Negative for  rash, pruritus, swelling, dryness .  	  Hematologic/lymphatic: Negative for bleeding and easy bruising  Musculoskeletal: Negative for arthralgias, back pain and muscle weakness.  Neurological: Negative for dizziness, headaches, seizures and tremors  Behavioral/Psych: Negative for mood change, depression.  Endocrine:	Negative for blurry vision, polydipsia and polyuria, diaphoresis.   Allergic/Immunologic:	Negative for anaphylaxis, angioedema and urticaria.      Vital Signs Last 24 Hrs  T(C): 36.1 (26 Mar 2018 07:52), Max: 36.9 (25 Mar 2018 16:11)  T(F): 97 (26 Mar 2018 07:52), Max: 98.5 (25 Mar 2018 16:11)  HR: 75 (26 Mar 2018 09:25) (70 - 83)  BP: 96/58 (26 Mar 2018 09:25) (75/58 - 121/81)  BP(mean): 69 (26 Mar 2018 09:25) (62 - 104)  RR: 14 (26 Mar 2018 09:25) (14 - 27)  SpO2: 95% (26 Mar 2018 03:00) (91% - 100%)    I&O's Summary    25 Mar 2018 07:01  -  26 Mar 2018 07:00  --------------------------------------------------------  IN: 1110 mL / OUT: 1200 mL / NET: -90 mL      PHYSICAL EXAM  General Appearance: cooperative, no acute distress,   HEENT: PERRL, conjunctiva clear, EOM's intact, non injected pharynx, no exudate .   Neck: Supple, , no adenopathy, thyroid: not enlarged, no carotid bruit or JVD  Back: Symmetric, no  tenderness,no soft tissue tenderness  Lungs: Clear to auscultation bilateral,no adventitious breath sounds, normal   expiratory phase  Heart: Regular rate and rhythm, S1, S2 normal, no murmur, rub or gallop  Abdomen: Soft, non-tender, bowel sounds active , no hepatosplenomegaly  Extremities: no cyanosis or edema, no joint swelling  Skin: Skin color, texture normal, no rashes   Neurologic: Alert and oriented X3 , cranial nerves intact, sensory and motor normal,        INTERPRETATION OF TELEMETRY: NSR with electronic ventricular pacing            LABS:      03-26    144  |  98  |  50<H>  ----------------------------<  136<H>  3.2<L>   |  37<H>  |  1.21    Ca    8.8      26 Mar 2018 04:38  Phos  2.3     03-26  Mg     2.4     03-25            Pro BNP  3985 03-23 @ 05:45  D Dimer  -- 03-23 @ 05:45  Pro BNP  5905 03-19 @ 14:11  D Dimer  -- 03-19 @ 14:11

## 2018-03-26 NOTE — CONSULT NOTE ADULT - SUBJECTIVE AND OBJECTIVE BOX
ADVANCED HEART FAILURE CONSULTATION    Patient is a 58y old  Female who presents with a chief complaint of Shortness of breath, weight gain, leg edema.     HPI:  This is a 57 y.o  Female with PMH of diastolic CHF and other cor morbid ites sent to the ED from Cardiologist's office for evaluation of worsening dyspnea and pedal edema and decompensated heart failure.  During the hospital course she was being diuresed but still with significant volume overload and complicated by hypotension.  Dobutamine drip was started and was pt had episodes of NSVT adn so discontinued.  Pt is noncompliant with diet, meds and appointments as outpt.   Pt denies any CP or pressure.     Pt ROS: stated above.     Past Medical History:  Asthma    CAD (coronary artery disease)    CHF (congestive heart failure)    DM (diabetes mellitus)    HLD (hyperlipidemia)    HTN (hypertension)    Pacemaker.    Past Surgical History:  Artificial cardiac pacemaker - BiV pacer.   History of cholecystectomy.    Family History:  Mother  Still living? No  Family history of other heart disease, Age at diagnosis: Age Unknown.    NKDA  Meds: reviewed w/ patient.   Social Hx: nonsmoker, no ETOH or drug use. Lives at home with .     Vital Signs Last 24 Hrs  T(C): 36.4 (19 Mar 2018 15:27), Max: 36.5 (19 Mar 2018 13:43)  T(F): 97.6 (19 Mar 2018 15:27), Max: 97.7 (19 Mar 2018 13:43)  HR: 88 (19 Mar 2018 15:27) (69 - 88)  BP: 105/76 (19 Mar 2018 15:27) (81/60 - 105/76)  BP(mean): --  RR: 18 (19 Mar 2018 15:27) (18 - 19)  SpO2: 100% (19 Mar 2018 15:27) (99% - 100%)    PHYSICAL EXAM:  Constitutional: middle aged  female in moderate respiratory distress with speaking in sentences sitting in chair, awake and alert, well-developed  HEENT: PERR, EOMI, Normal Hearing, MMM  Neck: Soft and supple, No LAD, No JVD  Respiratory: Breath sounds are decreased at bases, no wheezing.   Cardiovascular: S1 and S2, tachycardic.   Gastrointestinal: Bowel Sounds present, soft, nontender, nondistended, no guarding, no rebound  Extremities: +3 LE peripheral edema Bilaterally to thighs.   Vascular: 2+ peripheral pulses  Neurological: A/O x 3, no focal deficits  Musculoskeletal: 5/5 strength b/l upper and lower extremities  Skin: red abrasions on calfs, does not appear infected.     MEDICATIONS  (STANDING):  buDESOnide 160 MICROgram(s)/formoterol 4.5 MICROgram(s) Inhaler 2 Puff(s) Inhalation two times a day  furosemide   Injectable 40 milliGRAM(s) IV Push every 12 hours  heparin  Injectable 5000 Unit(s) SubCutaneous every 12 hours    LABS: All Labs Reviewed:                        11.5   9.60  )-----------( 160      ( 19 Mar 2018 14:11 )             38.2     03-19    138  |  102  |  71<H>  ----------------------------<  174<H>  4.0   |  26  |  2.01<H>    Ca    8.6      19 Mar 2018 14:11    TPro  6.9  /  Alb  2.7<L>  /  TBili  1.7<H>  /  DBili  x   /  AST  52<H>  /  ALT  57  /  AlkPhos  247<H>  03-19  PT/INR - ( 19 Mar 2018 14:11 )   PT: 14.3 sec;   INR: 1.32 ratio         PTT - ( 19 Mar 2018 14:11 )  PTT:25.0 sec  CARDIAC MARKERS ( 19 Mar 2018 14:11 )  0.018 ng/mL / x     / x     / x     / x        EKG: AV paced. HR 70    Xray Chest 1 View AP/PA. (03.19.18 @ 14:43) >  Massive cardiomegaly.  Clear lungs.          PAST MEDICAL & SURGICAL HISTORY:  CHF (congestive heart failure)  Pacemaker  HTN (hypertension)  HLD (hyperlipidemia)  Asthma  DM (diabetes mellitus)  CAD (coronary artery disease)  History of cholecystectomy  Artificial cardiac pacemaker      MEDICATIONS  (STANDING):  amiodarone    Tablet 200 milliGRAM(s) Oral daily  aspirin  chewable 81 milliGRAM(s) Oral daily  buDESOnide 160 MICROgram(s)/formoterol 4.5 MICROgram(s) Inhaler 2 Puff(s) Inhalation two times a day  furosemide Infusion 5 mG/Hr (2.5 mL/Hr) IV Continuous <Continuous>  heparin  Injectable 5000 Unit(s) SubCutaneous every 12 hours  magnesium oxide 400 milliGRAM(s) Oral once  metolazone 5 milliGRAM(s) Oral daily  pantoprazole    Tablet 40 milliGRAM(s) Oral before breakfast  potassium acid phosphate/sodium acid phosphate tablet (K-PHOS No. 2) 1 Tablet(s) Oral four times a day with meals  potassium chloride    Tablet ER 40 milliEquivalent(s) Oral every 4 hours  potassium chloride  10 mEq/100 mL IVPB 10 milliEquivalent(s) IV Intermittent every 1 hour    MEDICATIONS  (PRN):  acetaminophen   Tablet. 650 milliGRAM(s) Oral every 6 hours PRN Mild Pain (1 - 3)  ALBUTerol    0.083% 2.5 milliGRAM(s) Nebulizer every 6 hours PRN Shortness of Breath and/or Wheezing  ALPRAZolam 0.5 milliGRAM(s) Oral two times a day PRN Severe anxiety  docusate sodium 100 milliGRAM(s) Oral two times a day PRN Constipation  hydrocortisone 1% Ointment 1 Application(s) Topical two times a day PRN Itching  melatonin 5 milliGRAM(s) Oral at bedtime PRN Sleep  ondansetron Injectable 4 milliGRAM(s) IV Push every 4 hours PRN Nausea and/or Vomiting      FAMILY HISTORY:  Family history of other heart disease (Mother)      SOCIAL HISTORY:  non smoker, no alcohol use     REVIEW OF SYSTEMS:  CONSTITUTIONAL:  No night sweats.  No fatigue, malaise, lethargy.  No fever or chills.  HEENT:  Eyes:  No visual changes.  No eye pain.      ENT:  No runny nose.  No epistaxis.  No sinus pain.  No sore throat.  No odynophagia.  No ear pain.  No congestion.  RESPIRATORY:  No cough.  No wheeze.  No hemoptysis.  No shortness of breath.  CARDIOVASCULAR:  No chest pains.  No palpitations. No shortness of breath, No orthopnea or PND.  GASTROINTESTINAL:  No abdominal pain.  No nausea or vomiting.  No diarrhea or constipation.  No hematemesis.  No hematochezia.  No melena.  GENITOURINARY:  No urgency.  No frequency.  No dysuria.  No hematuria.  No obstructive symptoms.  No discharge.  No pain.  No significant abnormal bleeding.  MUSCULOSKELETAL:  No musculoskeletal pain.  No joint swelling.  No arthritis.  NEUROLOGICAL:  No tingling or numbness or weakness.  PSYCHIATRIC:  No confusion  SKIN:  No rashes.  No lesions.  No wounds.  ENDOCRINE:  No unexplained weight loss.  No polydipsia.  No polyuria.  No polyphagia.  HEMATOLOGIC:  No anemia.  No purpura.  No petechiae.  No prolonged or excessive bleeding.   ALLERGIC AND IMMUNOLOGIC:  No pruritus.  No swelling.         Vital Signs Last 24 Hrs  T(C): 36.2 (26 Mar 2018 12:10), Max: 36.6 (26 Mar 2018 06:26)  T(F): 97.1 (26 Mar 2018 12:10), Max: 97.8 (26 Mar 2018 06:26)  HR: 76 (26 Mar 2018 14:59) (71 - 83)  BP: 95/68 (26 Mar 2018 14:59) (75/58 - 109/65)  BP(mean): 74 (26 Mar 2018 14:59) (62 - 86)  RR: 21 (26 Mar 2018 14:59) (14 - 27)  SpO2: 95% (26 Mar 2018 03:00) (95% - 100%)    PHYSICAL EXAM-    Constitutional: The patient appears to be normal, well developed, well nourished and alert and oriented to time, place and person. The patient does not appear acutely ill.     Head: Head is normocephalic and atraumatic.      Neck: The patient's neck is supple without enlargement, has no palpable thyromegaly nor thyroid nodules and has no jugular venous distention. No audible carotid bruits. There are strong carotid pulses bilaterally. No JVD.     Cardiovascular: Regular rate and rhythm without S3, S4. No murmurs or rubs are appreciated.      Respiratory: Breath sounds are normal. No rales. No wheezing.    Abdomen: Soft, nontender, nondistended with positive bowel sounds.      Extremity: No tenderness. No  pitting edema No skin discoloration No clubbing No cyanosis.     Neurologic: The patient is alert and oriented.      Skin: No rash, no obvious lesions noted.      Psychiatric: The patient appears to be emotionally stable.      INTERPRETATION OF TELEMETRY: A paced    ECG: A paced    I&O's Detail    25 Mar 2018 07:01  -  26 Mar 2018 07:00  --------------------------------------------------------  IN:    Oral Fluid: 1110 mL  Total IN: 1110 mL    OUT:    Voided: 1200 mL  Total OUT: 1200 mL    Total NET: -90 mL      26 Mar 2018 07:01  -  26 Mar 2018 17:47  --------------------------------------------------------  IN:    Oral Fluid: 500 mL  Total IN: 500 mL    OUT:    Voided: 500 mL  Total OUT: 500 mL    Total NET: 0 mL          LABS:    03-26    144  |  98  |  50<H>  ----------------------------<  136<H>  3.2<L>   |  37<H>  |  1.21    Ca    8.8      26 Mar 2018 04:38  Phos  2.3     03-26  Mg     2.4     03-25              I&O's Summary    25 Mar 2018 07:01  -  26 Mar 2018 07:00  --------------------------------------------------------  IN: 1110 mL / OUT: 1200 mL / NET: -90 mL    26 Mar 2018 07:01  -  26 Mar 2018 17:47  --------------------------------------------------------  IN: 500 mL / OUT: 500 mL / NET: 0 mL      BNP  RADIOLOGY & ADDITIONAL STUDIES:

## 2018-03-26 NOTE — PROGRESS NOTE ADULT - SUBJECTIVE AND OBJECTIVE BOX
Chart reviewed.  Patient seen and examined.    CC: worsening shortness of breath weight gain, leg sidney    HPI: 57 y.o  woman with PMH/PSH of HFrEF 20%, Non obstructive CAD, AICD, HTN, HLD, Asthma, DM, cholecystectomy  sent to the ED from Cardiologist's office for evaluation of worsening dyspnea. Patient reports 11lb weight gain over 3 days and states being compliant with all medications including Lasix. Per her , she eats "Eliel food which has salt it in". No recent fevers / chills or illnesses. Of note patient was recently admitted to  and discharged on 3/4/18 (2 weeks ago) for CHF and leg cellulitis. During that admission she suffered KEYSHA with Scr peaked at 2.0 and thus several diuretics were withheld upon discharge including Entresto and Zaroxyln.     In the ED, notable labs include elevated BNP ~6000 and Scr 2.0, normal trop X 1 and normal CBC. CXR with massive cardiomegaly. Physical exam pertinent for severe +3 LE pitting edema bilaterally. Patient given IV Lasix 20mg and admission requested. Currently seen denying chest pain and c/o ongoing KHAN and + orthopnea at home.     HOSPITAL COURSE: Admitted for advanced CHF exac w/ IV Lasix diuresis also requiring Dobutamine drip for BP support given extensive volume overload and tenuous BP. Noted to have asymptomatic VRE colonization for which ID recs observation off abx.     Subjective:   3/24/18: Frustrated to still be hospitalized, denies CP/ SOB but has extensive pitting B/L LE Edema to thighs. Pulled out IV last night.     3/25/18: No CP, states SOB improving. Depressed. Overnight had 20 beats of Vtach thus Dobutamine drip stopped. Currently SBP low 100's, states urinating frequently. Reinforced low salt diet w/ pt's .    3/26: States SOB improving, still w/ marked leg edema. No CP. Off Dobutamine X 1 day.     REVIEW OF SYSTEMS: All systems reviewed and found to be negative with the exception as above.    Vital Signs Last 24 Hrs  T(C): 36.1 (26 Mar 2018 07:52), Max: 36.9 (25 Mar 2018 16:11)  T(F): 97 (26 Mar 2018 07:52), Max: 98.5 (25 Mar 2018 16:11)  HR: 75 (26 Mar 2018 09:25) (71 - 83)  BP: 96/58 (26 Mar 2018 09:25) (75/58 - 121/81)  BP(mean): 69 (26 Mar 2018 09:25) (62 - 104)  RR: 14 (26 Mar 2018 09:25) (14 - 27)  SpO2: 95% (26 Mar 2018 03:00) (91% - 100%)    PHYSICAL EXAM:    GENERAL: obese middle aged  female comfortable   HEENT: some facial puffiness noted, pupils equal and reactive, EOMI, no oropharyngeal lesions, erythema, exudates, oral thrush  NECK:   supple, no carotid bruits, no palpable lymph nodes, no thyromegaly  CV:  +S1, +S2, regular, no murmurs or rubs  RESP:  lungs clear to auscultation bilaterally, no wheezing, rales, rhonchi, good air entry bilaterally  GI:  obese abdomen soft, non-tender, +distention normal BS, no bruits, no abdominal masses, no palpable masses  MSK:   normal muscle tone, no atrophy, no rigidity, no contractions  EXT/vasc:  3+ pitting edema, extending to thighs, chronic vascular changes, diminished pedal pulses  NEURO:  AAOX3, no focal neurological deficits, follows all commands, able to move extremities spontaneously  SKIN:  no ulcers, lesions or rashes    LABS:   03-26    144  |  98  |  50<H>  ----------------------------<  136<H>  3.2<L>   |  37<H>  |  1.21    Ca    8.8      26 Mar 2018 04:38  Phos  2.3     03-26  Mg     2.4     03-25 03-25    144  |  102  |  54<H>  ----------------------------<  176<H>  3.5   |  34<H>  |  1.42<H>    Ca    8.3<L>      25 Mar 2018 04:57  Mg     1.9     03-25    LIVER FUNCTIONS - ( 22 Mar 2018 06:48 )  Alb: 2.3 g/dL / Pro: 6.7 gm/dL / ALK PHOS: 218 U/L / ALT: 52 U/L / AST: 64 U/L / GGT: x           Serum Pro-Brain Natriuretic Peptide: 3985 pg/mL (03-23-18 @ 05:45)  Serum Pro-Brain Natriuretic Peptide: 5905 pg/mL (03-19-18 @ 14:11)    Culture - Urine (collected 03-19-18 @ 14:11)  Source: .Urine Clean Catch (Midstream)  Final Report (03-21-18 @ 21:14):    50,000 - 99,000 CFU/mL Enterococcus faecium (vancomycin resistant)    <10,000 CFU/ml Normal Urogenital tara present  Organism: Enterococcus faecium (vancomycin resistant) (03-21-18 @ 21:14)  Organism: Enterococcus faecium (vancomycin resistant) (03-21-18 @ 21:14)      -  Ampicillin: R >8      -  Ciprofloxacin: R >2      -  Nitrofurantoin: S <=32      -  Tetra/Doxy: R >8      -  Vancomycin: R >16      Method Type: MOSES    RADIOLOGY  < from: Xray Chest 1 View AP/PA. (03.19.18 @ 14:43) >  IMPRESSION: Massive cardiomegaly. Clear lungs.    < end of copied text >    < from: Transthoracic Echocardiogram (06.07.17 @ 09:49) >     Summary   The left ventricle is severely dilated with severe global hypokinesis.   Estimated left ventricular ejection fraction is 20 % via bi-plane method.   Left atriumis moderately dilated.  The right atrium appears moderately dilated. A device wire is seen in the   RV and RA.  The right ventricle is normal in size and contractility.   The aortic valve appears normal.  There is thickening of both mitral valve leaflet tips. The leaflet   opening  is normal. Moderate mitral regurgitation is present based on a calculated   ERO of .32cm2.  The tricuspid valve leaflets are thin and pliable. Moderate (2+)   tricuspid  valve regurgitation is present. Moderate pulmonary hypertension.   Trace pericardial effusion is present.     CT Abdomen and Pelvis No Cont (03.21.18 @ 13:13) >    IMPRESSION:   Limited evaluation of the IVC and iliac veins due to lack of intravenous   contrast. They are however normal in caliber and there is no extrinsic   mass effect on these vessels.    Small right and trace left pleural effusions, trace ascites and anasarca.    4.8 cm right adnexal cyst. Recommend ultrasound to further evaluate.     US Abdomen Complete (03.23.18 @ 16:13) >  IMPRESSION:  No cholelithiasis.     Visualized portions of the IVC and intra-abdominal vasculature are   unremarkable.    Right-sided pleural effusion. Trace perihepatic ascites.      MEDICATIONS    MEDICATIONS  (STANDING):  amiodarone    Tablet 200 milliGRAM(s) Oral daily  aspirin  chewable 81 milliGRAM(s) Oral daily  buDESOnide 160 MICROgram(s)/formoterol 4.5 MICROgram(s) Inhaler 2 Puff(s) Inhalation two times a day  buMETAnide Injectable 2 milliGRAM(s) IV Push every 12 hours  carvedilol 3.125 milliGRAM(s) Oral every 12 hours  heparin  Injectable 5000 Unit(s) SubCutaneous every 12 hours  metolazone 5 milliGRAM(s) Oral daily  pantoprazole    Tablet 40 milliGRAM(s) Oral before breakfast  potassium acid phosphate/sodium acid phosphate tablet (K-PHOS No. 2) 1 Tablet(s) Oral four times a day with meals  potassium chloride    Tablet ER 40 milliEquivalent(s) Oral every 4 hours

## 2018-03-26 NOTE — PROGRESS NOTE ADULT - ASSESSMENT
ASSESSMENT / PLAN:   57 y.o  Woman sent to the ED from Cardiologist's office for evaluation of worsening dyspnea found to have acute on chronic HFrEF     Acute Hypoxic Respiratory Distress superimposed from Acute on Chronic HFrEF (20%)  - decompensated likely 2/2 dietary noncompliance   - still with significant volume overload; CT a/p reviewed  - cont w/ aggressive diuresis bumex IV BID + Zaroxyln BID (prior to Bumex).   - OFF Dobutamine drip given Vtach on Saturday night.   - may need to restart for BP support, goal SBP> 90 while undergoing diuresis.   - replete lytes.    # Hypokalemia / Hypophosphatemia - replete lytes aggressively and agree that she will likely need PO K+ daily upon discharge.    - monitor strict I's/O's, daily weights--> not well documented (reinforce importance of efficient reporting to help guide therapy)  - may need Entresto added back to regimen and evaluation for pulmonary HTN.   - appreciate cardiology input   - recent dopplers 2 weeks from admission date was negative for DVT.   - ongoing education on importance of dietary compliance  - ACE bandage wrap for compression effect  - Serum Pro-Brain Natriuretic Peptide 3985 pg/mL from 5905 pg/mL on adm     KEYSHA on CKD - possibly 2/2 prerenal from volume overload. Currently azotemic.  - appreciate renal input  - avoid nephrotoxins where possible  - IMPROIVING, Today Scr = 1.2    Urine Cx w/ colonized VRE  - she's afebrile / WBC normal  - appreciate ID input --> monitor off abt    Vtach/CAD - chronic  - see above.    - con't asa / toprol / Amiodarone / statin  - monitor LFT's, normal US Liver.     HTN - currently hypotensive while undergoing diuresis.   - BB on hold.     T2DM  - A1c 7.3 (1/30/18)  - holding januvia  - con't POCT with HEMA ; consistent carb diet    Asthma - stable  - continue Symbicort add prn nebs for wheezing.     Obesity  - nutrition consult  - educated on importance of appropriate wt loss plan    #DVT prophylaxis- heparin sq    Dispo: Remain inpatient on CCU. Discussed w/pt and her . Discussed w/ RN.   - full code  Total time > 45 mins.

## 2018-03-26 NOTE — PROGRESS NOTE ADULT - ASSESSMENT
Acute on Chronic HFrEF  CRI .  NICM. S/p VT  Non obstructive CAD  HTN  AICD  An episode of NSVT .          Continue with current cardiac meds.  Dobutrex stopped due to VT.  Supplement K, Keep > 4.   Continue  with diuretics .  follow low sodium, low cholesterol, ADA diet.  Fluid restriction 1.5 lit/day  monitor Intake & output  Daily weights.  Follow up electrolytes, renal function.    No ACEi at present till renal function stabilizes.   Hold beta blockers if BP low  Dr Stevens will follow up tomorrow.

## 2018-03-26 NOTE — CONSULT NOTE ADULT - CONSULT REASON
Cardiology Consult
Decompensated heart failure that is complicated by hypotension refractory to diuresis.
VRE in urine
KEYSHA on CKD3

## 2018-03-26 NOTE — CONSULT NOTE ADULT - ASSESSMENT
Acute on chronic decompensated HFrEF, LVEF about 10%, biventricular failure, complicated by hypotension, hypoalbuminemia, noncompliance- severe hypervolemia, NYHA class III- IV-   s/p BivICD.  Will DC coreg.  Will start her on lasix drip 5mg/hr tonight.  Continue metolazone.  Hold off ACEI./ARB/ARNI.  Check TFTs.   Diuresis with close monitoring of the renal function and electrolytes.  Goal potassium of 4 and magnesium of 2.   Strict I/O and daily wt checks. Low sodium diet. Nutrition education.   Poor prognosis.  She will need evaluation for advanced therapies with a tertiary care center heart failure program. Discussed with Dr Tucker her primary cardiologist.  Will discuss the past ischemic heart disease evaluation with Dr Stevens when available. No immediate need at this time.  Non compliance to be addressed.  Risk for readmission and high mortality.    Severe MR- likely functional in nature although in some views appears eccentric.  Will monitor for now with diuresis and will address this down the road.    Hypoalbuminemia- nutrition consult.    VT-  close monitoring of the electrolytes.  Goal potassium of 4 and magnesium of 2.   Amiodarone to be continued.    Other medical issues- Management per primary team.   Thank you for allowing me to participate in the care of this patient. Please feel free to contact me with any questions.

## 2018-03-27 LAB
ADD ON TEST-SPECIMEN IN LAB: SIGNIFICANT CHANGE UP
ANION GAP SERPL CALC-SCNC: 4 MMOL/L — LOW (ref 5–17)
BUN SERPL-MCNC: 49 MG/DL — HIGH (ref 7–23)
CALCIUM SERPL-MCNC: 8.7 MG/DL — SIGNIFICANT CHANGE UP (ref 8.5–10.1)
CHLORIDE SERPL-SCNC: 98 MMOL/L — SIGNIFICANT CHANGE UP (ref 96–108)
CO2 SERPL-SCNC: 39 MMOL/L — HIGH (ref 22–31)
CREAT SERPL-MCNC: 1.12 MG/DL — SIGNIFICANT CHANGE UP (ref 0.5–1.3)
GLUCOSE SERPL-MCNC: 112 MG/DL — HIGH (ref 70–99)
MAGNESIUM SERPL-MCNC: 2 MG/DL — SIGNIFICANT CHANGE UP (ref 1.6–2.6)
POTASSIUM SERPL-MCNC: 3.2 MMOL/L — LOW (ref 3.5–5.3)
POTASSIUM SERPL-SCNC: 3.2 MMOL/L — LOW (ref 3.5–5.3)
SODIUM SERPL-SCNC: 141 MMOL/L — SIGNIFICANT CHANGE UP (ref 135–145)

## 2018-03-27 RX ORDER — POTASSIUM CHLORIDE 20 MEQ
40 PACKET (EA) ORAL EVERY 4 HOURS
Qty: 0 | Refills: 0 | Status: COMPLETED | OUTPATIENT
Start: 2018-03-27 | End: 2018-03-27

## 2018-03-27 RX ORDER — POTASSIUM CHLORIDE 20 MEQ
10 PACKET (EA) ORAL
Qty: 0 | Refills: 0 | Status: COMPLETED | OUTPATIENT
Start: 2018-03-27 | End: 2018-03-27

## 2018-03-27 RX ADMIN — Medication 1 TABLET(S): at 11:14

## 2018-03-27 RX ADMIN — Medication 40 MILLIEQUIVALENT(S): at 10:29

## 2018-03-27 RX ADMIN — HEPARIN SODIUM 5000 UNIT(S): 5000 INJECTION INTRAVENOUS; SUBCUTANEOUS at 21:21

## 2018-03-27 RX ADMIN — Medication 1 TABLET(S): at 21:21

## 2018-03-27 RX ADMIN — BUDESONIDE AND FORMOTEROL FUMARATE DIHYDRATE 2 PUFF(S): 160; 4.5 AEROSOL RESPIRATORY (INHALATION) at 20:04

## 2018-03-27 RX ADMIN — AMIODARONE HYDROCHLORIDE 200 MILLIGRAM(S): 400 TABLET ORAL at 05:13

## 2018-03-27 RX ADMIN — PANTOPRAZOLE SODIUM 40 MILLIGRAM(S): 20 TABLET, DELAYED RELEASE ORAL at 08:14

## 2018-03-27 RX ADMIN — Medication 81 MILLIGRAM(S): at 13:57

## 2018-03-27 RX ADMIN — Medication 40 MILLIEQUIVALENT(S): at 11:14

## 2018-03-27 RX ADMIN — HEPARIN SODIUM 5000 UNIT(S): 5000 INJECTION INTRAVENOUS; SUBCUTANEOUS at 08:14

## 2018-03-27 RX ADMIN — Medication 40 MILLIEQUIVALENT(S): at 15:20

## 2018-03-27 RX ADMIN — Medication 1 TABLET(S): at 17:32

## 2018-03-27 RX ADMIN — Medication 1 TABLET(S): at 05:13

## 2018-03-27 NOTE — PROGRESS NOTE ADULT - SUBJECTIVE AND OBJECTIVE BOX
57 y.o  Female with PMH of diastolic CHF and other cormorbidites sent to the ED for evaluation of worsening dyspnea. Patient reports 11lb weight gain over 3 days and states being compliant with all medications including Lasix. Per her , she eats "[place] food which has salt it in". No recent fevers / chills or illnesses. She has been non compliant to office follow up. Missed 3 out pt visits last week. Of note patient was recently admitted to  and discharged on 3/4/18 (2 weeks ago) for CHF and leg cellulitis. During that admission she suffered KEYSHA with Scr peaked at 2.0 and thus several diuretics were withheld upon discharge including Entresto and Zaroxyln.  She has worsening leg edema. She was hypotensive. Started on IV Dobutamine to aid diuresis. Urine output increased and there was weight loss noted, however she had NSVT and dobutamine was stopped.    Today, she is comfortable. No new events. She has PVCs, couplets and triplets. No chest pain. No orthopnea. Leg edema is marginally better.    PAST MEDICAL & SURGICAL HISTORY:  HFrEF CHF (congestive heart failure)  NICM  Non obstructive CAD  AICD  HTN (hypertension)  HLD (hyperlipidemia)  Asthma  DM (diabetes mellitus)  History of cholecystectomy      PREVIOUS CARDIAC WORKUP:      Echo:  Severe reduced EF  Cardiac Cath:  8/24/17 - Non obstructive      Allergies:   No Known Allergies      MEDICATIONS  (STANDING):  amiodarone    Tablet 200 milliGRAM(s) Oral daily  aspirin  chewable 81 milliGRAM(s) Oral daily  buDESOnide 160 MICROgram(s)/formoterol 4.5 MICROgram(s) Inhaler 2 Puff(s) Inhalation two times a day  furosemide Infusion 5 mG/Hr (2.5 mL/Hr) IV Continuous <Continuous>  heparin  Injectable 5000 Unit(s) SubCutaneous every 12 hours  metolazone 5 milliGRAM(s) Oral daily  pantoprazole    Tablet 40 milliGRAM(s) Oral before breakfast  potassium acid phosphate/sodium acid phosphate tablet (K-PHOS No. 2) 1 Tablet(s) Oral four times a day with meals  potassium chloride    Tablet ER 40 milliEquivalent(s) Oral every 4 hours    MEDICATIONS  (PRN):  acetaminophen   Tablet. 650 milliGRAM(s) Oral every 6 hours PRN Mild Pain (1 - 3)  ALBUTerol    0.083% 2.5 milliGRAM(s) Nebulizer every 6 hours PRN Shortness of Breath and/or Wheezing  ALPRAZolam 0.5 milliGRAM(s) Oral two times a day PRN Severe anxiety  docusate sodium 100 milliGRAM(s) Oral two times a day PRN Constipation  hydrocortisone 1% Ointment 1 Application(s) Topical two times a day PRN Itching  melatonin 5 milliGRAM(s) Oral at bedtime PRN Sleep  ondansetron Injectable 4 milliGRAM(s) IV Push every 4 hours PRN Nausea and/or Vomiting      ROS:     Detailed ten system ROS was performed and was negative except for history as eluded to above.    no fever  no chills  no nausea  no vomiting  no diarrhea  no constipation  no melena  no hematochezia  no chest pain  no palpitations  no sob at rest  + dyspnea on exertion  no cough  no wheezing  no anorexia  no headache  no dizziness  no syncope  no weakness  no myalgia  no dysuria  no polyuria  no hematuria   + edema      Vital Signs Last 24 Hrs  T(C): 36.6 (27 Mar 2018 06:19), Max: 36.8 (27 Mar 2018 06:16)  T(F): 97.9 (27 Mar 2018 06:19), Max: 98.3 (27 Mar 2018 06:16)  HR: 81 (27 Mar 2018 06:00) (71 - 83)  BP: 99/58 (27 Mar 2018 06:00) (77/43 - 101/57)  BP(mean): 69 (27 Mar 2018 06:00) (49 - 76)  RR: 27 (27 Mar 2018 06:00) (20 - 31)  SpO2: 97% (27 Mar 2018 06:00) (97% - 98%)    I&O's Summary    26 Mar 2018 07:01  -  27 Mar 2018 07:00  --------------------------------------------------------  IN: 1300 mL / OUT: 1801 mL / NET: -501 mL        PHYSICAL EXAM:    General:                Comfortable, AAO X 3, in no distress.   HEENT:                  Atraumatic, PERRLA, EOMI, conjunctiva clear.   Neck:                     Supple, no adenopathy, no thyromegaly, no JVD, no bruit.  Back:                     Symmetric, non tender.  Chest:                    Scant rales, B/L symmetric air entry, no tachypnea  Heart:                     S1, S2 normal, no gallop, + murmur.  Abdomen:              Soft, non-tender, bowel sounds active. No palpable masses.  Extremities:           no cyanosis, + edema B/L LE.   Skin:                      Skin color, texture normal, no rash  Neurologic:            Grossly nonfocal.       TELEMETRY:   A paced rhythm. PVCs noted. Occasional couplets and triplets.      ECG:  A paced, IVCD    LABS:    03-27    141  |  98  |  49<H>  ----------------------------<  112<H>  3.2<L>   |  39<H>  |  1.12    Ca    8.7      27 Mar 2018 04:42  Phos  2.3     03-26  Mg     2.0     03-27      Pro BNP  3985 03-23 @ 05:45

## 2018-03-27 NOTE — PROGRESS NOTE ADULT - ASSESSMENT
57 y.o  Woman sent to the ED from Cardiologist's office for evaluation of worsening dyspnea found to have acute on chronic HFrEF     Acute Hypoxic Respiratory Distress superimposed from Acute on Chronic HFrEF (20%)  - decompensated likely 2/2 dietary noncompliance   - still with significant volume overload; CT a/p reviewed   - cont day 2 of IV Lasix increased to 10mg/hr + Zaroxyln.   - OFF Dobutamine drip given Vtach on Saturday night.   - may need to restart for BP support, goal SBP> 90 while undergoing diuresis.   - replete lytes.    # Hypokalemia / Hypophosphatemia - replete lytes aggressively and agree that she will likely need PO K+ daily upon discharge.    - monitor strict I's/O's, daily weights--> not well documented (reinforce importance of efficient reporting to help guide therapy)  - may need Entresto added back to regimen and evaluation for pulmonary HTN.   - appreciate cardiology input   - recent dopplers 2 weeks from admission date was negative for DVT.   - ongoing education on importance of dietary compliance  - ACE bandage wrap for compression effect  - Serum Pro-Brain Natriuretic Peptide 3985 pg/mL from 5905 pg/mL on adm     KEYSHA on CKD - possibly 2/2 prerenal from volume overload. Currently azotemic.  - appreciate renal input  - avoid nephrotoxins where possible  - IMPROIVING, Today Scr = 1.1    Urine Cx w/ colonized VRE  - she's afebrile / WBC normal  - appreciate ID input --> monitor off abt    Vtach/CAD - chronic  - see above.    - con't asa / toprol / Amiodarone / statin  - monitor LFT's, normal US Liver.     HTN - currently hypotensive while undergoing diuresis.   - BB on hold.     T2DM  - A1c 7.3 (1/30/18)  - holding januvia  - con't POCT with HEMA ; consistent carb diet    Asthma - stable  - continue Symbicort add prn nebs for wheezing.     Obesity  - nutrition consult  - educated on importance of appropriate wt loss plan    #DVT prophylaxis- heparin sq    Dispo: Remain inpatient on CCU. Discussed w/pt and her . Discussed w/ RN and Cardiology. Tentative dc in 2 - 3 days pending clinical improvement.  - full code  Total time > 45 mins.

## 2018-03-27 NOTE — PROGRESS NOTE ADULT - ASSESSMENT
Acute on Chronic HFrEF  NSVT on IV Dobutamine  Hypokalemia  ARF - improving with diuresis.  NICM. S/p VT  Non obstructive CAD  HTN  AICD    Suggest:    Diuresis - now on Lasix gtt. Will F/U I/O, BP, Renal function and lytes. Change to Bumex gtt if not as  Supplement K, Keep > 4.   Add Aldactone / Inspra when BP is more stable.  follow low sodium, low cholesterol, ADA diet.  Fluid restriction 1.5 lit/day  monitor Intake & output  Daily weights.  Follow up electrolytes, renal function.   No ACEi, ARBs or Entresto at present till renal function stabilizes.   Hold beta blockers if BP low    D/W pt's

## 2018-03-27 NOTE — PROGRESS NOTE ADULT - SUBJECTIVE AND OBJECTIVE BOX
ADVANCED HEART FAILURE CONSULTATION    Patient is a 58y old  Female who presents with a chief complaint of Shortness of breath, weight gain, leg edema.     HPI:  This is a 57 y.o  Female with PMH of diastolic CHF and other cor morbid ites sent to the ED from Cardiologist's office for evaluation of worsening dyspnea and pedal edema and decompensated heart failure.  During the hospital course she was being diuresed but still with significant volume overload and complicated by hypotension.  Dobutamine drip was started and was pt had episodes of NSVT adn so discontinued.  Pt is noncompliant with diet, meds and appointments as outpt.   Pt denies any CP or pressure.   [id number].    Past Medical History:  Asthma    CAD (coronary artery disease)    CHF (congestive heart failure)    DM (diabetes mellitus)    HLD (hyperlipidemia)    HTN (hypertension)    Pacemaker.    Past Surgical History:  Artificial cardiac pacemaker - BiV pacer.   History of cholecystectomy.    Family History:  Mother  Still living? No  Family history of other heart disease, Age at diagnosis: Age Unknown.    NKDA  Meds: reviewed w/ patient.   Social Hx: nonsmoker, no ETOH or drug use. Lives at home with .     Vital Signs Last 24 Hrs  T(C): 36.4 (19 Mar 2018 15:27), Max: 36.5 (19 Mar 2018 13:43)  T(F): 97.6 (19 Mar 2018 15:27), Max: 97.7 (19 Mar 2018 13:43)  HR: 88 (19 Mar 2018 15:27) (69 - 88)  BP: 105/76 (19 Mar 2018 15:27) (81/60 - 105/76)  BP(mean): --  RR: 18 (19 Mar 2018 15:27) (18 - 19)  SpO2: 100% (19 Mar 2018 15:27) (99% - 100%)      MEDICATIONS  (STANDING):  buDESOnide 160 MICROgram(s)/formoterol 4.5 MICROgram(s) Inhaler 2 Puff(s) Inhalation two times a day  furosemide   Injectable 40 milliGRAM(s) IV Push every 12 hours  heparin  Injectable 5000 Unit(s) SubCutaneous every 12 hours    LABS: All Labs Reviewed:                        11.5   9.60  )-----------( 160      ( 19 Mar 2018 14:11 )             38.2     03-19    138  |  102  |  71<H>  ----------------------------<  174<H>  4.0   |  26  |  2.01<H>    Ca    8.6      19 Mar 2018 14:11    TPro  6.9  /  Alb  2.7<L>  /  TBili  1.7<H>  /  DBili  x   /  AST  52<H>  /  ALT  57  /  AlkPhos  247<H>  03-19  PT/INR - ( 19 Mar 2018 14:11 )   PT: 14.3 sec;   INR: 1.32 ratio         PTT - ( 19 Mar 2018 14:11 )  PTT:25.0 sec  CARDIAC MARKERS ( 19 Mar 2018 14:11 )  0.018 ng/mL / x     / x     / x     / x        EKG: AV paced. HR 70    Xray Chest 1 View AP/PA. (03.19.18 @ 14:43) >  Massive cardiomegaly.  Clear lungs.          PAST MEDICAL & SURGICAL HISTORY:  CHF (congestive heart failure)  Pacemaker  HTN (hypertension)  HLD (hyperlipidemia)  Asthma  DM (diabetes mellitus)  CAD (coronary artery disease)  History of cholecystectomy  Artificial cardiac pacemaker      MEDICATIONS  (STANDING):  amiodarone    Tablet 200 milliGRAM(s) Oral daily  aspirin  chewable 81 milliGRAM(s) Oral daily  buDESOnide 160 MICROgram(s)/formoterol 4.5 MICROgram(s) Inhaler 2 Puff(s) Inhalation two times a day  furosemide Infusion 5 mG/Hr (2.5 mL/Hr) IV Continuous <Continuous>  heparin  Injectable 5000 Unit(s) SubCutaneous every 12 hours  magnesium oxide 400 milliGRAM(s) Oral once  metolazone 5 milliGRAM(s) Oral daily  pantoprazole    Tablet 40 milliGRAM(s) Oral before breakfast  potassium acid phosphate/sodium acid phosphate tablet (K-PHOS No. 2) 1 Tablet(s) Oral four times a day with meals  potassium chloride    Tablet ER 40 milliEquivalent(s) Oral every 4 hours  potassium chloride  10 mEq/100 mL IVPB 10 milliEquivalent(s) IV Intermittent every 1 hour    MEDICATIONS  (PRN):  acetaminophen   Tablet. 650 milliGRAM(s) Oral every 6 hours PRN Mild Pain (1 - 3)  ALBUTerol    0.083% 2.5 milliGRAM(s) Nebulizer every 6 hours PRN Shortness of Breath and/or Wheezing  ALPRAZolam 0.5 milliGRAM(s) Oral two times a day PRN Severe anxiety  docusate sodium 100 milliGRAM(s) Oral two times a day PRN Constipation  hydrocortisone 1% Ointment 1 Application(s) Topical two times a day PRN Itching  melatonin 5 milliGRAM(s) Oral at bedtime PRN Sleep  ondansetron Injectable 4 milliGRAM(s) IV Push every 4 hours PRN Nausea and/or Vomiting      FAMILY HISTORY:  Family history of other heart disease (Mother)      SOCIAL HISTORY:  non smoker, no alcohol use     REVIEW OF SYSTEMS:  CONSTITUTIONAL:  No night sweats.  No fatigue, malaise, lethargy.  No fever or chills.  HEENT:  Eyes:  No visual changes.  No eye pain.      ENT:  No runny nose.  No epistaxis.  No sinus pain.  No sore throat.  No odynophagia.  No ear pain.  No congestion.  RESPIRATORY:  No cough.  No wheeze.  No hemoptysis.  c/o shortness of breath.  CARDIOVASCULAR:  No chest pains.  No palpitations. c/o shortness of breath, No orthopnea or PND. c/o pedal edema  GASTROINTESTINAL:  No abdominal pain.  No nausea or vomiting.  No diarrhea or constipation.  No hematemesis.  No hematochezia.  No melena.  GENITOURINARY:  No urgency.  No frequency.  No dysuria.  No hematuria.  No obstructive symptoms.  No discharge.  No pain.  No significant abnormal bleeding.  MUSCULOSKELETAL:  No musculoskeletal pain.  No joint swelling.  No arthritis.  NEUROLOGICAL:  No tingling or numbness or weakness.  PSYCHIATRIC:  No confusion  SKIN:  No rashes.  No lesions.  No wounds.  ENDOCRINE:  No unexplained weight loss.  No polydipsia.  No polyuria.  No polyphagia.  HEMATOLOGIC:  No anemia.  No purpura.  No petechiae.  No prolonged or excessive bleeding.   ALLERGIC AND IMMUNOLOGIC:  No pruritus.  No swelling.         Vital Signs Last 24 Hrs  T(C): 36.2 (26 Mar 2018 12:10), Max: 36.6 (26 Mar 2018 06:26)  T(F): 97.1 (26 Mar 2018 12:10), Max: 97.8 (26 Mar 2018 06:26)  HR: 76 (26 Mar 2018 14:59) (71 - 83)  BP: 95/68 (26 Mar 2018 14:59) (75/58 - 109/65)  BP(mean): 74 (26 Mar 2018 14:59) (62 - 86)  RR: 21 (26 Mar 2018 14:59) (14 - 27)  SpO2: 95% (26 Mar 2018 03:00) (95% - 100%)    PHYSICAL EXAM-    Constitutional: ill looking obese  female in mild respiratory distress.      Head: Head is normocephalic and atraumatic.      Neck: The patient's neck is supple without enlargement, has no palpable thyromegaly nor thyroid nodules and has no jugular venous distention. No audible carotid bruits. There are strong carotid pulses bilaterally. positive JVD.     Cardiovascular: Regular rate and rhythm without S3, S4. No murmurs or rubs are appreciated.      Respiratory: decrease breath sounds at bases with faint crackles b/l     Abdomen: Soft, nontender, nondistended with positive bowel sounds.      Extremity: No tenderness.2+  pitting edema b/l LE, No skin discoloration No clubbing No cyanosis.     Neurologic: The patient is alert and oriented.      Skin: No rash, no obvious lesions noted.      Psychiatric: The patient appears to be emotionally stable.      INTERPRETATION OF TELEMETRY: A paced    ECG: A paced    I&O's Detail    25 Mar 2018 07:01  -  26 Mar 2018 07:00  --------------------------------------------------------  IN:    Oral Fluid: 1110 mL  Total IN: 1110 mL    OUT:    Voided: 1200 mL  Total OUT: 1200 mL    Total NET: -90 mL      26 Mar 2018 07:01  -  26 Mar 2018 17:47  --------------------------------------------------------  IN:    Oral Fluid: 500 mL  Total IN: 500 mL    OUT:    Voided: 500 mL  Total OUT: 500 mL    Total NET: 0 mL          LABS:    03-26    144  |  98  |  50<H>  ----------------------------<  136<H>  3.2<L>   |  37<H>  |  1.21    Ca    8.8      26 Mar 2018 04:38  Phos  2.3     03-26  Mg     2.4     03-25              I&O's Summary    25 Mar 2018 07:01  -  26 Mar 2018 07:00  --------------------------------------------------------  IN: 1110 mL / OUT: 1200 mL / NET: -90 mL    26 Mar 2018 07:01  -  26 Mar 2018 17:47  --------------------------------------------------------  IN: 500 mL / OUT: 500 mL / NET: 0 mL      BNP  RADIOLOGY & ADDITIONAL STUDIES:

## 2018-03-27 NOTE — PROGRESS NOTE ADULT - ASSESSMENT
Acute on chronic decompensated HFrEF, LVEF about 10%, biventricular failure, complicated by hypotension, hypoalbuminemia, noncompliance- severe hypervolemia, NYHA class III- IV-   s/p BivICD.  Tolerating lasix drip well overnight.   Will DC coreg.  increase lasix drip 10 mg/hr.  Continue metolazone.  Hold off ACEI./ARB/ARNI.  Check TFTs.   Diuresis with close monitoring of the renal function and electrolytes.  Goal potassium of 4 and magnesium of 2.   Strict I/O and daily wt checks. Low sodium diet. Nutrition education.   Poor prognosis.  She will need evaluation for advanced therapies with a tertiary care center heart failure program. Discussed with Dr Tucker her primary cardiologist.  Will discuss the past ischemic heart disease evaluation with Dr Stevens when available. No immediate need at this time.  Non compliance to be addressed.  Risk for readmission and high mortality.    Hypokalemia- iv and po potassium supplementation.     Severe MR- likely functional in nature although in some views appears eccentric.  Will monitor for now with diuresis and will address this down the road.    Hypoalbuminemia- nutrition consult.    VT-  close monitoring of the electrolytes.  Goal potassium of 4 and magnesium of 2.   Amiodarone to be continued.    Other medical issues- Management per primary team.   Thank you for allowing me to participate in the care of this patient. Please feel free to contact me with any questions.

## 2018-03-27 NOTE — PROGRESS NOTE ADULT - SUBJECTIVE AND OBJECTIVE BOX
CC: worsening shortness of breath weight gain, leg sidney    HPI: 57 y.o  woman with PMH/PSH of HFrEF 20%, Non obstructive CAD, AICD, HTN, HLD, Asthma, DM, cholecystectomy  sent to the ED from Cardiologist's office for evaluation of worsening dyspnea. Patient reports 11lb weight gain over 3 days and states being compliant with all medications including Lasix. Per her , she eats "[place] food which has salt it in". No recent fevers / chills or illnesses. Of note patient was recently admitted to  and discharged on 3/4/18 (2 weeks ago) for CHF and leg cellulitis. During that admission she suffered KEYSHA with Scr peaked at 2.0 and thus several diuretics were withheld upon discharge including Entresto and Zaroxyln.     In the ED, notable labs include elevated BNP ~6000 and Scr 2.0, normal trop X 1 and normal CBC. CXR with massive cardiomegaly. Physical exam pertinent for severe +3 LE pitting edema bilaterally. Patient given IV Lasix 20mg and admission requested. Currently seen denying chest pain and c/o ongoing KHAN and + orthopnea at home.     HOSPITAL COURSE: Admitted for advanced CHF exac w/ IV Lasix diuresis also requiring Dobutamine drip for BP support given extensive volume overload and tenuous BP. Noted to have asymptomatic VRE colonization for which ID recs observation off abx.  - Overall with good diuresis now on IV Lasix drip and off dobutamine. Anticipate dc in 2-3 days to home with strict fluid and salt control.      Subjective: Feels better, frequent urination w/ diuresis, denies SOB. Notable pitting edema above knees still present, discussed importance of remaining inpatient for a few more days despite frustration at being hospitalized.     REVIEW OF SYSTEMS: All systems reviewed and found to be negative with the exception as above.    Vital Signs Last 24 Hrs  T(C): 36.6 (27 Mar 2018 12:00), Max: 36.8 (27 Mar 2018 06:16)  T(F): 97.8 (27 Mar 2018 12:00), Max: 98.3 (27 Mar 2018 06:16)  HR: 81 (27 Mar 2018 06:00) (72 - 83)  BP: 110/68 (27 Mar 2018 13:00) (77/43 - 110/68)  BP(mean): 79 (27 Mar 2018 13:00) (49 - 79)  RR: 27 (27 Mar 2018 06:00) (20 - 31)  SpO2: 97% (27 Mar 2018 06:00) (97% - 98%)    GENERAL: obese middle aged  female comfortable   HEENT: some facial puffiness noted, pupils equal and reactive, EOMI, no oropharyngeal lesions, erythema, exudates, oral thrush  NECK:   supple, no carotid bruits, no palpable lymph nodes, no thyromegaly  CV:  +S1, +S2, regular, no murmurs or rubs  RESP:  lungs clear to auscultation bilaterally, no wheezing, rales, rhonchi, good air entry bilaterally  GI:  obese abdomen soft, non-tender, +distention normal BS, no bruits, no abdominal masses, no palpable masses  MSK:   normal muscle tone, no atrophy, no rigidity, no contractions  EXT/vasc:  3+ pitting edema, extending to thighs, chronic vascular changes, diminished pedal pulses  NEURO:  AAOX3, no focal neurological deficits, follows all commands, able to move extremities spontaneously  SKIN:  no ulcers, lesions or rashes    LABS:   03-27    141  |  98  |  49<H>  ----------------------------<  112<H>  3.2<L>   |  39<H>  |  1.12    Ca    8.7      27 Mar 2018 04:42  Phos  2.3     03-26  Mg     2.0     03-27    LIVER FUNCTIONS - ( 22 Mar 2018 06:48 )  Alb: 2.3 g/dL / Pro: 6.7 gm/dL / ALK PHOS: 218 U/L / ALT: 52 U/L / AST: 64 U/L / GGT: x           Serum Pro-Brain Natriuretic Peptide: 3985 pg/mL (03-23-18 @ 05:45)  Serum Pro-Brain Natriuretic Peptide: 5905 pg/mL (03-19-18 @ 14:11)     Transthoracic Echocardiogram (06.07.17 @ 09:49) >     Summary   The left ventricle is severely dilated with severe global hypokinesis.   Estimated left ventricular ejection fraction is 20 % via bi-plane method.

## 2018-03-28 LAB
ADD ON TEST-SPECIMEN IN LAB: SIGNIFICANT CHANGE UP
ANION GAP SERPL CALC-SCNC: 6 MMOL/L — SIGNIFICANT CHANGE UP (ref 5–17)
BUN SERPL-MCNC: 49 MG/DL — HIGH (ref 7–23)
CALCIUM SERPL-MCNC: 9.1 MG/DL — SIGNIFICANT CHANGE UP (ref 8.5–10.1)
CHLORIDE SERPL-SCNC: 94 MMOL/L — LOW (ref 96–108)
CO2 SERPL-SCNC: 41 MMOL/L — HIGH (ref 22–31)
CREAT SERPL-MCNC: 1.2 MG/DL — SIGNIFICANT CHANGE UP (ref 0.5–1.3)
GLUCOSE SERPL-MCNC: 115 MG/DL — HIGH (ref 70–99)
MAGNESIUM SERPL-MCNC: 1.8 MG/DL — SIGNIFICANT CHANGE UP (ref 1.6–2.6)
PHOSPHATE SERPL-MCNC: 3.4 MG/DL — SIGNIFICANT CHANGE UP (ref 2.5–4.5)
POTASSIUM SERPL-MCNC: 3.1 MMOL/L — LOW (ref 3.5–5.3)
POTASSIUM SERPL-SCNC: 3.1 MMOL/L — LOW (ref 3.5–5.3)
SODIUM SERPL-SCNC: 141 MMOL/L — SIGNIFICANT CHANGE UP (ref 135–145)

## 2018-03-28 RX ORDER — POTASSIUM CHLORIDE 20 MEQ
40 PACKET (EA) ORAL
Qty: 0 | Refills: 0 | Status: DISCONTINUED | OUTPATIENT
Start: 2018-03-29 | End: 2018-03-29

## 2018-03-28 RX ORDER — POTASSIUM CHLORIDE 20 MEQ
40 PACKET (EA) ORAL
Qty: 0 | Refills: 0 | Status: DISCONTINUED | OUTPATIENT
Start: 2018-03-28 | End: 2018-03-28

## 2018-03-28 RX ORDER — POTASSIUM CHLORIDE 20 MEQ
40 PACKET (EA) ORAL EVERY 4 HOURS
Qty: 0 | Refills: 0 | Status: COMPLETED | OUTPATIENT
Start: 2018-03-28 | End: 2018-03-28

## 2018-03-28 RX ADMIN — Medication 40 MILLIEQUIVALENT(S): at 12:21

## 2018-03-28 RX ADMIN — HEPARIN SODIUM 5000 UNIT(S): 5000 INJECTION INTRAVENOUS; SUBCUTANEOUS at 09:02

## 2018-03-28 RX ADMIN — Medication 650 MILLIGRAM(S): at 10:54

## 2018-03-28 RX ADMIN — Medication 40 MILLIEQUIVALENT(S): at 09:02

## 2018-03-28 RX ADMIN — Medication 650 MILLIGRAM(S): at 09:18

## 2018-03-28 RX ADMIN — Medication 81 MILLIGRAM(S): at 09:02

## 2018-03-28 RX ADMIN — Medication 5 MG/HR: at 17:41

## 2018-03-28 RX ADMIN — BUDESONIDE AND FORMOTEROL FUMARATE DIHYDRATE 2 PUFF(S): 160; 4.5 AEROSOL RESPIRATORY (INHALATION) at 20:01

## 2018-03-28 RX ADMIN — Medication 1 TABLET(S): at 12:21

## 2018-03-28 RX ADMIN — Medication 40 MILLIEQUIVALENT(S): at 17:40

## 2018-03-28 RX ADMIN — BUDESONIDE AND FORMOTEROL FUMARATE DIHYDRATE 2 PUFF(S): 160; 4.5 AEROSOL RESPIRATORY (INHALATION) at 08:49

## 2018-03-28 RX ADMIN — AMIODARONE HYDROCHLORIDE 200 MILLIGRAM(S): 400 TABLET ORAL at 05:46

## 2018-03-28 RX ADMIN — HEPARIN SODIUM 5000 UNIT(S): 5000 INJECTION INTRAVENOUS; SUBCUTANEOUS at 23:04

## 2018-03-28 RX ADMIN — PANTOPRAZOLE SODIUM 40 MILLIGRAM(S): 20 TABLET, DELAYED RELEASE ORAL at 09:02

## 2018-03-28 NOTE — PROGRESS NOTE ADULT - SUBJECTIVE AND OBJECTIVE BOX
CC: worsening shortness of breath weight gain, leg sidney    HPI: 57 y.o  woman with PMH/PSH of HFrEF 20%, Non obstructive CAD, AICD, HTN, HLD, Asthma, DM, cholecystectomy  sent to the ED from Cardiologist's office for evaluation of worsening dyspnea. Patient reports 11lb weight gain over 3 days and states being compliant with all medications including Lasix. Per her , she eats "American food which has salt it in". No recent fevers / chills or illnesses. Of note patient was recently admitted to  and discharged on 3/4/18 (2 weeks ago) for CHF and leg cellulitis. During that admission she suffered KEYSHA with Scr peaked at 2.0 and thus several diuretics were withheld upon discharge including Entresto and Zaroxyln.     In the ED, notable labs include elevated BNP ~6000 and Scr 2.0, normal trop X 1 and normal CBC. CXR with massive cardiomegaly. Physical exam pertinent for severe +3 LE pitting edema bilaterally. Patient given IV Lasix 20mg and admission requested. Currently seen denying chest pain and c/o ongoing KHAN and + orthopnea at home.     HOSPITAL COURSE: Admitted for advanced CHF exac w/ IV Lasix diuresis also requiring Dobutamine drip for BP support given extensive volume overload and tenuous BP. Noted to have asymptomatic VRE colonization for which ID recs observation off abx.  - Overall with good diuresis now on IV Lasix drip and off dobutamine. Anticipate dc in 2-3 days to home with strict fluid and salt control.      Subjective:  3/28Feels better, frequent urination w/ diuresis, denies SOB. Notable pitting edema above knees still present, discussed importance of remaining inpatient for a few more days despite frustration at being hospitalized.  GENERAL: obese middle aged  female comfortable   HEENT: some facial puffiness noted, pupils equal and reactive, EOMI, no oropharyngeal lesions, erythema, exudates, oral thrush  NECK:   supple, no carotid bruits, no palpable lymph nodes, no thyromegaly  CV:  +S1, +S2, regular, no murmurs or rubs  RESP:  lungs clear to auscultation bilaterally, no wheezing, rales, rhonchi, good air entry bilaterally  GI:  obese abdomen soft, non-tender, +distention normal BS, no bruits, no abdominal masses, no palpable masses  MSK:   normal muscle tone, no atrophy, no rigidity, no contractions  EXT/vasc:  3+ pitting edema, extending to thighs, chronic vascular changes, diminished pedal pulses  NEURO:  AAOX3, no focal neurological deficits, follows all commands, able to move extremities spontaneously  SKIN:  no ulcers, lesions or rashes      PHYSICAL EXAM:    Daily     Daily Weight in k (28 Mar 2018 06:15)    ICU Vital Signs Last 24 Hrs  T(C): 36.4 (28 Mar 2018 06:03), Max: 37.3 (27 Mar 2018 19:57)  T(F): 97.5 (28 Mar 2018 06:03), Max: 99.1 (27 Mar 2018 19:57)  HR: 76 (28 Mar 2018 14:25) (72 - 135)  BP: 91/66 (28 Mar 2018 13:00) (82/44 - 109/66)  BP(mean): 72 (28 Mar 2018 13:00) (53 - 82)  ABP: --  ABP(mean): --  RR: 23 (28 Mar 2018 13:00) (15 - 26)  SpO2: 96% (28 Mar 2018 13:00) (84% - 100%)                      141  |  94<L>  |  49<H>  ----------------------------<  115<H>  3.1<L>   |  41<H>  |  1.20    Ca    9.1      28 Mar 2018 05:23  Phos  3.4       Mg     1.8                                   MEDICATIONS  (STANDING):  amiodarone    Tablet 200 milliGRAM(s) Oral daily  aspirin  chewable 81 milliGRAM(s) Oral daily  buDESOnide 160 MICROgram(s)/formoterol 4.5 MICROgram(s) Inhaler 2 Puff(s) Inhalation two times a day  furosemide Infusion 10 mG/Hr (5 mL/Hr) IV Continuous <Continuous>  heparin  Injectable 5000 Unit(s) SubCutaneous every 12 hours  metolazone 5 milliGRAM(s) Oral daily  pantoprazole    Tablet 40 milliGRAM(s) Oral before breakfast  potassium chloride    Tablet ER 40 milliEquivalent(s) Oral every 4 hours

## 2018-03-28 NOTE — PROGRESS NOTE ADULT - ASSESSMENT
· Assessment		  57 y.o  Woman sent to the ED from Cardiologist's office for evaluation of worsening dyspnea found to have acute on chronic HFrEF     Acute Hypoxic Respiratory Distress superimposed from Acute on Chronic HFrEF (20%)  - decompensated likely 2/2 dietary noncompliance   - still with significant volume overload; CT a/p reviewed   - cont day 3 of IV Lasix gtt.   - OFF Dobutamine drip given NSVT on Saturday night.   , goal SBP> 90 while undergoing diuresis.   - replete lytes.   hold ACEI/ARB for now to avoid KEYSHA while on IV lasix gtt  hold BB for now to avoid hypotension    # Hypokalemia / Hypophosphatemia - replete lytes aggressively and agree that she will likely need PO K+ daily upon discharge.  - monitor strict I's/O's, daily weights--> not well documented (reinforce importance of efficient reporting to help guide therapy)  - may need Entresto added back to regimen and evaluation for pulmonary HTN.   - appreciate cardiology input   - recent dopplers 2 weeks from admission date was negative for DVT.   - ongoing education on importance of dietary compliance  - ACE bandage wrap for compression effect  - Serum Pro-Brain Natriuretic Peptide 3985 pg/mL from 5905 pg/mL on adm     KEYSHA on CKD - possibly 2/2 prerenal from volume overload.  - appreciate renal input  - avoid nephrotoxins where possible      Urine Cx w/ colonized VRE  - she's afebrile / WBC normal  - appreciate ID input --> monitor off abt    Vtach/CAD - chronic  - see above.    - con't asa // Amiodarone / statin  BB on hold for now to avoid hypotension  - monitor LFT's, normal US Liver.     HTN - currently hypotensive while undergoing diuresis.   - BB on hold.     T2DM  - A1c 7.3 (1/30/18)  - holding januvia  - con't POCT with HEMA ; consistent carb diet  Asthma - stable  - continue Symbicort add prn nebs for wheezing.     Obesity  - nutrition consult  - educated on importance of appropriate wt loss plan    #DVT prophylaxis- heparin sq    Dispo: Remain inpatient on CCU. Discussed w/pt and her . Discussed w/ RN and Cardiology. Tentative dc in 2 - 3 days pending clinical improvement.  - full code  Total time > 45 mins.

## 2018-03-28 NOTE — PROGRESS NOTE ADULT - ASSESSMENT
Acute on Chronic HFrEF  NSVT, s/p AICD.   Hypokalemia  ARF - improving with diuresis.  NICM.   Non obstructive CAD  HTN  AICD    Suggest:    Diuresis with Lasix gtt.  Making progress. Monitor I/O and lytes   Supplement K, Keep > 4.   Add Aldactone / Inspra when BP is more stable.  follow low sodium, low cholesterol, ADA diet.  Fluid restriction 1.5 lit/day  monitor Intake & output  Daily weights.  No ACEi, ARBs or Entresto at present till renal function stabilizes.   Held beta blockers because BP low    D/W pt's

## 2018-03-28 NOTE — PROGRESS NOTE ADULT - ASSESSMENT
58 y.o. female with PMH of CKD2/3 (baseline SCr 1.0), HTN, DM2, chronic systolic CHF (s/p bi-v AICD), VTach on Amiodarone, Hyperlipidemia discharged 3/4/18 s/p CHF exacerbation with edema requiring IV diuresis returns with volume overload and KEYSHA (SCr 2.0) - renal called for evaluation.    1. KYESHA on CKD3 / Volume overload  - likely due to poor hemodynamics from CHF / volume overload  -IV diuretics ongoing, monitor renal closely - today SCr improving and stable near 1.2, remains azotemic  -Monitor weights and I/Os as able  - consider Midodrine for improved BP and fluid mobilization -- when switching off lasix gtt to IV Lasix, would do 1-2 days of Q8h Albumin prior to Lasix dosing  - tid Nepro protein supplement (ordered) - has not been getting, discussed with RODERICK Dickinson  -Aggressive K/Mg  -agree with RTC po KCl - 40mEq q8h - can redose daily. Will likely need standing outpatient dosing - ordered for bid dosing starting tomorrow, can add more as needed.  - Phos - s/p IV KPhos - on KPhos po - check level daily  - ok to liberalize diet - no K restriction - ok to have OJ, bananas    2. Proteinura - 1.3g/g  - due to diabetic nephropathy  - Eventual benefit likely with use of ACE    3. CHF   - BB, diuresis    d/c with RN

## 2018-03-28 NOTE — PROGRESS NOTE ADULT - SUBJECTIVE AND OBJECTIVE BOX
CURRENT CARDIAC WORKUP:       Echo:  Stress Test:  Cardiac Cath:    Allergies:   No Known Allergies      MEDICATIONS  (STANDING):  amiodarone Infusion 1 mG/Min (33.333 mL/Hr) IV Continuous <Continuous>  aspirin  chewable 81 milliGRAM(s) Oral daily  buDESOnide 160 MICROgram(s)/formoterol 4.5 MICROgram(s) Inhaler 2 Puff(s) Inhalation two times a day  furosemide Infusion 10 mG/Hr (5 mL/Hr) IV Continuous <Continuous>  heparin  Injectable 5000 Unit(s) SubCutaneous every 12 hours  metolazone 5 milliGRAM(s) Oral daily  pantoprazole    Tablet 40 milliGRAM(s) Oral before breakfast  potassium chloride    Tablet ER 40 milliEquivalent(s) Oral every 4 hours  potassium chloride  10 mEq/100 mL IVPB 10 milliEquivalent(s) IV Intermittent every 1 hour    MEDICATIONS  (PRN):  acetaminophen   Tablet. 650 milliGRAM(s) Oral every 6 hours PRN Mild Pain (1 - 3)  ALBUTerol    0.083% 2.5 milliGRAM(s) Nebulizer every 6 hours PRN Shortness of Breath and/or Wheezing  ALPRAZolam 0.5 milliGRAM(s) Oral two times a day PRN Severe anxiety  docusate sodium 100 milliGRAM(s) Oral two times a day PRN Constipation  hydrocortisone 1% Ointment 1 Application(s) Topical two times a day PRN Itching  melatonin 5 milliGRAM(s) Oral at bedtime PRN Sleep  ondansetron Injectable 4 milliGRAM(s) IV Push every 4 hours PRN Nausea and/or Vomiting      ROS:     .ros      Vital Signs Last 24 Hrs  T(C): 36.6 (29 Mar 2018 20:14), Max: 36.8 (29 Mar 2018 05:04)  T(F): 97.9 (29 Mar 2018 20:14), Max: 98.2 (29 Mar 2018 05:04)  HR: 75 (29 Mar 2018 19:50) (73 - 88)  BP: 100/63 (29 Mar 2018 17:01) (83/54 - 109/60)  BP(mean): 72 (29 Mar 2018 17:01) (60 - 93)  RR: 16 (29 Mar 2018 04:00) (16 - 26)  SpO2: 96% (29 Mar 2018 19:50) (95% - 99%)    I&O's Summary    28 Mar 2018 07:01  -  29 Mar 2018 07:00  --------------------------------------------------------  IN: 960 mL / OUT: 1500 mL / NET: -540 mL    29 Mar 2018 07:01  -  29 Mar 2018 22:50  --------------------------------------------------------  IN: 490 mL / OUT: 1100 mL / NET: -610 mL        PHYSICAL EXAM:    .phy      INTERPRETATION OF TELEMETRY:    ECG:    LABS:    03-29    135  |  88<L>  |  52<H>  ----------------------------<  184<H>  2.8<LL>   |  41<H>  |  1.24    Ca    9.0      29 Mar 2018 20:45  Phos  3.2     03-29  Mg     2.0     03-29            Pro BNP  3985 03-23 @ 05:45  D Dimer  -- 03-23 @ 05:45              RADIOLOGY & ADDITIONAL STUDIES: 57 y.o  Female with PMH of diastolic CHF and other cormorbidites sent to the ED for evaluation of worsening dyspnea. Patient reports 11lb weight gain over 3 days and states being compliant with all medications including Lasix. Per her , she eats "[place] food which has salt it in". No recent fevers / chills or illnesses. She has been non compliant to office follow up. Missed 3 out pt visits last week. Of note patient was recently admitted to  and discharged on 3/4/18 (2 weeks ago) for CHF and leg cellulitis. During that admission she suffered KEYSHA with Scr peaked at 2.0 and thus several diuretics were withheld upon discharge including Entresto and Zaroxyln.  She has worsening leg edema. She was hypotensive. Started on IV Dobutamine to aid diuresis. Urine output increased and there was weight loss noted, however she had NSVT and dobutamine was stopped.    Today, she is comfortable. No new events. She continues to have occasional PVCs, couplets and triplets. No chest pain. No orthopnea. Leg edema is marginally better.    PAST MEDICAL & SURGICAL HISTORY:  HFrEF CHF (congestive heart failure)  NICM  Non obstructive CAD  AICD  HTN (hypertension)  HLD (hyperlipidemia)  Asthma  DM (diabetes mellitus)  History of cholecystectomy      PREVIOUS CARDIAC WORKUP:      Echo:  Severe reduced EF  Cardiac Cath:  8/24/17 - Non obstructive      Allergies:   No Known Allergies      MEDICATIONS  (STANDING):  amiodarone    Tablet 200 milliGRAM(s) Oral daily  aspirin  chewable 81 milliGRAM(s) Oral daily  buDESOnide 160 MICROgram(s)/formoterol 4.5 MICROgram(s) Inhaler 2 Puff(s) Inhalation two times a day  furosemide Infusion 10 mG/Hr (5 mL/Hr) IV Continuous <Continuous>  heparin  Injectable 5000 Unit(s) SubCutaneous every 12 hours  metolazone 5 milliGRAM(s) Oral daily  pantoprazole    Tablet 40 milliGRAM(s) Oral before breakfast  potassium chloride    Tablet ER 40 milliEquivalent(s) Oral every 4 hours    MEDICATIONS  (PRN):  acetaminophen   Tablet. 650 milliGRAM(s) Oral every 6 hours PRN Mild Pain (1 - 3)  ALBUTerol    0.083% 2.5 milliGRAM(s) Nebulizer every 6 hours PRN Shortness of Breath and/or Wheezing  ALPRAZolam 0.5 milliGRAM(s) Oral two times a day PRN Severe anxiety  docusate sodium 100 milliGRAM(s) Oral two times a day PRN Constipation  hydrocortisone 1% Ointment 1 Application(s) Topical two times a day PRN Itching  melatonin 5 milliGRAM(s) Oral at bedtime PRN Sleep  ondansetron Injectable 4 milliGRAM(s) IV Push every 4 hours PRN Nausea and/or Vomiting      ROS:     Detailed ten system ROS was performed and was negative except for history as eluded to above.    no fever  no chills  no nausea  no vomiting  no diarrhea  no constipation  no melena  no hematochezia  no chest pain  no palpitations  no sob at rest  + dyspnea on exertion  no cough  no wheezing  no anorexia  no headache  no dizziness  no syncope  no weakness  no myalgia  no dysuria  no polyuria  no hematuria   + edema      Vital Signs Last 24 Hrs  T(C): 36.4 (28 Mar 2018 06:03), Max: 37.3 (27 Mar 2018 19:57)  T(F): 97.5 (28 Mar 2018 06:03), Max: 99.1 (27 Mar 2018 19:57)  HR: 74 (28 Mar 2018 05:06) (70 - 80)  BP: 99/60 (28 Mar 2018 05:06) (82/44 - 110/68)  BP(mean): 69 (28 Mar 2018 05:06) (53 - 79)  RR: 15 (28 Mar 2018 05:06) (15 - 26)  SpO2: 91% (28 Mar 2018 05:06) (84% - 100%)      PHYSICAL EXAM:    General:                Comfortable, AAO X 3, in no distress.   HEENT:                  Atraumatic, PERRLA, EOMI, conjunctiva clear.   Neck:                     Supple, no adenopathy, no thyromegaly, no JVD, no bruit.  Back:                     Symmetric, non tender.  Chest:                    Scant rales, B/L symmetric air entry, no tachypnea  Heart:                     S1, S2 normal, no gallop, + murmur.  Abdomen:              Soft, non-tender, bowel sounds active. No palpable masses.  Extremities:           no cyanosis, + edema B/L LE.   Skin:                      Skin color, texture normal, no rash  Neurologic:            Grossly nonfocal.       TELEMETRY:   A paced rhythm. PVCs noted. Occasional couplets and triplets.      ECG:  A paced, IVCD    LABS:      03-28    141  |  94<L>  |  49<H>  ----------------------------<  115<H>  3.1<L>   |  41<H>  |  1.20    Ca    9.1      28 Mar 2018 05:23  Phos  3.4     03-28  Mg     1.8     03-28

## 2018-03-28 NOTE — PROGRESS NOTE ADULT - SUBJECTIVE AND OBJECTIVE BOX
Patient is a 58 y.o. female with PMH of CKD2/3 (baseline SCr 1.0), HTN, DM2, chronic systolic CHF (s/p bi-v AICD), VTach on Amiodarone, Hyperlipidemia discharged 3/4/18 s/p CHF exacerbation with edema requiring IV diuresis with KEYSHA peak 2.45 - diuretics (Metolazone and Aldactone) were held, Metformin was stopped, and she was discharged with SCr 1.5 on Lasix 40mg daily. Now she returns with volume overload and KEYSHA (SCr 2.0) - renal called for evaluation.  Reports eating high salt diet. Eats out often.  Since admission, received IV Lasix with improvement in breathing and edema.    Dopplers negative for DVT on prior admission.   On 18 - admission for asthma exacerbation due to parainfluenza infection and KEYSHA 1.7 with hyperkalemia 5.9. Entresto was stopped.  3/23 - wants to leave, edema improved with compression dressings, did not receive loop last night due to hypotension SBP 80s - to receive dose this AM. Metolazone added.    3/26 - s/p VT, transferred to CICU. Now denies SOB. Edema improving. +diarrhea yesterday. K depleted.  3/28 - on Lasix gtt + Metolazone daily, receiving K supplements, edema persists, BP 90s.      REVIEW OF SYSTEMS:    CONSTITUTIONAL: No weakness, fevers or chills  RESPIRATORY: No cough, wheezing, hemoptysis; + shortness of breath (improving)  CARDIOVASCULAR: No chest pain or palpitations  GENITOURINARY: No dysuria, frequency or hematuria  All other review of systems is negative unless indicated above.    MEDICATIONS  (STANDING):  amiodarone    Tablet 200 milliGRAM(s) Oral daily  aspirin  chewable 81 milliGRAM(s) Oral daily  buDESOnide 160 MICROgram(s)/formoterol 4.5 MICROgram(s) Inhaler 2 Puff(s) Inhalation two times a day  furosemide Infusion 10 mG/Hr (5 mL/Hr) IV Continuous <Continuous>  heparin  Injectable 5000 Unit(s) SubCutaneous every 12 hours  metolazone 5 milliGRAM(s) Oral daily  pantoprazole    Tablet 40 milliGRAM(s) Oral before breakfast  potassium acid phosphate/sodium acid phosphate tablet (K-PHOS No. 2) 1 Tablet(s) Oral four times a day with meals  potassium chloride    Tablet ER 40 milliEquivalent(s) Oral every 4 hours      Vital Signs Last 24 Hrs  T(C): 36.4 (28 Mar 2018 06:03), Max: 37.3 (27 Mar 2018 19:57)  T(F): 97.5 (28 Mar 2018 06:03), Max: 99.1 (27 Mar 2018 19:57)  HR: 74 (28 Mar 2018 05:06) (70 - 80)  BP: 99/60 (28 Mar 2018 05:06) (82/44 - 110/68)  BP(mean): 69 (28 Mar 2018 05:06) (53 - 79)  RR: 15 (28 Mar 2018 05:06) (15 - 26)  SpO2: 91% (28 Mar 2018 05:06) (84% - 100%)  Daily     Daily Weight in k (28 Mar 2018 06:15)  I&O's Summary    27 Mar 2018 07:01  -  28 Mar 2018 07:00  --------------------------------------------------------  IN: 360 mL / OUT: 802 mL / NET: -442 mL    25 Mar 2018 07:01  -  26 Mar 2018 07:00  --------------------------------------------------------  IN: 1110 mL / OUT: 1200 mL / NET: -90 mL      PHYSICAL EXAM:    Constitutional: NAD, morbidly obese  HEENT: PERRLA, EOMI,  MMM  Neck: No LAD, No JVD  Respiratory: dist bs, no crackles  Cardiovascular: S1 and S2, RRR  Gastrointestinal: BS+, soft, NT/ND  Extremities: 1-2+peripheral edema (improved initially, stable x past few days)  Neurological: A/O x 3, no focal deficits  Psychiatric: Normal mood, normal affect  : No Lcoke  Skin: No rashes  Access: Not applicable    LABS:                        11.3   8.40  )-----------( 196      ( 24 Mar 2018 05:18 )             38.1       141    |  94     |  49     ----------------------------<  115       28 Mar 2018 05:23  3.1     |  41     |  1.20     141    |  98     |  49     ----------------------------<  112       27 Mar 2018 04:42  3.2     |  39     |  1.12     142    |  96     |  51     ----------------------------<  158       26 Mar 2018 18:18  3.5     |  39     |  1.18     Ca    9.1        28 Mar 2018 05:23  Ca    8.7        27 Mar 2018 04:42    Phos  3.4       28 Mar 2018 05:23  Phos  2.3       26 Mar 2018 04:38    Mg     1.8       28 Mar 2018 05:23  Mg     2.0       27 Mar 2018 04:42          144  |  98  |  50<H>  ----------------------------<  136<H>  3.2<L>   |  37<H>  |  1.21    Ca    8.8      26 Mar 2018 04:38  Phos  2.3       Mg     2.4     03-25    22 Mar 2018 06:48    138    |  103    |  74     ----------------------------<  84     5.0     |  24     |  2.18           Urine Studies:          RADIOLOGY & ADDITIONAL STUDIES:

## 2018-03-29 LAB
ANION GAP SERPL CALC-SCNC: 10 MMOL/L — SIGNIFICANT CHANGE UP (ref 5–17)
ANION GAP SERPL CALC-SCNC: 6 MMOL/L — SIGNIFICANT CHANGE UP (ref 5–17)
BUN SERPL-MCNC: 52 MG/DL — HIGH (ref 7–23)
BUN SERPL-MCNC: 54 MG/DL — HIGH (ref 7–23)
CALCIUM SERPL-MCNC: 9 MG/DL — SIGNIFICANT CHANGE UP (ref 8.5–10.1)
CALCIUM SERPL-MCNC: 9 MG/DL — SIGNIFICANT CHANGE UP (ref 8.5–10.1)
CHLORIDE SERPL-SCNC: 88 MMOL/L — LOW (ref 96–108)
CHLORIDE SERPL-SCNC: 91 MMOL/L — LOW (ref 96–108)
CO2 SERPL-SCNC: 38 MMOL/L — HIGH (ref 22–31)
CO2 SERPL-SCNC: 41 MMOL/L — HIGH (ref 22–31)
CREAT SERPL-MCNC: 1.16 MG/DL — SIGNIFICANT CHANGE UP (ref 0.5–1.3)
CREAT SERPL-MCNC: 1.24 MG/DL — SIGNIFICANT CHANGE UP (ref 0.5–1.3)
GLUCOSE SERPL-MCNC: 184 MG/DL — HIGH (ref 70–99)
GLUCOSE SERPL-MCNC: 97 MG/DL — SIGNIFICANT CHANGE UP (ref 70–99)
MAGNESIUM SERPL-MCNC: 2 MG/DL — SIGNIFICANT CHANGE UP (ref 1.6–2.6)
PHOSPHATE SERPL-MCNC: 3.2 MG/DL — SIGNIFICANT CHANGE UP (ref 2.5–4.5)
POTASSIUM SERPL-MCNC: 2.8 MMOL/L — CRITICAL LOW (ref 3.5–5.3)
POTASSIUM SERPL-MCNC: 4.3 MMOL/L — SIGNIFICANT CHANGE UP (ref 3.5–5.3)
POTASSIUM SERPL-SCNC: 2.8 MMOL/L — CRITICAL LOW (ref 3.5–5.3)
POTASSIUM SERPL-SCNC: 4.3 MMOL/L — SIGNIFICANT CHANGE UP (ref 3.5–5.3)
SODIUM SERPL-SCNC: 135 MMOL/L — SIGNIFICANT CHANGE UP (ref 135–145)
SODIUM SERPL-SCNC: 139 MMOL/L — SIGNIFICANT CHANGE UP (ref 135–145)

## 2018-03-29 RX ORDER — POTASSIUM CHLORIDE 20 MEQ
10 PACKET (EA) ORAL
Qty: 0 | Refills: 0 | Status: COMPLETED | OUTPATIENT
Start: 2018-03-29 | End: 2018-03-30

## 2018-03-29 RX ORDER — AMIODARONE HYDROCHLORIDE 400 MG/1
1 TABLET ORAL
Qty: 900 | Refills: 0 | Status: DISCONTINUED | OUTPATIENT
Start: 2018-03-29 | End: 2018-03-30

## 2018-03-29 RX ORDER — POTASSIUM CHLORIDE 20 MEQ
10 PACKET (EA) ORAL
Qty: 0 | Refills: 0 | Status: DISCONTINUED | OUTPATIENT
Start: 2018-03-29 | End: 2018-03-29

## 2018-03-29 RX ORDER — POTASSIUM CHLORIDE 20 MEQ
40 PACKET (EA) ORAL EVERY 4 HOURS
Qty: 0 | Refills: 0 | Status: COMPLETED | OUTPATIENT
Start: 2018-03-29 | End: 2018-03-30

## 2018-03-29 RX ORDER — POTASSIUM CHLORIDE 20 MEQ
40 PACKET (EA) ORAL DAILY
Qty: 0 | Refills: 0 | Status: DISCONTINUED | OUTPATIENT
Start: 2018-03-30 | End: 2018-03-30

## 2018-03-29 RX ORDER — AMIODARONE HYDROCHLORIDE 400 MG/1
400 TABLET ORAL THREE TIMES A DAY
Qty: 0 | Refills: 0 | Status: DISCONTINUED | OUTPATIENT
Start: 2018-03-29 | End: 2018-03-29

## 2018-03-29 RX ORDER — MAGNESIUM SULFATE 500 MG/ML
2 VIAL (ML) INJECTION ONCE
Qty: 0 | Refills: 0 | Status: COMPLETED | OUTPATIENT
Start: 2018-03-29 | End: 2018-03-29

## 2018-03-29 RX ORDER — POTASSIUM CHLORIDE 20 MEQ
40 PACKET (EA) ORAL ONCE
Qty: 0 | Refills: 0 | Status: COMPLETED | OUTPATIENT
Start: 2018-03-30 | End: 2018-03-30

## 2018-03-29 RX ORDER — AMIODARONE HYDROCHLORIDE 400 MG/1
150 TABLET ORAL ONCE
Qty: 0 | Refills: 0 | Status: COMPLETED | OUTPATIENT
Start: 2018-03-29 | End: 2018-03-29

## 2018-03-29 RX ADMIN — PANTOPRAZOLE SODIUM 40 MILLIGRAM(S): 20 TABLET, DELAYED RELEASE ORAL at 10:41

## 2018-03-29 RX ADMIN — AMIODARONE HYDROCHLORIDE 200 MILLIGRAM(S): 400 TABLET ORAL at 06:18

## 2018-03-29 RX ADMIN — BUDESONIDE AND FORMOTEROL FUMARATE DIHYDRATE 2 PUFF(S): 160; 4.5 AEROSOL RESPIRATORY (INHALATION) at 19:50

## 2018-03-29 RX ADMIN — Medication 40 MILLIEQUIVALENT(S): at 06:18

## 2018-03-29 RX ADMIN — Medication 40 MILLIEQUIVALENT(S): at 22:36

## 2018-03-29 RX ADMIN — BUDESONIDE AND FORMOTEROL FUMARATE DIHYDRATE 2 PUFF(S): 160; 4.5 AEROSOL RESPIRATORY (INHALATION) at 11:18

## 2018-03-29 RX ADMIN — HEPARIN SODIUM 5000 UNIT(S): 5000 INJECTION INTRAVENOUS; SUBCUTANEOUS at 10:42

## 2018-03-29 RX ADMIN — Medication 81 MILLIGRAM(S): at 10:42

## 2018-03-29 RX ADMIN — HEPARIN SODIUM 5000 UNIT(S): 5000 INJECTION INTRAVENOUS; SUBCUTANEOUS at 20:40

## 2018-03-29 RX ADMIN — AMIODARONE HYDROCHLORIDE 400 MILLIGRAM(S): 400 TABLET ORAL at 20:40

## 2018-03-29 RX ADMIN — AMIODARONE HYDROCHLORIDE 618 MILLIGRAM(S): 400 TABLET ORAL at 23:33

## 2018-03-29 RX ADMIN — Medication 50 GRAM(S): at 20:48

## 2018-03-29 NOTE — PROGRESS NOTE ADULT - SUBJECTIVE AND OBJECTIVE BOX
Patient is a 58 y.o. female with PMH of CKD2/3 (baseline SCr 1.0), HTN, DM2, chronic systolic CHF (s/p bi-v AICD), VTach on Amiodarone, Hyperlipidemia discharged 3/4/18 s/p CHF exacerbation with edema requiring IV diuresis with KEYSHA peak 2.45 - diuretics (Metolazone and Aldactone) were held, Metformin was stopped, and she was discharged with SCr 1.5 on Lasix 40mg daily. Now she returns with volume overload and KEYSHA (SCr 2.0) - renal called for evaluation.  Reports eating high salt diet. Eats out often.  Since admission, received IV Lasix with improvement in breathing and edema.    Dopplers negative for DVT on prior admission.   On 18 - admission for asthma exacerbation due to parainfluenza infection and KEYSHA 1.7 with hyperkalemia 5.9. Entresto was stopped.  3/23 - wants to leave, edema improved with compression dressings, did not receive loop last night due to hypotension SBP 80s - to receive dose this AM. Metolazone added.    3/26 - s/p VT, transferred to CICU. Now denies SOB. Edema improving. +diarrhea yesterday. K depleted.  3/28 - on Lasix gtt + Metolazone daily, receiving K supplements, edema persists, BP 90s.  3.29 - on lasix gtt, K improved, edema improving slowly      REVIEW OF SYSTEMS:    CONSTITUTIONAL: No weakness, fevers or chills  RESPIRATORY: No cough, wheezing, hemoptysis; + shortness of breath (improving)  CARDIOVASCULAR: No chest pain or palpitations  GENITOURINARY: No dysuria, frequency or hematuria  All other review of systems is negative unless indicated above.    MEDICATIONS  (STANDING):  amiodarone    Tablet 200 milliGRAM(s) Oral daily  aspirin  chewable 81 milliGRAM(s) Oral daily  buDESOnide 160 MICROgram(s)/formoterol 4.5 MICROgram(s) Inhaler 2 Puff(s) Inhalation two times a day  furosemide Infusion 10 mG/Hr (5 mL/Hr) IV Continuous <Continuous>  heparin  Injectable 5000 Unit(s) SubCutaneous every 12 hours  metolazone 5 milliGRAM(s) Oral daily  pantoprazole    Tablet 40 milliGRAM(s) Oral before breakfast      Vital Signs Last 24 Hrs  T(C): 36.2 (29 Mar 2018 07:59), Max: 36.8 (29 Mar 2018 05:04)  T(F): 97.2 (29 Mar 2018 07:59), Max: 98.2 (29 Mar 2018 05:04)  HR: 80 (29 Mar 2018 12:00) (72 - 88)  BP: 95/71 (29 Mar 2018 12:00) (84/62 - 108/89)  BP(mean): 76 (29 Mar 2018 12:00) (53 - 93)  RR: 16 (29 Mar 2018 04:00) (16 - 26)  SpO2: 96% (29 Mar 2018 04:00) (95% - 100%)  Daily     Daily Weight in k.3 (29 Mar 2018 05:21)  I&O's Summary    28 Mar 2018 07:01  -  29 Mar 2018 07:00  --------------------------------------------------------  IN: 960 mL / OUT: 1500 mL / NET: -540 mL    29 Mar 2018 07:01  -  29 Mar 2018 13:20  --------------------------------------------------------  IN: 260 mL / OUT: 600 mL / NET: -340 mL          PHYSICAL EXAM:    Constitutional: NAD, morbidly obese  HEENT: PERRLA, EOMI,  MMM  Neck: No LAD, No JVD  Respiratory: dist bs, no crackles  Cardiovascular: S1 and S2, RRR  Gastrointestinal: BS+, soft, NT/ND  Extremities: 1-2+peripheral edema (improved initially, stable x past few days)  Neurological: A/O x 3, no focal deficits  Psychiatric: Normal mood, normal affect  : No Locke  Skin: No rashes  Access: Not applicable    LABS:                        11.3   8.40  )-----------( 196      ( 24 Mar 2018 05:18 )             38.1     03-    139  |  91<L>  |  54<H>  ----------------------------<  97  4.3   |  38<H>  |  1.16    Ca    9.0      29 Mar 2018 04:23  Phos  3.4     28  Mg     1.8           141    |  94     |  49     ----------------------------<  115       28 Mar 2018 05:23  3.1     |  41     |  1.20     141    |  98     |  49     ----------------------------<  112       27 Mar 2018 04:42  3.2     |  39     |  1.12     142    |  96     |  51     ----------------------------<  158       26 Mar 2018 18:18  3.5     |  39     |  1.18     Ca    9.1        28 Mar 2018 05:23  Ca    8.7        27 Mar 2018 04:42    Phos  3.4       28 Mar 2018 05:23  Phos  2.3       26 Mar 2018 04:38    Mg     1.8       28 Mar 2018 05:23  Mg     2.0       27 Mar 2018 04:42          144  |  98  |  50<H>  ----------------------------<  136<H>  3.2<L>   |  37<H>  |  1.21    Ca    8.8      26 Mar 2018 04:38  Phos  2.3     -  Mg     2.4     03-25    22 Mar 2018 06:48    138    |  103    |  74     ----------------------------<  84     5.0     |  24     |  2.18           Urine Studies:          RADIOLOGY & ADDITIONAL STUDIES:

## 2018-03-29 NOTE — PROGRESS NOTE ADULT - ASSESSMENT
58 y.o. female with PMH of CKD2/3 (baseline SCr 1.0), HTN, DM2, chronic systolic CHF (s/p bi-v AICD), VTach on Amiodarone, Hyperlipidemia discharged 3/4/18 s/p CHF exacerbation with edema requiring IV diuresis returns with volume overload and KEYSHA (SCr 2.0) - renal called for evaluation.    1. KEYSHA on CKD3 / Volume overload  - likely due to poor hemodynamics from CHF / volume overload  -IV diuretics ongoing, monitor renal closely - today SCr improving and stable near 1.2, remains azotemic  -Monitor weights and I/Os as able  - consider Midodrine for improved BP and fluid mobilization  - switch 60mg Lasix TID - recommend IV Albumin x 30 min prior each Lasix dose x 3-6 doses  - tid Nepro protein supplement (ordered) - has not been getting, discussed with RODERICK Dickinson  -Aggressive K/Mg  - K at goal today - decrease to daily KCL supplement  - Phos - s/p IV KPhos - on KPhos po - check level daily  - ok to liberalize diet - no K restriction - ok to have OJ, bananas    2. Proteinura - 1.3g/g  - due to diabetic nephropathy  - Eventual benefit likely with use of ACE    3. CHF   - BB, diuresis    d/c with RN staff

## 2018-03-29 NOTE — PROGRESS NOTE ADULT - SUBJECTIVE AND OBJECTIVE BOX
CC: worsening shortness of breath weight gain, leg sidney    HPI: 57 y.o  woman with PMH/PSH of HFrEF 20%, Non obstructive CAD, AICD, HTN, HLD, Asthma, DM, cholecystectomy  sent to the ED from Cardiologist's office for evaluation of worsening dyspnea. Patient reports 11lb weight gain over 3 days and states being compliant with all medications including Lasix. Per her , she eats "[place] food which has salt it in". No recent fevers / chills or illnesses. Of note patient was recently admitted to  and discharged on 3/4/18 (2 weeks ago) for CHF and leg cellulitis. During that admission she suffered KEYSHA with Scr peaked at 2.0 and thus several diuretics were withheld upon discharge including Entresto and Zaroxyln.     In the ED, notable labs include elevated BNP ~6000 and Scr 2.0, normal trop X 1 and normal CBC. CXR with massive cardiomegaly. Physical exam pertinent for severe +3 LE pitting edema bilaterally. Patient given IV Lasix 20mg and admission requested. Currently seen denying chest pain and c/o ongoing KHAN and + orthopnea at home.     HOSPITAL COURSE: Admitted for advanced CHF exac w/ IV Lasix diuresis also requiring Dobutamine drip for BP support given extensive volume overload and tenuous BP. Noted to have asymptomatic VRE colonization for which ID recs observation off abx.  - Overall with good diuresis now on IV Lasix drip and off dobutamine. Anticipate dc in 2-3 days to home with strict fluid and salt control.      Subjective:  3/28Feels better, frequent urination w/ diuresis, denies SOB. Notable pitting edema above knees still present, discussed importance of remaining inpatient for a few more days despite frustration at being hospitalized.  3/29- continues to urinate well,denies SOB at rest, still has edema B/l lower extremities improving  GENERAL: obese middle aged  female comfortable   HEENT: some facial puffiness noted, pupils equal and reactive, EOMI, no oropharyngeal lesions, erythema, exudates, oral thrush  NECK:   supple, no carotid bruits, no palpable lymph nodes, no thyromegaly  CV:  +S1, +S2, regular, no murmurs or rubs  RESP:  lungs clear to auscultation bilaterally, no wheezing, rales, rhonchi, good air entry bilaterally  GI:  obese abdomen soft, non-tender, +distention normal BS, no bruits, no abdominal masses, no palpable masses  MSK:   normal muscle tone, no atrophy, no rigidity, no contractions  EXT/vasc:  3+ pitting edema, extending to thighs, chronic vascular changes, diminished pedal pulses  NEURO:  AAOX3, no focal neurological deficits, follows all commands, able to move extremities spontaneously  SKIN:  no ulcers, lesions or rashes      PHYSICAL EXAM:    Daily     Daily Weight in k.3 (29 Mar 2018 05:21)    ICU Vital Signs Last 24 Hrs  T(C): 36.2 (29 Mar 2018 07:59), Max: 36.8 (29 Mar 2018 05:04)  T(F): 97.2 (29 Mar 2018 07:59), Max: 98.2 (29 Mar 2018 05:04)  HR: 82 (29 Mar 2018 15:00) (73 - 88)  BP: 103/64 (29 Mar 2018 15:00) (83/54 - 109/60)  BP(mean): 73 (29 Mar 2018 15:00) (53 - 93)  ABP: --  ABP(mean): --  RR: 16 (29 Mar 2018 04:00) (16 - 26)  SpO2: 96% (29 Mar 2018 04:00) (95% - 100%)                      139  |  91<L>  |  54<H>  ----------------------------<  97  4.3   |  38<H>  |  1.16    Ca    9.0      29 Mar 2018 04:23  Phos  3.4     03-28  Mg     1.8                                   MEDICATIONS  (STANDING):  amiodarone    Tablet 200 milliGRAM(s) Oral daily  aspirin  chewable 81 milliGRAM(s) Oral daily  buDESOnide 160 MICROgram(s)/formoterol 4.5 MICROgram(s) Inhaler 2 Puff(s) Inhalation two times a day  furosemide Infusion 10 mG/Hr (5 mL/Hr) IV Continuous <Continuous>  heparin  Injectable 5000 Unit(s) SubCutaneous every 12 hours  metolazone 5 milliGRAM(s) Oral daily  pantoprazole    Tablet 40 milliGRAM(s) Oral before breakfast

## 2018-03-29 NOTE — PROGRESS NOTE ADULT - SUBJECTIVE AND OBJECTIVE BOX
Responded to RRT in CICU.  Pt developed VT on tele    58 y/o female with HFrEF and ICD on lasix gtt developed what appears to be polymorphic VT.  K from this morning was 4.1  No Mag level.  On arrival, she is in paced rhythm, awake and alert, NAD, stable hemodynamics with no evidence of worsening HF.      IMP:    Prob torsade     Suggest:    Check lytes now and replete as needed    Dr san was informed regarding above.  I left voicemail on hospitalist admit phone at [phone].  Further care as per primary team/cards.

## 2018-03-29 NOTE — CHART NOTE - NSCHARTNOTEFT_GEN_A_CORE
Pt with NSVT, possibly ATP and return to normal / A paced rhythm. Supplement K, Mag. Reload with Amiodarone. IV amiodarone. F/u QTc and rhythm. D/W team

## 2018-03-29 NOTE — PROVIDER CONTACT NOTE (CHANGE IN STATUS NOTIFICATION) - BACKGROUND
at bedside states pt felt dizzy before losing consciousness. Admitted for dyspnea, weight gain, CHF. BP: 113/77 HR 88 O2 96%  at bedside states pt felt dizzy before losing consciousness. Admitted for dyspnea, weight gain, CHF. BP: 113/77 HR 88 O2 96%. Pacer pads placed, crash cart at bedside.

## 2018-03-29 NOTE — PROGRESS NOTE ADULT - ASSESSMENT
Acute on Chronic HFrEF  NSVT, s/p AICD.   Hypokalemia  ARF - improving with diuresis.  NICM.   Non obstructive CAD  HTN  AICD    Suggest:    Diuresis with Lasix gtt. Change to BID dosing tomorrow. Has diuresed well with gtt.  Continue Zaroxolyn  Weight loss noted. and need to lose more.  Monitor I/O and lytes   Supplement K, Keep > 4.   Add Aldactone / Inspra when BP is more stable.  follow low sodium, low cholesterol, ADA diet.  Fluid restriction 1.5 lit/day  monitor Intake & output  Daily weights.  No ACEi, ARBs or Entresto at present till renal function stabilizes.   Held beta blockers because BP low    D/W pt's

## 2018-03-29 NOTE — PROVIDER CONTACT NOTE (CHANGE IN STATUS NOTIFICATION) - ACTION/TREATMENT ORDERED:
STAT amiodarone bolus, 150 mg to infuse over 10min. Maintenance infusion of amiodarone 900mg at 33.333 ml/hr.
Mag Sulfate 2g IVPB, STAT labs (electrolytes, BMP, Mag). Dr. Stevens notified, amiodarone 400mg PO.

## 2018-03-29 NOTE — PROGRESS NOTE ADULT - SUBJECTIVE AND OBJECTIVE BOX
CURRENT CARDIAC WORKUP:       Echo:  Stress Test:  Cardiac Cath:    Allergies:   No Known Allergies      MEDICATIONS  (STANDING):  amiodarone Infusion 1 mG/Min (33.333 mL/Hr) IV Continuous <Continuous>  aspirin  chewable 81 milliGRAM(s) Oral daily  buDESOnide 160 MICROgram(s)/formoterol 4.5 MICROgram(s) Inhaler 2 Puff(s) Inhalation two times a day  furosemide Infusion 10 mG/Hr (5 mL/Hr) IV Continuous <Continuous>  heparin  Injectable 5000 Unit(s) SubCutaneous every 12 hours  metolazone 5 milliGRAM(s) Oral daily  pantoprazole    Tablet 40 milliGRAM(s) Oral before breakfast  potassium chloride    Tablet ER 40 milliEquivalent(s) Oral every 4 hours  potassium chloride  10 mEq/100 mL IVPB 10 milliEquivalent(s) IV Intermittent every 1 hour    MEDICATIONS  (PRN):  acetaminophen   Tablet. 650 milliGRAM(s) Oral every 6 hours PRN Mild Pain (1 - 3)  ALBUTerol    0.083% 2.5 milliGRAM(s) Nebulizer every 6 hours PRN Shortness of Breath and/or Wheezing  ALPRAZolam 0.5 milliGRAM(s) Oral two times a day PRN Severe anxiety  docusate sodium 100 milliGRAM(s) Oral two times a day PRN Constipation  hydrocortisone 1% Ointment 1 Application(s) Topical two times a day PRN Itching  melatonin 5 milliGRAM(s) Oral at bedtime PRN Sleep  ondansetron Injectable 4 milliGRAM(s) IV Push every 4 hours PRN Nausea and/or Vomiting      ROS:     .ros      Vital Signs Last 24 Hrs  T(C): 36.6 (29 Mar 2018 20:14), Max: 36.8 (29 Mar 2018 05:04)  T(F): 97.9 (29 Mar 2018 20:14), Max: 98.2 (29 Mar 2018 05:04)  HR: 75 (29 Mar 2018 19:50) (73 - 88)  BP: 100/63 (29 Mar 2018 17:01) (83/54 - 109/60)  BP(mean): 72 (29 Mar 2018 17:01) (60 - 93)  RR: 16 (29 Mar 2018 04:00) (16 - 26)  SpO2: 96% (29 Mar 2018 19:50) (95% - 99%)    I&O's Summary    28 Mar 2018 07:01  -  29 Mar 2018 07:00  --------------------------------------------------------  IN: 960 mL / OUT: 1500 mL / NET: -540 mL    29 Mar 2018 07:01  -  29 Mar 2018 22:50  --------------------------------------------------------  IN: 490 mL / OUT: 1100 mL / NET: -610 mL        PHYSICAL EXAM:    .phy      INTERPRETATION OF TELEMETRY:    ECG:    LABS:    03-29    135  |  88<L>  |  52<H>  ----------------------------<  184<H>  2.8<LL>   |  41<H>  |  1.24    Ca    9.0      29 Mar 2018 20:45  Phos  3.2     03-29  Mg     2.0     03-29            Pro BNP  3985 03-23 @ 05:45  D Dimer  -- 03-23 @ 05:45              RADIOLOGY & ADDITIONAL STUDIES: 57 y.o  Female with PMH of diastolic CHF and other cormorbidites sent to the ED for evaluation of worsening dyspnea. Patient reports 11lb weight gain over 3 days and states being compliant with all medications including Lasix. Per her , she eats "[place] food which has salt it in". No recent fevers / chills or illnesses. She has been non compliant to office follow up. Missed 3 out pt visits last week. Of note patient was recently admitted to  and discharged on 3/4/18 (2 weeks ago) for CHF and leg cellulitis. During that admission she suffered KEYSHA with Scr peaked at 2.0 and thus several diuretics were withheld upon discharge including Entresto and Zaroxyln.  She has worsening leg edema. She was hypotensive. Started on IV Dobutamine to aid diuresis. Urine output increased and there was weight loss noted, however she had NSVT and dobutamine was stopped.    Today, she is comfortable. Fatigued. Wants to go home but understands she is sick and needs to stay in the hospital for further treatment. Diuresing well. No new events. She continues to have occasional PVCs, couplets and triplets. No chest pain. No orthopnea. Leg edema persists.    PAST MEDICAL & SURGICAL HISTORY:  HFrEF CHF (congestive heart failure)  NICM  Non obstructive CAD  AICD  HTN (hypertension)  HLD (hyperlipidemia)  Asthma  DM (diabetes mellitus)  History of cholecystectomy      PREVIOUS CARDIAC WORKUP:      Echo:  Severe reduced EF  Cardiac Cath:  8/24/17 - Non obstructive    Allergies:   No Known Allergies      MEDICATIONS  (STANDING):  amiodarone    Tablet 200 milliGRAM(s) Oral daily  aspirin  chewable 81 milliGRAM(s) Oral daily  buDESOnide 160 MICROgram(s)/formoterol 4.5 MICROgram(s) Inhaler 2 Puff(s) Inhalation two times a day  furosemide Infusion 10 mG/Hr (5 mL/Hr) IV Continuous <Continuous>  heparin  Injectable 5000 Unit(s) SubCutaneous every 12 hours  metolazone 5 milliGRAM(s) Oral daily  pantoprazole    Tablet 40 milliGRAM(s) Oral before breakfast    MEDICATIONS  (PRN):  acetaminophen   Tablet. 650 milliGRAM(s) Oral every 6 hours PRN Mild Pain (1 - 3)  ALBUTerol    0.083% 2.5 milliGRAM(s) Nebulizer every 6 hours PRN Shortness of Breath and/or Wheezing  ALPRAZolam 0.5 milliGRAM(s) Oral two times a day PRN Severe anxiety  docusate sodium 100 milliGRAM(s) Oral two times a day PRN Constipation  hydrocortisone 1% Ointment 1 Application(s) Topical two times a day PRN Itching  melatonin 5 milliGRAM(s) Oral at bedtime PRN Sleep  ondansetron Injectable 4 milliGRAM(s) IV Push every 4 hours PRN Nausea and/or Vomiting      ROS:     Detailed ten system ROS was performed and was negative except for history as eluded to above.    no fever  no chills  no nausea  no vomiting  no diarrhea  no constipation  no melena  no hematochezia  no chest pain  no palpitations  no sob at rest  + dyspnea on exertion  no cough  no wheezing  no anorexia  no headache  no dizziness  no syncope  no weakness  no myalgia  no dysuria  no polyuria  no hematuria   +edema      Vital Signs Last 24 Hrs    T(C): 36.2 (29 Mar 2018 07:59), Max: 36.8 (29 Mar 2018 05:04)  T(F): 97.2 (29 Mar 2018 07:59), Max: 98.2 (29 Mar 2018 05:04)  HR: 80 (29 Mar 2018 12:00) (72 - 88)  BP: 95/71 (29 Mar 2018 12:00) (84/62 - 108/89)  BP(mean): 76 (29 Mar 2018 12:00) (53 - 93)  RR: 16 (29 Mar 2018 04:00) (16 - 26)  SpO2: 96% (29 Mar 2018 04:00) (95% - 100%)    I&O's Summary    28 Mar 2018 07:01  -  29 Mar 2018 07:00  --------------------------------------------------------  IN: 960 mL / OUT: 1500 mL / NET: -540 mL        PHYSICAL EXAM:    General:                Comfortable, AAO X 3, in no distress.   HEENT:                  Atraumatic, PERRLA, EOMI, conjunctiva clear.   Neck:                     Supple, no adenopathy, no thyromegaly, no JVD, no bruit.  Back:                     Symmetric, non tender.  Chest:                    Scant rales, B/L symmetric air entry, no tachypnea  Heart:                     S1, S2 normal, no gallop, + murmur.  Abdomen:              Soft, non-tender, bowel sounds active. No palpable masses.  Extremities:           no cyanosis, + edema B/L LE.   Skin:                      Skin color, texture normal, no rash  Neurologic:            Grossly nonfocal.       TELEMETRY:   A paced rhythm. PVCs noted. Occasional couplets and triplets.      ECG:  A paced, IVCD    LABS:    03-29    139  |  91<L>  |  54<H>  ----------------------------<  97  4.3   |  38<H>  |  1.16    Ca    9.0      29 Mar 2018 04:23  Phos  3.4     03-28  Mg     1.8     03-28      Pro BNP  3985 03-23 @ 05:45  D Dimer  -- 03-23 @ 05:45      RADIOLOGY & ADDITIONAL STUDIES:

## 2018-03-29 NOTE — PROGRESS NOTE ADULT - NSHPATTENDINGPLANDISCUSS_GEN_ALL_CORE
Dr Carmen Rajput
team - NP for Dr Jarad Rdz
Dr Ericka Loza
Dr Ericka Loza
team
team - NP for Dr Jarad Rdz

## 2018-03-29 NOTE — CHART NOTE - NSCHARTNOTEFT_GEN_A_CORE
Rapid response called @ ~8:15PM for vtach. Pt is admitted w/ sCHF exacerbation, on telemetry had vtach for a few seconds, lost consciousness briefly, but at time of interview pt was AOx3. She reports that her AICD did NOT fire. She has no complaints at this time. Denying chest pain, pressure, SOB. Vitals stable and patient out of vtach at time of interview.    Vital Signs Last 24 Hrs  T(C): 36.6 (03-29-18 @ 20:14)  T(F): 97.9 (03-29-18 @ 20:14), Max: 98.2 (03-29-18 @ 05:04)  HR: 75 (03-29-18 @ 19:50) (73 - 88)  BP: 100/63 (03-29-18 @ 17:01)  BP(mean): 72 (03-29-18 @ 17:01) (60 - 93)  RR: 16 (03-29-18 @ 04:00) (16 - 26)  SpO2: 96% (03-29-18 @ 19:50) (95% - 99%)  Wt(kg): --    03-28 @ 07:01  -  03-29 @ 07:00  --------------------------------------------------------  IN: 960 mL / OUT: 1500 mL / NET: -540 mL    03-29 @ 07:01  -  03-29 @ 20:31  --------------------------------------------------------  IN: 490 mL / OUT: 1100 mL / NET: -610 mL    Physical exam  Gen - AOx3, NAD  Card - RRR, clear S1/S2  Resp - no crackles appreciated, CTA b/l  Extremities - + pitting edema b/l, lower aspect legs wrapped.    A/P: 58F w/ history of vtach had episode of vtach now.    No EKG for now as patient is back in paced rhythm  Changed to amiodarone 400 TID as per Dr. Stevens  If recurrent episodes vtach will start amiodarone gtt  Ordered stat BMP, Mg  Give 2g Mg IVPB now    Discussed w/ intensivist and senior resident Rapid response called @ ~8:15PM for vtach. Pt is admitted w/ sCHF exacerbation, on telemetry had vtach for a few seconds, lost consciousness briefly, but at time of interview pt was AOx3. She reports that her AICD did NOT fire. She has no complaints at this time. Denying chest pain, pressure, SOB. Vitals stable and patient out of vtach at time of interview.    Vital Signs Last 24 Hrs  T(C): 36.6 (03-29-18 @ 20:14)  T(F): 97.9 (03-29-18 @ 20:14), Max: 98.2 (03-29-18 @ 05:04)  HR: 75 (03-29-18 @ 19:50) (73 - 88)  BP: 100/63 (03-29-18 @ 17:01)  BP(mean): 72 (03-29-18 @ 17:01) (60 - 93)  RR: 16 (03-29-18 @ 04:00) (16 - 26)  SpO2: 96% (03-29-18 @ 19:50) (95% - 99%)  Wt(kg): --    03-28 @ 07:01  -  03-29 @ 07:00  --------------------------------------------------------  IN: 960 mL / OUT: 1500 mL / NET: -540 mL    03-29 @ 07:01  -  03-29 @ 20:31  --------------------------------------------------------  IN: 490 mL / OUT: 1100 mL / NET: -610 mL    Physical exam  Gen - AOx3, NAD  Card - RRR, clear S1/S2  Resp - no crackles appreciated, CTA b/l  Extremities - + pitting edema b/l, lower aspect legs wrapped.    A/P: 58F w/ history of vtach had episode of vtach now.    No EKG for now as patient is back in paced rhythm  Changed to amiodarone 400 TID as per Dr. Stevens  If recurrent episodes vtach will start amiodarone gtt  Ordered stat BMP, Mg  Give 2g Mg IVPB now    Discussed w/ intensivist and senior resident    UPDATE: Code blue called @ ~8:50PM. Pt had ROSC by the time we arrived in the room. Caused by another run of vtach w/ loss of consciousness. Pt is again AOx3 at time of interview. Will change from PO amiodarone to amiodarone gtt as per Dr. Stevens. Will also upgrade to CCU.

## 2018-03-29 NOTE — PROGRESS NOTE ADULT - ASSESSMENT
· Assessment		  57 y.o  Woman sent to the ED from Cardiologist's office for evaluation of worsening dyspnea found to have acute on chronic HFrEF     Acute Hypoxic Respiratory Distress superimposed from Acute on Chronic HFrEF (20%)- improving  - decompensated likely 2/2 dietary noncompliance -   - CT a/p reviewed   - plan to d/c Iv lasix gtt 3/30  daily Kcl replacement   - OFF Dobutamine drip given NSVT on Saturday night.   , goal SBP> 90 while undergoing diuresis.   - replete lytes.   hold ACEI/ARB for now to avoid KEYSHA while on IV lasix gtt  hold BB for now to avoid hypotension    # Hypokalemia / Hypophosphatemia - replete lytes aggressively and agree that she will likely need PO K+ daily upon discharge.  - monitor strict I's/O's, daily weights--> not well documented (reinforce importance of efficient reporting to help guide therapy)  - may need Entresto added back to regimen and evaluation for pulmonary HTN.   - appreciate cardiology input   - recent dopplers 2 weeks from admission date was negative for DVT.   - ongoing education on importance of dietary compliance  - ACE bandage wrap for compression effect  - Serum Pro-Brain Natriuretic Peptide 3985 pg/mL from 5905 pg/mL on adm     KEYSHA on CKD - possibly 2/2 prerenal from volume overload.- appreciate renal input  - avoid nephrotoxins where possible      Urine Cx w/ colonized VRE  - she's afebrile / WBC normal  - appreciate ID input --> monitor off abt  Vtach/CAD - chronic  - see above.    - con't asa // Amiodarone / statin  BB on hold for now to avoid hypotension  - monitor LFT's, normal US Liver.     HTN - currently hypotensive while undergoing diuresis.   - BB on hold.     T2DM  - A1c 7.3 (1/30/18)  - holding januvia  - con't POCT with HEMA ; consistent carb diet  Asthma - stable  - continue Symbicort add prn nebs for wheezing.     Obesity  - nutrition consult  - educated on importance of appropriate wt loss plan    #DVT prophylaxis- heparin sq    Dispo: Remain inpatient on CCU. Discussed w/pt and her . Discussed w/ RN and Cardiology. Tentative dc in 2 - 3 days pending clinical improvement.  - full code  Total time > 45 mins.

## 2018-03-30 ENCOUNTER — TRANSCRIPTION ENCOUNTER (OUTPATIENT)
Age: 58
End: 2018-03-30

## 2018-03-30 ENCOUNTER — APPOINTMENT (OUTPATIENT)
Dept: CARDIOTHORACIC SURGERY | Facility: HOSPITAL | Age: 58
End: 2018-03-30

## 2018-03-30 VITALS — HEART RATE: 93 BPM | TEMPERATURE: 101 F | RESPIRATION RATE: 23 BRPM

## 2018-03-30 LAB
ANION GAP SERPL CALC-SCNC: 11 MMOL/L — SIGNIFICANT CHANGE UP (ref 5–17)
ANION GAP SERPL CALC-SCNC: 11 MMOL/L — SIGNIFICANT CHANGE UP (ref 5–17)
ANION GAP SERPL CALC-SCNC: 26 MMOL/L — HIGH (ref 5–17)
APPEARANCE UR: (no result)
APTT BLD: 29.1 SEC — SIGNIFICANT CHANGE UP (ref 27.5–37.4)
BACTERIA # UR AUTO: (no result)
BASE EXCESS BLDA CALC-SCNC: -6.6 MMOL/L — LOW (ref -2–2)
BASE EXCESS BLDA CALC-SCNC: -9.1 MMOL/L — LOW (ref -2–2)
BASE EXCESS BLDA CALC-SCNC: 5.6 MMOL/L — HIGH (ref -2–2)
BASOPHILS # BLD AUTO: 0.06 K/UL — SIGNIFICANT CHANGE UP (ref 0–0.2)
BASOPHILS NFR BLD AUTO: 0.4 % — SIGNIFICANT CHANGE UP (ref 0–2)
BILIRUB UR-MCNC: NEGATIVE — SIGNIFICANT CHANGE UP
BLD GP AB SCN SERPL QL: SIGNIFICANT CHANGE UP
BLOOD GAS COMMENTS ARTERIAL: SIGNIFICANT CHANGE UP
BLOOD GAS COMMENTS ARTERIAL: SIGNIFICANT CHANGE UP
BUN SERPL-MCNC: 56 MG/DL — HIGH (ref 7–23)
BUN SERPL-MCNC: 57 MG/DL — HIGH (ref 7–23)
BUN SERPL-MCNC: 59 MG/DL — HIGH (ref 7–23)
CALCIUM SERPL-MCNC: 8.9 MG/DL — SIGNIFICANT CHANGE UP (ref 8.5–10.1)
CALCIUM SERPL-MCNC: 9 MG/DL — SIGNIFICANT CHANGE UP (ref 8.5–10.1)
CALCIUM SERPL-MCNC: 9.4 MG/DL — SIGNIFICANT CHANGE UP (ref 8.5–10.1)
CHLORIDE SERPL-SCNC: 88 MMOL/L — LOW (ref 96–108)
CHLORIDE SERPL-SCNC: 90 MMOL/L — LOW (ref 96–108)
CHLORIDE SERPL-SCNC: 91 MMOL/L — LOW (ref 96–108)
CO2 SERPL-SCNC: 19 MMOL/L — LOW (ref 22–31)
CO2 SERPL-SCNC: 34 MMOL/L — HIGH (ref 22–31)
CO2 SERPL-SCNC: 35 MMOL/L — HIGH (ref 22–31)
COLOR SPEC: (no result)
COMMENT - URINE: SIGNIFICANT CHANGE UP
CREAT SERPL-MCNC: 1.58 MG/DL — HIGH (ref 0.5–1.3)
CREAT SERPL-MCNC: 2.07 MG/DL — HIGH (ref 0.5–1.3)
CREAT SERPL-MCNC: 2.43 MG/DL — HIGH (ref 0.5–1.3)
DIFF PNL FLD: (no result)
EOSINOPHIL # BLD AUTO: 0 K/UL — SIGNIFICANT CHANGE UP (ref 0–0.5)
EOSINOPHIL NFR BLD AUTO: 0 % — SIGNIFICANT CHANGE UP (ref 0–6)
EPI CELLS # UR: SIGNIFICANT CHANGE UP
GAS PNL BLDA: SIGNIFICANT CHANGE UP
GAS PNL BLDA: SIGNIFICANT CHANGE UP
GLUCOSE BLDC GLUCOMTR-MCNC: 132 MG/DL — HIGH (ref 70–99)
GLUCOSE BLDC GLUCOMTR-MCNC: 249 MG/DL — HIGH (ref 70–99)
GLUCOSE SERPL-MCNC: 158 MG/DL — HIGH (ref 70–99)
GLUCOSE SERPL-MCNC: 8 MG/DL — CRITICAL LOW (ref 70–99)
GLUCOSE SERPL-MCNC: 82 MG/DL — SIGNIFICANT CHANGE UP (ref 70–99)
GLUCOSE UR QL: NEGATIVE MG/DL — SIGNIFICANT CHANGE UP
GRAM STN FLD: SIGNIFICANT CHANGE UP
HCO3 BLDA-SCNC: 18 MMOL/L — LOW (ref 21–29)
HCO3 BLDA-SCNC: 18 MMOL/L — LOW (ref 21–29)
HCO3 BLDA-SCNC: 32 MMOL/L — HIGH (ref 21–29)
HCT VFR BLD CALC: 39.6 % — SIGNIFICANT CHANGE UP (ref 34.5–45)
HCT VFR BLD CALC: 41 % — SIGNIFICANT CHANGE UP (ref 34.5–45)
HCT VFR BLD CALC: 42.8 % — SIGNIFICANT CHANGE UP (ref 34.5–45)
HGB BLD-MCNC: 11.7 G/DL — SIGNIFICANT CHANGE UP (ref 11.5–15.5)
HGB BLD-MCNC: 12.2 G/DL — SIGNIFICANT CHANGE UP (ref 11.5–15.5)
HGB BLD-MCNC: 12.5 G/DL — SIGNIFICANT CHANGE UP (ref 11.5–15.5)
HOROWITZ INDEX BLDA+IHG-RTO: 100 — SIGNIFICANT CHANGE UP
IMM GRANULOCYTES NFR BLD AUTO: 1.1 % — SIGNIFICANT CHANGE UP (ref 0–1.5)
INR BLD: 1.33 RATIO — HIGH (ref 0.88–1.16)
KETONES UR-MCNC: (no result)
LACTATE SERPL-SCNC: 15.7 MMOL/L — CRITICAL HIGH (ref 0.7–2)
LACTATE SERPL-SCNC: 4.8 MMOL/L — CRITICAL HIGH (ref 0.7–2)
LACTATE SERPL-SCNC: 5.8 MMOL/L — CRITICAL HIGH (ref 0.7–2)
LEUKOCYTE ESTERASE UR-ACNC: (no result)
LYMPHOCYTES # BLD AUTO: 1.59 K/UL — SIGNIFICANT CHANGE UP (ref 1–3.3)
LYMPHOCYTES # BLD AUTO: 9.8 % — LOW (ref 13–44)
MAGNESIUM SERPL-MCNC: 2.7 MG/DL — HIGH (ref 1.6–2.6)
MCHC RBC-ENTMCNC: 24.5 PG — LOW (ref 27–34)
MCHC RBC-ENTMCNC: 24.6 PG — LOW (ref 27–34)
MCHC RBC-ENTMCNC: 24.9 PG — LOW (ref 27–34)
MCHC RBC-ENTMCNC: 29.2 GM/DL — LOW (ref 32–36)
MCHC RBC-ENTMCNC: 29.5 GM/DL — LOW (ref 32–36)
MCHC RBC-ENTMCNC: 29.8 GM/DL — LOW (ref 32–36)
MCV RBC AUTO: 82.5 FL — SIGNIFICANT CHANGE UP (ref 80–100)
MCV RBC AUTO: 83.4 FL — SIGNIFICANT CHANGE UP (ref 80–100)
MCV RBC AUTO: 85.1 FL — SIGNIFICANT CHANGE UP (ref 80–100)
METHOD TYPE: SIGNIFICANT CHANGE UP
MONOCYTES # BLD AUTO: 1.2 K/UL — HIGH (ref 0–0.9)
MONOCYTES NFR BLD AUTO: 7.4 % — SIGNIFICANT CHANGE UP (ref 2–14)
MSSA DNA SPEC QL NAA+PROBE: SIGNIFICANT CHANGE UP
NEUTROPHILS # BLD AUTO: 13.15 K/UL — HIGH (ref 1.8–7.4)
NEUTROPHILS NFR BLD AUTO: 81.3 % — HIGH (ref 43–77)
NITRITE UR-MCNC: NEGATIVE — SIGNIFICANT CHANGE UP
NRBC # BLD: 0 /100 WBCS — SIGNIFICANT CHANGE UP (ref 0–0)
NRBC # BLD: 2 /100 WBCS — HIGH (ref 0–0)
NRBC # BLD: 3 /100 WBCS — HIGH (ref 0–0)
PCO2 BLDA: 36 MMHG — SIGNIFICANT CHANGE UP (ref 32–46)
PCO2 BLDA: 43 MMHG — SIGNIFICANT CHANGE UP (ref 32–46)
PCO2 BLDA: 55 MMHG — HIGH (ref 32–46)
PH BLDA: 7.24 — LOW (ref 7.35–7.45)
PH BLDA: 7.32 — LOW (ref 7.35–7.45)
PH BLDA: 7.38 — SIGNIFICANT CHANGE UP (ref 7.35–7.45)
PH UR: 5 — SIGNIFICANT CHANGE UP (ref 5–8)
PHOSPHATE SERPL-MCNC: 5.7 MG/DL — HIGH (ref 2.5–4.5)
PLATELET # BLD AUTO: 267 K/UL — SIGNIFICANT CHANGE UP (ref 150–400)
PLATELET # BLD AUTO: 270 K/UL — SIGNIFICANT CHANGE UP (ref 150–400)
PLATELET # BLD AUTO: 272 K/UL — SIGNIFICANT CHANGE UP (ref 150–400)
PO2 BLDA: 158 MMHG — HIGH (ref 74–108)
PO2 BLDA: 333 MMHG — HIGH (ref 74–108)
PO2 BLDA: 71 MMHG — LOW (ref 74–108)
POTASSIUM SERPL-MCNC: 4.4 MMOL/L — SIGNIFICANT CHANGE UP (ref 3.5–5.3)
POTASSIUM SERPL-MCNC: 4.7 MMOL/L — SIGNIFICANT CHANGE UP (ref 3.5–5.3)
POTASSIUM SERPL-MCNC: 5.3 MMOL/L — SIGNIFICANT CHANGE UP (ref 3.5–5.3)
POTASSIUM SERPL-SCNC: 4.4 MMOL/L — SIGNIFICANT CHANGE UP (ref 3.5–5.3)
POTASSIUM SERPL-SCNC: 4.7 MMOL/L — SIGNIFICANT CHANGE UP (ref 3.5–5.3)
POTASSIUM SERPL-SCNC: 5.3 MMOL/L — SIGNIFICANT CHANGE UP (ref 3.5–5.3)
PROT UR-MCNC: 100 MG/DL
PROTHROM AB SERPL-ACNC: 14.5 SEC — HIGH (ref 9.8–12.7)
RBC # BLD: 4.75 M/UL — SIGNIFICANT CHANGE UP (ref 3.8–5.2)
RBC # BLD: 4.97 M/UL — SIGNIFICANT CHANGE UP (ref 3.8–5.2)
RBC # BLD: 5.03 M/UL — SIGNIFICANT CHANGE UP (ref 3.8–5.2)
RBC # FLD: 22.6 % — HIGH (ref 10.3–14.5)
RBC # FLD: 23.1 % — HIGH (ref 10.3–14.5)
RBC # FLD: 23.4 % — HIGH (ref 10.3–14.5)
RBC CASTS # UR COMP ASSIST: SIGNIFICANT CHANGE UP /HPF (ref 0–4)
SAO2 % BLDA: 100 % — HIGH (ref 92–96)
SAO2 % BLDA: 90 % — LOW (ref 92–96)
SAO2 % BLDA: 99 % — HIGH (ref 92–96)
SODIUM SERPL-SCNC: 134 MMOL/L — LOW (ref 135–145)
SODIUM SERPL-SCNC: 135 MMOL/L — SIGNIFICANT CHANGE UP (ref 135–145)
SODIUM SERPL-SCNC: 136 MMOL/L — SIGNIFICANT CHANGE UP (ref 135–145)
SP GR SPEC: 1.01 — SIGNIFICANT CHANGE UP (ref 1.01–1.02)
SPECIMEN SOURCE: SIGNIFICANT CHANGE UP
TROPONIN I SERPL-MCNC: 0.27 NG/ML — HIGH (ref 0.01–0.04)
TYPE + AB SCN PNL BLD: SIGNIFICANT CHANGE UP
UROBILINOGEN FLD QL: 4 MG/DL
WBC # BLD: 16.18 K/UL — HIGH (ref 3.8–10.5)
WBC # BLD: 28.66 K/UL — HIGH (ref 3.8–10.5)
WBC # BLD: 28.68 K/UL — HIGH (ref 3.8–10.5)
WBC # FLD AUTO: 16.18 K/UL — HIGH (ref 3.8–10.5)
WBC # FLD AUTO: 28.66 K/UL — HIGH (ref 3.8–10.5)
WBC # FLD AUTO: 28.68 K/UL — HIGH (ref 3.8–10.5)
WBC UR QL: (no result)

## 2018-03-30 PROCEDURE — 71045 X-RAY EXAM CHEST 1 VIEW: CPT | Mod: 26,76

## 2018-03-30 PROCEDURE — 93010 ELECTROCARDIOGRAM REPORT: CPT | Mod: 76

## 2018-03-30 PROCEDURE — 71045 X-RAY EXAM CHEST 1 VIEW: CPT | Mod: 26,77

## 2018-03-30 RX ORDER — VASOPRESSIN 20 [USP'U]/ML
0.04 INJECTION INTRAVENOUS
Qty: 0 | Refills: 0 | COMMUNITY
Start: 2018-03-30

## 2018-03-30 RX ORDER — MIDAZOLAM HYDROCHLORIDE 1 MG/ML
1 INJECTION, SOLUTION INTRAMUSCULAR; INTRAVENOUS
Qty: 100 | Refills: 0 | Status: DISCONTINUED | OUTPATIENT
Start: 2018-03-30 | End: 2018-03-30

## 2018-03-30 RX ORDER — AMIODARONE HYDROCHLORIDE 400 MG/1
0.5 TABLET ORAL
Qty: 900 | Refills: 0 | Status: DISCONTINUED | OUTPATIENT
Start: 2018-03-30 | End: 2018-03-30

## 2018-03-30 RX ORDER — HEPARIN SODIUM 5000 [USP'U]/ML
3000 INJECTION INTRAVENOUS; SUBCUTANEOUS ONCE
Qty: 0 | Refills: 0 | Status: COMPLETED | OUTPATIENT
Start: 2018-03-30 | End: 2018-03-30

## 2018-03-30 RX ORDER — MILRINONE LACTATE 1 MG/ML
4500 INJECTION, SOLUTION INTRAVENOUS ONCE
Qty: 0 | Refills: 0 | Status: COMPLETED | OUTPATIENT
Start: 2018-03-30 | End: 2018-03-30

## 2018-03-30 RX ORDER — FENTANYL CITRATE 50 UG/ML
75 INJECTION INTRAVENOUS ONCE
Qty: 0 | Refills: 0 | Status: DISCONTINUED | OUTPATIENT
Start: 2018-03-30 | End: 2018-03-30

## 2018-03-30 RX ORDER — VANCOMYCIN HCL 1 G
1000 VIAL (EA) INTRAVENOUS ONCE
Qty: 0 | Refills: 0 | Status: COMPLETED | OUTPATIENT
Start: 2018-03-30 | End: 2018-03-30

## 2018-03-30 RX ORDER — PIPERACILLIN AND TAZOBACTAM 4; .5 G/20ML; G/20ML
3.38 INJECTION, POWDER, LYOPHILIZED, FOR SOLUTION INTRAVENOUS ONCE
Qty: 0 | Refills: 0 | Status: COMPLETED | OUTPATIENT
Start: 2018-03-30 | End: 2018-03-30

## 2018-03-30 RX ORDER — EPINEPHRINE 0.3 MG/.3ML
0.05 INJECTION INTRAMUSCULAR; SUBCUTANEOUS
Qty: 4 | Refills: 0 | Status: DISCONTINUED | OUTPATIENT
Start: 2018-03-30 | End: 2018-03-30

## 2018-03-30 RX ORDER — DOCUSATE SODIUM 100 MG
1 CAPSULE ORAL
Qty: 0 | Refills: 0 | COMMUNITY

## 2018-03-30 RX ORDER — LANOLIN ALCOHOL/MO/W.PET/CERES
1 CREAM (GRAM) TOPICAL
Qty: 0 | Refills: 0 | COMMUNITY
Start: 2018-03-30

## 2018-03-30 RX ORDER — MILRINONE LACTATE 1 MG/ML
4500 INJECTION, SOLUTION INTRAVENOUS ONCE
Qty: 0 | Refills: 0 | Status: DISCONTINUED | OUTPATIENT
Start: 2018-03-30 | End: 2018-03-30

## 2018-03-30 RX ORDER — BUMETANIDE 0.25 MG/ML
1 INJECTION INTRAMUSCULAR; INTRAVENOUS
Qty: 10 | Refills: 0 | Status: DISCONTINUED | OUTPATIENT
Start: 2018-03-30 | End: 2018-03-30

## 2018-03-30 RX ORDER — PHENYLEPHRINE HYDROCHLORIDE 10 MG/ML
0.1 INJECTION INTRAVENOUS
Qty: 80 | Refills: 0 | Status: DISCONTINUED | OUTPATIENT
Start: 2018-03-30 | End: 2018-03-30

## 2018-03-30 RX ORDER — HEPARIN SODIUM 5000 [USP'U]/ML
2000 INJECTION INTRAVENOUS; SUBCUTANEOUS ONCE
Qty: 0 | Refills: 0 | Status: COMPLETED | OUTPATIENT
Start: 2018-03-30 | End: 2018-03-30

## 2018-03-30 RX ORDER — HEPARIN SODIUM 5000 [USP'U]/ML
5000 INJECTION INTRAVENOUS; SUBCUTANEOUS ONCE
Qty: 0 | Refills: 0 | Status: COMPLETED | OUTPATIENT
Start: 2018-03-30 | End: 2018-03-30

## 2018-03-30 RX ORDER — PROPOFOL 10 MG/ML
30 INJECTION, EMULSION INTRAVENOUS
Qty: 1000 | Refills: 0 | Status: DISCONTINUED | OUTPATIENT
Start: 2018-03-30 | End: 2018-03-30

## 2018-03-30 RX ORDER — MIDAZOLAM HYDROCHLORIDE 1 MG/ML
1 INJECTION, SOLUTION INTRAMUSCULAR; INTRAVENOUS
Qty: 0 | Refills: 0 | COMMUNITY
Start: 2018-03-30

## 2018-03-30 RX ORDER — PIPERACILLIN AND TAZOBACTAM 4; .5 G/20ML; G/20ML
3.38 INJECTION, POWDER, LYOPHILIZED, FOR SOLUTION INTRAVENOUS
Qty: 0 | Refills: 0 | COMMUNITY
Start: 2018-03-30

## 2018-03-30 RX ORDER — BUMETANIDE 0.25 MG/ML
1 INJECTION INTRAMUSCULAR; INTRAVENOUS
Qty: 0 | Refills: 0 | COMMUNITY
Start: 2018-03-30

## 2018-03-30 RX ORDER — MILRINONE LACTATE 1 MG/ML
8 INJECTION, SOLUTION INTRAVENOUS
Qty: 0 | Refills: 0 | COMMUNITY
Start: 2018-03-30

## 2018-03-30 RX ORDER — NOREPINEPHRINE BITARTRATE/D5W 8 MG/250ML
1 PLASTIC BAG, INJECTION (ML) INTRAVENOUS
Qty: 0 | Refills: 0 | COMMUNITY
Start: 2018-03-30

## 2018-03-30 RX ORDER — MILRINONE LACTATE 1 MG/ML
0.29 INJECTION, SOLUTION INTRAVENOUS
Qty: 20 | Refills: 0 | Status: DISCONTINUED | OUTPATIENT
Start: 2018-03-30 | End: 2018-03-30

## 2018-03-30 RX ORDER — NOREPINEPHRINE BITARTRATE/D5W 8 MG/250ML
1 PLASTIC BAG, INJECTION (ML) INTRAVENOUS
Qty: 8 | Refills: 0 | Status: DISCONTINUED | OUTPATIENT
Start: 2018-03-30 | End: 2018-03-30

## 2018-03-30 RX ORDER — CHLORHEXIDINE GLUCONATE 213 G/1000ML
15 SOLUTION TOPICAL
Qty: 0 | Refills: 0 | Status: DISCONTINUED | OUTPATIENT
Start: 2018-03-30 | End: 2018-03-30

## 2018-03-30 RX ORDER — VASOPRESSIN 20 [USP'U]/ML
0.04 INJECTION INTRAVENOUS
Qty: 100 | Refills: 0 | Status: DISCONTINUED | OUTPATIENT
Start: 2018-03-30 | End: 2018-03-30

## 2018-03-30 RX ORDER — DEXTROSE 50 % IN WATER 50 %
50 SYRINGE (ML) INTRAVENOUS ONCE
Qty: 0 | Refills: 0 | Status: COMPLETED | OUTPATIENT
Start: 2018-03-30 | End: 2018-03-30

## 2018-03-30 RX ORDER — VASOPRESSIN 20 [USP'U]/ML
0.04 INJECTION INTRAVENOUS ONCE
Qty: 0 | Refills: 0 | Status: DISCONTINUED | OUTPATIENT
Start: 2018-03-30 | End: 2018-03-30

## 2018-03-30 RX ORDER — ACETAMINOPHEN 500 MG
650 TABLET ORAL ONCE
Qty: 0 | Refills: 0 | Status: COMPLETED | OUTPATIENT
Start: 2018-03-30 | End: 2018-03-30

## 2018-03-30 RX ORDER — PHENYLEPHRINE HYDROCHLORIDE 10 MG/ML
0.1 INJECTION INTRAVENOUS
Qty: 0 | Refills: 0 | COMMUNITY
Start: 2018-03-30

## 2018-03-30 RX ORDER — ALPRAZOLAM 0.25 MG
1 TABLET ORAL
Qty: 0 | Refills: 0 | COMMUNITY
Start: 2018-03-30

## 2018-03-30 RX ORDER — PIPERACILLIN AND TAZOBACTAM 4; .5 G/20ML; G/20ML
3.38 INJECTION, POWDER, LYOPHILIZED, FOR SOLUTION INTRAVENOUS EVERY 12 HOURS
Qty: 0 | Refills: 0 | Status: DISCONTINUED | OUTPATIENT
Start: 2018-03-30 | End: 2018-03-30

## 2018-03-30 RX ADMIN — Medication 170.06 MICROGRAM(S)/KG/MIN: at 18:55

## 2018-03-30 RX ADMIN — PIPERACILLIN AND TAZOBACTAM 25 GRAM(S): 4; .5 INJECTION, POWDER, LYOPHILIZED, FOR SOLUTION INTRAVENOUS at 17:15

## 2018-03-30 RX ADMIN — Medication 170.06 MICROGRAM(S)/KG/MIN: at 21:11

## 2018-03-30 RX ADMIN — PROPOFOL 16.33 MICROGRAM(S)/KG/MIN: 10 INJECTION, EMULSION INTRAVENOUS at 22:45

## 2018-03-30 RX ADMIN — PIPERACILLIN AND TAZOBACTAM 200 GRAM(S): 4; .5 INJECTION, POWDER, LYOPHILIZED, FOR SOLUTION INTRAVENOUS at 06:38

## 2018-03-30 RX ADMIN — PHENYLEPHRINE HYDROCHLORIDE 3.4 MICROGRAM(S)/KG/MIN: 10 INJECTION INTRAVENOUS at 13:44

## 2018-03-30 RX ADMIN — Medication 40 MILLIEQUIVALENT(S): at 01:31

## 2018-03-30 RX ADMIN — Medication 170.06 MICROGRAM(S)/KG/MIN: at 20:45

## 2018-03-30 RX ADMIN — FENTANYL CITRATE 75 MICROGRAM(S): 50 INJECTION INTRAVENOUS at 09:47

## 2018-03-30 RX ADMIN — MILRINONE LACTATE 8 MICROGRAM(S)/KG/MIN: 1 INJECTION, SOLUTION INTRAVENOUS at 14:19

## 2018-03-30 RX ADMIN — VASOPRESSIN 2.4 UNIT(S)/MIN: 20 INJECTION INTRAVENOUS at 16:33

## 2018-03-30 RX ADMIN — HEPARIN SODIUM 3000 UNIT(S): 5000 INJECTION INTRAVENOUS; SUBCUTANEOUS at 22:11

## 2018-03-30 RX ADMIN — Medication 170.06 MICROGRAM(S)/KG/MIN: at 17:19

## 2018-03-30 RX ADMIN — Medication 81 MILLIGRAM(S): at 14:34

## 2018-03-30 RX ADMIN — PHENYLEPHRINE HYDROCHLORIDE 3.4 MICROGRAM(S)/KG/MIN: 10 INJECTION INTRAVENOUS at 09:53

## 2018-03-30 RX ADMIN — Medication 170.06 MICROGRAM(S)/KG/MIN: at 17:37

## 2018-03-30 RX ADMIN — AMIODARONE HYDROCHLORIDE 33.33 MG/MIN: 400 TABLET ORAL at 01:32

## 2018-03-30 RX ADMIN — Medication 0.5 MILLIGRAM(S): at 05:24

## 2018-03-30 RX ADMIN — EPINEPHRINE 17.01 MICROGRAM(S)/KG/MIN: 0.3 INJECTION INTRAMUSCULAR; SUBCUTANEOUS at 20:21

## 2018-03-30 RX ADMIN — Medication 170.06 MICROGRAM(S)/KG/MIN: at 22:46

## 2018-03-30 RX ADMIN — Medication 170.06 MICROGRAM(S)/KG/MIN: at 19:19

## 2018-03-30 RX ADMIN — Medication 650 MILLIGRAM(S): at 09:46

## 2018-03-30 RX ADMIN — Medication 50 GRAM(S): at 16:05

## 2018-03-30 RX ADMIN — MILRINONE LACTATE 8 MICROGRAM(S)/KG/MIN: 1 INJECTION, SOLUTION INTRAVENOUS at 22:58

## 2018-03-30 RX ADMIN — Medication 170.06 MICROGRAM(S)/KG/MIN: at 16:11

## 2018-03-30 RX ADMIN — MILRINONE LACTATE 27 MICROGRAM(S): 1 INJECTION, SOLUTION INTRAVENOUS at 14:40

## 2018-03-30 RX ADMIN — HEPARIN SODIUM 2000 UNIT(S): 5000 INJECTION INTRAVENOUS; SUBCUTANEOUS at 22:57

## 2018-03-30 RX ADMIN — EPINEPHRINE 17.01 MICROGRAM(S)/KG/MIN: 0.3 INJECTION INTRAMUSCULAR; SUBCUTANEOUS at 22:11

## 2018-03-30 RX ADMIN — EPINEPHRINE 17.01 MICROGRAM(S)/KG/MIN: 0.3 INJECTION INTRAMUSCULAR; SUBCUTANEOUS at 23:17

## 2018-03-30 RX ADMIN — HEPARIN SODIUM 5000 UNIT(S): 5000 INJECTION INTRAVENOUS; SUBCUTANEOUS at 22:11

## 2018-03-30 RX ADMIN — AMIODARONE HYDROCHLORIDE 16.67 MG/MIN: 400 TABLET ORAL at 13:44

## 2018-03-30 RX ADMIN — Medication 40 MILLIEQUIVALENT(S): at 05:24

## 2018-03-30 RX ADMIN — Medication 170.06 MICROGRAM(S)/KG/MIN: at 13:43

## 2018-03-30 RX ADMIN — CHLORHEXIDINE GLUCONATE 15 MILLILITER(S): 213 SOLUTION TOPICAL at 16:40

## 2018-03-30 RX ADMIN — MIDAZOLAM HYDROCHLORIDE 1 MG/HR: 1 INJECTION, SOLUTION INTRAMUSCULAR; INTRAVENOUS at 13:45

## 2018-03-30 RX ADMIN — Medication 250 MILLIGRAM(S): at 06:38

## 2018-03-30 RX ADMIN — PHENYLEPHRINE HYDROCHLORIDE 3.4 MICROGRAM(S)/KG/MIN: 10 INJECTION INTRAVENOUS at 20:45

## 2018-03-30 NOTE — PROGRESS NOTE ADULT - SUBJECTIVE AND OBJECTIVE BOX
Patient is a 58 y.o. female with PMH of CKD2/3 (baseline SCr 1.0), HTN, DM2, chronic systolic CHF (s/p bi-v AICD), VTach on Amiodarone, Hyperlipidemia discharged 3/4/18 s/p CHF exacerbation with edema requiring IV diuresis with KEYSHA peak 2.45 - diuretics (Metolazone and Aldactone) were held, Metformin was stopped, and she was discharged with SCr 1.5 on Lasix 40mg daily. Now she returns with volume overload and KEYSHA (SCr 2.0) - renal called for evaluation.  Reports eating high salt diet. Eats out often.  Since admission, received IV Lasix with improvement in breathing and edema.    Dopplers negative for DVT on prior admission.   On 18 - admission for asthma exacerbation due to parainfluenza infection and KEYSHA 1.7 with hyperkalemia 5.9. Entresto was stopped.  3/23 - wants to leave, edema improved with compression dressings, did not receive loop last night due to hypotension SBP 80s - to receive dose this AM. Metolazone added.    3/26 - s/p VT, transferred to CICU. Now denies SOB. Edema improving. +diarrhea yesterday. K depleted.  3/28 - on Lasix gtt + Metolazone daily, receiving K supplements, edema persists, BP 90s.  3.29 - on lasix gtt, K improved, edema improving slowly  3/30 - reviewed events overnight/this AM - s/p VT, intubation, on IABP and pressors, hypotensive in 80s, no UOP, febrile to 102      REVIEW OF SYSTEMS:  UTO    MEDICATIONS  (STANDING):  amiodarone Infusion 0.5 mG/Min (16.667 mL/Hr) IV Continuous <Continuous>  aspirin  chewable 81 milliGRAM(s) Oral daily  buDESOnide 160 MICROgram(s)/formoterol 4.5 MICROgram(s) Inhaler 2 Puff(s) Inhalation two times a day  buMETAnide Infusion 1 mG/Hr (10 mL/Hr) IV Continuous <Continuous>  chlorhexidine 0.12% Liquid 15 milliLiter(s) Swish and Spit two times a day  furosemide Infusion 10 mG/Hr (5 mL/Hr) IV Continuous <Continuous>  heparin  Injectable 5000 Unit(s) SubCutaneous every 12 hours  metolazone 5 milliGRAM(s) Oral daily  midazolam Infusion 1 mG/Hr (1 mL/Hr) IV Continuous <Continuous>  milrinone Infusion 0.294 MICROgram(s)/kG/Min (8 mL/Hr) IV Continuous <Continuous>  norepinephrine Infusion 1 MICROgram(s)/kG/Min (170.063 mL/Hr) IV Continuous <Continuous>  pantoprazole    Tablet 40 milliGRAM(s) Oral before breakfast  phenylephrine    Infusion 0.1 MICROgram(s)/kG/Min (3.401 mL/Hr) IV Continuous <Continuous>  piperacillin/tazobactam IVPB. 3.375 Gram(s) IV Intermittent every 12 hours  potassium chloride    Tablet ER 40 milliEquivalent(s) Oral daily      Vital Signs Last 24 Hrs  T(C): 38.6 (30 Mar 2018 15:00), Max: 38.9 (30 Mar 2018 04:35)  T(F): 101.5 (30 Mar 2018 15:00), Max: 102 (30 Mar 2018 04:35)  HR: 101 (30 Mar 2018 15:00) (73 - 106)  BP: 142/89 (30 Mar 2018 14:00) (50/30 - 179/145)  BP(mean): 103 (30 Mar 2018 14:00) (44 - 153)  RR: 22 (30 Mar 2018 15:00) (16 - 203)  SpO2: 89% (30 Mar 2018 15:00) (73% - 100%)  Daily     Daily Weight in k.6 (30 Mar 2018 01:43)  I&O's Summary    29 Mar 2018 07:  -  30 Mar 2018 07:00  --------------------------------------------------------  IN: 811.5 mL / OUT: 1550 mL / NET: -738.5 mL    28 Mar 2018 07:01  -  29 Mar 2018 07:00  --------------------------------------------------------  IN: 960 mL / OUT: 1500 mL / NET: -540 mL    29 Mar 2018 07:01  -  29 Mar 2018 13:20  --------------------------------------------------------  IN: 260 mL / OUT: 600 mL / NET: -340 mL          PHYSICAL EXAM:    Constitutional: intubated, sedated, on pressors and IABP  HEENT: intubated  Neck: No LAD, + JVD  Respiratory: bilateral crackles throughout  Cardiovascular: S1 and S2, RRR  Gastrointestinal: BS+, soft, NT/ND  Extremities: 1+peripheral edema, cold extremeties  Neurological: intubated and sedated  Psychiatric: Normal mood, normal affect  : +lopes, no output  Skin: No rashes  Access: Not applicable    LABS:                                    12.5   28.66 )-----------( 267      ( 30 Mar 2018 15:00 )             42.8                         11.7   28.68 )-----------( 272      ( 30 Mar 2018 11:00 )             39.6       136    |  91     |  59     ----------------------------<  8         30 Mar 2018 15:00  5.3     |  19     |  2.43     135    |  90     |  57     ----------------------------<  82        30 Mar 2018 11:00  4.7     |  34     |  2.07     134    |  88     |  56     ----------------------------<  158       30 Mar 2018 05:22  4.4     |  35     |  1.58     Ca    9.0        30 Mar 2018 15:00  Ca    8.9        30 Mar 2018 11:00    Phos  5.7       30 Mar 2018 15:00  Phos  3.2       29 Mar 2018 20:45    Mg     2.7       30 Mar 2018 15:00  Mg     2.0       29 Mar 2018 20:45      03-29    139  |  91<L>  |  54<H>  ----------------------------<  97  4.3   |  38<H>  |  1.16    Ca    9.0      29 Mar 2018 04:23  Phos  3.4     -28  Mg     1.8           141    |  94     |  49     ----------------------------<  115       28 Mar 2018 05:23  3.1     |  41     |  1.20     141    |  98     |  49     ----------------------------<  112       27 Mar 2018 04:42  3.2     |  39     |  1.12     142    |  96     |  51     ----------------------------<  158       26 Mar 2018 18:18  3.5     |  39     |  1.18     Ca    9.1        28 Mar 2018 05:23  Ca    8.7        27 Mar 2018 04:42    Phos  3.4       28 Mar 2018 05:23  Phos  2.3       26 Mar 2018 04:38    Mg     1.8       28 Mar 2018 05:23  Mg     2.0       27 Mar 2018 04:42      03    144  |  98  |  50<H>  ----------------------------<  136<H>  3.2<L>   |  37<H>  |  1.21    Ca    8.8      26 Mar 2018 04:38  Phos  2.3     -26  Mg     2.4     -25    22 Mar 2018 06:48    138    |  103    |  74     ----------------------------<  84     5.0     |  24     |  2.18           Urine Studies:          RADIOLOGY & ADDITIONAL STUDIES:

## 2018-03-30 NOTE — CHART NOTE - NSCHARTNOTEFT_GEN_A_CORE
Events of last night noted. Patient had NSVT. Develop respiratory compromise and had to be intubated this morning. She is currently hypotensive. Acute on chronic systolic congestive heart failure. Possible cardiogenic shock. Ejection fraction is 10-15% with significant mitral regurgitation. We will start patient on inotropes. Intra-aortic balloon pump will be arranged. Right and left heart cardiac catheterization. Discussed with patient's family at bedside. She is in critical condition.

## 2018-03-30 NOTE — PROVIDER CONTACT NOTE (CRITICAL VALUE NOTIFICATION) - ACTION/TREATMENT ORDERED:
KCL 10 meq IVPB x3 doses ordered, stat
pushed 2 amps of D50
orders to replace potassium and magnesium received
MD will review chart and order appropriate IV abx, will f/u.

## 2018-03-30 NOTE — DISCHARGE NOTE ADULT - PATIENT PORTAL LINK FT
You can access the [facility] Patient Portal, offered by [facility], by registering with the following website: [email or web address]

## 2018-03-30 NOTE — PROVIDER CONTACT NOTE (OTHER) - BACKGROUND
pt maintained on versed, nikunj, levo, amio, primacor, & vasopressin drips. unable to start bumex drip r/t hypotension. IABP maintained in right groin- SBP 70-80  w/ aug 80-90s on IABP.
pt remains unresponsive with "tensing" movement to pain stimuli. transport team here to transfer pt to [facility]. support and education done with  by both MD and RN.
admitted for CHF, hx of vtach, EF of 20%, on a continuous dobutamine infusion

## 2018-03-30 NOTE — CHART NOTE - NSCHARTNOTEFT_GEN_A_CORE
Procedure: Under sterile prep and drape ECMO performed by Dr Sp White. 23 Fr Right CFV access. 16 Fr left CFA access. Perfusion started. 4 LPM. BP improved. but remains on pressors. Primacor resumed.     Pt being transferred to [facility] for further care.     D/W pt's .

## 2018-03-30 NOTE — PROGRESS NOTE ADULT - PROVIDER SPECIALTY LIST ADULT
Cardiology
Critical Care
Heart Failure
Hospitalist
Infectious Disease
Nephrology
Cardiology
Heart Failure
Cardiology
Hospitalist
Nephrology
Critical Care

## 2018-03-30 NOTE — PROCEDURE NOTE - NSTRACHPOSTINTU_RESP_A_CORE
Breath sounds bilateral/Appropriate capnography/Breath sounds equal/Chest excursion noted/Chest X-Ray/Positive end tidal Co2 noted

## 2018-03-30 NOTE — PROVIDER CONTACT NOTE (OTHER) - DATE AND TIME:
30-Mar-2018 19:55
30-Mar-2018 22:14
19-Mar-2018 19:05
19-Mar-2018 21:05
20-Mar-2018 17:27
21-Mar-2018 10:56
24-Mar-2018 20:20
25-Mar-2018 00:00
30-Mar-2018 01:50

## 2018-03-30 NOTE — PROCEDURE NOTE - PROCEDURE
<<-----Click on this checkbox to enter Procedure Central venous catheter insertion  03/30/2018    Active  LILY

## 2018-03-30 NOTE — PROCEDURE NOTE - NSPROCDETAILS_GEN_ALL_CORE
patient pre-oxygenated, tube inserted, placement confirmed
ultrasound guidance/sterile technique, catheter placed/guidewire recovered/lumen(s) aspirated and flushed/sterile dressing applied

## 2018-03-30 NOTE — PROVIDER CONTACT NOTE (CHANGE IN STATUS NOTIFICATION) - BACKGROUND
s ordered. Pt. also placed on constant observation as she is trying to get oob. Will continue to monitor pt. closely.

## 2018-03-30 NOTE — PROGRESS NOTE ADULT - ASSESSMENT
58 y.o. female with PMH of CKD2/3 (baseline SCr 1.0), HTN, DM2, chronic systolic CHF (s/p bi-v AICD), VTach on Amiodarone, Hyperlipidemia discharged 3/4/18 s/p CHF exacerbation with edema requiring IV diuresis returns with volume overload and KEYSHA (SCr 2.0) - renal called for evaluation.    1. KEYSHA on CKD3 / Volume overload   - currently anuric with rising creatinine due to minimal perfusion while hypotensive on multiple pressors  - poor cardiac output - on IABP  - improve hemodynamics  - not a candidate for dialysis while hypotensive - will not tolerate  - currently lytes acceptable  - may need IV bicarb infusion if bicarb falls further  - diuresis as tolerated - no UOP after high dose IV lasix this AM - unlikely to be affective until hemodynamics improve  - do not give K supplementation for K>3.2    2. Sepsis / pulm edema / CHF / respiratory failure  - pressors  - abx - renally dose, zosyn q12h  - intubated  - f/u cultures    Condition critical. Multiorgan failure.  Possible transfer for LVAD.  Case d/w Mana Stevens and Jonel. Discussed in detail with  at bedside.    Dr. Reynolds to cover service 3/31-4/1. 58 y.o. female with PMH of CKD2/3 (baseline SCr 1.0), HTN, DM2, chronic systolic CHF (s/p bi-v AICD), VTach on Amiodarone, Hyperlipidemia discharged 3/4/18 s/p CHF exacerbation with edema requiring IV diuresis returns with volume overload and KEYSHA (SCr 2.0) - renal called for evaluation.    1. KEYSHA on CKD3 / Volume overload   - currently anuric with rising creatinine due to minimal perfusion while hypotensive on multiple pressors  - poor cardiac output - on IABP  - improve hemodynamics  - not a candidate for dialysis while hypotensive - will not tolerate  - currently lytes acceptable  - may need IV bicarb infusion if bicarb falls further  - diuresis as tolerated - no UOP after high dose IV lasix this AM - unlikely to be effective until hemodynamics improve  - do not give K supplementation for K>3.2    2. Sepsis / pulm edema / CHF / respiratory failure  - pressors  - abx - renally dose, zosyn q12h  - intubated  - f/u cultures    Condition critical. Multiorgan failure.  Case d/w Mana Stevens and Jonel. Discussed in detail with  at bedside.    Dr. Reynolds to cover service 3/31-4/1.

## 2018-03-30 NOTE — PROGRESS NOTE ADULT - ASSESSMENT
57 y.o  Female with PMH of diastolic CHF and other cormorbidites sent to the ED from Cardiologist's office for evaluation of worsening dyspnea. Patient reports 11lb weight gain over 3 days and states being compliant with all medications including Lasix. Per her , she eats "[place] food which has salt it in". No recent fevers / chills or illnesses. Of note patient was recently admitted to  and discharged on 3/4/18 (2 weeks ago) for CHF and leg cellulitis. During that admission she suffered KEYSHA with Scr peaked at 2.0 and thus several diuretics were withheld upon discharge including Entresto and Zaroxyln. Admitted to  with CHF exacerbation/renal failure, UA with some pyuria, no urinary symptoms, cx growing VRE.     1. On 3/29/18 respiratory failure requiring intubation, pressor support, IABP  - large component of heart failure  - cannot rule out septic shock, given fever, possible pneumonia, questionable aspiration?  - follow up cultures   - will start zosyn at q 12 hours dosing given pressor support, expect creatinine clearance to worsen  - supportive care    2. other issues - care per medicine  Will follow

## 2018-03-30 NOTE — CHART NOTE - NSCHARTNOTEFT_GEN_A_CORE
Informed by RN that pt temp was 102. Unable to obtain ROS 2/2 clinical condition  Pt seen and examined at bedside    BP 80/60 P 98 R 22    Gen: Awake and alert, agitated   Cardiac: S1,S2  Lungs: good air entry b/l  Abd: + BS, NT    This is a 59 y/o female with history significant for HFrEF 20%, Non obstructive CAD, AICD, HTN, HLD, Asthma, DM sent from cardiologist with dyspnea, pt  had episode of unresponsiveness last night, found to be in polymorphic VT. Pt regained consciousness and was started on Amiodarone drip. Patient spike tempt of temp of 102.    Septic protocol initiated- stat lactate, CBC, CXR, blood and urine cx ordered. Labs resulted with w/ Lactate of 4.8 with leukocytosis w/ WBC of 16 with corresponding bp of 80/60 (baseline sbp in the 80's per primary RN). Stat dose of Vanco and Zosyn. Writer called intensivist regarding care- per discussion with Dr. Vaughan he agrees with IV antibiotic but DO NOT want to initiate IVF because of history of CHF EF of 20% and pt is currently on Lasix drip. Per MD will continue with tele monitoring on CCU. Repeat lactate ordered. F/u CXR, blood and urine cx. ID consult.     Am hospitalist to see pt    Case discussed with Dr. Mcfarland Informed by RN that pt temp was 102. Unable to obtain ROS 2/2 clinical condition  Pt seen and examined at bedside    BP 80/60 P 98 R 22    Gen: Awake and alert, agitated   Cardiac: S1,S2  Lungs: goo air entry b/l   Abd: + BS, NT    This is a 59 y/o female with history significant for HFrEF 20%, Non obstructive CAD, AICD, HTN, HLD, Asthma, DM sent from cardiologist with dyspnea, pt  had episode of unresponsiveness last night, found to be in polymorphic VT. Pt regained consciousness and was started on Amiodarone drip. Patient spike tempt of temp of 102.    Septic protocol initiated- stat lactate, CBC, CXR, blood and urine cx ordered. Labs resulted with w/ Lactate of 4.8 with leukocytosis w/ WBC of 16 with corresponding bp of 80/60 (baseline sbp in the 80's per primary RN). Stat dose of Vanco and Zosyn. Writer called intensivist regarding care- per discussion with Dr. Vaughan he agrees with IV antibiotic but DO NOT want to initiate IVF because of history of CHF EF of 20% and pt is currently on Lasix drip. Lactate likely elevated due to low fluid volume per intensivist. Per MD will continue with tele monitoring on CCU. Repeat lactate ordered. F/u CXR, blood and urine cx. ID and intensivist consult.     Am hospitalist to see pt    Case discussed with Dr. Mcfarland

## 2018-03-30 NOTE — CHART NOTE - NSCHARTNOTEFT_GEN_A_CORE
Called emergently to see the patient for hypotension and hypoxia post intubation.      Upon arrival she had a SBP in the 50s-60s O2 sat in the 80 on FiO2 100%    Plan:    CXR ordered and done--ET in good position and she is clearly in heart failure    Started nikunj for BP support    Increased PEEP to 12 to help oxygenate    Gave fentanyl 75 to sedate and help synchronize the patient with the vent to improve ventilation and oxygenation    Ordered an ABG    D/W  Hilda-Stat ECHO and likely IABP then possible transfer for LVAD    Add 45 min

## 2018-03-30 NOTE — PROVIDER CONTACT NOTE (OTHER) - SITUATION
md bass & his team w/ md san performed ECMO procedure in pt room. IV drips tirated for pts VS. post procedure. pt stable and BP & aug pressure has improved.
pt report recieved at bedside by day RN. PT unresponsive w/ fixed/1 pupils. Only movement noted is tensing on extremities w/ pain stimuli.
Dr. Reynolds's office notified about pt.
Dr. Stevens's office notified about pt.
CALLED DR MORGAN SERVICE ON CONSULT..  SERVICE AWARE
Pt had 20 beats of nonsustained vtach, c/o dizziness
Pt has IV lasix infusion ordered, pt went into vtach, K 2.8, unclear whether or not pt should have the infusion still going
Pt refusing potassium IV route  C/O pain at IV site  slower infusion rate attempted and unsuccessful
notified service; spoke to Eda

## 2018-03-30 NOTE — PROVIDER CONTACT NOTE (CRITICAL VALUE NOTIFICATION) - TEST AND RESULT REPORTED:
potassium level 2.8
+BC aerobic bottle. gram + cocci in clusters
K 2.7, Mg 1.6
Urine culture- + 50,000-99,000 enterococcus faecium, vancomycin resistant. <100,000 normal urogenital tara present
glucose
lactate 15.7

## 2018-03-30 NOTE — PROGRESS NOTE ADULT - SUBJECTIVE AND OBJECTIVE BOX
Patient is a 58y old  Female who presents with a chief complaint of Shortness of breath, weight gain, leg edema. (19 Mar 2018 16:51)    Date of service: 03-30-18 @ 14:35  Saw patient on 3/30/18; had rapid response, placed on ventilatory support, pressor support and intra aortic balloon pump  ROS: unable to obtain secondary to patient medical condition   Medications and Vitals reviewed on 3/30/18  MEDICATIONS  (STANDING):  amiodarone Infusion 0.5 mG/Min (16.667 mL/Hr) IV Continuous <Continuous>  aspirin  chewable 81 milliGRAM(s) Oral daily  buDESOnide 160 MICROgram(s)/formoterol 4.5 MICROgram(s) Inhaler 2 Puff(s) Inhalation two times a day  furosemide Infusion 10 mG/Hr (5 mL/Hr) IV Continuous <Continuous>  heparin  Injectable 5000 Unit(s) SubCutaneous every 12 hours  metolazone 5 milliGRAM(s) Oral daily  midazolam Infusion 1 mG/Hr (1 mL/Hr) IV Continuous <Continuous>  milrinone Bolus 4500 MICROGram(s) IV Bolus once  milrinone Infusion 0.294 MICROgram(s)/kG/Min (8 mL/Hr) IV Continuous <Continuous>  norepinephrine Infusion 1 MICROgram(s)/kG/Min (170.063 mL/Hr) IV Continuous <Continuous>  pantoprazole    Tablet 40 milliGRAM(s) Oral before breakfast  phenylephrine    Infusion 0.1 MICROgram(s)/kG/Min (3.401 mL/Hr) IV Continuous <Continuous>  piperacillin/tazobactam IVPB. 3.375 Gram(s) IV Intermittent every 12 hours  potassium chloride    Tablet ER 40 milliEquivalent(s) Oral daily    MEDICATIONS  (PRN):  acetaminophen   Tablet. 650 milliGRAM(s) Oral every 6 hours PRN Mild Pain (1 - 3)  ALBUTerol    0.083% 2.5 milliGRAM(s) Nebulizer every 6 hours PRN Shortness of Breath and/or Wheezing  ALPRAZolam 0.5 milliGRAM(s) Oral two times a day PRN Severe anxiety  docusate sodium 100 milliGRAM(s) Oral two times a day PRN Constipation  hydrocortisone 1% Ointment 1 Application(s) Topical two times a day PRN Itching  melatonin 5 milliGRAM(s) Oral at bedtime PRN Sleep  ondansetron Injectable 4 milliGRAM(s) IV Push every 4 hours PRN Nausea and/or Vomiting      Vital Signs Last 24 Hrs  T(C): 38.5 (30 Mar 2018 14:00), Max: 38.9 (30 Mar 2018 04:35)  T(F): 101.3 (30 Mar 2018 14:00), Max: 102 (30 Mar 2018 04:35)  HR: 104 (30 Mar 2018 14:00) (73 - 106)  BP: 142/89 (30 Mar 2018 14:00) (50/30 - 179/145)  BP(mean): 103 (30 Mar 2018 14:00) (44 - 153)  RR: 23 (30 Mar 2018 14:00) (16 - 203)  SpO2: 86% (30 Mar 2018 14:00) (73% - 100%)    Physical Exam:      PE:  Constitutional: frail looking, intubated  HEENT: NC/AT  Neck: supple  Respiratory: decreased breath sounds  Cardiovascular: S1S2 regular, no murmurs  Abdomen: soft  Musculoskeletal: peripheral edema  Neurological:  intubated  Skin: venous stasis changes b/l LEs    Labs:                        11.7   28.68 )-----------( 272      ( 30 Mar 2018 11:00 )             39.6     03-30    135  |  90<L>  |  57<H>  ----------------------------<  82  4.7   |  34<H>  |  2.07<H>    Ca    8.9      30 Mar 2018 11:00  Phos  3.2     03-29  Mg     2.0     03-29             Cultures:               Radiology:  < from: Xray Chest 1 View AP/PA. (03.30.18 @ 08:49) >    EXAM:  XR CHEST AP OR PA 1V                            PROCEDURE DATE:  03/30/2018          INTERPRETATION:  History: Dyspnea    Chest:  one view.      Comparison: 3/30/2018    AP radiograph of the chest demonstrates slight improvement of BILATERAL  infiltrates. The cardiac silhouette is enlarged in size. Pacemaker.   Osseous structures are intact. ET and NG tubes are in satisfactory   position.    Impression:Slight improvement of BILATERAL infiltrates. Moderate   cardiomegaly.    < end of copied text >          Advanced directives addressed: full resuscitation

## 2018-03-30 NOTE — CHART NOTE - NSCHARTNOTEFT_GEN_A_CORE
Cardiac catheterization revealed normal coronaries. Severe pulmonary artery hypertension. Markedly elevated pulmonary capillary wedge pressure at 55 mmHg. Right atrial pressure 35 mmHg. Discuss with heart failure team at [facility] (patient's primary cardiology team), transfer arrangements initiated to [facility] in [place] however patient is very sick at this time to be transferred to the [facility]. Transfer arrangements to [facility] suggested however because patient's lactate level was elevated and she was hypotensive despite 3 pressors and intra-aortic balloon pump transfer to [place] is also aborted. Initiated [facility] cardiothoracic surgery team for ECMO and Dr. Sp White will be coming in to consult on this patient and facilitate ECMO and transfer of this patient to tertiary care advanced congestive heart failure bed at [facility]. D/W pt's jennifer at bedside. D/W pt's son Serafin over the phone

## 2018-03-30 NOTE — PROGRESS NOTE ADULT - SUBJECTIVE AND OBJECTIVE BOX
CC: SOB    HPI: 57y old F with PMH of HFrEF sent from office for evaluation of worsening dyspnea. She had an 11lb weight gain over 3 days.    3/30 - Patient seen and examined, Events noted. On arrival patient is on 100% FIO2 with inc work of breathing and increased RR. Her sats are diminishing. She will need urgent intubation.    ROS: stated above.     Past Medical History:  Asthma    CAD (coronary artery disease)    CHF (congestive heart failure)    DM (diabetes mellitus)    HLD (hyperlipidemia)    HTN (hypertension)    Pacemaker.    Past Surgical History:  Artificial cardiac pacemaker - BiV pacer.   History of cholecystectomy.    Family History:  Mother  Still living? No  Family history of other heart disease, Age at diagnosis: Age Unknown.    NKDA  Meds: reviewed w/ patient.   Social Hx: nonsmoker, no ETOH or drug use. Lives at home with .     Vital Signs Last 24 Hrs  T(C): 36.4 (19 Mar 2018 15:27), Max: 36.5 (19 Mar 2018 13:43)  T(F): 97.6 (19 Mar 2018 15:27), Max: 97.7 (19 Mar 2018 13:43)  HR: 88 (19 Mar 2018 15:27) (69 - 88)  BP: 105/76 (19 Mar 2018 15:27) (81/60 - 105/76)  BP(mean): --  RR: 18 (19 Mar 2018 15:27) (18 - 19)  SpO2: 100% (19 Mar 2018 15:27) (99% - 100%)    PHYSICAL EXAM:  Constitutional: middle aged  female in moderate respiratory distress with prolonged conversation, awake and alert, well-developed  HEENT: PERR, EOMI, Normal Hearing, MMM  Neck: Soft and supple, No LAD, No JVD  Respiratory: Breath sounds are decreased at bases, no wheezing.   Cardiovascular: S1 and S2, tachycardic.   Gastrointestinal: Bowel Sounds present, soft, nontender, nondistended, no guarding, no rebound  Extremities: +3 LE peripheral edema Bilaterally to thighs.   Vascular: 2+ peripheral pulses  Neurological: A/O x 3, no focal deficits  Musculoskeletal: 5/5 strength b/l upper and lower extremities  Skin: red abrasions on calfs, does not appear infected.     MEDICATIONS  (STANDING):  buDESOnide 160 MICROgram(s)/formoterol 4.5 MICROgram(s) Inhaler 2 Puff(s) Inhalation two times a day  furosemide   Injectable 40 milliGRAM(s) IV Push every 12 hours  heparin  Injectable 5000 Unit(s) SubCutaneous every 12 hours    LABS: All Labs Reviewed:                        11.5   9.60  )-----------( 160      ( 19 Mar 2018 14:11 )             38.2     03-19    138  |  102  |  71<H>  ----------------------------<  174<H>  4.0   |  26  |  2.01<H>    Ca    8.6      19 Mar 2018 14:11    TPro  6.9  /  Alb  2.7<L>  /  TBili  1.7<H>  /  DBili  x   /  AST  52<H>  /  ALT  57  /  AlkPhos  247<H>  03-19  PT/INR - ( 19 Mar 2018 14:11 )   PT: 14.3 sec;   INR: 1.32 ratio         PTT - ( 19 Mar 2018 14:11 )  PTT:25.0 sec  CARDIAC MARKERS ( 19 Mar 2018 14:11 )  0.018 ng/mL / x     / x     / x     / x        EKG: AV paced. HR 70    75-min CC time is exclusive of billed procedures and/or teaching and/or routine family updates.

## 2018-03-30 NOTE — DISCHARGE NOTE ADULT - CARE PLAN
Principal Discharge DX:	CHF (congestive heart failure)  Goal:	safe transfer  Assessment and plan of treatment:	higher level of care

## 2018-03-30 NOTE — DISCHARGE NOTE ADULT - CARE PROVIDER_API CALL
Juanita Stevens), Cardiology; Interventional Cardiology  [address] [facility]  [place] [address]  Phone: [phone]  Fax: [phone]

## 2018-03-30 NOTE — PROGRESS NOTE ADULT - ASSESSMENT
58 y old F with:  Acute Hypoxemic Resp Failure - On Vent  Inc A-a gradient  HFrEF @ 10%  CAD  DM  HL  HTN    Plan:  Cont ICU Care  Critically Ill  BP Stable - On pressors  Keep MAP > 65  Cont Mech Vent   NPO  May need IABP for BP  Diuresis as BP allows  Monitor renal function  Strict I/O's  DVT prophylaxis - Hep SQ  Possible transfer for LVAD  Case d/w in detail with  via  regarding patients current status, plan of care, and prognosis with all questions answered in detail.  Plan discussed with Dr. Stevens.

## 2018-03-30 NOTE — PROVIDER CONTACT NOTE (OTHER) - ASSESSMENT
& md san at bedside. support given. md from [place] to come perform ECMO. awaiting team. report given to RN & transport at [facility] via telephone.
K 2.7
VS: 94/73 , HR 77, SpO2 98%, c/o dizziness

## 2018-03-30 NOTE — DISCHARGE NOTE ADULT - HOSPITAL COURSE
Patient with worsening heart failure , intubated, IABP, multiple drips , patient to be transferred to higher level of care

## 2018-03-30 NOTE — PROVIDER CONTACT NOTE (OTHER) - ACTION/TREATMENT ORDERED:
10 mEq Kcl IVPB x3 runs, 40 mEq PO x2
continue with IV lasix infusion
Potassium 40 mEq PO
STAT BMP and Mag, Hold the dobutamine infusion

## 2018-03-30 NOTE — PROCEDURE NOTE - NSINDICATIONS_GEN_A_CORE
respiratory distress/respiratory failure/critical patient
critical illness/emergency venous access/hemodynamic monitoring

## 2018-03-31 ENCOUNTER — INPATIENT (INPATIENT)
Facility: HOSPITAL | Age: 58
LOS: 3 days | DRG: 3 | End: 2018-04-04
Attending: THORACIC SURGERY (CARDIOTHORACIC VASCULAR SURGERY) | Admitting: THORACIC SURGERY (CARDIOTHORACIC VASCULAR SURGERY)
Payer: COMMERCIAL

## 2018-03-31 VITALS — WEIGHT: 205.91 LBS | HEART RATE: 72 BPM | RESPIRATION RATE: 20 BRPM

## 2018-03-31 DIAGNOSIS — Z90.49 ACQUIRED ABSENCE OF OTHER SPECIFIED PARTS OF DIGESTIVE TRACT: Chronic | ICD-10-CM

## 2018-03-31 DIAGNOSIS — Z92.81 PERSONAL HISTORY OF EXTRACORPOREAL MEMBRANE OXYGENATION (ECMO): ICD-10-CM

## 2018-03-31 DIAGNOSIS — Z95.0 PRESENCE OF CARDIAC PACEMAKER: Chronic | ICD-10-CM

## 2018-03-31 DIAGNOSIS — R57.0 CARDIOGENIC SHOCK: ICD-10-CM

## 2018-03-31 DIAGNOSIS — Z29.9 ENCOUNTER FOR PROPHYLACTIC MEASURES, UNSPECIFIED: ICD-10-CM

## 2018-03-31 LAB
ALBUMIN SERPL ELPH-MCNC: 1.8 G/DL — LOW (ref 3.3–5)
ALBUMIN SERPL ELPH-MCNC: 2.1 G/DL — LOW (ref 3.3–5)
ALP SERPL-CCNC: 129 U/L — HIGH (ref 40–120)
ALP SERPL-CCNC: 171 U/L — HIGH (ref 40–120)
ALT FLD-CCNC: 2513 U/L RC — HIGH (ref 10–45)
ALT FLD-CCNC: 821 U/L RC — HIGH (ref 10–45)
ANION GAP SERPL CALC-SCNC: 35 MMOL/L — HIGH (ref 5–17)
ANION GAP SERPL CALC-SCNC: 44 MMOL/L — HIGH (ref 5–17)
APTT BLD: 186.6 SEC — CRITICAL HIGH (ref 27.5–37.4)
APTT BLD: 38.8 SEC — HIGH (ref 27.5–37.4)
APTT BLD: 44.9 SEC — HIGH (ref 27.5–37.4)
APTT BLD: 51.3 SEC — HIGH (ref 27.5–37.4)
AST SERPL-CCNC: 2595 U/L — HIGH (ref 10–40)
AST SERPL-CCNC: >7000 U/L — HIGH (ref 10–40)
BASE EXCESS BLDV CALC-SCNC: -19.5 MMOL/L — LOW (ref -2–2)
BILIRUB SERPL-MCNC: 4.3 MG/DL — HIGH (ref 0.2–1.2)
BILIRUB SERPL-MCNC: 8.4 MG/DL — HIGH (ref 0.2–1.2)
BLD GP AB SCN SERPL QL: NEGATIVE — SIGNIFICANT CHANGE UP
BUN SERPL-MCNC: 57 MG/DL — HIGH (ref 7–23)
BUN SERPL-MCNC: 58 MG/DL — HIGH (ref 7–23)
CALCIUM SERPL-MCNC: 7.5 MG/DL — LOW (ref 8.4–10.5)
CALCIUM SERPL-MCNC: 8.3 MG/DL — LOW (ref 8.4–10.5)
CHLORIDE SERPL-SCNC: 91 MMOL/L — LOW (ref 96–108)
CHLORIDE SERPL-SCNC: 96 MMOL/L — SIGNIFICANT CHANGE UP (ref 96–108)
CK MB BLD-MCNC: 1.9 % — SIGNIFICANT CHANGE UP (ref 0–3.5)
CK MB CFR SERPL CALC: 28.4 NG/ML — HIGH (ref 0–3.8)
CK SERPL-CCNC: 1528 U/L — HIGH (ref 25–170)
CK SERPL-CCNC: 2898 U/L — HIGH (ref 25–170)
CK SERPL-CCNC: 3854 U/L — HIGH (ref 25–170)
CO2 BLDV-SCNC: 11 MMOL/L — LOW (ref 22–30)
CO2 SERPL-SCNC: 19 MMOL/L — LOW (ref 22–31)
CO2 SERPL-SCNC: 5 MMOL/L — CRITICAL LOW (ref 22–31)
CREAT SERPL-MCNC: 2.19 MG/DL — HIGH (ref 0.5–1.3)
CREAT SERPL-MCNC: 2.44 MG/DL — HIGH (ref 0.5–1.3)
CULTURE RESULTS: NO GROWTH — SIGNIFICANT CHANGE UP
GAS PNL BLDA: SIGNIFICANT CHANGE UP
GAS PNL BLDV: SIGNIFICANT CHANGE UP
GLUCOSE SERPL-MCNC: 128 MG/DL — HIGH (ref 70–99)
GLUCOSE SERPL-MCNC: 65 MG/DL — LOW (ref 70–99)
GRAM STN FLD: SIGNIFICANT CHANGE UP
HCO3 BLDV-SCNC: 10 MMOL/L — LOW (ref 21–29)
HCT VFR BLD CALC: 32.7 % — LOW (ref 34.5–45)
HCT VFR BLD CALC: 34.2 % — LOW (ref 34.5–45)
HCT VFR BLD CALC: 34.5 % — SIGNIFICANT CHANGE UP (ref 34.5–45)
HGB BLD-MCNC: 10.4 G/DL — LOW (ref 11.5–15.5)
HGB BLD-MCNC: 10.5 G/DL — LOW (ref 11.5–15.5)
HGB BLD-MCNC: 9.9 G/DL — LOW (ref 11.5–15.5)
HOROWITZ INDEX BLDV+IHG-RTO: 60 — SIGNIFICANT CHANGE UP
INR BLD: 2.02 RATIO — HIGH (ref 0.88–1.16)
INR BLD: 2.61 RATIO — HIGH (ref 0.88–1.16)
INR BLD: 4.34 RATIO — HIGH (ref 0.88–1.16)
MAGNESIUM SERPL-MCNC: 2.3 MG/DL — SIGNIFICANT CHANGE UP (ref 1.6–2.6)
MCHC RBC-ENTMCNC: 26.4 PG — LOW (ref 27–34)
MCHC RBC-ENTMCNC: 26.5 PG — LOW (ref 27–34)
MCHC RBC-ENTMCNC: 27.4 PG — SIGNIFICANT CHANGE UP (ref 27–34)
MCHC RBC-ENTMCNC: 29 GM/DL — LOW (ref 32–36)
MCHC RBC-ENTMCNC: 30.3 GM/DL — LOW (ref 32–36)
MCHC RBC-ENTMCNC: 31.8 GM/DL — LOW (ref 32–36)
MCV RBC AUTO: 86.2 FL — SIGNIFICANT CHANGE UP (ref 80–100)
MCV RBC AUTO: 87.6 FL — SIGNIFICANT CHANGE UP (ref 80–100)
MCV RBC AUTO: 91 FL — SIGNIFICANT CHANGE UP (ref 80–100)
PCO2 BLDV: 36 MMHG — SIGNIFICANT CHANGE UP (ref 35–50)
PH BLDV: 7.05 — CRITICAL LOW (ref 7.35–7.45)
PHOSPHATE SERPL-MCNC: 7.5 MG/DL — HIGH (ref 2.5–4.5)
PLATELET # BLD AUTO: 130 K/UL — LOW (ref 150–400)
PLATELET # BLD AUTO: 132 K/UL — LOW (ref 150–400)
PLATELET # BLD AUTO: 212 K/UL — SIGNIFICANT CHANGE UP (ref 150–400)
PO2 BLDV: 80 MMHG — HIGH (ref 25–45)
POTASSIUM SERPL-MCNC: 5 MMOL/L — SIGNIFICANT CHANGE UP (ref 3.5–5.3)
POTASSIUM SERPL-MCNC: 5.5 MMOL/L — HIGH (ref 3.5–5.3)
POTASSIUM SERPL-SCNC: 5 MMOL/L — SIGNIFICANT CHANGE UP (ref 3.5–5.3)
POTASSIUM SERPL-SCNC: 5.5 MMOL/L — HIGH (ref 3.5–5.3)
PROT SERPL-MCNC: 4.1 G/DL — LOW (ref 6–8.3)
PROT SERPL-MCNC: 5.4 G/DL — LOW (ref 6–8.3)
PROTHROM AB SERPL-ACNC: 22.3 SEC — HIGH (ref 9.8–12.7)
PROTHROM AB SERPL-ACNC: 29 SEC — HIGH (ref 9.8–12.7)
PROTHROM AB SERPL-ACNC: 48.3 SEC — HIGH (ref 9.8–12.7)
RBC # BLD: 3.76 M/UL — LOW (ref 3.8–5.2)
RBC # BLD: 3.79 M/UL — LOW (ref 3.8–5.2)
RBC # BLD: 3.94 M/UL — SIGNIFICANT CHANGE UP (ref 3.8–5.2)
RBC # FLD: 20 % — HIGH (ref 10.3–14.5)
RBC # FLD: 20.3 % — HIGH (ref 10.3–14.5)
RBC # FLD: 21 % — HIGH (ref 10.3–14.5)
RH IG SCN BLD-IMP: POSITIVE — SIGNIFICANT CHANGE UP
SAO2 % BLDV: 88 % — SIGNIFICANT CHANGE UP (ref 67–88)
SODIUM SERPL-SCNC: 140 MMOL/L — SIGNIFICANT CHANGE UP (ref 135–145)
SODIUM SERPL-SCNC: 150 MMOL/L — HIGH (ref 135–145)
SPECIMEN SOURCE: SIGNIFICANT CHANGE UP
TROPONIN T SERPL-MCNC: 0.11 NG/ML — HIGH (ref 0–0.06)
VANCOMYCIN TROUGH SERPL-MCNC: 19.9 UG/ML — SIGNIFICANT CHANGE UP (ref 10–20)
WBC # BLD: 19.1 K/UL — HIGH (ref 3.8–10.5)
WBC # BLD: 19.5 K/UL — HIGH (ref 3.8–10.5)
WBC # BLD: 26.9 K/UL — HIGH (ref 3.8–10.5)
WBC # FLD AUTO: 19.1 K/UL — HIGH (ref 3.8–10.5)
WBC # FLD AUTO: 19.5 K/UL — HIGH (ref 3.8–10.5)
WBC # FLD AUTO: 26.9 K/UL — HIGH (ref 3.8–10.5)

## 2018-03-31 PROCEDURE — 70450 CT HEAD/BRAIN W/O DYE: CPT | Mod: 26

## 2018-03-31 PROCEDURE — 33952 ECMO/ECLS INSJ PRPH CANNULA: CPT

## 2018-03-31 PROCEDURE — 99291 CRITICAL CARE FIRST HOUR: CPT

## 2018-03-31 PROCEDURE — 99292 CRITICAL CARE ADDL 30 MIN: CPT

## 2018-03-31 PROCEDURE — 99222 1ST HOSP IP/OBS MODERATE 55: CPT

## 2018-03-31 PROCEDURE — 33949 ECMO/ECLS DAILY MGMT ARTERY: CPT

## 2018-03-31 PROCEDURE — 71045 X-RAY EXAM CHEST 1 VIEW: CPT | Mod: 26,77

## 2018-03-31 PROCEDURE — 71045 X-RAY EXAM CHEST 1 VIEW: CPT | Mod: 26

## 2018-03-31 RX ORDER — NOREPINEPHRINE BITARTRATE/D5W 8 MG/250ML
0.2 PLASTIC BAG, INJECTION (ML) INTRAVENOUS
Qty: 16 | Refills: 0 | Status: DISCONTINUED | OUTPATIENT
Start: 2018-03-31 | End: 2018-04-04

## 2018-03-31 RX ORDER — EPINEPHRINE 0.3 MG/.3ML
0.1 INJECTION INTRAMUSCULAR; SUBCUTANEOUS
Qty: 4 | Refills: 0 | Status: DISCONTINUED | OUTPATIENT
Start: 2018-03-31 | End: 2018-03-31

## 2018-03-31 RX ORDER — SODIUM BICARBONATE 1 MEQ/ML
100 SYRINGE (ML) INTRAVENOUS ONCE
Qty: 0 | Refills: 0 | Status: COMPLETED | OUTPATIENT
Start: 2018-03-31 | End: 2018-03-31

## 2018-03-31 RX ORDER — CALCIUM GLUCONATE 100 MG/ML
1 VIAL (ML) INTRAVENOUS ONCE
Qty: 0 | Refills: 0 | Status: COMPLETED | OUTPATIENT
Start: 2018-03-31 | End: 2018-03-31

## 2018-03-31 RX ORDER — SODIUM BICARBONATE 1 MEQ/ML
50 SYRINGE (ML) INTRAVENOUS
Qty: 0 | Refills: 0 | Status: COMPLETED | OUTPATIENT
Start: 2018-03-31 | End: 2018-03-31

## 2018-03-31 RX ORDER — VANCOMYCIN HCL 1 G
1000 VIAL (EA) INTRAVENOUS ONCE
Qty: 0 | Refills: 0 | Status: COMPLETED | OUTPATIENT
Start: 2018-03-31 | End: 2018-03-31

## 2018-03-31 RX ORDER — CALCIUM CHLORIDE
1000 POWDER (GRAM) MISCELLANEOUS ONCE
Qty: 0 | Refills: 0 | Status: COMPLETED | OUTPATIENT
Start: 2018-03-31 | End: 2018-03-31

## 2018-03-31 RX ORDER — DOBUTAMINE HCL 250MG/20ML
5 VIAL (ML) INTRAVENOUS
Qty: 1000 | Refills: 0 | Status: DISCONTINUED | OUTPATIENT
Start: 2018-03-31 | End: 2018-03-31

## 2018-03-31 RX ORDER — MEROPENEM 1 G/30ML
500 INJECTION INTRAVENOUS EVERY 8 HOURS
Qty: 0 | Refills: 0 | Status: DISCONTINUED | OUTPATIENT
Start: 2018-03-31 | End: 2018-04-04

## 2018-03-31 RX ORDER — MEROPENEM 1 G/30ML
500 INJECTION INTRAVENOUS ONCE
Qty: 0 | Refills: 0 | Status: COMPLETED | OUTPATIENT
Start: 2018-03-31 | End: 2018-03-31

## 2018-03-31 RX ORDER — MEROPENEM 1 G/30ML
INJECTION INTRAVENOUS
Qty: 0 | Refills: 0 | Status: DISCONTINUED | OUTPATIENT
Start: 2018-03-31 | End: 2018-04-04

## 2018-03-31 RX ORDER — ALBUMIN HUMAN 25 %
250 VIAL (ML) INTRAVENOUS ONCE
Qty: 0 | Refills: 0 | Status: COMPLETED | OUTPATIENT
Start: 2018-03-31 | End: 2018-03-31

## 2018-03-31 RX ORDER — FENTANYL CITRATE 50 UG/ML
50 INJECTION INTRAVENOUS ONCE
Qty: 0 | Refills: 0 | Status: DISCONTINUED | OUTPATIENT
Start: 2018-03-31 | End: 2018-03-31

## 2018-03-31 RX ORDER — METOPROLOL TARTRATE 50 MG
1.5 TABLET ORAL
Qty: 0 | Refills: 0 | COMMUNITY

## 2018-03-31 RX ORDER — PANTOPRAZOLE SODIUM 20 MG/1
40 TABLET, DELAYED RELEASE ORAL EVERY 12 HOURS
Qty: 0 | Refills: 0 | Status: DISCONTINUED | OUTPATIENT
Start: 2018-03-31 | End: 2018-04-04

## 2018-03-31 RX ORDER — FUROSEMIDE 40 MG
1 TABLET ORAL
Qty: 0 | Refills: 0 | COMMUNITY

## 2018-03-31 RX ORDER — PHENYLEPHRINE HYDROCHLORIDE 10 MG/ML
2 INJECTION INTRAVENOUS
Qty: 160 | Refills: 0 | Status: DISCONTINUED | OUTPATIENT
Start: 2018-03-31 | End: 2018-04-01

## 2018-03-31 RX ORDER — VASOPRESSIN 20 [USP'U]/ML
0.1 INJECTION INTRAVENOUS
Qty: 100 | Refills: 0 | Status: DISCONTINUED | OUTPATIENT
Start: 2018-03-31 | End: 2018-04-04

## 2018-03-31 RX ORDER — NOREPINEPHRINE BITARTRATE/D5W 8 MG/250ML
1.06 PLASTIC BAG, INJECTION (ML) INTRAVENOUS
Qty: 8 | Refills: 0 | Status: DISCONTINUED | OUTPATIENT
Start: 2018-03-31 | End: 2018-03-31

## 2018-03-31 RX ORDER — PHENYLEPHRINE HYDROCHLORIDE 10 MG/ML
5 INJECTION INTRAVENOUS
Qty: 80 | Refills: 0 | Status: DISCONTINUED | OUTPATIENT
Start: 2018-03-31 | End: 2018-03-31

## 2018-03-31 RX ORDER — SODIUM CHLORIDE 9 MG/ML
1000 INJECTION, SOLUTION INTRAVENOUS
Qty: 0 | Refills: 0 | Status: DISCONTINUED | OUTPATIENT
Start: 2018-03-31 | End: 2018-03-31

## 2018-03-31 RX ORDER — DOBUTAMINE HCL 250MG/20ML
10 VIAL (ML) INTRAVENOUS
Qty: 500 | Refills: 0 | Status: DISCONTINUED | OUTPATIENT
Start: 2018-03-31 | End: 2018-03-31

## 2018-03-31 RX ORDER — HEPARIN SODIUM 5000 [USP'U]/ML
500 INJECTION INTRAVENOUS; SUBCUTANEOUS
Qty: 25000 | Refills: 0 | Status: DISCONTINUED | OUTPATIENT
Start: 2018-03-31 | End: 2018-04-01

## 2018-03-31 RX ORDER — AMIODARONE HYDROCHLORIDE 400 MG/1
0.5 TABLET ORAL
Qty: 900 | Refills: 0 | Status: DISCONTINUED | OUTPATIENT
Start: 2018-03-31 | End: 2018-04-01

## 2018-03-31 RX ADMIN — Medication 1000 MILLIGRAM(S): at 15:52

## 2018-03-31 RX ADMIN — Medication 50 MILLIEQUIVALENT(S): at 10:52

## 2018-03-31 RX ADMIN — AMIODARONE HYDROCHLORIDE 33.33 MG/MIN: 400 TABLET ORAL at 21:53

## 2018-03-31 RX ADMIN — Medication 50 MILLIEQUIVALENT(S): at 08:38

## 2018-03-31 RX ADMIN — Medication 125 MILLILITER(S): at 03:00

## 2018-03-31 RX ADMIN — FENTANYL CITRATE 50 MICROGRAM(S): 50 INJECTION INTRAVENOUS at 23:20

## 2018-03-31 RX ADMIN — VASOPRESSIN 6 UNIT(S)/MIN: 20 INJECTION INTRAVENOUS at 21:53

## 2018-03-31 RX ADMIN — MEROPENEM 100 MILLIGRAM(S): 1 INJECTION INTRAVENOUS at 15:52

## 2018-03-31 RX ADMIN — MEROPENEM 100 MILLIGRAM(S): 1 INJECTION INTRAVENOUS at 09:00

## 2018-03-31 RX ADMIN — Medication 100 MILLIEQUIVALENT(S): at 02:00

## 2018-03-31 RX ADMIN — Medication 1000 MILLIGRAM(S): at 06:04

## 2018-03-31 RX ADMIN — Medication 6.8 MICROGRAM(S)/KG/MIN: at 21:53

## 2018-03-31 RX ADMIN — PHENYLEPHRINE HYDROCHLORIDE 34.01 MICROGRAM(S)/KG/MIN: 10 INJECTION INTRAVENOUS at 21:52

## 2018-03-31 RX ADMIN — MEROPENEM 100 MILLIGRAM(S): 1 INJECTION INTRAVENOUS at 21:51

## 2018-03-31 RX ADMIN — Medication 2 MILLIGRAM(S): at 13:00

## 2018-03-31 RX ADMIN — Medication 250 MILLIGRAM(S): at 03:00

## 2018-03-31 RX ADMIN — Medication 50 MILLIEQUIVALENT(S): at 08:18

## 2018-03-31 RX ADMIN — Medication 50 MILLIEQUIVALENT(S): at 14:48

## 2018-03-31 RX ADMIN — PANTOPRAZOLE SODIUM 40 MILLIGRAM(S): 20 TABLET, DELAYED RELEASE ORAL at 06:05

## 2018-03-31 RX ADMIN — Medication 125 MILLILITER(S): at 06:08

## 2018-03-31 RX ADMIN — FENTANYL CITRATE 50 MICROGRAM(S): 50 INJECTION INTRAVENOUS at 23:35

## 2018-03-31 RX ADMIN — Medication 1000 MILLIGRAM(S): at 19:04

## 2018-03-31 RX ADMIN — HEPARIN SODIUM 6 UNIT(S)/HR: 5000 INJECTION INTRAVENOUS; SUBCUTANEOUS at 21:53

## 2018-03-31 RX ADMIN — Medication 200 GRAM(S): at 23:24

## 2018-03-31 RX ADMIN — Medication 50 MILLIEQUIVALENT(S): at 14:43

## 2018-03-31 RX ADMIN — Medication 200 GRAM(S): at 02:00

## 2018-03-31 RX ADMIN — Medication 50 MILLIEQUIVALENT(S): at 10:39

## 2018-03-31 RX ADMIN — Medication 50 MILLIEQUIVALENT(S): at 08:28

## 2018-03-31 RX ADMIN — Medication 17.01 MICROGRAM(S)/KG/MIN: at 21:53

## 2018-03-31 RX ADMIN — Medication 100 MILLIEQUIVALENT(S): at 06:07

## 2018-03-31 RX ADMIN — Medication 200 GRAM(S): at 03:00

## 2018-03-31 RX ADMIN — PANTOPRAZOLE SODIUM 40 MILLIGRAM(S): 20 TABLET, DELAYED RELEASE ORAL at 18:11

## 2018-03-31 RX ADMIN — Medication 125 MILLILITER(S): at 03:30

## 2018-03-31 NOTE — PROVIDER CONTACT NOTE (OTHER) - ASSESSMENT
Pt appears to be having seizure like activity twitching of face and upper extremities. Pt on high doses of levo, vaso, epi, nikunj,

## 2018-03-31 NOTE — PROGRESS NOTE ADULT - SUBJECTIVE AND OBJECTIVE BOX
ADRIÁN YOO  MRN#:  [id number]    The patient is a 58y Female PMH significant for HTN, hyperlipidemia, NICM, VT on amiodarone, HFrEF, BiV-AICD,  diabetes, cellulitis, severe pulmonary HTN, and prior KEYSHA admitted to CTU after recent admission for exacerbation of CHF with subsequent intubation and shock felt to be cardiogenic as well as possibly infectious, s/p IABP and VA ECMO who was seen, evaluated, & examined with the CTICU staff on admission and during the early morning hours with a multidisciplinary care plan formulated & implemented.  All available clinical, laboratory, radiographic, pharmacologic, and electrocardiographic data were reviewed & analyzed.      The patient was in the CTICU in critical condition secondary to persistent cardiopulmonary dysfunction, hemodynamically significant anemia/hypovolemia-shock, cardiogenic and possibly septic shock, sever lactic acidosis,  & hyperglycemia.      Respiratory status required full ventilatory support, VA ECMO the following of ABG’s with A-line monitoring, and continuous pulse oximetry monitoring for support & to evaluate for & prevent further decompensation secondary to persistent cardiopulmonary dysfunction.     Invasive hemodynamic monitoring with a central venous and arterial catheter were required for the following of continuous central venous and MAP/BP monitoring to ensure adequate cardiovascular support and to evaluate for & help prevent decompensation while receiving intermittent volume expansion, blood transfusions, an IV Levophed drip, an IV Vasopressin drip, an IV Epinephrine drip, and and IV Dobutamine drip secondary to hemodynamically significant anemia/hypovolemia-shock, and cardiogenic shock.    Patient developed LE ischemia of the left leg where she has an ECMO cannula. An arterial catheter placed and spliced into the ECMO circuit in order to improve perfusion to the leg.    One set of blood cultures from the transferring institution was positive for Gram positive cocci in clusters. IV Vancomycin ordered.    Metabolic stability, uncontrolled type 2 diabetes-hyperglycemia, & infection prophylaxis required an IV regular Insulin drip & the following of serial glucose levels to help achieve & maintain euglycemia.      Patient required acute critical care management and I provided 80 minutes of non-continuous care to the patient.  Discussed at length with the CTICU staff and helped coordinate care. ADRIÁN YOO  MRN#:  [id number]    The patient is a 58y Female PMH significant for HTN, hyperlipidemia, NICM, VT on amiodarone, HFrEF, BiV-AICD,  diabetes, cellulitis, severe pulmonary HTN, and prior KEYSHA admitted to CTU after recent admission for exacerbation of CHF with subsequent intubation and shock felt to be cardiogenic as well as possibly infectious, s/p IABP and VA ECMO who was seen, evaluated, & examined with the CTICU staff on admission and during the early morning hours with a multidisciplinary care plan formulated & implemented.  All available clinical, laboratory, radiographic, pharmacologic, and electrocardiographic data were reviewed & analyzed.      The patient was in the CTICU in critical condition secondary to persistent cardiopulmonary dysfunction, hemodynamically significant anemia/hypovolemia-shock, cardiogenic and possibly septic shock, sever lactic acidosis,  & hyperglycemia.      Respiratory status required full ventilatory support, VA ECMO and inhaled Nitric oxide the following of ABG’s with A-line monitoring, and continuous pulse oximetry monitoring for support & to evaluate for & prevent further decompensation secondary to persistent cardiopulmonary dysfunction.     Invasive hemodynamic monitoring with a central venous and arterial catheter were required for the following of continuous central venous and MAP/BP monitoring to ensure adequate cardiovascular support and to evaluate for & help prevent decompensation while receiving intermittent volume expansion, blood transfusions, an IV Levophed drip, an IV Vasopressin drip, an IV Epinephrine drip, an IV Dobutamine drip and an IV Amiodarone drip secondary to hemodynamically significant anemia/hypovolemia-shock, and cardiogenic shock.    Patient developed LE ischemia of the left leg where she has an ECMO cannula. An arterial catheter placed and spliced into the ECMO circuit in order to improve perfusion to the leg.    One set of blood cultures from the transferring institution was positive for Gram positive cocci in clusters. IV Vancomycin ordered.    Metabolic stability, uncontrolled type 2 diabetes-hyperglycemia, & infection prophylaxis required an IV regular Insulin drip & the following of serial glucose levels to help achieve & maintain euglycemia.      Patient required acute critical care management and I provided 80 minutes of non-continuous care to the patient.  Discussed at length with the CTICU staff and helped coordinate care.

## 2018-03-31 NOTE — PROGRESS NOTE ADULT - SUBJECTIVE AND OBJECTIVE BOX
ADRIÁN YOO  MRN#:  [id number]    The patient is a 58y Female PMH significant for HTN, hyperlipidemia, NICM, VT on amiodarone, HFrEF, BiV-AICD,  diabetes, cellulitis, severe pulmonary HTN, and prior KEYSHA admitted to CTU after recent admission for exacerbation of CHF with subsequent intubation and shock felt to be cardiogenic as well as possibly infectious, s/p IABP and VA ECMO who was seen, evaluated, & examined with the CTICU staff on admission and during the early morning hours with a multidisciplinary care plan formulated & implemented.  All available clinical, laboratory, radiographic, pharmacologic, and electrocardiographic data were reviewed & analyzed.      The patient was in the CTICU in critical condition secondary to persistent cardiopulmonary dysfunction, hemodynamically significant anemia/hypovolemia-shock, cardiogenic and possibly septic shock, sever lactic acidosis,  & hyperglycemia.      Respiratory status required full ventilatory support, VA ECMO and inhaled Nitric oxide the following of ABG’s with A-line monitoring, and continuous pulse oximetry monitoring for support & to evaluate for & prevent further decompensation secondary to persistent cardiopulmonary dysfunction.     Invasive hemodynamic monitoring with a central venous and arterial catheter were required for the following of continuous central venous and MAP/BP monitoring to ensure adequate cardiovascular support and to evaluate for & help prevent decompensation while receiving intermittent volume expansion, blood transfusions, an IV Levophed drip, an IV Vasopressin drip, an IV Epinephrine drip, an IV Dobutamine drip and an IV Amiodarone drip secondary to hemodynamically significant anemia/hypovolemia-shock, and cardiogenic shock.    Patient developed LE ischemia of the left leg where she has an ECMO cannula. An arterial catheter placed and spliced into the ECMO circuit in order to improve perfusion to the leg.    One set of blood cultures from the transferring institution was positive for Gram positive cocci in clusters. IV Vancomycin ordered.    Metabolic stability, uncontrolled type 2 diabetes-hyperglycemia, & infection prophylaxis required an IV regular Insulin drip & the following of serial glucose levels to help achieve & maintain euglycemia.      Patient required acute critical care management and I provided 85 minutes of non-continuous care to the patient.  Discussed at length with the CTICU staff and helped coordinate care.

## 2018-03-31 NOTE — CONSULT NOTE ADULT - SUBJECTIVE AND OBJECTIVE BOX
Vascular Surgery Consult  Consulting surgical team: Vascular Surgery [id number]  Consulting attending: Dr. Pelaez    HPI:  57F, with multiple hospital admissions in the past year and PMH significant for HTN, hyperlipidemia, asthma, NICM, VT on amiodarone, HFrEF, BiV-AICD,  diabetes, influenza, cellulitis, severe pulmonary HTN, and KEYSHA on last admission in which diuretics and Entresto were held, referred to [facility] ER on  with 11 pound weight gain, persistent KHAN, and orthopnea.  Family endorsed dietary indiscretions with patient non-compliant with sodium restriction.  Outside hospital course significant for +Urine cx colonized with VRE, recurrent VT, KEYSHA, and worsening heart failure weaned off Dobutamine due to dysrhythmia. (31 Mar 2018 03:43)      PAST MEDICAL HISTORY:  Cellulitis  KEYSHA (acute kidney injury)  CHF (congestive heart failure)  Pacemaker  HTN (hypertension)  HLD (hyperlipidemia)  Asthma  DM (diabetes mellitus)  CAD (coronary artery disease)    PAST SURGICAL HISTORY:  History of cholecystectomy  Artificial cardiac pacemaker    MEDICATIONS:  amiodarone Infusion 1 mG/Min IV Continuous <Continuous>  dextrose 5% 1000 milliLiter(s) IV Continuous <Continuous>  DOBUTamine Infusion 5 MICROgram(s)/kG/Min IV Continuous <Continuous>  Epinephrine 16 milliGRAM(s),Sodium Chloride 0.9% 250 milliLiter(s) 16 milliGRAM(s) IV Continuous <Continuous>  meropenem  IVPB 500 milliGRAM(s) IV Intermittent once  meropenem  IVPB 500 milliGRAM(s) IV Intermittent every 8 hours  meropenem  IVPB      norepinephrine Infusion 0.2 MICROgram(s)/kG/Min IV Continuous <Continuous>  pantoprazole  Injectable 40 milliGRAM(s) IV Push every 12 hours  phenylephrine    Infusion 2 MICROgram(s)/kG/Min IV Continuous <Continuous>  vasopressin Infusion 0.1 Unit(s)/Min IV Continuous <Continuous>    ALLERGIES:  No Known Allergies    VITALS & I/Os:  Vital Signs Last 24 Hrs  T(C): 35.6 (31 Mar 2018 08:00), Max: 38.6 (30 Mar 2018 15:00)  T(F): 96.1 (31 Mar 2018 08:00), Max: 101.5 (30 Mar 2018 15:00)  HR: 92 (31 Mar 2018 08:30) (71 - 106)  BP: 117/66 (30 Mar 2018 22:00) (96/50 - 153/92)  BP(mean): 79 (30 Mar 2018 22:00) (56 - 108)  RR: 20 (31 Mar 2018 08:30) (9 - 51)  SpO2: 91% (31 Mar 2018 08:30) (68% - 100%)  Mode: AC/ CMV (Assist Control/ Continuous Mandatory Ventilation)  RR (machine): 16  TV (machine): 400  FiO2: 60  PEEP: 5  ITime: 1  MAP: 10  PIP: 38    I&O's Summary    30 Mar 2018 07:  -  31 Mar 2018 07:00  --------------------------------------------------------  IN: 4715.8 mL / OUT: 730 mL / NET: 3985.8 mL    31 Mar 2018 07:  -  31 Mar 2018 11:10  --------------------------------------------------------  IN: 1005.9 mL / OUT: 60 mL / NET: 945.9 mL        PHYSICAL EXAM:  General: On ECMO, intubated  Respiratory: intubated  Extremities: left lower extremity cyanotic decreased capillary     LABS:                        9.9    26.9  )-----------( 212      ( 31 Mar 2018 01:41 )             34.2     31    140  |  91<L>  |  58<H>  ----------------------------<  65<L>  5.5<H>   |  5<LL>  |  2.44<H>    Ca    7.5<L>      31 Mar 2018 01:41  Phos  7.5       Mg     2.3         TPro  5.4<L>  /  Alb  2.1<L>  /  TBili  4.3<H>  /  DBili  x   /  AST  2595<H>  /  ALT  821<H>  /  AlkPhos  171<H>      Lactate: Lactate, Blood: 15.7 mmol/L ( @ 15:00)     PT/INR - ( 31 Mar 2018 07:17 )   PT: 29.0 sec;   INR: 2.61 ratio         PTT - ( 31 Mar 2018 07:17 )  PTT:51.3 sec  ABG - ( 31 Mar 2018 10:33 )  pH: 7.22  /  pCO2: 40    /  pO2: 93    / HCO3: 16    / Base Excess: -10.7 /  SaO2: 96        CARDIAC MARKERS ( 31 Mar 2018 01:41 )  x     / x     / 103 U/L / x     / x      CARDIAC MARKERS ( 30 Mar 2018 15:00 )  0.274 ng/mL / x     / x     / x     / x           @ 11:00  A POS    Urinalysis Basic - ( 30 Mar 2018 17:20 )    Color: Kate / Appearance: very cloudy / S.010 / pH: x  Gluc: x / Ketone: Trace  / Bili: Negative / Urobili: 4 mg/dL   Blood: x / Protein: 100 mg/dL / Nitrite: Negative   Leuk Esterase: Trace / RBC: 0-2 /HPF / WBC 6-10   Sq Epi: x / Non Sq Epi: Few / Bacteria: Few        IMAGING:

## 2018-03-31 NOTE — H&P ADULT - PMH
KEYSHA (acute kidney injury)    Asthma    CAD (coronary artery disease)    Cellulitis    CHF (congestive heart failure)    DM (diabetes mellitus)    HLD (hyperlipidemia)    HTN (hypertension)    Pacemaker

## 2018-03-31 NOTE — H&P ADULT - NSHPSOCIALHISTORY_GEN_ALL_CORE
SOCIAL HISTORY:  Smoker: [ ] Yes  [ ] No        PACK YEARS:          Quit date:  ETOH use: [ ] Yes  [ ] No              FREQUENCY / QUANTITY:  Ilicit Drug use:  [ ] Yes  [ ] No  Occupation:   Living arrangements:   Assist device use:     Relevant Family History  FAMILY HISTORY:  Family history of heart disease:

## 2018-03-31 NOTE — H&P ADULT - NSHPPHYSICALEXAM_GEN_ALL_CORE
General: intubated  Head:  Normocephalic, atraumatic  ENT:  Mucosa moist, no ulcerations  Neck:  Supple, no sinuses or palpable masses  Lymphatic:  No palpable cervical, supraclavicular, axillary or inguinal adenopathy  Respiratory: CTA B/L  CV: RRR, S1S2, no murmur  Abdominal: Soft, rare bowel tones  Incisions:  R groin General: intubated/sedated  Head:  Normocephalic, atraumatic  ENT:  Mucosa moist, no ulcerations  Respiratory:   CV: S1S2  Abdominal: Obese  Incisions:  R groin General: intubated/sedated  Head:  Normocephalic, atraumatic  ENT:  Mucosa moist, no ulcerations  Respiratory: intubated, yellow secretions suctioned from ETT  CV: S1S2  Abdominal: Obese, soft, hypoactive bowel tones x 4  Incisions:  R groin ECMO access/[facility]

## 2018-03-31 NOTE — H&P ADULT - NSHPREVIEWOFSYSTEMS_GEN_ALL_CORE
Review of Systems: Unable to assess patient intubated/sedated  GENERAL:  Fevers[] chills[] sweats[] fatigue[] weight loss[] weight gain []                                        NEURO:  seizures []  syncope []  confusion []                                                                                  EYES: glasses[]  blurry vision[]  discharge[] pain[] glaucoma []                                                                            ENT:  difficulty hearing []  vertigo[]  dysphagia[] epistaxis[] recent dental work []                                      CV:  chest pain[] palpitations[] KHAN [] diaphoresis [] edema[]                                                                                             RESPIRATORY:  wheezing[] SOB[] cough [] sputum[] hemoptysis[]                                                                    GI:  nausea[]  vomiting []  diarrhea[] constipation [] melena []                                                                        : hematuria[ ]  dysuria[ ] urgency[] incontinence[]                                                                                              MUSCULOSKELETAL:  arthritis[ ]  joint swelling [ ] muscle weakness [ ]                                                                  SKIN/BREAST:  rash[ ] itching [ ]  hair loss[ ] masses[ ]                                                                                                PSYCH:  dementia [ ] depression [ ] anxiety[ ]                                                                                                                  HEME/LYMPH:  bruises easily[ ] enlarged lymph nodes[ ] tender lymph nodes[ ]                                                 ENDOCRINE:  cold intolerance[ ] heat intolerance[ ] polydipsia[ ]

## 2018-03-31 NOTE — H&P ADULT - HISTORY OF PRESENT ILLNESS
57F, , with multiple hospital admissions in the past year and PMH significant for HTN, hyperlipidemia, asthma, NICM, VT on amiodarone, HFrEF, BiV-AICD,  diabetes, influenza, cellulitis, and KEYSHA on last admission in which diuretics and Entresto were held, referred to [facility] ER on March 19th with 11 pound weight gain, persistent KHAN, and orthopnea.  Family endorsed dietary indiscretions with patient non-compliant with sodium restriction.  Outside hospital course significant for recurrent VT, KEYSHA, and worsening heart failure.  Transferred to [facility] for continued management 57F, , with multiple hospital admissions in the past year and PMH significant for HTN, hyperlipidemia, asthma, NICM, VT on amiodarone, HFrEF, BiV-AICD,  diabetes, influenza, cellulitis, severe pulmonary HTN, and KEYSHA on last admission in which diuretics and Entresto were held, referred to [facility] ER on March 19th with 11 pound weight gain, persistent KHAN, and orthopnea.  Family endorsed dietary indiscretions with patient non-compliant with sodium restriction.  Outside hospital course significant for +Urine cx colonized with VRE, recurrent VT, KEYSHA, and worsening heart failure weaned off Dobutamine due to dysrhythmia.

## 2018-03-31 NOTE — H&P ADULT - ATTENDING COMMENTS
Patient in severe cardiogenic shock. A previous hospital there was a question about the neurological function for the last 12-16 hours. She was on wide open pressure medications and intra-aortic balloon pump with persistent lactic acid elevation, and liver failure, and renal failure, as well as no evidence of neurological function over the last several hours. After a discussion with the  and the referring attending, a decision was made to place the patient on ECMO for transfer to [facility], for further evaluation. The  understands the risks of the transfer. Thank you for the opportunity to participate in the care of your patient.

## 2018-03-31 NOTE — H&P ADULT - ASSESSMENT
57F, , with multiple hospital admissions in the past year and PMH significant for HTN, hyperlipidemia, asthma, NICM, VT on amiodarone, HFrEF, BiV-AICD,  diabetes, influenza, cellulitis, and KEYSHA on last admission in which diuretics and Entresto were held, referred to [facility] ER on March 19th with 11 pound weight gain, persistent KHAN, and orthopnea.  Family endorsed dietary indiscretions with patient non-compliant with sodium restriction.  Outside hospital course significant for recurrent VT, KEYSHA, and worsening heart failure.  Transferred to [facility] for further management and placement of V-A ECMO 57F, , with multiple hospital admissions in the past year and PMH significant for HTN, hyperlipidemia, asthma, NICM, VT on amiodarone, HFrEF, BiV-AICD,  diabetes, influenza, cellulitis, and KEYSHA on last admission in which diuretics and Entresto were held, admitted to  [facility] ER on March 19th with Acute on chronic heart failure exacerbation.  She decompensated on 3/29 with episodes of NSVT and was emergently intubated on 3/30 and underwent emergent heart cath with IABP on 3/30.  She was hypotensive despite 3 pressors and intra-aortic balloon pump, ECMO was facilitated and she was transferred to [facility] for further management. 57F, , with multiple hospital admissions in the past year and PMH significant for HTN, hyperlipidemia, asthma, NICM, VT on amiodarone, HFrEF, BiV-AICD,  diabetes, influenza, cellulitis, and KEYSHA on last admission in which diuretics and Entresto were held, admitted to  [facility] ER on March 19th with Acute on chronic heart failure exacerbation.  She decompensated on 3/29 with episodes of NSVT and was emergently intubated on 3/30 and  heart cath with IABP on 3/30.  She was hypotensive despite 3 pressors and intra-aortic balloon pump, ECMO was facilitated and she was transferred to [facility] for further management.

## 2018-03-31 NOTE — CHART NOTE - NSCHARTNOTEFT_GEN_A_CORE
Asked by CTU to place an antegrade sheath for a cold, mottled and hypoperfused LLE (site of arterial ECMO cannula).  Patient deemed critical ill with worsening acidosis (rising lactate).  The patient was prepped and draped in the usual sterile fashion.  Under ultrasound guidance the left femoral artery was identified and cannulated using the modified Seldinger technique.  A 7Fr sheath introducer was used and the line was attached to a pressure line.  Confirmation by ABG was obtained.  The line was sutured into place.  Care per CTU. No complications, EBL 20 cc.

## 2018-03-31 NOTE — CONSULT NOTE ADULT - ASSESSMENT
59 yo female requiring VA ECMO with placement of distal arterial catheter to the left lower extremity.   -If left lower extremity does not improve, may be due to placement of arterial catheter in venous site.   -Due to patients current status feel that her leg is not urgent would wait to address after neurologic status and metabolic acidosis.

## 2018-04-01 DIAGNOSIS — N17.9 ACUTE KIDNEY FAILURE, UNSPECIFIED: ICD-10-CM

## 2018-04-01 LAB
-  AMPICILLIN/SULBACTAM: SIGNIFICANT CHANGE UP
-  CEFAZOLIN: SIGNIFICANT CHANGE UP
-  CIPROFLOXACIN: SIGNIFICANT CHANGE UP
-  CLINDAMYCIN: SIGNIFICANT CHANGE UP
-  ERYTHROMYCIN: SIGNIFICANT CHANGE UP
-  GENTAMICIN: SIGNIFICANT CHANGE UP
-  LEVOFLOXACIN: SIGNIFICANT CHANGE UP
-  MOXIFLOXACIN(AEROBIC): SIGNIFICANT CHANGE UP
-  OXACILLIN: SIGNIFICANT CHANGE UP
-  PENICILLIN: SIGNIFICANT CHANGE UP
-  RIFAMPIN: SIGNIFICANT CHANGE UP
-  TETRACYCLINE: SIGNIFICANT CHANGE UP
-  TRIMETHOPRIM/SULFAMETHOXAZOLE: SIGNIFICANT CHANGE UP
-  VANCOMYCIN: SIGNIFICANT CHANGE UP
ALBUMIN SERPL ELPH-MCNC: 1.7 G/DL — LOW (ref 3.3–5)
ALP SERPL-CCNC: 171 U/L — HIGH (ref 40–120)
ALT FLD-CCNC: 2956 U/L RC — HIGH (ref 10–45)
ANION GAP SERPL CALC-SCNC: 21 MMOL/L — HIGH (ref 5–17)
APTT BLD: 45.9 SEC — HIGH (ref 27.5–37.4)
AST SERPL-CCNC: >7000 U/L — HIGH (ref 10–40)
BASE EXCESS BLDV CALC-SCNC: -2 MMOL/L — SIGNIFICANT CHANGE UP (ref -2–2)
BILIRUB SERPL-MCNC: 12.6 MG/DL — HIGH (ref 0.2–1.2)
BUN SERPL-MCNC: 44 MG/DL — HIGH (ref 7–23)
CALCIUM SERPL-MCNC: 7.7 MG/DL — LOW (ref 8.4–10.5)
CHLORIDE SERPL-SCNC: 103 MMOL/L — SIGNIFICANT CHANGE UP (ref 96–108)
CK SERPL-CCNC: 5201 U/L — HIGH (ref 25–170)
CK SERPL-CCNC: 5276 U/L — HIGH (ref 25–170)
CK SERPL-CCNC: 5533 U/L — HIGH (ref 25–170)
CO2 BLDV-SCNC: 24 MMOL/L — SIGNIFICANT CHANGE UP (ref 22–30)
CO2 SERPL-SCNC: 20 MMOL/L — LOW (ref 22–31)
CREAT SERPL-MCNC: 1.79 MG/DL — HIGH (ref 0.5–1.3)
CULTURE RESULTS: NO GROWTH — SIGNIFICANT CHANGE UP
CULTURE RESULTS: SIGNIFICANT CHANGE UP
GAS PNL BLDA: SIGNIFICANT CHANGE UP
GAS PNL BLDV: SIGNIFICANT CHANGE UP
GLUCOSE SERPL-MCNC: 58 MG/DL — LOW (ref 70–99)
HCO3 BLDV-SCNC: 23 MMOL/L — SIGNIFICANT CHANGE UP (ref 21–29)
INR BLD: 5.52 RATIO — CRITICAL HIGH (ref 0.88–1.16)
METHOD TYPE: SIGNIFICANT CHANGE UP
ORGANISM # SPEC MICROSCOPIC CNT: SIGNIFICANT CHANGE UP
PCO2 BLDV: 43 MMHG — SIGNIFICANT CHANGE UP (ref 35–50)
PH BLDV: 7.34 — LOW (ref 7.35–7.45)
PO2 BLDV: 58 MMHG — HIGH (ref 25–45)
POTASSIUM SERPL-MCNC: 4.8 MMOL/L — SIGNIFICANT CHANGE UP (ref 3.5–5.3)
POTASSIUM SERPL-SCNC: 4.8 MMOL/L — SIGNIFICANT CHANGE UP (ref 3.5–5.3)
PROT SERPL-MCNC: 4 G/DL — LOW (ref 6–8.3)
PROTHROM AB SERPL-ACNC: 61.7 SEC — HIGH (ref 9.8–12.7)
SAO2 % BLDV: 84 % — SIGNIFICANT CHANGE UP (ref 67–88)
SODIUM SERPL-SCNC: 144 MMOL/L — SIGNIFICANT CHANGE UP (ref 135–145)
SPECIMEN SOURCE: SIGNIFICANT CHANGE UP
SPECIMEN SOURCE: SIGNIFICANT CHANGE UP
VANCOMYCIN TROUGH SERPL-MCNC: 19.5 UG/ML — SIGNIFICANT CHANGE UP (ref 10–20)

## 2018-04-01 PROCEDURE — 99291 CRITICAL CARE FIRST HOUR: CPT

## 2018-04-01 PROCEDURE — 93306 TTE W/DOPPLER COMPLETE: CPT | Mod: 26

## 2018-04-01 PROCEDURE — 99255 IP/OBS CONSLTJ NEW/EST HI 80: CPT

## 2018-04-01 PROCEDURE — 99223 1ST HOSP IP/OBS HIGH 75: CPT | Mod: GC

## 2018-04-01 RX ORDER — DEXTROSE 50 % IN WATER 50 %
50 SYRINGE (ML) INTRAVENOUS ONCE
Qty: 0 | Refills: 0 | Status: COMPLETED | OUTPATIENT
Start: 2018-04-01 | End: 2018-04-01

## 2018-04-01 RX ORDER — CALCIUM GLUCONATE 100 MG/ML
1 VIAL (ML) INTRAVENOUS ONCE
Qty: 0 | Refills: 0 | Status: COMPLETED | OUTPATIENT
Start: 2018-04-01 | End: 2018-04-01

## 2018-04-01 RX ORDER — DEXTROSE 10 % IN WATER 10 %
1000 INTRAVENOUS SOLUTION INTRAVENOUS
Qty: 0 | Refills: 0 | Status: DISCONTINUED | OUTPATIENT
Start: 2018-04-01 | End: 2018-04-03

## 2018-04-01 RX ORDER — BUMETANIDE 0.25 MG/ML
4 INJECTION INTRAMUSCULAR; INTRAVENOUS ONCE
Qty: 0 | Refills: 0 | Status: COMPLETED | OUTPATIENT
Start: 2018-04-01 | End: 2018-04-01

## 2018-04-01 RX ORDER — FUROSEMIDE 40 MG
100 TABLET ORAL ONCE
Qty: 0 | Refills: 0 | Status: COMPLETED | OUTPATIENT
Start: 2018-04-01 | End: 2018-04-01

## 2018-04-01 RX ORDER — ALBUMIN HUMAN 25 %
250 VIAL (ML) INTRAVENOUS
Qty: 0 | Refills: 0 | Status: COMPLETED | OUTPATIENT
Start: 2018-04-01 | End: 2018-04-01

## 2018-04-01 RX ORDER — BUMETANIDE 0.25 MG/ML
2 INJECTION INTRAMUSCULAR; INTRAVENOUS
Qty: 10 | Refills: 0 | Status: DISCONTINUED | OUTPATIENT
Start: 2018-04-01 | End: 2018-04-01

## 2018-04-01 RX ORDER — FUROSEMIDE 40 MG
100 TABLET ORAL ONCE
Qty: 0 | Refills: 0 | Status: DISCONTINUED | OUTPATIENT
Start: 2018-04-01 | End: 2018-04-01

## 2018-04-01 RX ORDER — VANCOMYCIN HCL 1 G
1000 VIAL (EA) INTRAVENOUS ONCE
Qty: 0 | Refills: 0 | Status: COMPLETED | OUTPATIENT
Start: 2018-04-01 | End: 2018-04-01

## 2018-04-01 RX ORDER — HYDROMORPHONE HYDROCHLORIDE 2 MG/ML
1 INJECTION INTRAMUSCULAR; INTRAVENOUS; SUBCUTANEOUS ONCE
Qty: 0 | Refills: 0 | Status: DISCONTINUED | OUTPATIENT
Start: 2018-04-01 | End: 2018-04-01

## 2018-04-01 RX ADMIN — Medication 1 DROP(S): at 05:36

## 2018-04-01 RX ADMIN — Medication 120 MILLIGRAM(S): at 04:07

## 2018-04-01 RX ADMIN — Medication 250 MILLIGRAM(S): at 03:49

## 2018-04-01 RX ADMIN — Medication 1 DROP(S): at 02:14

## 2018-04-01 RX ADMIN — Medication 1 DROP(S): at 14:41

## 2018-04-01 RX ADMIN — Medication 200 GRAM(S): at 03:23

## 2018-04-01 RX ADMIN — Medication 500 MILLILITER(S): at 21:21

## 2018-04-01 RX ADMIN — Medication 1 DROP(S): at 17:57

## 2018-04-01 RX ADMIN — BUMETANIDE 4 MILLIGRAM(S): 0.25 INJECTION INTRAMUSCULAR; INTRAVENOUS at 11:02

## 2018-04-01 RX ADMIN — Medication 200 GRAM(S): at 03:49

## 2018-04-01 RX ADMIN — HYDROMORPHONE HYDROCHLORIDE 1 MILLIGRAM(S): 2 INJECTION INTRAMUSCULAR; INTRAVENOUS; SUBCUTANEOUS at 02:00

## 2018-04-01 RX ADMIN — MEROPENEM 100 MILLIGRAM(S): 1 INJECTION INTRAVENOUS at 13:57

## 2018-04-01 RX ADMIN — BUMETANIDE 20 MG/HR: 0.25 INJECTION INTRAMUSCULAR; INTRAVENOUS at 11:14

## 2018-04-01 RX ADMIN — Medication 1 DROP(S): at 11:03

## 2018-04-01 RX ADMIN — MEROPENEM 100 MILLIGRAM(S): 1 INJECTION INTRAVENOUS at 05:36

## 2018-04-01 RX ADMIN — Medication 200 GRAM(S): at 06:22

## 2018-04-01 RX ADMIN — MEROPENEM 100 MILLIGRAM(S): 1 INJECTION INTRAVENOUS at 21:25

## 2018-04-01 RX ADMIN — BUMETANIDE 20 MG/HR: 0.25 INJECTION INTRAMUSCULAR; INTRAVENOUS at 14:41

## 2018-04-01 RX ADMIN — Medication 20 MILLILITER(S): at 22:07

## 2018-04-01 RX ADMIN — Medication 500 MILLILITER(S): at 21:22

## 2018-04-01 RX ADMIN — PANTOPRAZOLE SODIUM 40 MILLIGRAM(S): 20 TABLET, DELAYED RELEASE ORAL at 05:36

## 2018-04-01 RX ADMIN — Medication 50 MILLILITER(S): at 22:03

## 2018-04-01 RX ADMIN — PANTOPRAZOLE SODIUM 40 MILLIGRAM(S): 20 TABLET, DELAYED RELEASE ORAL at 17:57

## 2018-04-01 RX ADMIN — HYDROMORPHONE HYDROCHLORIDE 1 MILLIGRAM(S): 2 INJECTION INTRAMUSCULAR; INTRAVENOUS; SUBCUTANEOUS at 02:15

## 2018-04-01 RX ADMIN — Medication 200 GRAM(S): at 22:07

## 2018-04-01 RX ADMIN — Medication 1 DROP(S): at 21:22

## 2018-04-01 NOTE — CONSULT NOTE ADULT - SUBJECTIVE AND OBJECTIVE BOX
Patient is a 58y old  Female who presents with a chief complaint of cardiogenic shock (31 Mar 2018 03:43)    HPI:  59 y/o  F w/ h/o HTN, HL, asthma, NICM (EF 20%) s/p CRT-D, VT (on amio), IDDM, who presented to [facility] 3/19 with weight gain/dyspnea/orthopnea in setting of dietary indiscretion and cessation of Entresto as outpatietn due to KEYSHA. Hospital course c/b VT, UTI, worsening renal failure and was started on dobutamine but subsequently continued to decline. Also noted to have GPC clusters bacteremia. Due to worsening deterioration and multiorgan organ failure, ECMO requested and Dr. White placed ECMO at  and transferred patient over on 3/31 morning. Patient also had IABP placed. Hypotensive requiring large doses of pressors but persistent lactic acidosis with evident mottling of LLE at site of ECMO cannula. Unfortunately no [place] available. No family availabe for collateral information. Initial lactate 24. Had twitching and unknown neurologic status so underwent portable NCHCT which showed no acute process. Patient had retrograde cannula placed for limb perfusion however may be in venous system. Over last 24 hours, pressors weaning down and lactate downtrending however continues to have concerns of critical limb ischemia. Vascular consulted with no plan for intervention at this time.     PAST MEDICAL & SURGICAL HISTORY:  Cellulitis  KEYSHA (acute kidney injury)  CHF (congestive heart failure)  Pacemaker  HTN (hypertension)  HLD (hyperlipidemia)  Asthma  DM (diabetes mellitus)  CAD (coronary artery disease)  History of cholecystectomy  Artificial cardiac pacemaker    FAMILY HISTORY:  Family history of other heart disease (Mother)    Medications:  artificial  tears Solution 1 Drop(s) Both EYES every 4 hours  Epinephrine 16 milliGRAM(s),Sodium Chloride 0.9% 250 milliLiter(s) 16 milliGRAM(s) IV Continuous <Continuous>  meropenem  IVPB 500 milliGRAM(s) IV Intermittent every 8 hours  meropenem  IVPB      norepinephrine Infusion 0.2 MICROgram(s)/kG/Min IV Continuous <Continuous>  pantoprazole  Injectable 40 milliGRAM(s) IV Push every 12 hours  vasopressin Infusion 0.1 Unit(s)/Min IV Continuous <Continuous>    Vitals:  T(C): 37.1 (18 @ 04:00), Max: 37.5 (18 @ 17:00)  HR: 95 (18 @ 06:30) (77 - 112)  ABP: 87/48 (18 @ 06:30) (75/47 - 131/64)  ABP(mean): 69 (18 @ 06:30) (60 - 97)  RR: 16 (18 @ 06:30) (16 - 32)  SpO2: 97% (18 @ 06:30) (91% - 100%)      Daily     Daily Weight in k.8 (2018 00:00)    Weight (kg): 90.7 ( @ 02:02)    I&O's Summary    30 Mar 2018 07  -  31 Mar 2018 07:00  --------------------------------------------------------  IN: 4715.8 mL / OUT: 730 mL / NET: 3985.8 mL    31 Mar 2018 07:  -  2018 06:51  --------------------------------------------------------  IN: 66862.2 mL / OUT: 1810 mL / NET: 8371.2 mL    Physical Exam:  Appearance: Critically ill. Intubated/sedated.  HEENT: elevated JVD  Cardiovascular: Tachycardic. no m/r/g  Respiratory: Dec BS b/l  Gastrointestinal: Soft, Non-tender	  Skin: No cyanosis	  Neurologic: No purposeful movements  Extremities: 2+ LE edema; cannulation site c/d/i however LLE with mottling      Labs:                        10.5   18.6  )-----------( 128      ( 2018 02:08 )             33.5     04-    148<H>  |  98  |  59<H>  ----------------------------<  105<H>  5.4<H>   |  20<L>  |  2.32<H>    Ca    8.4      2018 02:08  Phos  6.6     -  Mg     1.8         TPro  4.0<L>  /  Alb  1.9<L>  /  TBili  10.3<H>  /  DBili  x   /  AST  >7000<H>  /  ALT  2668<H>  /  AlkPhos  141<H>      PT/INR - ( 2018 03:21 )   PT: 55.2 sec;   INR: 4.95 ratio         PTT - ( 2018 03:21 )  PTT:60.9 sec  CARDIAC MARKERS ( 2018 02:08 )  x     / 0.13 ng/mL / 4769 U/L / x     / 42.9 ng/mL  CARDIAC MARKERS ( 31 Mar 2018 20:03 )  x     / x     / 3854 U/L / x     / x      CARDIAC MARKERS ( 31 Mar 2018 16:17 )  x     / x     / 2898 U/L / x     / x      CARDIAC MARKERS ( 31 Mar 2018 12:13 )  x     / 0.11 ng/mL / 1528 U/L / x     / 28.4 ng/mL  CARDIAC MARKERS ( 31 Mar 2018 01:41 )  x     / x     / 103 U/L / x     / x      CARDIAC MARKERS ( 30 Mar 2018 15:00 )  0.274 ng/mL / x     / x     / x     / x        Lactate Dehydrogenase, Serum: >2250 U/L ( @ 02:08)    Lactate, Blood: 15.7 mmol/L ( @ 15:00)  Lactate, Blood: 5.8 mmol/L ( @ 09:39)  Lactate, Blood: 4.8 mmol/L ( @ 05:22)    EKG: 3/1/18 - NSR, 1st deg AV block, RAD, PRWP, nonspecific IVCD Patient is a 58y old  Female who presents with a chief complaint of cardiogenic shock (31 Mar 2018 03:43)    HPI:  59 y/o  F w/ h/o HTN, HL, asthma, NICM (EF 20%) s/p CRT-D, VT (on amio), IDDM, who presented to [facility] 3/19 with weight gain/dyspnea/orthopnea in setting of dietary indiscretion and cessation of Entresto as outpatietn due to KEYSHA. Hospital course c/b VT, UTI, worsening renal failure and was started on dobutamine but subsequently continued to decline. Also noted to have GPC clusters bacteremia. Due to worsening deterioration and multiorgan organ failure, ECMO requested and Dr. White placed ECMO at  and transferred patient over on 3/31 morning. Patient also had IABP placed. Hypotensive requiring large doses of pressors but persistent lactic acidosis with evident mottling of LLE at site of ECMO cannula. Unfortunately no [place] available. No family availabe for collateral information. Initial lactate 24. Had twitching and unknown neurologic status so underwent portable NCHCT which showed no acute process. Patient had retrograde cannula placed for limb perfusion. Over last 24 hours, pressors weaning down (still on high doses) and lactate downtrending however continues to have concerns of critical limb ischemia. Vascular consulted with no plan for intervention at this time.     PAST MEDICAL & SURGICAL HISTORY:  Cellulitis  KEYSHA (acute kidney injury)  CHF (congestive heart failure)  Pacemaker  HTN (hypertension)  HLD (hyperlipidemia)  Asthma  DM (diabetes mellitus)  CAD (coronary artery disease)  History of cholecystectomy  Artificial cardiac pacemaker    FAMILY HISTORY:  Family history of other heart disease (Mother)    Medications:  artificial  tears Solution 1 Drop(s) Both EYES every 4 hours  Epinephrine 16 milliGRAM(s),Sodium Chloride 0.9% 250 milliLiter(s) 16 milliGRAM(s) IV Continuous <Continuous>  meropenem  IVPB 500 milliGRAM(s) IV Intermittent every 8 hours  meropenem  IVPB      norepinephrine Infusion 0.2 MICROgram(s)/kG/Min IV Continuous <Continuous>  pantoprazole  Injectable 40 milliGRAM(s) IV Push every 12 hours  vasopressin Infusion 0.1 Unit(s)/Min IV Continuous <Continuous>    Vitals:  T(C): 37.1 (18 @ 04:00), Max: 37.5 (18 @ 17:00)  HR: 95 (18 @ 06:30) (77 - 112)  ABP: 87/48 (18 @ 06:30) (75/47 - 131/64)  ABP(mean): 69 (18 @ 06:30) (60 - 97)  RR: 16 (18 @ 06:30) (16 - 32)  SpO2: 97% (18 @ 06:30) (91% - 100%)      Daily     Daily Weight in k.8 (2018 00:00)    Weight (kg): 90.7 ( @ 02:02)    I&O's Summary    30 Mar 2018 07  -  31 Mar 2018 07:00  --------------------------------------------------------  IN: 4715.8 mL / OUT: 730 mL / NET: 3985.8 mL    31 Mar 2018 07:  -  2018 06:51  --------------------------------------------------------  IN: 00616.2 mL / OUT: 1810 mL / NET: 8371.2 mL    Physical Exam:  Appearance: Critically ill. Intubated/sedated.  HEENT: elevated JVD  Cardiovascular: Tachycardic. no m/r/g  Respiratory: Dec BS b/l  Gastrointestinal: Soft, Non-tender	  Skin: No cyanosis	  Neurologic: No purposeful movements  Extremities: 2+ LE edema; cannulation site c/d/i however LLE with mottling      Labs:                        10.5   18.6  )-----------( 128      ( 2018 02:08 )             33.5     04-    148<H>  |  98  |  59<H>  ----------------------------<  105<H>  5.4<H>   |  20<L>  |  2.32<H>    Ca    8.4      2018 02:08  Phos  6.6     -  Mg     1.8         TPro  4.0<L>  /  Alb  1.9<L>  /  TBili  10.3<H>  /  DBili  x   /  AST  >7000<H>  /  ALT  2668<H>  /  AlkPhos  141<H>      PT/INR - ( 2018 03:21 )   PT: 55.2 sec;   INR: 4.95 ratio         PTT - ( 2018 03:21 )  PTT:60.9 sec  CARDIAC MARKERS ( 2018 02:08 )  x     / 0.13 ng/mL / 4769 U/L / x     / 42.9 ng/mL  CARDIAC MARKERS ( 31 Mar 2018 20:03 )  x     / x     / 3854 U/L / x     / x      CARDIAC MARKERS ( 31 Mar 2018 16:17 )  x     / x     / 2898 U/L / x     / x      CARDIAC MARKERS ( 31 Mar 2018 12:13 )  x     / 0.11 ng/mL / 1528 U/L / x     / 28.4 ng/mL  CARDIAC MARKERS ( 31 Mar 2018 01:41 )  x     / x     / 103 U/L / x     / x      CARDIAC MARKERS ( 30 Mar 2018 15:00 )  0.274 ng/mL / x     / x     / x     / x        Lactate Dehydrogenase, Serum: >2250 U/L ( @ 02:08)    Lactate, Blood: 15.7 mmol/L ( @ 15:00)  Lactate, Blood: 5.8 mmol/L ( @ 09:39)  Lactate, Blood: 4.8 mmol/L ( @ 05:22)    EKG: 3/1/18 - NSR, 1st deg AV block, RAD, PRWP, nonspecific IVCD

## 2018-04-01 NOTE — CONSULT NOTE ADULT - ASSESSMENT
Patient is a 59 y/o woman with history of CHF, DM2, HTN, asthma who presents with acute CHF requiring support from ECMO and developed anuric ATN requiring renal replacement therapy.

## 2018-04-01 NOTE — CONSULT NOTE ADULT - ASSESSMENT
57 y/o  F w/ h/o HTN, HL, asthma, NICM (EF 20%) s/p CRT-D, VT (on amio), IDDM, who presented to [facility] 3/19 with weight gain/dyspnea/orthopnea in setting of dietary indiscretion and cessation of Entresto as outpatietn due to KEYSHA. Hospital course c/b VT, UTI, worsening renal failure and was started on dobutamine but subsequently continued to decline. Also noted to have GPC clusters bacteremia. Due to worsening deterioration and multiorgan organ failure, ECMO requested and Dr. White placed ECMO at  and transferred patient over on 3/31 morning. Patient also had IABP placed. Hypotensive requiring large doses of pressors but persistent lactic acidosis with evident mottling of LLE at site of ECMO cannula. Unfortunately no [place] available. No family availabe for collateral information. Initial lactate 24. Had twitching and unknown neurologic status so underwent portable NCHCT which showed no acute process. Patient had retrograde cannula placed for limb perfusion however may be in venous system. Over last 24 hours, pressors weaning down and lactate downtrending however continues to have concerns of critical limb ischemia. Vascular consulted with no plan for intervention at this time. 59 y/o  F w/ h/o HTN, HL, asthma, NICM (EF 20%) s/p CRT-D, VT (on amio), IDDM, who presented to [facility] 3/19 with weight gain/dyspnea/orthopnea in setting of dietary indiscretion and cessation of Entresto as outpatietn due to KEYSHA. Hospital course c/b VT, UTI, worsening renal failure and was started on dobutamine but subsequently continued to decline. Also noted to have GPC clusters bacteremia. Due to worsening deterioration and multiorgan organ failure, ECMO requested and Dr. White placed ECMO at  and transferred patient over on 3/31 morning. Patient also had IABP placed. Hypotensive requiring large doses of pressors but persistent lactic acidosis with evident mottling of LLE at site of ECMO cannula. Unfortunately no [place] available. No family availabe for collateral information. Initial lactate 24. Had twitching and unknown neurologic status so underwent portable NCHCT which showed no acute process. Patient had retrograde cannula placed for limb perfusion however may be in venous system. Over last 24 hours, pressors weaning down and lactate downtrending however continues to have concerns of critical limb ischemia. Vascular consulted with no plan for intervention at this time. Labs reviewed K 5.4, BUn/Cr 59/2.32 (stable; b/l 0.89), Lactate 13.4 (downtrending), CK 4k (uptrending), LDH >2k, shock liver (AST/ALT 7k/2k), CXR pulmonary edema (improving). Trop T 0.13. Prior [facility] 8/17 nl cors. TTE pending but las 6/7/17 LVEDD 7.1 cm, EF 20%, mod-severe MR. Overall, stage D HF, NYHA class IV, INTERMACS 1 with concern of LLE ischemia and persistent multiorgan dysfunction  - 57 y/o  F w/ h/o HTN, HL, asthma, NICM (EF 20%) s/p CRT-D, VT (on amio), IDDM, who presented to [facility] 3/19 with weight gain/dyspnea/orthopnea in setting of dietary indiscretion and cessation of Entresto as outpatietn due to KEYSHA. Hospital course c/b VT, UTI, worsening renal failure and was started on dobutamine but subsequently continued to decline. Also noted to have GPC clusters bacteremia. Due to worsening deterioration and multiorgan organ failure, ECMO requested and Dr. White placed ECMO at  and transferred patient over on 3/31 morning. Patient also had IABP placed. Hypotensive requiring large doses of pressors but persistent lactic acidosis with evident mottling of LLE at site of ECMO cannula. Unfortunately no [place] available. No family availabe for collateral information. Initial lactate 24. Had twitching and unknown neurologic status so underwent portable NCHCT which showed no acute process. Patient had retrograde cannula placed for limb perfusion however may be in venous system. Over last 24 hours, pressors weaning down and lactate downtrending however continues to have concerns of critical limb ischemia. Vascular consulted with no plan for intervention at this time. Labs reviewed K 5.4, BUn/Cr 59/2.32 (stable; b/l 0.89), Lactate 13.4 (downtrending), CK 4k (uptrending), LDH >2k, shock liver (AST/ALT 7k/2k), CXR pulmonary edema (improving). Trop T 0.13. Prior [facility] 8/17 nl cors. TTE 6/7/17 LVEDD 7.1 cm, EF 20%, mod-severe MR. Overall, stage D HF, NYHA class IV, INTERMACS 1 with concern of LLE ischemia and persistent multiorgan dysfunction.   - - acute on chronic kidney injury; appreciate renal c/s; start bumex gtt at 2 mg/hr; would do CVVH  - wean pressors as tolerated; venous sat 84% suggesting CI 5.6/CO 10.9, ; wean epi as tolerated to salvage LLE  - in vasodilatory shock as well as cardiogenic; likely 2/2 MRSA bacteremia; on vanc/merrem; check procalcitonin   - unclear mental status; NCHCT negative  - LLE ischemia; CK 4k (uptrending); vascular following  - overall poor prognosis

## 2018-04-01 NOTE — PROGRESS NOTE ADULT - SUBJECTIVE AND OBJECTIVE BOX
58 F POD 2 s/p emergent VA ECMO placement for cardiogenic shock, possible sepsis, multiorgan failure by Dr White. Pt remains intubated, does not follow commands, withdraws from pain. On Epi 0.25, Vaso 0.1, Levo 0.5. Multi-organ failure, left leg is pulseless, cool, blistered, arterial ECMO cannula and distal perfusion cannula in Left femoral artery, vascular consulted. Pt examined at bedside with Dr. Rudolph, case discussed with Vascular and Dr White, attempting to wean pressors, awaiting f/u recommendations by Vascular.

## 2018-04-01 NOTE — CONSULT NOTE ADULT - PROBLEM SELECTOR RECOMMENDATION 9
Based on history, patient has normal range creatinine levels from Feb 2018.  Acute renal failure likely from cardiorenal syndrome type 1, possible contribution from septic ATN.  Patient is currently hyperkalemia and also with medically intractable CHF despite inotropic support at outside hospital.  Plan to initiate CVVHDF at this time.  If able would consider attempting fluid removal when hemodynamic status improves, defer to CT ICU in setting of multiple vasopressor, ECMO and aortic balloon pump.  Monitor UOP for recovery.

## 2018-04-01 NOTE — CONSULT NOTE ADULT - SUBJECTIVE AND OBJECTIVE BOX
[facility] DIVISION OF KIDNEY DISEASES AND HYPERTENSION -- INITIAL CONSULT NOTE  --------------------------------------------------------------------------------  HPI:  Patient is a 56 y/o woman who presents as a transfer from outside hospital where she spent 2 weeks found with unstable ventricular tachycardia in the setting of acute on chronic congestive heart failure, sepsis and acute renal failure.  Patient is currently intubated, sedated on multiple pressors and unable to provide history.   at bedside who provided history as well as medical history.   is uncertain how long patient has had diabetes, unclear if patient has CKD but has been told in the past that patient has had kidney problems.  Patient has frequent episodes of CHF exacerbation, according to records, some episodes may be contributable to poor medication compliance.  On this admission, patient presented to [facility] 2 weeks prior with a complaint of dyspnea on exertion, rapidly worsening lower extremity edema.  Patient's hospital course was complicated by sepsis, VTACH, KEYSHA and was transferred to [facility] for further management.  Patient was started on ECMO in the CTICU at [facility] but developed oligo-anuria with rising creatinine and hyperkalemia and renal was consulted.  Patient currently with minimal urine output and medically intractable congestive heart failure on ECMO.  Plan to initiate continuous renal replacement therapy.      PAST HISTORY  --------------------------------------------------------------------------------  PAST MEDICAL & SURGICAL HISTORY:  Cellulitis  KEYSHA (acute kidney injury)  CHF (congestive heart failure)  Pacemaker  HTN (hypertension)  HLD (hyperlipidemia)  Asthma  DM (diabetes mellitus)  CAD (coronary artery disease)  History of cholecystectomy  Artificial cardiac pacemaker    FAMILY HISTORY:  Family history of other heart disease (Mother)    PAST SOCIAL HISTORY:   denies any illicit substance abuse    ALLERGIES & MEDICATIONS  --------------------------------------------------------------------------------  Allergies    No Known Allergies    Intolerances      Standing Inpatient Medications  artificial  tears Solution 1 Drop(s) Both EYES every 4 hours  Epinephrine 16 milliGRAM(s),Sodium Chloride 0.9% 250 milliLiter(s) 16 milliGRAM(s) IV Continuous <Continuous>  meropenem  IVPB 500 milliGRAM(s) IV Intermittent every 8 hours  meropenem  IVPB      norepinephrine Infusion 0.2 MICROgram(s)/kG/Min IV Continuous <Continuous>  pantoprazole  Injectable 40 milliGRAM(s) IV Push every 12 hours  vasopressin Infusion 0.1 Unit(s)/Min IV Continuous <Continuous>    PRN Inpatient Medications      REVIEW OF SYSTEMS  --------------------------------------------------------------------------------  Patient is intubated, sedated on multiple vasopressors and unable to provide history    All other systems were reviewed and are negative, except as noted.    VITALS/PHYSICAL EXAM  --------------------------------------------------------------------------------  T(C): 37.1 (04-01-18 @ 04:00), Max: 37.5 (03-31-18 @ 17:00)  HR: 95 (04-01-18 @ 07:30) (77 - 112)  BP: 90/49, map 71  RR: 16 (04-01-18 @ 07:30) (16 - 31)  SpO2: 97% (04-01-18 @ 07:30) (91% - 100%)  Wt(kg): --  Height (cm): 160 (03-31-18 @ 02:02)  Weight (kg): 90.7 (03-31-18 @ 02:02)  BMI (kg/m2): 35.4 (03-31-18 @ 02:02)  BSA (m2): 1.93 (03-31-18 @ 02:02)      03-31-18 @ 07:01  -  04-01-18 @ 07:00  --------------------------------------------------------  IN: 65849.2 mL / OUT: 1840 mL / NET: 8483.2 mL    04-01-18 @ 07:01  -  04-01-18 @ 07:52  --------------------------------------------------------  IN: 142 mL / OUT: 0 mL / NET: 142 mL      Physical Exam:  	Gen: NAD, morbidly obese  	HEENT: Intubated  	Pulm: bilateral ventilator breath sounds audible  	CV: RRR, S1S2; no rub  	Abd: +BS, soft, nontender  	: lopes catheter in place  	UE: Warm, + pitting edema  	LE: Warm, 2+ pitting edema bilaterally  	Neuro: sedated  	Psych: sedated  	Skin: Warm, without rashes    LABS/STUDIES  --------------------------------------------------------------------------------              10.5   18.6  >-----------<  128      [04-01-18 @ 02:08]              33.5     148  |  98  |  59  ----------------------------<  105      [04-01-18 @ 02:08]  5.4   |  20  |  2.32        Ca     8.4     [04-01-18 @ 02:08]      Mg     1.8     [04-01-18 @ 02:08]      Phos  6.6     [04-01-18 @ 02:08]    TPro  4.0  /  Alb  1.9  /  TBili  10.3  /  DBili  x   /  AST  >7000  /  ALT  2668  /  AlkPhos  141  [04-01-18 @ 02:08]    PT/INR: PT 55.2 , INR 4.95       [04-01-18 @ 03:21]  PTT: 60.9       [04-01-18 @ 03:21]    Troponin 0.13      [04-01-18 @ 02:08]  CK 5276      [04-01-18 @ 05:50]  LDH >2250      [04-01-18 @ 02:08]    Creatinine Trend:  SCr 2.32 [04-01 @ 02:08]  SCr 2.19 [03-31 @ 18:32]  SCr 2.44 [03-31 @ 01:41]  SCr 2.43 [03-30 @ 15:00]  SCr 2.07 [03-30 @ 11:00]

## 2018-04-01 NOTE — PROVIDER CONTACT NOTE (CRITICAL VALUE NOTIFICATION) - BACKGROUND
pt placed on ecmo for shock.  HX of heart failure.  Now with fluid overload, increasing Creat and rising K+ ions

## 2018-04-01 NOTE — PROGRESS NOTE ADULT - ASSESSMENT
57 yo female in multisystem organ failure with left lower extremity ischemia  -Will continue to monitor. Wean pressors as tolerated  -Seen and examined with Dr. Zeng

## 2018-04-01 NOTE — PROGRESS NOTE ADULT - SUBJECTIVE AND OBJECTIVE BOX
VASCULAR SURGERY  PROGRESS NOTE:       Overnight Events:  weaned Levophed overnight. Patient continues to be in multisystem organ failure    Physical Exam  Vital Signs Last 24 Hrs  T(C): 37.1 (2018 04:00), Max: 37.5 (31 Mar 2018 17:00)  T(F): 98.8 (2018 04:00), Max: 99.5 (31 Mar 2018 17:00)  HR: 95 (2018 07:30) (77 - 112)  RR: 16 (2018 07:30) (16 - 31)  SpO2: 97% (2018 07:30) (91% - 100%)    Gen: intubated, on ECMO,   Ext: left lower extremity cool to the thigh with sloughing of the skin. No doppler signals in the left DP/PT. Calf and thigh compartments soft.     I&O's Detail    31 Mar 2018 07:01  -  2018 07:00  --------------------------------------------------------  IN:    amiodarone Infusion: 532.8 mL    amiodarone Infusion: 66.8 mL    dextrose 5%: 1050 mL    DOBUTamine Infusion: 217.6 mL    freetext medication -  Infusion: 2040 mL    heparin Infusion: 55 mL    IV PiggyBack: 1050 mL    norepinephrine Infusion: 2952 mL    Packed Red Blood Cells: 250 mL    phenylephrine   Infusion: 1965 mL    vasopressin Infusion: 144 mL  Total IN: 89841.2 mL    OUT:    Indwelling Catheter - Urethral: 1840 mL  Total OUT: 1840 mL    Total NET: 8483.2 mL      2018 07:01  -  2018 07:52  --------------------------------------------------------  IN:    freetext medication -  Infusion: 85 mL    norepinephrine Infusion: 51 mL    vasopressin Infusion: 6 mL  Total IN: 142 mL    OUT:  Total OUT: 0 mL    Total NET: 142 mL          Labs:                        10.5   18.6  )-----------( 128      ( 2018 02:08 )             33.5     04-01    148<H>  |  98  |  59<H>  ----------------------------<  105<H>  5.4<H>   |  20<L>  |  2.32<H>    Ca    8.4      2018 02:08  Phos  6.6     04-  Mg     1.8     04-    TPro  4.0<L>  /  Alb  1.9<L>  /  TBili  10.3<H>  /  DBili  x   /  AST  >7000<H>  /  ALT  2668<H>  /  AlkPhos  141<H>  04-    PT/INR - ( 2018 03:21 )   PT: 55.2 sec;   INR: 4.95 ratio         PTT - ( 2018 03:21 )  PTT:60.9 sec  Urinalysis Basic - ( 30 Mar 2018 17:20 )    Color: Kate / Appearance: very cloudy / S.010 / pH: x  Gluc: x / Ketone: Trace  / Bili: Negative / Urobili: 4 mg/dL   Blood: x / Protein: 100 mg/dL / Nitrite: Negative   Leuk Esterase: Trace / RBC: 0-2 /HPF / WBC 6-10   Sq Epi: x / Non Sq Epi: Few / Bacteria: Few      ABG - ( 2018 05:48 )  pH: 7.36  /  pCO2: 41    /  pO2: 106   / HCO3: 22    / Base Excess: -2.4  /  SaO2: 98

## 2018-04-01 NOTE — PROGRESS NOTE ADULT - SUBJECTIVE AND OBJECTIVE BOX
ADRIÁN YOO  MRN#:  [id number]    The patient is a 58y Female PMH significant for HTN, hyperlipidemia, NICM, VT on amiodarone, HFrEF, BiV-AICD,  diabetes, cellulitis, severe pulmonary HTN, and prior KEYSHA admitted to CTU after recent admission for exacerbation of CHF with subsequent intubation and shock felt to be cardiogenic as well as possibly infectious, s/p IABP and VA ECMO who was seen, evaluated, & examined with the CTICU staff on admission and during the early morning hours with a multidisciplinary care plan formulated & implemented.  All available clinical, laboratory, radiographic, pharmacologic, and electrocardiographic data were reviewed & analyzed.      The patient was in the CTICU in critical condition secondary to persistent hypoxic respiratory failure, cardiogenic and possibly septic shock, severe lactic acidosis, acute renal failure and left lower extremity ischemia.    Respiratory status required full ventilatory support, VA ECMO and inhaled Nitric oxide the following of ABG’s with A-line monitoring, and continuous pulse oximetry monitoring for support & to evaluate for & prevent further decompensation secondary to persistent cardiopulmonary dysfunction.     Invasive hemodynamic monitoring with a central venous and arterial catheter were required for the following of continuous central venous and MAP/BP monitoring to ensure adequate cardiovascular support and to evaluate for & help prevent decompensation while receiving intermittent volume expansion, blood transfusions, an IV Levophed drip, an IV Vasopressin drip, an IV Epinephrine drip, and an IV Bumex drip secondary to hemodynamically significant anemia, cardiogenic and vasogenic shock, ischemic hepatitis, coagulopathy and acute renal failure.    Patient developed LE ischemia of the left leg where she has an ECMO cannula. An arterial catheter placed and spliced into the ECMO circuit in order to improve perfusion to the leg. At this point the leg appears to have ongoing ischemia and necrosis and would possibly need amputation if she survives.    One set of blood cultures from the transferring institution was positive for Staphylococcus aureus. IV Vancomycin ordered and levels being monitored.    Metabolic stability, uncontrolled type 2 diabetes-hyperglycemia, & infection prophylaxis required an IV regular Insulin drip & the following of serial glucose levels to help achieve & maintain euglycemia.      Patient required acute critical care management and I provided 80 minutes of non-continuous care to the patient.  Discussed at length with the CTICU staff and helped coordinate care. ADRIÁN YOO  MRN#:  [id number]    The patient is a 58y Female PMH significant for HTN, hyperlipidemia, NICM, VT on amiodarone, HFrEF, BiV-AICD,  diabetes, cellulitis, severe pulmonary HTN, and prior KEYSHA admitted to CTU after recent admission for exacerbation of CHF with subsequent intubation and shock felt to be cardiogenic as well as possibly infectious, s/p IABP and VA ECMO who was seen, evaluated, & examined with the CTICU staff on rounds and later in the afternoon with a multidisciplinary care plan formulated & implemented.  All available clinical, laboratory, radiographic, pharmacologic, and electrocardiographic data were reviewed & analyzed.      The patient was in the CTICU in critical condition secondary to persistent hypoxic respiratory failure, cardiogenic and possibly septic shock, severe lactic acidosis, acute renal failure and left lower extremity ischemia.    Respiratory status required full ventilatory support, VA ECMO and inhaled Nitric oxide the following of ABG’s with A-line monitoring, and continuous pulse oximetry monitoring for support & to evaluate for & prevent further decompensation secondary to persistent cardiopulmonary dysfunction.     Invasive hemodynamic monitoring with a central venous and arterial catheter were required for the following of continuous central venous and MAP/BP monitoring to ensure adequate cardiovascular support and to evaluate for & help prevent decompensation while receiving intermittent volume expansion, blood transfusions, an IV Levophed drip, an IV Vasopressin drip, an IV Epinephrine drip, and an IV Bumex drip secondary to hemodynamically significant anemia, cardiogenic and vasogenic shock, ischemic hepatitis, coagulopathy and acute renal failure.    Patient developed LE ischemia of the left leg where she has an ECMO cannula. An arterial catheter placed and spliced into the ECMO circuit in order to improve perfusion to the leg. At this point the leg appears to have ongoing ischemia and necrosis and would possibly need amputation if she survives.    One set of blood cultures from the transferring institution was positive for Staphylococcus aureus. IV Vancomycin ordered and levels being monitored.    Metabolic stability, uncontrolled type 2 diabetes-hyperglycemia, & infection prophylaxis required an IV regular Insulin drip & the following of serial glucose levels to help achieve & maintain euglycemia.      Patient required acute critical care management and I provided 80 minutes of non-continuous care to the patient.  Discussed at length with the CTICU staff and helped coordinate care.

## 2018-04-02 LAB
ALBUMIN SERPL ELPH-MCNC: 1.9 G/DL — LOW (ref 3.3–5)
ALP SERPL-CCNC: 162 U/L — HIGH (ref 40–120)
ALT FLD-CCNC: 2389 U/L RC — HIGH (ref 10–45)
AMYLASE P1 CFR SERPL: 285 U/L — HIGH (ref 25–125)
ANION GAP SERPL CALC-SCNC: 23 MMOL/L — HIGH (ref 5–17)
APTT BLD: 46.8 SEC — HIGH (ref 27.5–37.4)
AST SERPL-CCNC: 5809 U/L — HIGH (ref 10–40)
BILIRUB SERPL-MCNC: 13.2 MG/DL — HIGH (ref 0.2–1.2)
BUN SERPL-MCNC: 43 MG/DL — HIGH (ref 7–23)
CALCIUM SERPL-MCNC: 8.1 MG/DL — LOW (ref 8.4–10.5)
CHLORIDE SERPL-SCNC: 101 MMOL/L — SIGNIFICANT CHANGE UP (ref 96–108)
CK SERPL-CCNC: 4610 U/L — HIGH (ref 25–170)
CK SERPL-CCNC: 8410 U/L — HIGH (ref 25–170)
CK SERPL-CCNC: 9312 U/L — HIGH (ref 25–170)
CO2 SERPL-SCNC: 18 MMOL/L — LOW (ref 22–31)
CREAT SERPL-MCNC: 1.71 MG/DL — HIGH (ref 0.5–1.3)
GAS PNL BLDA: SIGNIFICANT CHANGE UP
GLUCOSE SERPL-MCNC: 96 MG/DL — SIGNIFICANT CHANGE UP (ref 70–99)
HCT VFR BLD CALC: 30.4 % — LOW (ref 34.5–45)
HGB BLD-MCNC: 9.6 G/DL — LOW (ref 11.5–15.5)
INR BLD: 4.27 RATIO — HIGH (ref 0.88–1.16)
LDH SERPL L TO P-CCNC: >2250 U/L — HIGH (ref 50–242)
LIDOCAIN IGE QN: 119 U/L — HIGH (ref 7–60)
MCHC RBC-ENTMCNC: 27.4 PG — SIGNIFICANT CHANGE UP (ref 27–34)
MCHC RBC-ENTMCNC: 31.4 GM/DL — LOW (ref 32–36)
MCV RBC AUTO: 87.4 FL — SIGNIFICANT CHANGE UP (ref 80–100)
PLATELET # BLD AUTO: 86 K/UL — LOW (ref 150–400)
POTASSIUM SERPL-MCNC: 4.9 MMOL/L — SIGNIFICANT CHANGE UP (ref 3.5–5.3)
POTASSIUM SERPL-SCNC: 4.9 MMOL/L — SIGNIFICANT CHANGE UP (ref 3.5–5.3)
PROT SERPL-MCNC: 3.9 G/DL — LOW (ref 6–8.3)
PROTHROM AB SERPL-ACNC: 47.5 SEC — HIGH (ref 9.8–12.7)
RBC # BLD: 3.48 M/UL — LOW (ref 3.8–5.2)
RBC # FLD: 20.8 % — HIGH (ref 10.3–14.5)
SODIUM SERPL-SCNC: 142 MMOL/L — SIGNIFICANT CHANGE UP (ref 135–145)
VANCOMYCIN TROUGH SERPL-MCNC: 21.2 UG/ML — HIGH (ref 10–20)
WBC # BLD: 16.9 K/UL — HIGH (ref 3.8–10.5)
WBC # FLD AUTO: 16.9 K/UL — HIGH (ref 3.8–10.5)

## 2018-04-02 PROCEDURE — 71045 X-RAY EXAM CHEST 1 VIEW: CPT | Mod: 26

## 2018-04-02 PROCEDURE — 99233 SBSQ HOSP IP/OBS HIGH 50: CPT | Mod: GC

## 2018-04-02 PROCEDURE — 99291 CRITICAL CARE FIRST HOUR: CPT

## 2018-04-02 PROCEDURE — 36800 INSERTION OF CANNULA: CPT

## 2018-04-02 RX ORDER — DEXTROSE 50 % IN WATER 50 %
50 SYRINGE (ML) INTRAVENOUS ONCE
Qty: 0 | Refills: 0 | Status: COMPLETED | OUTPATIENT
Start: 2018-04-02 | End: 2018-04-02

## 2018-04-02 RX ORDER — FENTANYL CITRATE 50 UG/ML
50 INJECTION INTRAVENOUS ONCE
Qty: 0 | Refills: 0 | Status: DISCONTINUED | OUTPATIENT
Start: 2018-04-02 | End: 2018-04-02

## 2018-04-02 RX ORDER — CALCIUM GLUCONATE 100 MG/ML
1 VIAL (ML) INTRAVENOUS ONCE
Qty: 0 | Refills: 0 | Status: COMPLETED | OUTPATIENT
Start: 2018-04-02 | End: 2018-04-02

## 2018-04-02 RX ADMIN — MEROPENEM 100 MILLIGRAM(S): 1 INJECTION INTRAVENOUS at 13:47

## 2018-04-02 RX ADMIN — PANTOPRAZOLE SODIUM 40 MILLIGRAM(S): 20 TABLET, DELAYED RELEASE ORAL at 05:24

## 2018-04-02 RX ADMIN — Medication 1 DROP(S): at 05:24

## 2018-04-02 RX ADMIN — Medication 1 DROP(S): at 01:57

## 2018-04-02 RX ADMIN — Medication 1 DROP(S): at 18:25

## 2018-04-02 RX ADMIN — Medication 1 DROP(S): at 13:48

## 2018-04-02 RX ADMIN — Medication 1 DROP(S): at 09:06

## 2018-04-02 RX ADMIN — Medication 1 DROP(S): at 22:37

## 2018-04-02 RX ADMIN — Medication 17.01 MICROGRAM(S)/KG/MIN: at 00:32

## 2018-04-02 RX ADMIN — FENTANYL CITRATE 50 MICROGRAM(S): 50 INJECTION INTRAVENOUS at 00:50

## 2018-04-02 RX ADMIN — Medication 50 MILLILITER(S): at 08:37

## 2018-04-02 RX ADMIN — PANTOPRAZOLE SODIUM 40 MILLIGRAM(S): 20 TABLET, DELAYED RELEASE ORAL at 18:25

## 2018-04-02 RX ADMIN — Medication 200 GRAM(S): at 00:32

## 2018-04-02 RX ADMIN — FENTANYL CITRATE 50 MICROGRAM(S): 50 INJECTION INTRAVENOUS at 01:05

## 2018-04-02 RX ADMIN — MEROPENEM 100 MILLIGRAM(S): 1 INJECTION INTRAVENOUS at 22:38

## 2018-04-02 RX ADMIN — MEROPENEM 100 MILLIGRAM(S): 1 INJECTION INTRAVENOUS at 05:24

## 2018-04-02 NOTE — PROCEDURE NOTE - NSPOSTCAREGUIDE_GEN_A_CORE
Care for catheter as per unit/ICU protocols/Verbal/written post procedure instructions were given to patient/caregiver
Care for catheter as per unit/ICU protocols
Care for catheter as per unit/ICU protocols

## 2018-04-02 NOTE — PROGRESS NOTE ADULT - SUBJECTIVE AND OBJECTIVE BOX
VASCULAR SURGERY PROGRESS NOTE:   Pager: [phone]    Interim Events: Not following commands; remains sedated and intubated on ECMO support. Lactemia improving to 7.9 today, levophed requirements decreasing.         Physical Exam  Vital Signs Last 24 Hrs  T(C): 35.1 (02 Apr 2018 09:00), Max: 37.3 (02 Apr 2018 04:00)  T(F): 95.2 (02 Apr 2018 09:00), Max: 99.1 (02 Apr 2018 04:00)  HR: 112 (02 Apr 2018 10:00) (88 - 134)  BP: --  BP(mean): --  RR: 16 (02 Apr 2018 10:00) (5 - 27)  SpO2: 100% (02 Apr 2018 10:00) (95% - 100%)    Gen: intubated, on ECMO,   Abd: Inferior pannus with bullae and mottling.   Ext: Left lower extremity cool to the thigh with bullae and sloughing of the skin. No doppler signals in the left DP/PT. Calf and thigh compartments soft.       I&O's Detail    01 Apr 2018 07:01  -  02 Apr 2018 07:00  --------------------------------------------------------  IN:    Albumin 5%  - 250 mL: 500 mL    bumetanide Infusion: 180 mL    dextrose 10%.: 180 mL    freetext medication -  Infusion: 2001 mL    IV PiggyBack: 400 mL    norepinephrine Infusion: 1798 mL    vasopressin Infusion: 144 mL  Total IN: 5203 mL    OUT:    Indwelling Catheter - Urethral: 420 mL    Other: 1747 mL  Total OUT: 2167 mL    Total NET: 3036 mL      02 Apr 2018 07:01  -  02 Apr 2018 10:54  --------------------------------------------------------  IN:    dextrose 10%.: 80 mL    freetext medication -  Infusion: 300 mL    norepinephrine Infusion: 75 mL    vasopressin Infusion: 24 mL  Total IN: 479 mL    OUT:    Other: 369 mL  Total OUT: 369 mL    Total NET: 110 mL          Labs:                        9.6    16.9  )-----------( 86       ( 02 Apr 2018 01:47 )             30.4     04-02    142  |  101  |  43<H>  ----------------------------<  96  4.9   |  18<L>  |  1.71<H>    Ca    8.1<L>      02 Apr 2018 01:46  Phos  6.6     04-01  Mg     1.8     04-01    TPro  3.9<L>  /  Alb  1.9<L>  /  TBili  13.2<H>  /  DBili  x   /  AST  5809<H>  /  ALT  2389<H>  /  AlkPhos  162<H>  04-02    PT/INR - ( 02 Apr 2018 01:47 )   PT: 47.5 sec;   INR: 4.27 ratio         PTT - ( 02 Apr 2018 01:47 )  PTT:46.8 sec    ABG - ( 02 Apr 2018 08:12 )  pH: 7.34  /  pCO2: 37    /  pO2: 328   / HCO3: 20    / Base Excess: -4.9  /  SaO2: 100

## 2018-04-02 NOTE — PROGRESS NOTE ADULT - SUBJECTIVE AND OBJECTIVE BOX
24H hour events:   Remains agitated not following commands. Remains intubated and sedated. No arrhythmias    MEDICATIONS:  norepinephrine Infusion 0.2 MICROgram(s)/kG/Min IV Continuous <Continuous>  meropenem  IVPB 500 milliGRAM(s) IV Intermittent every 8 hours  meropenem  IVPB      pantoprazole  Injectable 40 milliGRAM(s) IV Push every 12 hours  vasopressin Infusion 0.1 Unit(s)/Min IV Continuous <Continuous>  artificial  tears Solution 1 Drop(s) Both EYES every 4 hours  dextrose 10%. 1000 milliLiter(s) IV Continuous <Continuous>        PHYSICAL EXAM:  T(C): 35.1 (04-02-18 @ 09:00), Max: 37.3 (04-02-18 @ 04:00)  HR: 108 (04-02-18 @ 09:00) (88 - 134)  BP: --  RR: 18 (04-02-18 @ 09:00) (16 - 27)  SpO2: 100% (04-02-18 @ 09:00) (95% - 100%)  Wt(kg): --  ICU Vital Signs Last 24 Hrs  T(C): 35.1 (02 Apr 2018 09:00), Max: 37.3 (02 Apr 2018 04:00)  T(F): 95.2 (02 Apr 2018 09:00), Max: 99.1 (02 Apr 2018 04:00)  HR: 108 (02 Apr 2018 09:00) (88 - 134)  BP: --  BP(mean): --  ABP: 98/44 (02 Apr 2018 09:00) (85/35 - 123/51)  ABP(mean): 68 (02 Apr 2018 09:00) (58 - 87)  RR: 18 (02 Apr 2018 09:00) (16 - 27)  SpO2: 100% (02 Apr 2018 09:00) (95% - 100%)    I&O's Summary    01 Apr 2018 07:01  -  02 Apr 2018 07:00  --------------------------------------------------------  IN: 5203 mL / OUT: 2167 mL / NET: 3036 mL    02 Apr 2018 07:01  -  02 Apr 2018 09:16  --------------------------------------------------------  IN: 252 mL / OUT: 242 mL / NET: 10 mL      Mode: AC/ CMV (Assist Control/ Continuous Mandatory Ventilation)  RR (machine): 16  TV (machine): 400  FiO2: 50  PEEP: 5  ITime: 1  MAP: 11  PIP: 35        Physical Exam:  Appearance: Critically ill. Intubated/sedated.  HEENT: elevated JVD  Cardiovascular: Tachycardic. no m/r/g  Respiratory: Dec BS b/l  Gastrointestinal: Soft, Non-tender	  Skin: No cyanosis	  Neurologic: No purposeful movements  Extremities: 2+ LE edema; cannulation site c/d/i however LLE with mottling. No pulses palpated LLE      LABS:	 	    CBC Full  -  ( 02 Apr 2018 01:47 )  WBC Count : 16.9 K/uL  Hemoglobin : 9.6 g/dL  Hematocrit : 30.4 %  Platelet Count - Automated : 86 K/uL  Mean Cell Volume : 87.4 fl  Mean Cell Hemoglobin : 27.4 pg  Mean Cell Hemoglobin Concentration : 31.4 gm/dL  Auto Neutrophil # : x  Auto Lymphocyte # : x  Auto Monocyte # : x  Auto Eosinophil # : x  Auto Basophil # : x  Auto Neutrophil % : x  Auto Lymphocyte % : x  Auto Monocyte % : x  Auto Eosinophil % : x  Auto Basophil % : x    04-02    142  |  101  |  43<H>  ----------------------------<  96  4.9   |  18<L>  |  1.71<H>  04-01    144  |  103  |  44<H>  ----------------------------<  58<L>  4.8   |  20<L>  |  1.79<H>    Ca    8.1<L>      02 Apr 2018 01:46  Ca    7.7<L>      01 Apr 2018 17:32  Phos  6.6     04-01  Mg     1.8     04-01    TPro  3.9<L>  /  Alb  1.9<L>  /  TBili  13.2<H>  /  DBili  x   /  AST  5809<H>  /  ALT  2389<H>  /  AlkPhos  162<H>  04-02  TPro  4.0<L>  /  Alb  1.7<L>  /  TBili  12.6<H>  /  DBili  x   /  AST  >7000<H>  /  ALT  2956<H>  /  AlkPhos  171<H>  04-01    ABG - ( 02 Apr 2018 08:12 )  pH: 7.34  /  pCO2: 37    /  pO2: 328   / HCO3: 20    / Base Excess: -4.9  /  SaO2: 100           Creatine Kinase, Serum: 8410 U/L (04-02-18 @ 08:15)  Creatine Kinase, Serum: 4610 U/L (04-02-18 @ 01:46)  Creatine Kinase, Serum: 5533 U/L (04-01-18 @ 17:32)  Creatine Kinase, Serum: 5201 U/L (04-01-18 @ 09:29)        TELEMETRY: 	  afib 80-90. Intermitant       PREVIOUS DIAGNOSTIC TESTING:    [ ] Echocardiogram:  < from: Transthoracic Echocardiogram (04.01.18 @ 11:37) >  Conclusions:  1. Eccentric left ventricular hypertrophy (dilated left  ventricle with normal relative wall thickness).  2. Severe global left ventricular systolic dysfunction.  3. The right ventricle is not well visualized; grossly  normal right ventricular systolic function. A device wire  is noted in the right heart.    < end of copied text > 24H hour events:   Remains agitated not following commands. Remains intubated and sedated. No arrhythmias    MEDICATIONS:  norepinephrine Infusion 0.2 MICROgram(s)/kG/Min IV Continuous <Continuous>  meropenem  IVPB 500 milliGRAM(s) IV Intermittent every 8 hours  meropenem  IVPB      pantoprazole  Injectable 40 milliGRAM(s) IV Push every 12 hours  vasopressin Infusion 0.1 Unit(s)/Min IV Continuous <Continuous>  artificial  tears Solution 1 Drop(s) Both EYES every 4 hours  dextrose 10%. 1000 milliLiter(s) IV Continuous <Continuous>      PHYSICAL EXAM:    ICU Vital Signs Last 24 Hrs  T(C): 35.1 (02 Apr 2018 09:00), Max: 37.3 (02 Apr 2018 04:00)  T(F): 95.2 (02 Apr 2018 09:00), Max: 99.1 (02 Apr 2018 04:00)  HR: 108 (02 Apr 2018 09:00) (88 - 134)  ABP: 98/44 (02 Apr 2018 09:00) (85/35 - 123/51)  ABP(mean): 68 (02 Apr 2018 09:00) (58 - 87)  RR: 18 (02 Apr 2018 09:00) (16 - 27)  SpO2: 100% (02 Apr 2018 09:00) (95% - 100%)    I&O's Summary    01 Apr 2018 07:01  -  02 Apr 2018 07:00  --------------------------------------------------------  IN: 5203 mL / OUT: 2167 mL / NET: 3036 mL    02 Apr 2018 07:01  -  02 Apr 2018 09:16  --------------------------------------------------------  IN: 252 mL / OUT: 242 mL / NET: 10 mL      Mode: AC/ CMV (Assist Control/ Continuous Mandatory Ventilation)  RR (machine): 16  TV (machine): 400  FiO2: 50  PEEP: 5  ITime: 1  MAP: 11  PIP: 35    Physical Exam:  Appearance: Critically ill. Intubated/sedated.  HEENT: elevated JVD  Cardiovascular: Tachycardic. no m/r/g  Respiratory: Dec BS b/l  Gastrointestinal: Soft, Non-tender	  Skin: No cyanosis	  Neurologic: No purposeful movements  Extremities: 2+ LE edema; cannulation site c/d/i however LLE with mottling. No pulses palpated LLE    LABS:	 	    CBC Full  -  ( 02 Apr 2018 01:47 )  WBC Count : 16.9 K/uL  Hemoglobin : 9.6 g/dL  Hematocrit : 30.4 %  Platelet Count - Automated : 86 K/uL  Mean Cell Volume : 87.4 fl  Mean Cell Hemoglobin : 27.4 pg  Mean Cell Hemoglobin Concentration : 31.4 gm/dL    04-02    142  |  101  |  43<H>  ----------------------------<  96  4.9   |  18<L>  |  1.71<H>  04-01    144  |  103  |  44<H>  ----------------------------<  58<L>  4.8   |  20<L>  |  1.79<H>    Ca    8.1<L>      02 Apr 2018 01:46  Ca    7.7<L>      01 Apr 2018 17:32  Phos  6.6     04-01  Mg     1.8     04-01    TPro  3.9<L>  /  Alb  1.9<L>  /  TBili  13.2<H>  /  DBili  x   /  AST  5809<H>  /  ALT  2389<H>  /  AlkPhos  162<H>  04-02  TPro  4.0<L>  /  Alb  1.7<L>  /  TBili  12.6<H>  /  DBili  x   /  AST  >7000<H>  /  ALT  2956<H>  /  AlkPhos  171<H>  04-01    ABG - ( 02 Apr 2018 08:12 )  pH: 7.34  /  pCO2: 37    /  pO2: 328   / HCO3: 20    / Base Excess: -4.9  /  SaO2: 100         Creatine Kinase, Serum: 8410 U/L (04-02-18 @ 08:15)  Creatine Kinase, Serum: 4610 U/L (04-02-18 @ 01:46)  Creatine Kinase, Serum: 5533 U/L (04-01-18 @ 17:32)  Creatine Kinase, Serum: 5201 U/L (04-01-18 @ 09:29)      TELEMETRY: 	  afib 80-90. Intermitant       PREVIOUS DIAGNOSTIC TESTING:    [ ] Echocardiogram:  < from: Transthoracic Echocardiogram (04.01.18 @ 11:37) >  Conclusions:  1. Eccentric left ventricular hypertrophy (dilated left  ventricle with normal relative wall thickness).  2. Severe global left ventricular systolic dysfunction.  3. The right ventricle is not well visualized; grossly  normal right ventricular systolic function. A device wire  is noted in the right heart.    < end of copied text >

## 2018-04-02 NOTE — CONSULT NOTE ADULT - ATTENDING COMMENTS
Patient was seen and examined with house staff. For additional details of history and examination, please see house staff note above. I agree with house staff assessment and recommendations except as noted below. At time of examination this morning, patient ABG showed pH 7.1 and increased lactate.  Exam s/f no response to voice or noxious stim. pupils sluggish, no doll's eyes, no gag, not overbreathing vent, no spontaneous movement.  Labs s/f evidence of renal and hepatic shock.  Minimal neurological function in setting of marked loss of cardiac output with persistent metabolic acidosis and increased lactate suggests ongoing cerebral ischemia. Cannot rule out metabolic component due hepatic and renal failure, but pt getting dialysis, so likely that greater contributor to impaired MS is diffuse cerebral ischemia. Repeat CT head may be helpful.  Given poor cardiac status and profound neurological impairment, prognosis for neurological recovery is very poor.
KEYSHA in the setting of shock/decompensated heart failure, now on pressors, ECMO, intubated    -Will initiate CVVHDF   -UF hourly as tolerated and per CTU  -Monitor

## 2018-04-02 NOTE — PROCEDURE NOTE - NSINDICATIONS_GEN_A_CORE
arterial puncture to obtain ABG's/blood sampling/cannulation purposes
critical illness
emergency venous access/critical illness

## 2018-04-02 NOTE — PROCEDURE NOTE - NSPROCDETAILS_GEN_ALL_CORE
lumen(s) aspirated and flushed/sterile dressing applied/ultrasound guidance/sterile technique, catheter placed/guidewire recovered
location identified, draped/prepped, sterile technique used, needle inserted/introduced
lumen(s) aspirated and flushed/sterile dressing applied/ultrasound guidance/guidewire recovered/sterile technique, catheter placed

## 2018-04-02 NOTE — PROGRESS NOTE ADULT - SUBJECTIVE AND OBJECTIVE BOX
[facility] DIVISION OF KIDNEY DISEASES AND HYPERTENSION -- FOLLOW UP NOTE  --------------------------------------------------------------------------------  Chief Complaint:  renal failure    24 hour events/subjective:  Pt seen in CTU. Remains on ECMO and pressor support.       PAST HISTORY  --------------------------------------------------------------------------------  No significant changes to PMH, PSH, FHx, SHx, unless otherwise noted    ALLERGIES & MEDICATIONS  --------------------------------------------------------------------------------  Allergies    No Known Allergies    Intolerances      Standing Inpatient Medications  artificial  tears Solution 1 Drop(s) Both EYES every 4 hours  dextrose 10%. 1000 milliLiter(s) IV Continuous <Continuous>  Epinephrine 16 milliGRAM(s),Sodium Chloride 0.9% 250 milliLiter(s) 16 milliGRAM(s) IV Continuous <Continuous>  meropenem  IVPB 500 milliGRAM(s) IV Intermittent every 8 hours  meropenem  IVPB      norepinephrine Infusion 0.2 MICROgram(s)/kG/Min IV Continuous <Continuous>  pantoprazole  Injectable 40 milliGRAM(s) IV Push every 12 hours  vasopressin Infusion 0.1 Unit(s)/Min IV Continuous <Continuous>    PRN Inpatient Medications      REVIEW OF SYSTEMS  --------------------------------------------------------------------------------  Unable to assess due to clinical condition.    VITALS/PHYSICAL EXAM  --------------------------------------------------------------------------------  T(C): 35.1 (04-02-18 @ 09:00), Max: 37.3 (04-02-18 @ 04:00)  HR: 106 (04-02-18 @ 09:45) (88 - 134)  BP: --  RR: 16 (04-02-18 @ 09:45) (5 - 27)  SpO2: 100% (04-02-18 @ 09:45) (95% - 100%)  Wt(kg): --        04-01-18 @ 07:01  -  04-02-18 @ 07:00  --------------------------------------------------------  IN: 5203 mL / OUT: 2167 mL / NET: 3036 mL    04-02-18 @ 07:01  -  04-02-18 @ 10:04  --------------------------------------------------------  IN: 252 mL / OUT: 242 mL / NET: 10 mL      Physical Exam:  	Gen: NAD, +ECMO, on chap[facility] wilkerson  	HEENT: +ET tube to vent  	Pulm: CTA B/L  	CV: no rub  	Abd: +BS, soft, obese abdomen  	UE: Warm, ++edema  	LE: Warm, +edema  	Neuro: eyes open, but no purposeful movement  	Skin: Warm  	Vascular access: left IJ non-tunneled HD catheter - no drainage or erythema     LABS/STUDIES  --------------------------------------------------------------------------------              9.6    16.9  >-----------<  86       [04-02-18 @ 01:47]              30.4     142  |  101  |  43  ----------------------------<  96      [04-02-18 @ 01:46]  4.9   |  18  |  1.71        Ca     8.1     [04-02-18 @ 01:46]      Mg     1.8     [04-01-18 @ 02:08]      Phos  6.6     [04-01-18 @ 02:08]    TPro  3.9  /  Alb  1.9  /  TBili  13.2  /  DBili  x   /  AST  5809  /  ALT  2389  /  AlkPhos  162  [04-02-18 @ 01:46]    PT/INR: PT 47.5 , INR 4.27       [04-02-18 @ 01:47]  PTT: 46.8       [04-02-18 @ 01:47]    Troponin 0.13      [04-01-18 @ 02:08]  CK 8410      [04-02-18 @ 08:15]  LDH >2250      [04-02-18 @ 01:46]    Creatinine Trend:  SCr 1.71 [04-02 @ 01:46]  SCr 1.79 [04-01 @ 17:32]  SCr 2.32 [04-01 @ 02:08]  SCr 2.19 [03-31 @ 18:32]  SCr 2.44 [03-31 @ 01:41]    Urinalysis - [03-30-18 @ 17:20]      Color Kate / Appearance very cloudy / SG 1.010 / pH 5.0      Gluc Negative / Ketone Trace  / Bili Negative / Urobili 4       Blood Trace / Protein 100 / Leuk Est Trace / Nitrite Negative      RBC 0-2 / WBC 6-10 / Hyaline  / Gran  / Sq Epi  / Non Sq Epi Few / Bacteria Few      HbA1c 7.3      [01-30-18 @ 06:07]  TSH 0.10      [04-01-18 @ 09:34]  Lipid: chol 120, , HDL 18, LDL 78      [03-22-18 @ 05:53]

## 2018-04-02 NOTE — PROGRESS NOTE ADULT - SUBJECTIVE AND OBJECTIVE BOX
ADRIÁN YOO  MRN#:  [id number]    The patient is a 58y Female PMH significant for HTN, hyperlipidemia, NICM, VT on amiodarone, HFrEF, BiV-AICD,  diabetes, cellulitis, severe pulmonary HTN, and prior KEYSHA admitted to CTU after recent admission for exacerbation of CHF with subsequent intubation and shock felt to be cardiogenic as well as possibly infectious, s/p IABP and VA ECMO who was seen, evaluated, & examined with the CTICU staff on rounds and later in the afternoon with a multidisciplinary care plan formulated & implemented.  All available clinical, laboratory, radiographic, pharmacologic, and electrocardiographic data were reviewed & analyzed.      The patient was in the CTICU in critical condition secondary to  1- cardiogenic shock  2- acute hypoxemic respiratory failure  3-acute renal failure  4- Lactic acidosis  5-Left Lower Extremity ischemia  6-Multiorgan dysfunction    cardiogenic shock required ECMO support, IABP, inotropes and pressors  receiving  IV Levophed drip, an IV Vasopressin drip, an IV Epinephrine drip. Invasive hemodynamic monitoring with a central venous and arterial catheter were required for the following of continuous central venous and MAP/BP monitoring to ensure adequate cardiovascular support and to evaluate for & help prevent decompensation      Respiratory status required full ventilatory supportA?C Mode, VA ECMO and inhaled Nitric oxide the following of ABG’s with A-line monitoring, and continuous pulse oximetry monitoring .    Renal failure: continulus renal replacemetn therapy with CVVH ,.    Leg ischemia :Patient developed LE ischemia of the left leg where she has an ECMO cannula. An arterial catheter placed and spliced into the ECMO circuit in order to improve perfusion to the leg. At this point the leg appears to have ongoing ischemia and necrosis.  One set of blood cultures from the transferring institution was positive for Staphylococcus aureus. IV Vancomycin ordered and levels being monitored.    Patient required acute critical care management and I provided 90 minutes of non-continuous care to the patient.  Discussed at length with  and pt spouse;  very poor prognosis; spouse want to wait till his son come back.

## 2018-04-02 NOTE — PROGRESS NOTE ADULT - ASSESSMENT
Patient is a 57 y/o woman with history of CHF, DM2, HTN, asthma who presents with acute CHF requiring support from ECMO and developed anuric ATN requiring renal replacement therapy.

## 2018-04-02 NOTE — CONSULT NOTE ADULT - ASSESSMENT
59 y/o  F w/ h/o HTN, HL, asthma, NICM (EF 20%) s/p CRT-D, VT (on amio), IDDM, who is currently on ECHMO due to heart failure. Patient also have multiorgan failure. CT head showed no acute stroke or any pathology on CT head. Neurology was consulted for prognosis of the patient.     Impression and Plan    Prognosis can not be determined correctly in setting of multiorgan failure     Plan     VEEG   [facility] medical care 57 y/o  F w/ h/o HTN, HL, asthma, NICM (EF 20%) s/p CRT-D, VT (on amio), IDDM, who is currently on ECHMO due to heart failure. Patient also have multiorgan failure. CT head showed no acute stroke or any pathology on CT head. Neurology was consulted for prognosis of the patient.     Impression and Plan    Prognosis can not be determined correctly in setting of multiorgan failure     Plan     routine EEG   cont medical care

## 2018-04-02 NOTE — PROGRESS NOTE ADULT - ASSESSMENT
57 y/o  F w/ h/o HTN, HL, asthma, NICM (EF 20%) s/p CRT-D, VT (on amio), IDDM, who presented to [facility] 3/19 with weight gain/dyspnea/orthopnea in setting of dietary indiscretion and cessation of Entresto as outpatietn due to KEYSHA. Hospital course c/b VT, UTI, worsening renal failure and was started on dobutamine but subsequently continued to decline. Also noted to have GPC clusters bacteremia. Due to worsening deterioration and multiorgan organ failure, ECMO requested and Dr. White placed ECMO at  and transferred patient over on 3/31 morning. Patient also had IABP placed. Hypotensive requiring large doses of pressors but persistent lactic acidosis with evident mottling of LLE at site of ECMO cannula. Unfortunately no [place] available. No family availabe for collateral information. Initial lactate 24. Had twitching and unknown neurologic status so underwent portable NCHCT which showed no acute process. Patient had retrograde cannula placed for limb perfusion however may be in venous system. Over last 24 hours, pressors weaning down and lactate downtrending however continues to have concerns of critical limb ischemia. Vascular consulted with no plan for intervention at this time. Overall, stage D HF, NYHA class IV, INTERMACS 1 with concern of LLE ischemia and persistent multiorgan dysfunction.     1. Vasodilatory/cardiogenic shock  - Complicated by multiorgan dysfunction. LFT's remain elevated but downtrending. Creatinine and lactate slowly improving. Remains critically ill on levofed, vasopressin, levofed and vasopressin. She is maintained on ECMO wtih IABP in place. Blood cultures from 3/30 grew MSSA, for which he is receiving meropenem. Filling pressures remain elevated, and he is making adiquate urine on bumex drip of 2/hr  - WIll continue to wean ECMO as needed      2. LLE ischemia  - Extremity is cold with no pulses by palpation or doppler. There is concern for ischemia and vascular surgery is on board    3. Acute on chronic systolic heart failure ACC/AHA D  - Given vasodilatory shock with multiorgan dysfunction, ACE/BB are being held. Will continue to monitor    4. mental status  -Unclear at this time. No purposeful movements.     Overall poor prognosis 59 y/o  F w/ h/o HTN, HL, asthma, NICM (EF 20%) s/p CRT-D, VT (on amio), IDDM, who presented to [facility] 3/19 with weight gain/dyspnea/orthopnea in setting of dietary indiscretion and cessation of Entresto as outpatietn due to KEYSHA. Hospital course c/b VT, UTI, worsening renal failure and was started on dobutamine but subsequently continued to decline. Also noted to have GPC clusters bacteremia. Due to worsening deterioration and multiorgan organ failure, ECMO requested and Dr. White placed ECMO at  and transferred patient over on 3/31 morning. Patient also had IABP placed. Hypotensive requiring large doses of pressors but persistent lactic acidosis with evident mottling of LLE at site of ECMO cannula. Initial lactate 24. Had twitching and unknown neurologic status so underwent portable NCHCT which showed no acute process. Over last 24 hours, pressors weaning down and lactate downtrending however continues to have concerns of critical limb ischemia. Vascular consulted with no plan for intervention at this time.      1. Vasodilatory/cardiogenic shock  - Complicated by multiorgan dysfunction. LFT's remain elevated but downtrending. Creatinine and lactate slowly improving. Remains critically ill on levofed, vasopressin, levofed and vasopressin. She is maintained on ECMO wtih IABP in place. Blood cultures from 3/30 grew MSSA, for which he is receiving meropenem. Filling pressures remain elevated, and he is making adiquate urine on bumex drip of 2/hr    2. LLE ischemia  - Extremity is cold with no pulses by palpation or doppler. There is concern for ischemia and vascular surgery is on board    3. Acute on chronic systolic heart failure   - Given vasodilatory shock with multiorgan dysfunction Will continue to monitor    4. mental status  -Unclear at this time. No purposeful movements.     Overall poor prognosis

## 2018-04-02 NOTE — CONSULT NOTE ADULT - SUBJECTIVE AND OBJECTIVE BOX
History obtained from medical chart due to patient's clinical status     HPI:    59 y/o  F w/ h/o HTN, HL, asthma, NICM (EF 20%) s/p CRT-D, VT (on amio), IDDM, who presented to [facility] 3/19 with weight gain/dyspnea/orthopnea in setting of dietary indiscretion and cessation of Entresto as outpatient  due to KEYSHA. Hospital course c/b VT, UTI, worsening renal failure and was started on dobutamine but subsequently continued to decline. Also noted to have GPC clusters bacteremia. Due to worsening deterioration and multiorgan organ failure, ECMO requested and Dr. White placed ECMO at  and transferred patient over on 3/31 morning. Patient also had IABP placed. Hypotensive requiring large doses of pressors but persistent lactic acidosis with evident mottling of LLE at site of ECMO cannula.  Had twitching and unknown neurologic status so underwent portable NCHCT which showed no acute process. Patient had retrograde cannula placed for limb perfusion however may be in venous system.       MEDICATIONS  (STANDING):  artificial  tears Solution 1 Drop(s) Both EYES every 4 hours  dextrose 10%. 1000 milliLiter(s) (20 mL/Hr) IV Continuous <Continuous>  Epinephrine 16 milliGRAM(s),Sodium Chloride 0.9% 250 milliLiter(s) 16 milliGRAM(s) (85 mL/Hr) IV Continuous <Continuous>  meropenem  IVPB 500 milliGRAM(s) IV Intermittent every 8 hours  meropenem  IVPB      norepinephrine Infusion 0.2 MICROgram(s)/kG/Min (17.006 mL/Hr) IV Continuous <Continuous>  pantoprazole  Injectable 40 milliGRAM(s) IV Push every 12 hours  vasopressin Infusion 0.1 Unit(s)/Min (6 mL/Hr) IV Continuous <Continuous>    MEDICATIONS  (PRN):      PAST MEDICAL & SURGICAL HISTORY:  Cellulitis  KEYSHA (acute kidney injury)  CHF (congestive heart failure)  Pacemaker  HTN (hypertension)  HLD (hyperlipidemia)  Asthma  DM (diabetes mellitus)  CAD (coronary artery disease)  History of cholecystectomy  Artificial cardiac pacemaker      FAMILY HISTORY:  Family history of other heart disease (Mother)      Allergies    No Known Allergies    Intolerances          SHx - No smoking, No ETOH, No drug abuse      Review of Systems:    unable to obtain     Vital Signs Last 24 Hrs  T(C): 35.6 (02 Apr 2018 12:00), Max: 37.3 (02 Apr 2018 04:00)  T(F): 96.1 (02 Apr 2018 12:00), Max: 99.1 (02 Apr 2018 04:00)  HR: 111 (02 Apr 2018 12:45) (88 - 134)  BP: --  BP(mean): --  RR: 16 (02 Apr 2018 12:45) (5 - 27)  SpO2: 100% (02 Apr 2018 12:45) (95% - 100%)    General Exam:   General appearance: No acute distress                   Neurological Exam:    Mental status : comatose, not following commands, spontaneously moving head    pupil reactive sluggishly, no facial asymmetry  no movement on painful stimulus in all four limbs           Other:    04-02    142  |  101  |  43<H>  ----------------------------<  96  4.9   |  18<L>  |  1.71<H>    Ca    8.1<L>      02 Apr 2018 01:46  Phos  6.6     04-01  Mg     1.8     04-01    TPro  3.9<L>  /  Alb  1.9<L>  /  TBili  13.2<H>  /  DBili  x   /  AST  5809<H>  /  ALT  2389<H>  /  AlkPhos  162<H>  04-02 04-02    142  |  101  |  43<H>  ----------------------------<  96  4.9   |  18<L>  |  1.71<H>    Ca    8.1<L>      02 Apr 2018 01:46  Phos  6.6     04-01  Mg     1.8     04-01    TPro  3.9<L>  /  Alb  1.9<L>  /  TBili  13.2<H>  /  DBili  x   /  AST  5809<H>  /  ALT  2389<H>  /  AlkPhos  162<H>  04-02                          9.6    16.9  )-----------( 86       ( 02 Apr 2018 01:47 )             30.4       Radiology    CT head     < from: CT Head No Cont (03.31.18 @ 09:19) >  IMPRESSION: Limited by motion    No gross evidence for acute intracranial hemorrhage or acute territorial   infarct.        < end of copied text >

## 2018-04-02 NOTE — PROGRESS NOTE ADULT - ASSESSMENT
59 yo female in multisystem organ failure and cardiogenic shock with left lower extremity ischemia. Patient remains critically ill on multiple pressors (levophed and vasopressin), maintained on ECMO with IABP. Patient with an overall poor prognosis.     - Will continue to monitor.   - Wean pressors as tolerated  - Discussed with Vascular surgeon, Dr. Benigno Lowry MD PGY2  Vascular surgery: [id number]

## 2018-04-02 NOTE — PROGRESS NOTE ADULT - PROBLEM SELECTOR PLAN 1
Acute renal failure likely from cardiorenal syndrome type 1 in setting of CHF, possible contribution from septic ATN. On ECMO, pressors and with elevated lactate to 8. On CVVHDF, with CTU team keeping net even at this time. Will c/w CVVHD with zero K baths. Monitor UOP for recovery. Poor prognosis. Pt. with hemodynamically mediated KEYSHA, likely from cardiorenal syndrome in setting of CHF, possible contribution from septis. On ECMO, pressors and with decreasing but still elevated lactate of 8. Pt. on CVVHDF for oliguric KEYSHA with acidosis, pt. tolerating CVVHDF without any episodes of clotting. Pt. remains oliguric, plan to continue CVVHDF with fluid goal to maintain pt's I/Os even. Monitor BMP. Strict I/Os. Avoid nephrotoxins.

## 2018-04-02 NOTE — PROCEDURE NOTE - NSINFORMCONSENT_GEN_A_CORE
Benefits, risks, and possible complications of procedure explained to patient/caregiver who verbalized understanding and gave verbal consent.
This was an emergent procedure.
This was an emergent procedure.

## 2018-04-02 NOTE — PROCEDURE NOTE - NSPOSTPRCRAD_GEN_A_CORE
post-procedure radiography performed/central line located in the superior vena cava
no pneumothorax/central line located in the superior vena cava/post-procedure radiography performed

## 2018-04-03 DIAGNOSIS — R80.9 PROTEINURIA, UNSPECIFIED: ICD-10-CM

## 2018-04-03 DIAGNOSIS — I42.9 CARDIOMYOPATHY, UNSPECIFIED: ICD-10-CM

## 2018-04-03 DIAGNOSIS — Z91.11 PATIENT'S NONCOMPLIANCE WITH DIETARY REGIMEN: ICD-10-CM

## 2018-04-03 DIAGNOSIS — I50.23 ACUTE ON CHRONIC SYSTOLIC (CONGESTIVE) HEART FAILURE: ICD-10-CM

## 2018-04-03 DIAGNOSIS — R06.02 SHORTNESS OF BREATH: ICD-10-CM

## 2018-04-03 DIAGNOSIS — J45.909 UNSPECIFIED ASTHMA, UNCOMPLICATED: ICD-10-CM

## 2018-04-03 DIAGNOSIS — Z95.810 PRESENCE OF AUTOMATIC (IMPLANTABLE) CARDIAC DEFIBRILLATOR: ICD-10-CM

## 2018-04-03 DIAGNOSIS — Z79.899 OTHER LONG TERM (CURRENT) DRUG THERAPY: ICD-10-CM

## 2018-04-03 DIAGNOSIS — I34.0 NONRHEUMATIC MITRAL (VALVE) INSUFFICIENCY: ICD-10-CM

## 2018-04-03 DIAGNOSIS — I13.0 HYPERTENSIVE HEART AND CHRONIC KIDNEY DISEASE WITH HEART FAILURE AND STAGE 1 THROUGH STAGE 4 CHRONIC KIDNEY DISEASE, OR UNSPECIFIED CHRONIC KIDNEY DISEASE: ICD-10-CM

## 2018-04-03 DIAGNOSIS — Z51.5 ENCOUNTER FOR PALLIATIVE CARE: ICD-10-CM

## 2018-04-03 DIAGNOSIS — N18.3 CHRONIC KIDNEY DISEASE, STAGE 3 (MODERATE): ICD-10-CM

## 2018-04-03 DIAGNOSIS — E11.22 TYPE 2 DIABETES MELLITUS WITH DIABETIC CHRONIC KIDNEY DISEASE: ICD-10-CM

## 2018-04-03 DIAGNOSIS — J96.00 ACUTE RESPIRATORY FAILURE, UNSPECIFIED WHETHER WITH HYPOXIA OR HYPERCAPNIA: ICD-10-CM

## 2018-04-03 DIAGNOSIS — G92 TOXIC ENCEPHALOPATHY: ICD-10-CM

## 2018-04-03 DIAGNOSIS — Z79.82 LONG TERM (CURRENT) USE OF ASPIRIN: ICD-10-CM

## 2018-04-03 DIAGNOSIS — I25.10 ATHEROSCLEROTIC HEART DISEASE OF NATIVE CORONARY ARTERY WITHOUT ANGINA PECTORIS: ICD-10-CM

## 2018-04-03 DIAGNOSIS — E88.09 OTHER DISORDERS OF PLASMA-PROTEIN METABOLISM, NOT ELSEWHERE CLASSIFIED: ICD-10-CM

## 2018-04-03 DIAGNOSIS — R06.03 ACUTE RESPIRATORY DISTRESS: ICD-10-CM

## 2018-04-03 DIAGNOSIS — I47.2 VENTRICULAR TACHYCARDIA: ICD-10-CM

## 2018-04-03 DIAGNOSIS — E66.9 OBESITY, UNSPECIFIED: ICD-10-CM

## 2018-04-03 DIAGNOSIS — E78.5 HYPERLIPIDEMIA, UNSPECIFIED: ICD-10-CM

## 2018-04-03 DIAGNOSIS — J96.01 ACUTE RESPIRATORY FAILURE WITH HYPOXIA: ICD-10-CM

## 2018-04-03 LAB
ALBUMIN SERPL ELPH-MCNC: 1.5 G/DL — LOW (ref 3.3–5)
ALP SERPL-CCNC: 237 U/L — HIGH (ref 40–120)
ALT FLD-CCNC: 1751 U/L RC — HIGH (ref 10–45)
AMYLASE P1 CFR SERPL: 198 U/L — HIGH (ref 25–125)
ANION GAP SERPL CALC-SCNC: 24 MMOL/L — HIGH (ref 5–17)
APTT BLD: 55.7 SEC — HIGH (ref 27.5–37.4)
AST SERPL-CCNC: 3570 U/L — HIGH (ref 10–40)
BILIRUB SERPL-MCNC: 13.5 MG/DL — HIGH (ref 0.2–1.2)
BUN SERPL-MCNC: 24 MG/DL — HIGH (ref 7–23)
CALCIUM SERPL-MCNC: 8 MG/DL — LOW (ref 8.4–10.5)
CHLORIDE SERPL-SCNC: 99 MMOL/L — SIGNIFICANT CHANGE UP (ref 96–108)
CK SERPL-CCNC: 9203 U/L — HIGH (ref 25–170)
CO2 SERPL-SCNC: 13 MMOL/L — LOW (ref 22–31)
CREAT SERPL-MCNC: 1.2 MG/DL — SIGNIFICANT CHANGE UP (ref 0.5–1.3)
GAS PNL BLDA: SIGNIFICANT CHANGE UP
GLUCOSE SERPL-MCNC: 61 MG/DL — LOW (ref 70–99)
HCT VFR BLD CALC: 32.5 % — LOW (ref 34.5–45)
HGB BLD-MCNC: 10.2 G/DL — LOW (ref 11.5–15.5)
INR BLD: 2.45 RATIO — HIGH (ref 0.88–1.16)
LIDOCAIN IGE QN: 90 U/L — HIGH (ref 7–60)
MCHC RBC-ENTMCNC: 27.6 PG — SIGNIFICANT CHANGE UP (ref 27–34)
MCHC RBC-ENTMCNC: 31.2 GM/DL — LOW (ref 32–36)
MCV RBC AUTO: 88.3 FL — SIGNIFICANT CHANGE UP (ref 80–100)
PLATELET # BLD AUTO: 62 K/UL — LOW (ref 150–400)
POTASSIUM SERPL-MCNC: 4.3 MMOL/L — SIGNIFICANT CHANGE UP (ref 3.5–5.3)
POTASSIUM SERPL-SCNC: 4.3 MMOL/L — SIGNIFICANT CHANGE UP (ref 3.5–5.3)
PROT SERPL-MCNC: 3.4 G/DL — LOW (ref 6–8.3)
PROTHROM AB SERPL-ACNC: 27.2 SEC — HIGH (ref 9.8–12.7)
RBC # BLD: 3.68 M/UL — LOW (ref 3.8–5.2)
RBC # FLD: 21.8 % — HIGH (ref 10.3–14.5)
SODIUM SERPL-SCNC: 136 MMOL/L — SIGNIFICANT CHANGE UP (ref 135–145)
WBC # BLD: 25.9 K/UL — HIGH (ref 3.8–10.5)
WBC # FLD AUTO: 25.9 K/UL — HIGH (ref 3.8–10.5)

## 2018-04-03 PROCEDURE — 99497 ADVNCD CARE PLAN 30 MIN: CPT | Mod: 25

## 2018-04-03 PROCEDURE — 99233 SBSQ HOSP IP/OBS HIGH 50: CPT | Mod: GC

## 2018-04-03 PROCEDURE — 99291 CRITICAL CARE FIRST HOUR: CPT

## 2018-04-03 PROCEDURE — 95819 EEG AWAKE AND ASLEEP: CPT | Mod: 26

## 2018-04-03 PROCEDURE — 99223 1ST HOSP IP/OBS HIGH 75: CPT | Mod: GC

## 2018-04-03 PROCEDURE — 71045 X-RAY EXAM CHEST 1 VIEW: CPT | Mod: 26

## 2018-04-03 PROCEDURE — 99254 IP/OBS CNSLTJ NEW/EST MOD 60: CPT | Mod: GC

## 2018-04-03 PROCEDURE — 99292 CRITICAL CARE ADDL 30 MIN: CPT

## 2018-04-03 PROCEDURE — 99498 ADVNCD CARE PLAN ADDL 30 MIN: CPT

## 2018-04-03 RX ORDER — SODIUM BICARBONATE 1 MEQ/ML
50 SYRINGE (ML) INTRAVENOUS
Qty: 0 | Refills: 0 | Status: COMPLETED | OUTPATIENT
Start: 2018-04-03 | End: 2018-04-03

## 2018-04-03 RX ORDER — SODIUM BICARBONATE 1 MEQ/ML
50 SYRINGE (ML) INTRAVENOUS ONCE
Qty: 0 | Refills: 0 | Status: COMPLETED | OUTPATIENT
Start: 2018-04-03 | End: 2018-04-03

## 2018-04-03 RX ORDER — SODIUM CHLORIDE 9 MG/ML
1000 INJECTION, SOLUTION INTRAVENOUS
Qty: 0 | Refills: 0 | Status: DISCONTINUED | OUTPATIENT
Start: 2018-04-03 | End: 2018-04-04

## 2018-04-03 RX ORDER — SODIUM BICARBONATE 1 MEQ/ML
100 SYRINGE (ML) INTRAVENOUS ONCE
Qty: 0 | Refills: 0 | Status: DISCONTINUED | OUTPATIENT
Start: 2018-04-03 | End: 2018-04-03

## 2018-04-03 RX ORDER — ALBUMIN HUMAN 25 %
250 VIAL (ML) INTRAVENOUS
Qty: 0 | Refills: 0 | Status: COMPLETED | OUTPATIENT
Start: 2018-04-03 | End: 2018-04-03

## 2018-04-03 RX ORDER — DEXTROSE 10 % IN WATER 10 %
500 INTRAVENOUS SOLUTION INTRAVENOUS
Qty: 0 | Refills: 0 | Status: DISCONTINUED | OUTPATIENT
Start: 2018-04-03 | End: 2018-04-04

## 2018-04-03 RX ORDER — PHENYLEPHRINE HYDROCHLORIDE 10 MG/ML
0.1 INJECTION INTRAVENOUS
Qty: 80 | Refills: 0 | Status: DISCONTINUED | OUTPATIENT
Start: 2018-04-03 | End: 2018-04-04

## 2018-04-03 RX ORDER — SODIUM BICARBONATE 1 MEQ/ML
SYRINGE (ML) INTRAVENOUS
Qty: 0 | Refills: 0 | Status: COMPLETED | OUTPATIENT
Start: 2018-04-03 | End: 2018-04-03

## 2018-04-03 RX ORDER — HYDROMORPHONE HYDROCHLORIDE 2 MG/ML
0.5 INJECTION INTRAMUSCULAR; INTRAVENOUS; SUBCUTANEOUS ONCE
Qty: 0 | Refills: 0 | Status: DISCONTINUED | OUTPATIENT
Start: 2018-04-03 | End: 2018-04-03

## 2018-04-03 RX ORDER — CALCIUM GLUCONATE 100 MG/ML
1 VIAL (ML) INTRAVENOUS ONCE
Qty: 0 | Refills: 0 | Status: COMPLETED | OUTPATIENT
Start: 2018-04-03 | End: 2018-04-03

## 2018-04-03 RX ORDER — POTASSIUM CHLORIDE 20 MEQ
10 PACKET (EA) ORAL
Qty: 0 | Refills: 0 | Status: COMPLETED | OUTPATIENT
Start: 2018-04-03 | End: 2018-04-03

## 2018-04-03 RX ORDER — POTASSIUM CHLORIDE 20 MEQ
10 PACKET (EA) ORAL ONCE
Qty: 0 | Refills: 0 | Status: COMPLETED | OUTPATIENT
Start: 2018-04-03 | End: 2018-04-03

## 2018-04-03 RX ADMIN — MEROPENEM 100 MILLIGRAM(S): 1 INJECTION INTRAVENOUS at 05:29

## 2018-04-03 RX ADMIN — Medication 17.01 MICROGRAM(S)/KG/MIN: at 00:18

## 2018-04-03 RX ADMIN — Medication 50 MILLIEQUIVALENT(S): at 17:51

## 2018-04-03 RX ADMIN — Medication 1 DROP(S): at 10:38

## 2018-04-03 RX ADMIN — Medication 50 MILLILITER(S): at 00:17

## 2018-04-03 RX ADMIN — Medication 50 MILLILITER(S): at 10:37

## 2018-04-03 RX ADMIN — Medication 50 MILLIEQUIVALENT(S): at 16:25

## 2018-04-03 RX ADMIN — MEROPENEM 100 MILLIGRAM(S): 1 INJECTION INTRAVENOUS at 22:13

## 2018-04-03 RX ADMIN — Medication 1 DROP(S): at 01:39

## 2018-04-03 RX ADMIN — MEROPENEM 100 MILLIGRAM(S): 1 INJECTION INTRAVENOUS at 14:39

## 2018-04-03 RX ADMIN — Medication 500 MILLILITER(S): at 16:19

## 2018-04-03 RX ADMIN — Medication 50 MILLIEQUIVALENT(S): at 12:25

## 2018-04-03 RX ADMIN — Medication 50 MILLIEQUIVALENT(S): at 12:20

## 2018-04-03 RX ADMIN — Medication 50 MILLIEQUIVALENT(S): at 10:25

## 2018-04-03 RX ADMIN — Medication 50 MILLIEQUIVALENT(S): at 16:26

## 2018-04-03 RX ADMIN — Medication 500 MILLILITER(S): at 14:30

## 2018-04-03 RX ADMIN — VASOPRESSIN 6 UNIT(S)/MIN: 20 INJECTION INTRAVENOUS at 10:37

## 2018-04-03 RX ADMIN — HYDROMORPHONE HYDROCHLORIDE 0.5 MILLIGRAM(S): 2 INJECTION INTRAMUSCULAR; INTRAVENOUS; SUBCUTANEOUS at 00:15

## 2018-04-03 RX ADMIN — Medication 1 DROP(S): at 14:39

## 2018-04-03 RX ADMIN — Medication 200 GRAM(S): at 20:54

## 2018-04-03 RX ADMIN — Medication 1 DROP(S): at 05:29

## 2018-04-03 RX ADMIN — Medication 50 MILLIEQUIVALENT(S): at 10:20

## 2018-04-03 RX ADMIN — PANTOPRAZOLE SODIUM 40 MILLIGRAM(S): 20 TABLET, DELAYED RELEASE ORAL at 17:53

## 2018-04-03 RX ADMIN — PANTOPRAZOLE SODIUM 40 MILLIGRAM(S): 20 TABLET, DELAYED RELEASE ORAL at 05:29

## 2018-04-03 RX ADMIN — Medication 50 MILLIEQUIVALENT(S): at 06:20

## 2018-04-03 RX ADMIN — Medication 50 MILLIEQUIVALENT(S): at 03:10

## 2018-04-03 RX ADMIN — Medication 50 MILLIEQUIVALENT(S): at 22:11

## 2018-04-03 RX ADMIN — Medication 1 DROP(S): at 17:51

## 2018-04-03 RX ADMIN — VASOPRESSIN 6 UNIT(S)/MIN: 20 INJECTION INTRAVENOUS at 00:18

## 2018-04-03 RX ADMIN — Medication 50 MILLIEQUIVALENT(S): at 10:36

## 2018-04-03 RX ADMIN — Medication 1 DROP(S): at 22:10

## 2018-04-03 RX ADMIN — HYDROMORPHONE HYDROCHLORIDE 0.5 MILLIGRAM(S): 2 INJECTION INTRAMUSCULAR; INTRAVENOUS; SUBCUTANEOUS at 00:30

## 2018-04-03 RX ADMIN — SODIUM CHLORIDE 150 MILLILITER(S): 9 INJECTION, SOLUTION INTRAVENOUS at 10:37

## 2018-04-03 NOTE — PROGRESS NOTE ADULT - PROVIDER SPECIALTY LIST ADULT
CCU
CT Surgery
CT Surgery
CTU
CTU
Critical Care
Heart Failure
Nephrology
Nephrology
Vascular Surgery
CCU

## 2018-04-03 NOTE — EEG REPORT - NS EEG TEXT BOX
ADRIÁN YOO [id number]     Study Date: 		04-03-18    ROUTINE EEG    Technical Information:			  		  Placement and Labeling of Electrodes:  The EEG was performed utilizing 20 channels referential EEG connections (coronal over temporal over parasagittal montage) using all standard 10-20 electrode placements with EKG.  Recording was at a sampling rate of 256 samples per second per channel.  Time synchronized digital video recording was done simultaneously with EEG recording.  A low light infrared camera was used for low light recording.  Gustavo and seizure detection algorithms were utilized.    CSA Technical Component:  Quantitative EEG analysis using a separate Compressed Spectral Array (CSA) software package was conducted in real-time and run at bedside after set up by the technician, digitally displaying the power of electrographic frequencies included in the 1-30Hz band using a graded color map.  This data was reviewed and interpreted independently, and is reported in a separate section below.    --------------------------------------------------------------------------------------------------  History:  CC/ HPI Patient is a 58y old  Female who presents with a chief complaint of cardiogenic shock (31 Mar 2018 03:43), AMS. EEG performed to assess for neurological prognosis.     MEDICATIONS  (STANDING):  artificial  tears Solution 1 Drop(s) Both EYES every 4 hours  dextrose 20%. 500 milliLiter(s) (50 mL/Hr) IV Continuous <Continuous>  dextrose 5% 1000 milliLiter(s) (150 mL/Hr) IV Continuous <Continuous>  Epinephrine 16 milliGRAM(s),Sodium Chloride 0.9% 250 milliLiter(s) 16 milliGRAM(s) (70 mL/Hr) IV Continuous <Continuous>  meropenem  IVPB 500 milliGRAM(s) IV Intermittent every 8 hours  meropenem  IVPB      norepinephrine Infusion 0.2 MICROgram(s)/kG/Min (17.006 mL/Hr) IV Continuous <Continuous>  pantoprazole  Injectable 40 milliGRAM(s) IV Push every 12 hours  vasopressin Infusion 0.1 Unit(s)/Min (6 mL/Hr) IV Continuous <Continuous>    --------------------------------------------------------------------------------------------------  Study Interpretation:    FINDINGS:  The background was continuous, non-variable, non-reactive and suppressed.     Sleep Background:  Drowsiness and Stage II sleep transients were not recorded.    Epileptiform Activity:   No epileptiform discharges were present.    Events:  No clinical events were recorded.  No seizures were recorded.    Activation Procedures:   -Hyperventilation was not performed.    -Photic stimulation was not performed.    Artifacts:  Intermittent myogenic and movement artifacts were noted.    ECG:  The heart rate on single channel ECG was predominantly between 70-90BPM.  -----------------------------------------------------------------------------------------------------    EEG Classification / Summary:  Abnormal EEG in unresponsive patient  -Voltage Suppression     --------------------------------------------------------------------------------------------------    Clinical Impression:  There is severe nonspecific diffuse or multifocal cerebral dysfunction. No epileptiform abnormalities recorded.      -------------------------------------------------------------------------------------------------------  Preliminary Report    Martha Dailey DO  Neurophysiology Fellow ADRIÁN YOO [id number]     Study Date: 		04-03-18    ROUTINE EEG    Technical Information:			  		  Placement and Labeling of Electrodes:  The EEG was performed utilizing 20 channels referential EEG connections (coronal over temporal over parasagittal montage) using all standard 10-20 electrode placements with EKG.  Recording was at a sampling rate of 256 samples per second per channel.  Time synchronized digital video recording was done simultaneously with EEG recording.  A low light infrared camera was used for low light recording.  Gustavo and seizure detection algorithms were utilized.    CSA Technical Component:  Quantitative EEG analysis using a separate Compressed Spectral Array (CSA) software package was conducted in real-time and run at bedside after set up by the technician, digitally displaying the power of electrographic frequencies included in the 1-30Hz band using a graded color map.  This data was reviewed and interpreted independently, and is reported in a separate section below.    --------------------------------------------------------------------------------------------------  History:  CC/ HPI Patient is a 58y old  Female who presents with a chief complaint of cardiogenic shock (31 Mar 2018 03:43), AMS. EEG performed to assess for neurological prognosis.     MEDICATIONS  (STANDING):  artificial  tears Solution 1 Drop(s) Both EYES every 4 hours  dextrose 20%. 500 milliLiter(s) (50 mL/Hr) IV Continuous <Continuous>  dextrose 5% 1000 milliLiter(s) (150 mL/Hr) IV Continuous <Continuous>  Epinephrine 16 milliGRAM(s),Sodium Chloride 0.9% 250 milliLiter(s) 16 milliGRAM(s) (70 mL/Hr) IV Continuous <Continuous>  meropenem  IVPB 500 milliGRAM(s) IV Intermittent every 8 hours  meropenem  IVPB      norepinephrine Infusion 0.2 MICROgram(s)/kG/Min (17.006 mL/Hr) IV Continuous <Continuous>  pantoprazole  Injectable 40 milliGRAM(s) IV Push every 12 hours  vasopressin Infusion 0.1 Unit(s)/Min (6 mL/Hr) IV Continuous <Continuous>    --------------------------------------------------------------------------------------------------  Study Interpretation:    FINDINGS:  The background was continuous, non-variable, non-reactive and suppressed.     Sleep Background:  Drowsiness and Stage II sleep transients were not recorded.    Epileptiform Activity:   No epileptiform discharges were present.    Events:  No clinical events were recorded.  No seizures were recorded.    Activation Procedures:   -Hyperventilation was not performed.    -Photic stimulation was not performed.    Artifacts:  Intermittent myogenic and movement artifacts were noted.    ECG:  The heart rate on single channel ECG was predominantly between 70-90BPM.  -----------------------------------------------------------------------------------------------------    EEG Classification / Summary:  Abnormal EEG in unresponsive patient  -Voltage Suppression     --------------------------------------------------------------------------------------------------    Clinical Impression:  There is severe nonspecific diffuse or multifocal cerebral dysfunction. No epileptiform abnormalities recorded.      -------------------------------------------------------------------------------------------------------  Martha Dailey DO  Neurophysiology Fellow    Jason Newton MD

## 2018-04-03 NOTE — CONSULT NOTE ADULT - PROBLEM SELECTOR RECOMMENDATION 4
Had a lengthy discussion with the son Serafin and  Serafin and Dr. Jasso. As per team patient condition is not improving despite all available and possible interventions. She continues to have multiorgan shutdown and progressive decline clinically and on labs. The hospital is the [facility] excellence in heart failure but her condition seems to be beyond point of return.  Son mentioned that as a mother he has seen her very active and always wanted to do every thing by herself. With her current condition he thinks she would not wanted to live like this but he is not advising to withdraw or withhold any treatment but that is his perception.   mentioned that he would want to return back tomorrow by 2 pm to come up with any decision after discussion with the family what should be done.  Patient remains intubated on life support in the mean while with full code status.

## 2018-04-03 NOTE — CONSULT NOTE ADULT - PROBLEM SELECTOR RECOMMENDATION 9
Patient on ECMO, blood pressure stable on Pressors. Being followed by cardiology. GOC:   Had a lengthy discussion with the patient's son, , Baudilio Russell, and Christopher about the patient's advanced illness and very poor prognosis.  As per team patient condition is not improving despite all available and possible interventions and it seems to be beyond point of return. She continues to have multiorgan failure and progressive decline clinically and on labs.    Son mentioned that his mother was very active and always wanted to do every thing by herself. With her current condition he thinks she may not have wanted to live this way. He indicated he is not advising his father to withdraw or withhold any treatment bu to indicate what his mother's wishes may have been if she were to be able to see herself under the current condition.    mentioned that he would want to return back tomorrow by 2 pm to come up with any decision after discussion with the family what should be done.  Code status was discussed. Lack of benefits of CPR and possible adverse effects of this procedure were explained to the patient's family. However, her  wanted to further discuss with his family before making a decision about her code status.  45' spent in ACP

## 2018-04-03 NOTE — PROGRESS NOTE ADULT - ASSESSMENT
57 yo female in multisystem organ failure and cardiogenic shock with left lower extremity ischemia. Patient remains critically ill on multiple pressors (levophed and vasopressin), maintained on ECMO with IABP. Patient with an overall poor prognosis.     - Pt awaiting evaluation by palliative care  - Will continue to monitor.   - Wean pressors as tolerated    PJ Lowry MD PGY2  Vascular surgery: [id number]

## 2018-04-03 NOTE — PROGRESS NOTE ADULT - SUBJECTIVE AND OBJECTIVE BOX
VASCULAR SURGERY PROGRESS NOTE:   Pager: [phone]    Interim Events: Serum lactate doubled from 8 yesterday to 16 today. RIGHT lower leg now mottled as well with skin sloughing and bullae. Patient not following commands; remains sedated and intubated on ECMO support.         Physical Exam  Vital Signs Last 24 Hrs  T(C): 33.9 (03 Apr 2018 12:00), Max: 36 (02 Apr 2018 16:00)  T(F): 93 (03 Apr 2018 12:00), Max: 96.8 (02 Apr 2018 16:00)  HR: 92 (03 Apr 2018 12:30) (69 - 118)  BP: --  BP(mean): --  RR: 9 (03 Apr 2018 12:30) (0 - 24)  SpO2: 97% (03 Apr 2018 08:34) (81% - 100%)    Gen: intubated, on ECMO,   Abd: Inferior pannus with bullae and mottling.   Ext: Left lower extremity cool to the thigh with bullae and sloughing of the skin. No doppler signals in the left DP/PT. Calf and thigh compartments soft.   RIGHT lower extremity mottled with sloughing skin and bullae    I&O's Detail    02 Apr 2018 07:01  -  03 Apr 2018 07:00  --------------------------------------------------------  IN:    dextrose 10%: 900 mL    dextrose 5%: 150 mL    freetext medication -  Infusion: 890 mL    freetext medication -  Infusion: 900 mL    IV PiggyBack: 100 mL    norepinephrine Infusion: 195 mL    vasopressin Infusion: 144 mL  Total IN: 3279 mL    OUT:    Indwelling Catheter - Urethral: 10 mL    Other: 3220 mL  Total OUT: 3230 mL    Total NET: 49 mL      03 Apr 2018 07:01  -  03 Apr 2018 12:36  --------------------------------------------------------  IN:    dextrose 5%: 750 mL    freetext medication -  Infusion: 350 mL    vasopressin Infusion: 30 mL  Total IN: 1130 mL    OUT:    Other: 1055 mL  Total OUT: 1055 mL    Total NET: 75 mL          Labs:                        10.2   25.9  )-----------( 62       ( 03 Apr 2018 02:42 )             32.5     04-03    136  |  99  |  24<H>  ----------------------------<  61<L>  4.3   |  13<L>  |  1.20    Ca    8.0<L>      03 Apr 2018 02:42    TPro  3.4<L>  /  Alb  1.5<L>  /  TBili  13.5<H>  /  DBili  x   /  AST  3570<H>  /  ALT  1751<H>  /  AlkPhos  237<H>  04-03    PT/INR - ( 03 Apr 2018 02:42 )   PT: 27.2 sec;   INR: 2.45 ratio         PTT - ( 03 Apr 2018 02:42 )  PTT:55.7 sec    ABG - ( 03 Apr 2018 11:56 )  pH: 7.23  /  pCO2: 49    /  pO2: 468   / HCO3: 20    / Base Excess: -7.2  /  SaO2: 100

## 2018-04-03 NOTE — GOALS OF CARE CONVERSATION - PERSONAL ADVANCE DIRECTIVE - CONVERSATION DETAILS
Had a lengthy discussion with the son Serafin and  Serafin and Dr. Jasso. As per team patient condition is not improving despite all available and possible interventions. She continues to have multiorgan shutdown and progressive decline clinically and on labs. [facility] but her condition seems to be beyond point of return.  Son mentioned that as a mother he has seen her very active and always wanted to do every thing by herself. With her current condition he thinks she would not wanted to live like this but he is not advising to withdraw or withhold any treatment but that is his perception.   mentioned that he would want to return back tomorrow by 2 pm to come up with any decision after discussion with the family what should be done.  Patient remains intubated on life support in the mean while with full code status. Had a lengthy discussion with the patient's son, , Baudilio Russell, and Christopher about the patient's advanced illness and very poor prognosis.  As per team patient condition is not improving despite all available and possible interventions and it seems to be beyond point of return. She continues to have multiorgan failure and progressive decline clinically and on labs.    Son mentioned that his mother was very active and always wanted to do every thing by herself. With her current condition he thinks she may not have wanted to live this way. He indicated he is not advising his father to withdraw or withhold any treatment bu to indicate what his mother's wishes may have been if she were to be able to see herself under the current condition.    mentioned that he would want to return back tomorrow by 2 pm to come up with any decision after discussion with the family what should be done.  Code status was discussed. Lack of benefits of CPR and possible adverse effects of this procedure were explained to the patient's family. However, her  wanted to further discuss with his family before making a decision about her code status.  45' spent in ACP.

## 2018-04-03 NOTE — PROGRESS NOTE ADULT - SUBJECTIVE AND OBJECTIVE BOX
CRITICAL CARE ATTENDING - CTICU    MEDICATIONS  (STANDING):  artificial  tears Solution 1 Drop(s) Both EYES every 4 hours  dextrose 20%. 500 milliLiter(s) (50 mL/Hr) IV Continuous <Continuous>  dextrose 5% 1000 milliLiter(s) (150 mL/Hr) IV Continuous <Continuous>  Epinephrine 16 milliGRAM(s),Sodium Chloride 0.9% 250 milliLiter(s) 16 milliGRAM(s) (70 mL/Hr) IV Continuous <Continuous>  meropenem  IVPB 500 milliGRAM(s) IV Intermittent every 8 hours  meropenem  IVPB      norepinephrine Infusion 0.2 MICROgram(s)/kG/Min (17.006 mL/Hr) IV Continuous <Continuous>  pantoprazole  Injectable 40 milliGRAM(s) IV Push every 12 hours  vasopressin Infusion 0.1 Unit(s)/Min (6 mL/Hr) IV Continuous <Continuous>                                    10.2   25.9  )-----------( 62       ( 2018 02:42 )             32.5       04-    136  |  99  |  24<H>  ----------------------------<  61<L>  4.3   |  13<L>  |  1.20    Ca    8.0<L>      2018 02:42    TPro  3.4<L>  /  Alb  1.5<L>  /  TBili  13.5<H>  /  DBili  x   /  AST  3570<H>  /  ALT  1751<H>  /  AlkPhos  237<H>  04-03      PT/INR - ( 2018 02:42 )   PT: 27.2 sec;   INR: 2.45 ratio         PTT - ( 2018 02:42 )  PTT:55.7 sec    Mode: AC/ CMV (Assist Control/ Continuous Mandatory Ventilation)  RR (machine): 16  TV (machine): 400  FiO2: 50  PEEP: 5  ITime: 1  MAP: 11  PIP: 38      Daily     Daily Weight in k.6 (2018 01:15)       @ 07:01  -  -03 @ 07:00  --------------------------------------------------------  IN: 3279 mL / OUT: 3230 mL / NET: 49 mL     @ 07:01   @ 10:43  --------------------------------------------------------  IN: 226 mL / OUT: 239 mL / NET: -13 mL        Critically Ill patient  : [ ] preoperative ,   [x ] post operative    Requires :  [ x] Arterial Line   [x ] Central Line  [x ] PA catheter  [x ] IABP  [x ] ECMO  [ ] LVAD  [ x] Ventilator  [ ] pacemaker [ ] Impella.    Bedside evaluation , monitoring , treatment of hemodynamics , fluids , IVP/ IVCD meds.        Diagnosis:     POD 3 - VA ECMO    Cardiogenic Shock     CHF- acute [x ]   chronic [ x]    systolic [x ]   diatolic [ ]          - Echo- EF - low            [ ] RV dysfunction          - Cxr-cardiomegally, edema          - Clinical-  [ x]inotropes   [ x]pressors   [ ]diuresis   [x ]IABP   [x ]ECMO   [ ]LVAD   [x ]Respiratory Failure    respiratory failure     Requires chest PT, pulmonary toilet, ambu bagging, suctioning to maintain SaO2,  patent airway and treat atelectasis.     Ventilator Management:  [x ]AC-rest    [ ]CPAP-PS Wean    [ ]Trach Collar     [ ]Extubate    [ ] T-Piece  [ ]peep>5     Renal Failure     Hepatic failure    MODS    fluid overload     Clawson Ev catheter interpretation and therapeutic management of unstable hemodynamics     Palliative care consult    Hemodynamic lability,instability. Requires IVCD [x ] vasopressors [ x] inotropes  [ ] vasodilator  [xIVSS fluid  to maintain MAP, perfusion, C.I.     VT    Thrombocytopenia                          -                     Discussed with CT surgeon, Physician's Assistant - Nurse Practitioner- Critical care medicine team.   Dicussed at  AM / PM rounds.   Chart, labs , films reviewed.    Total Time: 120 min.

## 2018-04-03 NOTE — CONSULT NOTE ADULT - SUBJECTIVE AND OBJECTIVE BOX
HPI:  57F, , with PMH significant for HTN, hyperlipidemia, asthma, NICM, VT on amiodarone, HFrEF, BiV-AICD,  diabetes, influenza, cellulitis, severe pulmonary HTN, and KEYSHA on last admission in which diuretics and Entresto were held, referred to [facility] ER for significant weight gain, shortness of breath and orthopnea.  Family endorsed dietary indiscretions with patient non-compliant with sodium restriction.  Outside hospital course significant for +Urine cx colonized with VRE, recurrent VT, KEYSHA, and worsening heart failure weaned off Dobutamine due to dysrhythmia. (31 Mar 2018 03:43)    Interim history:  Patient is admitted to CTU in critical condition with multiorgan failure on life support, CVVH, and ECMO. She is noted to have cardiogenic and septic shock with left leg ischemia. She is off sedation yet not responsive. Palliative care has been called to discuss goals of care.      PERTINENT PMH REVIEWED:  [ ] YES [ ] NO           SOCIAL HISTORY:   Significant other/partner:  [ ] YES  [ ] NO               Children:  [ ] YES  [ ] NO                   Taoist/Spirituality:  Substance hx:  [ ] YES   [ ] NO                   Tobacco hx:  [ ] YES  [ ] NO                       Alcohol hx: [ ] YES  [ ] NO         Home Opioid hx:  [ ] YES  [ ] NO   Living Situation: [ ] Home  [ ] Long term care  [ ] Rehab [ ] Other    FAMILY HISTORY:  Family history of other heart disease (Mother)    [ ] Family history non-contributory     BASELINE (I)ADLs (prior to admission):  [place]: [ ] total  [ ] moderate [ ] dependent    ADVANCE DIRECTIVES:    DNR [ ] YES [ ] NO                            [ ] Completed  MOLST  [ ] YES [ ] NO                      [ ] Completed  Health Care Proxy [ ] YES  [ ] NO   [ ] Completed  Living Will  [ ] YES [ ] NO             [ ] Surrogate  [ ] HCP  [ ] Guardian:                                                                  Phone#:    Allergies    No Known Allergies    Intolerances        MEDICATIONS  (STANDING):  albumin human  5% IVPB 250 milliLiter(s) IV Intermittent every 10 minutes  artificial  tears Solution 1 Drop(s) Both EYES every 4 hours  dextrose 20%. 500 milliLiter(s) (50 mL/Hr) IV Continuous <Continuous>  dextrose 5% 1000 milliLiter(s) (150 mL/Hr) IV Continuous <Continuous>  Epinephrine 16 milliGRAM(s),Sodium Chloride 0.9% 250 milliLiter(s) 16 milliGRAM(s) (70 mL/Hr) IV Continuous <Continuous>  meropenem  IVPB 500 milliGRAM(s) IV Intermittent every 8 hours  meropenem  IVPB      norepinephrine Infusion 0.2 MICROgram(s)/kG/Min (17.006 mL/Hr) IV Continuous <Continuous>  pantoprazole  Injectable 40 milliGRAM(s) IV Push every 12 hours  potassium chloride  10 mEq/50 mL IVPB 10 milliEquivalent(s) IV Intermittent every 1 hour  sodium bicarbonate  Injectable 50 milliEquivalent(s) IV Push once  vasopressin Infusion 0.1 Unit(s)/Min (6 mL/Hr) IV Continuous <Continuous>    MEDICATIONS  (PRN):      PRESENT SYMPTOMS:  Source: [ ] Patient   [ ] Family   [ ] Team     Pain:                        [ ] No [ ] Yes             [ ] Mild [ ] Moderate [ ] Severe    Onset -  Location -  Duration -  Character -  Alleviating/Aggravating -  Radiation -  Timing -      Dyspnea:                [ ] No [ ] Yes             [ ] Mild [ ] Moderate [ ] Severe    Anxiety:                  [ ] No [ ] Yes             [ ] Mild [ ] Moderate [ ] Severe    Fatigue:                  [ ] No [ ] Yes             [ ] Mild [ ] Moderate [ ] Severe    Nausea:                  [ ] No [ ] Yes             [ ] Mild [ ] Moderate [ ] Severe    Loss of appetite:   [ ] No [ ] Yes             [ ] Mild [ ] Moderate [ ] Severe    Constipation:        [ ] No [ ] Yes             [ ] Mild [ ] Moderate [ ] Severe    Other Symptoms:  [ ] All other review of systems negative   [ ] Unable to obtain due to poor mentation     Karnofsky Performance Score/Palliative Performance Status Version 2:         %    PHYSICAL EXAM:  Vital Signs Last 24 Hrs  T(C): 33.5 (03 Apr 2018 15:00), Max: 36 (02 Apr 2018 16:00)  T(F): 92.3 (03 Apr 2018 15:00), Max: 96.8 (02 Apr 2018 16:00)  HR: 71 (03 Apr 2018 15:00) (69 - 116)  BP: --  BP(mean): --  RR: 11 (03 Apr 2018 15:00) (0 - 24)  SpO2: 99% (03 Apr 2018 13:02) (81% - 100%) I&O's Summary    02 Apr 2018 07:01  -  03 Apr 2018 07:00  --------------------------------------------------------  IN: 3279 mL / OUT: 3230 mL / NET: 49 mL    03 Apr 2018 07:01  -  03 Apr 2018 15:17  --------------------------------------------------------  IN: 1858 mL / OUT: 2169 mL / NET: -311 mL        General:  [ ] Alert  [ ] Oriented x      [ ] Lethargic  [ ] Agitated   [ ] Cachexia   [ ] Unarousable  [ ] Verbal  [ ] Non-Verbal    HEENT:  [ ] Normal   [ ] Dry mouth   [ ] ET Tube    [ ] Trach  [ ] Oral lesions    Lungs:   [ ] Clear [ ] Tachypnea  [ ] Audible excessive secretions   [ ] Rhonchi        [ ] Right [ ] Left [ ] Bilateral  [ ] Crackles        [ ] Right [ ] Left [ ] Bilateral  [ ] Wheezing     [ ] Right [ ] Left [ ] Bilateral    Cardiovascular:  [ ] Regular [ ] Irregular [ ] Tachycardia   [ ] Bradycardia  [ ] Murmur [ ] Other    Abdomen: [ ] Soft  [ ] Distended   [ ] +BS  [ ] Non tender [ ] Tender  [ ]PEG   [ ]OGT/ NGT   Last BM:       Genitourinary: [ ] Normal [ ] Incontinent   [ ] Oliguria/Anuria   [ ] Locke    Musculoskeletal:  [ ] Normal   [ ] Weakness  [ ] Bedbound/Wheelchair bound [ ] Edema    Neurological: [ ] No focal deficits  [ ] Cognitive impairment  [ ] Dysphagia [ ] Dysarthria [ ] Paresis [ ] Other     Skin: [ ] Normal   [ ] Pressure ulcer(s)                  [ ] Rash    LABS:                        10.2   25.9  )-----------( 62       ( 03 Apr 2018 02:42 )             32.5     04-03    136  |  99  |  24<H>  ----------------------------<  61<L>  4.3   |  13<L>  |  1.20    Ca    8.0<L>      03 Apr 2018 02:42    TPro  3.4<L>  /  Alb  1.5<L>  /  TBili  13.5<H>  /  DBili  x   /  AST  3570<H>  /  ALT  1751<H>  /  AlkPhos  237<H>  04-03    PT/INR - ( 03 Apr 2018 02:42 )   PT: 27.2 sec;   INR: 2.45 ratio         PTT - ( 03 Apr 2018 02:42 )  PTT:55.7 sec      Shock: [ ] Septic [ ] Cardiogenic [ ] Neurologic [ ] Hypovolemic  Vasopressors x   Inotrophs x     Protein Calorie Malnutrition: [ ] Mild [ ] Moderate [ ] Severe    Oral Intake: [ ] Unable/mouth care only [ ] Minimal [ ] Moderate [ ] Full Capability  Diet: [ ] NPO [ ] Tube feeds [ ] TPN [ ] Other     RADIOLOGY & ADDITIONAL STUDIES:    REFERRALS:   [ ] Chaplaincy  [ ] Hospice  [ ] Child Life  [ ] Social Work  [ ] Case management [ ] Holistic Therapy HPI:  58 year old  woman with PMH significant for HTN, hyperlipidemia, asthma, NICM, VT on amiodarone, HFrEF, BiV-AICD,  diabetes, influenza, cellulitis, severe pulmonary HTN, and KEYSHA on last admission in which diuretics and Entresto were held, referred to [facility] ER for significant weight gain, shortness of breath and orthopnea.  Family endorsed dietary indiscretions with patient non-compliant with sodium restriction.  Outside hospital course significant for +Urine cx colonized with VRE, recurrent VT, KEYSHA, and worsening heart failure weaned off Dobutamine due to dysrhythmia. (31 Mar 2018 03:43)    Interim history:  Patient is admitted to CTU in critical condition with multiorgan failure on life support, CVVH, and ECMO. She is noted to have cardiogenic and septic shock with left leg ischemia. She is off sedation yet not responsive. Palliative care has been called to discuss goals of care.      PERTINENT PMH REVIEWED:  [ X] YES [ ] NO    Patient History:    Past Medical History:  KEYSHA (acute kidney injury)    Asthma    CAD (coronary artery disease)    Cellulitis    CHF (congestive heart failure)    DM (diabetes mellitus)    HLD (hyperlipidemia)    HTN (hypertension)    Pacemaker.     Past Surgical History:  Artificial cardiac pacemaker    History of cholecystectomy.           SOCIAL HISTORY:   Significant other/partner:  [X ] YES  [ ] NO               Children:  [X ] YES  [ ] NO                   Caodaism/Spirituality:  Substance hx:  [ ] YES   [ ] NO                   Tobacco hx:  [ ] YES  [ ] NO / Unable to obtain                      Alcohol hx: [ ] YES  [ ] NO  / Unable to obtain      Home Opioid hx:  [ ] YES  [ ] NO   Living Situation: [X ] Home  [ ] Long term care  [ ] Rehab [ ] Other    FAMILY HISTORY:  Family history of other heart disease (Mother)    [X ] Family history non-contributory     BASELINE (I)ADLs (prior to admission):  [place]: [ X] total  [ ] moderate [ ] dependent    ADVANCE DIRECTIVES:    DNR [ ] YES [X ] NO                            [ ] Completed  MOLST  [ ] YES [X ] NO                      [ ] Completed  Health Care Proxy [X ] YES  [ ] NO   [ ] Completed  Living Will  [ ] YES [ ] NO             [ ] Surrogate  [X ] HCP  [ ] Guardian:                                                                  Phone#: richard [phone] / [phone]      Allergies    No Known Allergies    Intolerances        MEDICATIONS  (STANDING):  albumin human  5% IVPB 250 milliLiter(s) IV Intermittent every 10 minutes  artificial  tears Solution 1 Drop(s) Both EYES every 4 hours  dextrose 20%. 500 milliLiter(s) (50 mL/Hr) IV Continuous <Continuous>  dextrose 5% 1000 milliLiter(s) (150 mL/Hr) IV Continuous <Continuous>  Epinephrine 16 milliGRAM(s),Sodium Chloride 0.9% 250 milliLiter(s) 16 milliGRAM(s) (70 mL/Hr) IV Continuous <Continuous>  meropenem  IVPB 500 milliGRAM(s) IV Intermittent every 8 hours  meropenem  IVPB      norepinephrine Infusion 0.2 MICROgram(s)/kG/Min (17.006 mL/Hr) IV Continuous <Continuous>  pantoprazole  Injectable 40 milliGRAM(s) IV Push every 12 hours  potassium chloride  10 mEq/50 mL IVPB 10 milliEquivalent(s) IV Intermittent every 1 hour  sodium bicarbonate  Injectable 50 milliEquivalent(s) IV Push once  vasopressin Infusion 0.1 Unit(s)/Min (6 mL/Hr) IV Continuous <Continuous>    MEDICATIONS  (PRN):      PRESENT SYMPTOMS:  Source: [ ] Patient   [ X] Family   [ ] Team     Pain:                        [X ] No [ ] Yes             [ ] Mild [ ] Moderate [ ] Severe    Onset -  Location -  Duration -  Character -  Alleviating/Aggravating -  Radiation -  Timing -      Dyspnea:                [X ] No [ ] Yes             [ ] Mild [ ] Moderate [ ] Severe    Anxiety:                  [ X] No [ ] Yes             [ ] Mild [ ] Moderate [ ] Severe    Fatigue:                  [ ] No [ ] Yes             [ ] Mild [ ] Moderate [ ] Severe    Nausea:                  [ ] No [ ] Yes             [ ] Mild [ ] Moderate [ ] Severe    Loss of appetite:   [ ] No [ ] Yes             [ ] Mild [ ] Moderate [ ] Severe    Constipation:        [ ] No [ ] Yes             [ ] Mild [ ] Moderate [ ] Severe    Other Symptoms:  [ ] All other review of systems negative   [X ] Unable to obtain due to poor mentation     Karnofsky Performance Score/Palliative Performance Status Version 2:     10    %    PHYSICAL EXAM:  Vital Signs Last 24 Hrs  T(C): 33.5 (03 Apr 2018 15:00), Max: 36 (02 Apr 2018 16:00)  T(F): 92.3 (03 Apr 2018 15:00), Max: 96.8 (02 Apr 2018 16:00)  HR: 71 (03 Apr 2018 15:00) (69 - 116)  BP: --  BP(mean): --  RR: 11 (03 Apr 2018 15:00) (0 - 24)  SpO2: 99% (03 Apr 2018 13:02) (81% - 100%) I&O's Summary    02 Apr 2018 07:01  -  03 Apr 2018 07:00  --------------------------------------------------------  IN: 3279 mL / OUT: 3230 mL / NET: 49 mL    03 Apr 2018 07:01  -  03 Apr 2018 15:17  --------------------------------------------------------  IN: 1858 mL / OUT: 2169 mL / NET: -311 mL        General:  [ ] Alert  [ ] Oriented x      [ ] Lethargic  [ ] Agitated   [ ] Cachexia   [X ] Unarousable  [ ] Verbal  [ X] Non-Verbal    HEENT:  [ ] Normal   [X ] Dry mouth   [X ] ET Tube    [ ] Trach  [ ] Oral lesions    Lungs:   [X ] Clear [ ] Tachypnea  [ ] Audible excessive secretions   [ ] Rhonchi        [ ] Right [ ] Left [ ] Bilateral  [ ] Crackles        [ ] Right [ ] Left [ ] Bilateral  [ ] Wheezing     [ ] Right [ ] Left [ ] Bilateral    Cardiovascular:  [X ] Regular [ ] Irregular [ ] Tachycardia   [ ] Bradycardia  [ ] Murmur [ ] Other    Abdomen: [ ] Soft  [X ] Distended   [ ] +BS  [ ] Non tender [ ] Tender  [ ]PEG   [ ]OGT/ NGT   Last BM:       Genitourinary: [ ] Normal [ ] Incontinent   [X ] Oliguria/Anuria   [ X] Locke    Musculoskeletal:  [ ] Normal   [ ] Weakness  [ X] Bedbound/Wheelchair bound [X ] Edema    Neurological: [ ] No focal deficits  [ ] Cognitive impairment  [ ] Dysphagia [ ] Dysarthria [ ] Paresis [X ] Other     Skin: [ ] Normal   [X ] Pressure ulcer(s) Bullae on right lower extremity /    discoloration of left lower extremity             [ ] Rash    LABS:                        10.2   25.9  )-----------( 62       ( 03 Apr 2018 02:42 )             32.5     04-03    136  |  99  |  24<H>  ----------------------------<  61<L>  4.3   |  13<L>  |  1.20    Ca    8.0<L>      03 Apr 2018 02:42    TPro  3.4<L>  /  Alb  1.5<L>  /  TBili  13.5<H>  /  DBili  x   /  AST  3570<H>  /  ALT  1751<H>  /  AlkPhos  237<H>  04-03    PT/INR - ( 03 Apr 2018 02:42 )   PT: 27.2 sec;   INR: 2.45 ratio         PTT - ( 03 Apr 2018 02:42 )  PTT:55.7 sec      Shock: [ X] Septic [X ] Cardiogenic [X ] Neurologic [ ] Hypovolemic  Vasopressors x Vasopresor  Inotrophs x Levophed    Protein Calorie Malnutrition: [ ] Mild [ ] Moderate [ X] Severe    Oral Intake: [X ] Unable/mouth care only [ ] Minimal [ ] Moderate [ ] Full Capability  Diet: [X ] NPO [ ] Tube feeds [ ] TPN [ ] Other     RADIOLOGY & ADDITIONAL STUDIES:    REFERRALS:   [ ] Chaplaincy  [ ] Hospice  [ ] Child Life  [ ] Social Work  [ ] Case management [ ] Holistic Therapy HPI:  58 year old  woman with HTN, hyperlipidemia, asthma, NICM, VT on amiodarone, HFrEF, BiV-AICD,  diabetes, influenza, cellulitis, severe pulmonary HTN, and KEYSHA on last admission in which diuretics and Entresto were held, referred to [facility] ER for significant weight gain, shortness of breath and orthopnea.  Family endorsed dietary indiscretions with patient non-compliant with sodium restriction.  Outside hospital course significant for +Urine cx colonized with VRE, recurrent VT, KEYSHA, and worsening heart failure weaned off Dobutamine due to dysrhythmia. (31 Mar 2018 03:43)    Interim history:  Patient is admitted to CTU in critical condition with multiorgan failure on life support, CVVH, and ECMO. She is noted to have cardiogenic and septic shock with left leg ischemia. She is off sedation yet not responsive. Palliative care has been called to discuss goals of care.      PERTINENT PMH REVIEWED:  [ X] YES [ ] NO    Patient History:    Past Medical History:  KEYSHA (acute kidney injury)    Asthma    CAD (coronary artery disease)    Cellulitis    CHF (congestive heart failure)    DM (diabetes mellitus)    HLD (hyperlipidemia)    HTN (hypertension)    Pacemaker.     Past Surgical History:  Artificial cardiac pacemaker    History of cholecystectomy.           SOCIAL HISTORY:   Significant other/partner:  [X ] YES  [ ] NO               Children:  [X ] YES  [ ] NO                   Samaritan/Spirituality:  Substance hx:  [ ] YES   [ ] NO                   Tobacco hx:  [ ] YES  [ ] NO / Unable to obtain                      Alcohol hx: [ ] YES  [ ] NO  / Unable to obtain      Home Opioid hx:  [ ] YES  [ ] NO   Living Situation: [X ] Home  [ ] Long term care  [ ] Rehab [ ] Other    FAMILY HISTORY:  Family history of other heart disease (Mother)    [X ] Family history non-contributory     BASELINE (I)ADLs (prior to admission):  [place]: [ X] total  [ ] moderate [ ] dependent    ADVANCE DIRECTIVES:    DNR [ ] YES [X ] NO                            [ ] Completed  MOLST  [ ] YES [X ] NO                      [ ] Completed  Health Care Proxy [X ] YES  [ ] NO   [ ] Completed  Living Will  [ ] YES [ ] NO             [x ] Surrogate  [ ] HCP  [ ] Guardian:     , YOUSIF                                                             Phone#:  [phone] / [phone]      Allergies    No Known Allergies    Intolerances        MEDICATIONS  (STANDING):  albumin human  5% IVPB 250 milliLiter(s) IV Intermittent every 10 minutes  artificial  tears Solution 1 Drop(s) Both EYES every 4 hours  dextrose 20%. 500 milliLiter(s) (50 mL/Hr) IV Continuous <Continuous>  dextrose 5% 1000 milliLiter(s) (150 mL/Hr) IV Continuous <Continuous>  Epinephrine 16 milliGRAM(s),Sodium Chloride 0.9% 250 milliLiter(s) 16 milliGRAM(s) (70 mL/Hr) IV Continuous <Continuous>  meropenem  IVPB 500 milliGRAM(s) IV Intermittent every 8 hours  meropenem  IVPB      norepinephrine Infusion 0.2 MICROgram(s)/kG/Min (17.006 mL/Hr) IV Continuous <Continuous>  pantoprazole  Injectable 40 milliGRAM(s) IV Push every 12 hours  potassium chloride  10 mEq/50 mL IVPB 10 milliEquivalent(s) IV Intermittent every 1 hour  sodium bicarbonate  Injectable 50 milliEquivalent(s) IV Push once  vasopressin Infusion 0.1 Unit(s)/Min (6 mL/Hr) IV Continuous <Continuous>    MEDICATIONS  (PRN):      PRESENT SYMPTOMS:  Source: [ ] Patient   [ X] Family   [ ] Team     Pain:                        [X ] No [ ] Yes             [ ] Mild [ ] Moderate [ ] Severe    Onset -  Location -  Duration -  Character -  Alleviating/Aggravating -  Radiation -  Timing -      Dyspnea:                [X ] No [ ] Yes             [ ] Mild [ ] Moderate [ ] Severe    Anxiety:                  [ X] No [ ] Yes             [ ] Mild [ ] Moderate [ ] Severe    Fatigue:                  [ ] No [ ] Yes             [ ] Mild [ ] Moderate [ ] Severe    Nausea:                  [ ] No [ ] Yes             [ ] Mild [ ] Moderate [ ] Severe    Loss of appetite:   [ ] No [ ] Yes             [ ] Mild [ ] Moderate [ ] Severe    Constipation:        [ ] No [ ] Yes             [ ] Mild [ ] Moderate [ ] Severe    Other Symptoms:  [ ] All other review of systems negative   [X ] Unable to obtain due to poor mentation     Karnofsky Performance Score/Palliative Performance Status Version 2:     10    %    PHYSICAL EXAM:  Vital Signs Last 24 Hrs  T(C): 33.5 (03 Apr 2018 15:00), Max: 36 (02 Apr 2018 16:00)  T(F): 92.3 (03 Apr 2018 15:00), Max: 96.8 (02 Apr 2018 16:00)  HR: 71 (03 Apr 2018 15:00) (69 - 116)  BP: --  BP(mean): --  RR: 11 (03 Apr 2018 15:00) (0 - 24)  SpO2: 99% (03 Apr 2018 13:02) (81% - 100%) I&O's Summary    02 Apr 2018 07:01  -  03 Apr 2018 07:00  --------------------------------------------------------  IN: 3279 mL / OUT: 3230 mL / NET: 49 mL    03 Apr 2018 07:01  -  03 Apr 2018 15:17  --------------------------------------------------------  IN: 1858 mL / OUT: 2169 mL / NET: -311 mL        General:  [ ] Alert  [ ] Oriented x      [ ] Lethargic  [ ] Agitated   [ ] Cachexia   [X ] Unarousable  [ ] Verbal  [ X] Non-Verbal [x] Ill appearing elderly female.     HEENT:  [ ] Normal   [X ] Dry mouth   [X ] ET Tube    [ ] Trach  [ ] Oral lesions [x] Pupils appeared non responsive and 2 mm OD and 3 mm OS    Lungs:   [X ] Clear [ ] Tachypnea  [ ] Audible excessive secretions   [ ] Rhonchi        [ ] Right [ ] Left [ ] Bilateral  [ ] Crackles        [ ] Right [ ] Left [ ] Bilateral  [ ] Wheezing     [ ] Right [ ] Left [ ] Bilateral    Cardiovascular:  [X ] Regular [ ] Irregular [ ] Tachycardia   [ ] Bradycardia  [ ] Murmur [ ] Other    Abdomen: [ ] Soft  [X ] Distended   [ ] +BS  [ ] Non tender [ ] Tender  [ ]PEG   [ ]OGT/ NGT   Last BM:   3/31    Genitourinary: [ ] Normal [ ] Incontinent   [X ] Oliguria/Anuria   [ X] Locke    Musculoskeletal:  [ ] Normal   [ ] Weakness  [ X] Bedbound/Wheelchair bound [X ] Edema    Neurological: [ ] No focal deficits  [ ] Cognitive impairment  [ ] Dysphagia [ ] Dysarthria [ ] Paresis [X ] Other: Unresponsive.      Skin: [ ] Normal   [X ] Pressure ulcer(s) Bullae on right lower extremity /    discoloration of left lower extremity             [ ] Rash    LABS:                        10.2   25.9  )-----------( 62       ( 03 Apr 2018 02:42 )             32.5     04-03    136  |  99  |  24<H>  ----------------------------<  61<L>  4.3   |  13<L>  |  1.20    Ca    8.0<L>      03 Apr 2018 02:42    TPro  3.4<L>  /  Alb  1.5<L>  /  TBili  13.5<H>  /  DBili  x   /  AST  3570<H>  /  ALT  1751<H>  /  AlkPhos  237<H>  04-03    PT/INR - ( 03 Apr 2018 02:42 )   PT: 27.2 sec;   INR: 2.45 ratio         PTT - ( 03 Apr 2018 02:42 )  PTT:55.7 sec      Shock: [ X] Septic [X ] Cardiogenic [X ] Neurologic [ ] Hypovolemic  Vasopressors x Vasopresor  Inotrophs x Levophed    Protein Calorie Malnutrition: [ ] Mild [ ] Moderate [ X] Severe    Oral Intake: [X ] Unable/mouth care only [ ] Minimal [ ] Moderate [ ] Full Capability  Diet: [X ] NPO [ ] Tube feeds [ ] TPN [ ] Other     RADIOLOGY & ADDITIONAL STUDIES:  < from: CT Head No Cont (03.31.18 @ 09:19) >  EXAM:  CT BRAIN                            PROCEDURE DATE:  03/31/2018    IMPRESSION: Limited by motion    No gross evidence for acute intracranial hemorrhage or acute territorial   infarct.    < end of copied text >    REFERRALS:   [ ] Chaplaincy  [ ] Hospice  [ ] Child Life  [ ] Social Work  [ ] Case management [ ] Holistic Therapy

## 2018-04-03 NOTE — PROGRESS NOTE ADULT - PROBLEM SELECTOR PLAN 1
Pt. with hemodynamically mediated KEYSHA, likely from cardiorenal syndrome in setting of CHF, possible contribution from sepsis. On ECMO, pressors and with increasing lactate to 14. Pt. on CVVHDF for oliguric KEYSHA with acidosis and tolerating CVVHDF without any episodes of clotting. Pt. now anuric. Continue CVVHDF with fluid goal to maintain pt's I/Os even. Monitor BMP. Strict I/Os. Avoid nephrotoxins. Pt. with hemodynamically mediated KEYSHA, likely from cardiorenal syndrome in setting of CHF, possible contribution from sepsis. Pt. on CVVHDF for oliguric KEYSHA with acidosis and tolerating CVVHDF without any episodes of clotting. Pt. remains anuric. Continue CVVHDF with fluid goal to maintain pt's I/Os even. Monitor BMP. Strict I/Os. Avoid nephrotoxins. Pt. awaiting evaluation by palliative care. Overall prognosis guarded.

## 2018-04-03 NOTE — CONSULT NOTE ADULT - PROBLEM SELECTOR RECOMMENDATION 3
Likely multifactorial due to shock from cardiogenic and sepsis, comatose with minimal response. Followed by neurology.

## 2018-04-03 NOTE — CONSULT NOTE ADULT - CONSULT REASON
58 y/o F on ECMO secondary to worsening heart failure/unstable VT/?sepsis with IABP placement, requiring high dose pressor/inotropic support, not following commands off all sedation. 59 y/o F on ECMO secondary to worsening heart failure/unstable VT/?sepsis with IABP placement, requiring high dose pressor/inotropic support, not following commands off all sedation. 57 y/o F on ECMO secondary to worsening heart failure/unstable VT/?sepsis with IABP placement, requiring high dose pressor/inotropic support, not following commands off all sedation. GOC

## 2018-04-03 NOTE — CONSULT NOTE ADULT - ASSESSMENT
58 year chronically ill woman with multiple co-morbids admitted to CTU on life support, CVVH and ECMO. Palliative care consulted for goals of care. 58 year chronically ill woman with multiple co-morbids admitted to CTU with cardiogenic Shock. Patient is on ECMO, IABP, MV, and with maximum doses of pressors. She is unresponsive off of sedations and with worsening Lactate and shock liver,  Palliative care consulted for goals of care.

## 2018-04-04 VITALS — RESPIRATION RATE: 16 BRPM

## 2018-04-04 LAB — GAS PNL BLDA: SIGNIFICANT CHANGE UP

## 2018-04-04 PROCEDURE — P9016: CPT

## 2018-04-04 PROCEDURE — 36430 TRANSFUSION BLD/BLD COMPNT: CPT

## 2018-04-04 PROCEDURE — 84443 ASSAY THYROID STIM HORMONE: CPT

## 2018-04-04 PROCEDURE — 87086 URINE CULTURE/COLONY COUNT: CPT

## 2018-04-04 PROCEDURE — 82150 ASSAY OF AMYLASE: CPT

## 2018-04-04 PROCEDURE — 84132 ASSAY OF SERUM POTASSIUM: CPT

## 2018-04-04 PROCEDURE — 84484 ASSAY OF TROPONIN QUANT: CPT

## 2018-04-04 PROCEDURE — C1889: CPT

## 2018-04-04 PROCEDURE — C1769: CPT

## 2018-04-04 PROCEDURE — 84100 ASSAY OF PHOSPHORUS: CPT

## 2018-04-04 PROCEDURE — 70450 CT HEAD/BRAIN W/O DYE: CPT

## 2018-04-04 PROCEDURE — 93306 TTE W/DOPPLER COMPLETE: CPT

## 2018-04-04 PROCEDURE — C1894: CPT

## 2018-04-04 PROCEDURE — 82553 CREATINE MB FRACTION: CPT

## 2018-04-04 PROCEDURE — 82435 ASSAY OF BLOOD CHLORIDE: CPT

## 2018-04-04 PROCEDURE — 82947 ASSAY GLUCOSE BLOOD QUANT: CPT

## 2018-04-04 PROCEDURE — 85730 THROMBOPLASTIN TIME PARTIAL: CPT

## 2018-04-04 PROCEDURE — 82330 ASSAY OF CALCIUM: CPT

## 2018-04-04 PROCEDURE — 85014 HEMATOCRIT: CPT

## 2018-04-04 PROCEDURE — 85027 COMPLETE CBC AUTOMATED: CPT

## 2018-04-04 PROCEDURE — 82565 ASSAY OF CREATININE: CPT

## 2018-04-04 PROCEDURE — 84295 ASSAY OF SERUM SODIUM: CPT

## 2018-04-04 PROCEDURE — 80202 ASSAY OF VANCOMYCIN: CPT

## 2018-04-04 PROCEDURE — 86900 BLOOD TYPING SEROLOGIC ABO: CPT

## 2018-04-04 PROCEDURE — 86850 RBC ANTIBODY SCREEN: CPT

## 2018-04-04 PROCEDURE — 83690 ASSAY OF LIPASE: CPT

## 2018-04-04 PROCEDURE — P9045: CPT

## 2018-04-04 PROCEDURE — 71045 X-RAY EXAM CHEST 1 VIEW: CPT

## 2018-04-04 PROCEDURE — 80053 COMPREHEN METABOLIC PANEL: CPT

## 2018-04-04 PROCEDURE — 83615 LACTATE (LD) (LDH) ENZYME: CPT

## 2018-04-04 PROCEDURE — 86923 COMPATIBILITY TEST ELECTRIC: CPT

## 2018-04-04 PROCEDURE — 85610 PROTHROMBIN TIME: CPT

## 2018-04-04 PROCEDURE — 86901 BLOOD TYPING SEROLOGIC RH(D): CPT

## 2018-04-04 PROCEDURE — 94799 UNLISTED PULMONARY SVC/PX: CPT

## 2018-04-04 PROCEDURE — 82803 BLOOD GASES ANY COMBINATION: CPT

## 2018-04-04 PROCEDURE — 82533 TOTAL CORTISOL: CPT

## 2018-04-04 PROCEDURE — 82550 ASSAY OF CK (CPK): CPT

## 2018-04-04 PROCEDURE — 83605 ASSAY OF LACTIC ACID: CPT

## 2018-04-04 PROCEDURE — 94003 VENT MGMT INPAT SUBQ DAY: CPT

## 2018-04-04 PROCEDURE — 95819 EEG AWAKE AND ASLEEP: CPT

## 2018-04-04 PROCEDURE — 83735 ASSAY OF MAGNESIUM: CPT

## 2018-04-04 PROCEDURE — 82962 GLUCOSE BLOOD TEST: CPT

## 2018-04-04 RX ORDER — SODIUM BICARBONATE 1 MEQ/ML
50 SYRINGE (ML) INTRAVENOUS ONCE
Qty: 0 | Refills: 0 | Status: COMPLETED | OUTPATIENT
Start: 2018-04-04 | End: 2018-04-04

## 2018-04-04 RX ORDER — CALCIUM GLUCONATE 100 MG/ML
1 VIAL (ML) INTRAVENOUS ONCE
Qty: 0 | Refills: 0 | Status: COMPLETED | OUTPATIENT
Start: 2018-04-04 | End: 2018-04-04

## 2018-04-04 RX ADMIN — Medication 1 DROP(S): at 03:12

## 2018-04-04 RX ADMIN — Medication 1 DROP(S): at 05:40

## 2018-04-04 RX ADMIN — Medication 200 GRAM(S): at 05:39

## 2018-04-04 RX ADMIN — Medication 50 MILLIEQUIVALENT(S): at 05:39

## 2018-04-04 NOTE — PROVIDER CONTACT NOTE (OTHER) - ACTION/TREATMENT ORDERED:
MD Gordillo notified and asked to come assess patient.  will wait on MD and continue to monitor.
2 mg ativan ordered

## 2018-04-04 NOTE — PROVIDER CONTACT NOTE (OTHER) - SITUATION
Patient's family made patient DNR. Patient a.line showed no blood pressure and EKG rhythm showed no HR.

## 2018-04-04 NOTE — CHART NOTE - NSCHARTNOTEFT_GEN_A_CORE
My colleague had previously spoken with the medical examiner this morning, Shazia, who denied this case.  However I was asked to call again because the medical examiner was "busy" when called earlier, and could not take the patients history.  I spoke now with medical examiner Jacqueline, at [phone], who also denied the case.

## 2018-04-04 NOTE — DISCHARGE NOTE FOR THE EXPIRED PATIENT - HOSPITAL COURSE
58 year old female with nonischemic cardiomyopathy was transferred emergently on 3/30/18 with worsening heart failure symptoms for which [facility] was placed.  Patient with suspected sepsis, with worsening renal failure, respiratory failure, multisystem organ failure requiring ventilator support and escalating inotropes and vasopressors.  Patient ultimately  at 5:34am on 18. 58 year old female with nonischemic cardiomyopathy was transferred emergently on 3/30/18 with worsening heart failure symptoms for which [facility] Ecmo was placed.  Patient with suspected sepsis, with worsening renal failure, respiratory failure, multisystem organ failure requiring ventilator support and escalating inotropes and vasopressors.  Patient ultimately  at 5:34am on 18.  The medical examiner's office was contacted and patient does not qualify for medical examiner case. 58 year old female with nonischemic cardiomyopathy was transferred emergently on 3/30/18 with worsening heart failure symptoms for which [facility] Ecmo was placed.  Patient with suspected sepsis, with worsening renal failure, respiratory failure, multisystem organ failure requiring ventilator support and escalating inotropes and vasopressors.  Patient ultimately  at 5:34am on 18.  The medical examiner's office was contacted and patient does not qualify for medical examiner case.  Family refused autopsy.

## 2018-04-05 PROBLEM — Z00.00 ENCOUNTER FOR PREVENTIVE HEALTH EXAMINATION: Noted: 2018-04-05

## 2018-05-01 ENCOUNTER — APPOINTMENT (OUTPATIENT)
Dept: ELECTROPHYSIOLOGY | Facility: CLINIC | Age: 58
End: 2018-05-01

## 2018-05-06 NOTE — ED PROVIDER NOTE - NS ED NOTE AC HIGH RISK COUNTRIES
Return to the Emergency department for any worsening or failure to improve, otherwise follow up with your primary care provider.     No

## 2018-05-15 NOTE — ED PROVIDER NOTE - NS_EDPROVIDERDISPOUSERTYPE_ED_A_ED
PHYSICAL THERAPY - DAILY TREATMENT NOTE    Patient Name: Raj Santiago        Date: 5/15/2018  : 1981   YES Patient  Verified  Visit #:     Insurance: Payor: SELF PAY / Plan: [facility] SELF PAY / Product Type: Self Pay /      In time: 10:40 Out time: 11:35   Total Treatment Time: 55     Medicare Time Tracking (below)   Total Timed Codes (min):  na 1:1 Treatment Time:  na     TREATMENT AREA =  Chronic neck and back pain [M54.2, M54.9]    SUBJECTIVE  Pain Level (on 0 to 10 scale):  6  / 10   Medication Changes/New allergies or changes in medical history, any new surgeries or procedures? NO    If yes, update Summary List   Subjective Functional Status/Changes:  []  No changes reported     It still hurts to sit for a while           OBJECTIVE  35 min Therapeutic Exercise:  [x]  See flow sheet   Rationale:      increase ROM, increase strength and improve coordination to improve the patients ability to perform pain free ADLs.        20 min Manual Therapy: STM/DTM  lumbar and thoracic paraspinals, glute/piriformis.     Rationale:      decrease pain, increase ROM, increase tissue extensibility and decrease trigger points to improve patient's ability to perform pain free ADLs. min Patient Education:  YES  Reviewed HEP   []  Progressed/Changed HEP based on:         Other Objective/Functional Measures:    Cont to have sig TTP at lumbar para     Post Treatment Pain Level (on 0 to 10) scale:   4  / 10     ASSESSMENT  Assessment/Changes in Function:     Good ratna to all Rx without increase in pain      []  See Progress Note/Recertification   Patient will continue to benefit from skilled PT services to modify and progress therapeutic interventions, address functional mobility deficits, address ROM deficits, address strength deficits, analyze and address soft tissue restrictions, analyze and cue movement patterns, analyze and modify body mechanics/ergonomics and assess and modify postural abnormalities to attain remaining goals.    Progress toward goals / Updated goals:    Slow progress with pain reduction      PLAN  []  Upgrade activities as tolerated YES Continue plan of care   []  Discharge due to :    []  Other:      Therapist: Candis Good, PT, OCS, SCS, CSCS    Date: 5/15/2018 Time: 9:57 AM       Future Appointments  Date Time Provider Barry Ndiaye   5/15/2018 10:30 AM Nikolay Cosme PT [facility]   5/17/2018 10:00 AM Nikolay Cosme PT [facility]   5/22/2018 11:30 AM Nikolay Cosme PT [facility]   5/24/2018 8:00 AM Nikolay Cosme PT [facility]   5/29/2018 11:00 AM Tristen Wesley PTA [facility]   5/31/2018 11:30 AM Tristen Wesley PTA [facility] Scribe Attestation (For Scribes USE Only)... Attending Attestation (For Attendings USE Only).../Scribe Attestation (For Scribes USE Only)...

## 2018-06-11 NOTE — PATIENT PROFILE ADULT. - FUNCTIONAL SCREEN CURRENT LEVEL: DRESSING, MLM
Gen: no loss of wt no loss of appetite  ENT: mild dizziness no hearing loss  Ophth: no blurring of vision no loss of vision  Resp: No cough no sputum production  CVS: see above HPI   GI: no N/V/D  : no dysuria, hematuria  Endo: no polyuria no excessive sweating  Neuro: no weakness no paresthesias  Psych: No suicidal no depressive ideation  Heme: No petechiae no easy bruising  Msk: No joint pain no swelling  Skin: No rash no itching  All other ROS negative (0) independent

## 2018-10-30 NOTE — ED ADULT NURSE NOTE - CHPI ED SYMPTOMS POS
87 y/o F with hx of HTN, HLD, CVA with LUE weakness, breast ca s/p L mastectomy presents with fall. She got up from the couch without the walked and fell to the floor. She did not hit head or had LOC. She was unable to get up from there and stayed on the floor until the AM when her brother found her. Denies fever, chills, cough, chest pain, sob, abdominal pain, nausea, vomiting, melena, hematochezia, LE edema, or dysuria. COUGH 89 y/o F with hx of HTN, HLD, CVA with LUE weakness, breast ca s/p L mastectomy presents with fall. She got up from the couch without the walker and fell to the floor. She did not hit head or had LOC. She was unable to get up from there and stayed on the floor until the AM when her brother found her. Denies fever, chills, cough, chest pain, sob, abdominal pain, nausea, vomiting, melena, hematochezia, LE edema, or dysuria.

## 2018-11-21 NOTE — ED ADULT NURSE NOTE - DOES PATIENT HAVE ADVANCE DIRECTIVE
Chronic pain of both knees Small bowel obstruction Small bowel obstruction Small bowel obstruction Small bowel obstruction No

## 2018-11-24 NOTE — ED STATDOCS - DR. NAME
palpitations. Gastrointestinal: Negative for abdominal distention, abdominal pain, anal bleeding, blood in stool, constipation, diarrhea, nausea and vomiting. Musculoskeletal: Negative for neck pain. Skin: Negative for rash. Neurological: Negative for headaches. Objective:   /80 (Site: Left Upper Arm, Position: Sitting, Cuff Size: Small Adult)   Pulse 70   Temp 97.1 °F (36.2 °C) (Temporal)   Resp 20   Ht 5' 4\" (1.626 m)   Wt 137 lb (62.1 kg)   SpO2 98%   BMI 23.52 kg/m²     Physical Exam   Constitutional: She is oriented to person, place, and time. Vital signs are normal. She appears well-developed and well-nourished. She is active. No distress. HENT:   Head: Normocephalic and atraumatic. Right Ear: External ear and ear canal normal. No drainage, swelling or tenderness. Tympanic membrane is erythematous. A middle ear effusion is present. Left Ear: Tympanic membrane, external ear and ear canal normal.   Nose: Mucosal edema and rhinorrhea present. No sinus tenderness. Right sinus exhibits no maxillary sinus tenderness and no frontal sinus tenderness. Left sinus exhibits no maxillary sinus tenderness and no frontal sinus tenderness. Mouth/Throat: Uvula is midline and mucous membranes are normal. Posterior oropharyngeal erythema present. No oropharyngeal exudate or posterior oropharyngeal edema. Eyes: Conjunctivae and lids are normal.   Neck: Normal range of motion and full passive range of motion without pain. Cardiovascular: Normal rate, regular rhythm, S1 normal, S2 normal, normal heart sounds, intact distal pulses and normal pulses. No murmur heard. Pulmonary/Chest: Effort normal and breath sounds normal. No accessory muscle usage. No respiratory distress. She has no decreased breath sounds. She has no wheezes. She has no rhonchi. She has no rales. Lymphadenopathy:     She has cervical adenopathy. Neurological: She is alert and oriented to person, place, and time.    Skin: desires to proceed. Pt instructions reviewed and given to patient.     Close follow up to evaluate treatment resultsand for coordination of care. I have reviewed the patient's medical historyin detail and updated the computerized patient record.     Nikhil Christiansen, ARMANDO - CNP [place]

## 2018-12-14 NOTE — PROVIDER CONTACT NOTE (CRITICAL VALUE NOTIFICATION) - ASSESSMENT
pt high dose vasopressors, ecmo, cvvhd with serial high lactate negative Soft, non-tender, no hepatosplenomegaly, normal bowel sounds

## 2018-12-28 NOTE — PATIENT PROFILE ADULT. - FALLEN IN THE PAST
December 30, 2018      [facility] - Otorhinolaryngology  [address]  [facility] [address]  Phone: [phone]  Fax: [phone]          Patient: Laura Sanford   MR Number: [id number]   YOB: 2005   Date of Visit: 12/28/2018       Dear Dr. Ye:    Thank you for referring Laura Sanford to me for evaluation. Attached you will find relevant portions of my assessment and plan of care.    If you have questions, please do not hesitate to call me. I look forward to following Laura Sanford along with you.    Sincerely,    Kemal Buchanan MD    Enclosure  CC:  No Recipients    If you would like to receive this communication electronically, please contact [email or web address] or [phone] to request more information on [facility] access.    For providers and/or their staff who would like to refer a patient to [facility], please contact us through our one-stop-shop provider referral line, Karin Banda, at [phone].    If you feel you have received this communication in error or would no longer like to receive these types of communications, please e-mail [email or web address]          yes

## 2019-02-20 NOTE — PROGRESS NOTE ADULT - SUBJECTIVE AND OBJECTIVE BOX
HPI:  58yo female with pmh  non obstructive CAD, NIIDM, hyperlipidemia,s/p AICD placement, asthma, systolic  CHF LVEF 20% by an echocardiogram in June 2017 presented with complains of dizziness and syncope. Patient's AICD evaluation demonstrated episodes of ventricular tachycardia and ventricular  fibrillation, she is status post AICD discharged on admission. She was evaluated by electrophysiologist and patient was started on amiodarone. Due to history of shortness of breath, episode of ventricular tachycardia and fibrillation  , She underwent cardiac catheterization and she has normal coronaries. She denies any chest pain or shortness of breath. She continues to be normal sinus rhythm with premature ventricular beats on telemetry. She is being loaded with amiodarone.,       PAST MEDICAL & SURGICAL HISTORY:  HTN (hypertension)  HLD (hyperlipidemia)  Asthma  DM (diabetes mellitus)  CAD (coronary artery disease) - non obstructive  Chr systolic CHF  Non ischemic CMPHPI:  58yo female with pmh CAD, NIIDM, hyperlipidemia, PPM, asthma, CHF (type unknown) presented 2 days ago to ED with respiratory distress and was dc from ED after initial treatment. She had been out of her asthma medications. Now presenting with wheezing, dyspnea and 4 pillow orthopnea, lower ext edema for 3 days. Not using some of her meds because she ran out. Also admits to dietary indiscretions.     Cardiac Cath Lab - Adult 08.23.17    Angiographic Findings     Cardiac Arteries and Lesion Findings    LMCA: Normal.    LAD: Normal.    LCx: Normal.    RCA: Normal.     Impression     Diagnostic Conclusions     Normal coronaries. Very elevated LVEDP    < end of copied text >      Echo  from: Transthoracic Echocardiogram 06.07.17    Summary     The left ventricle is severely dilated with severe global hypokinesis.   Estimated left ventricular ejection fraction is 20 % via bi-plane method.   Left atriumis moderately dilated.   The right atrium appears moderately dilated. A device wire is seen in the   RV and RA.   The right ventricle is normal in size and contractility.   The aortic valve appears normal.   There is thickening of both mitral valve leaflet tips. The leaflet   opening   is normal. Moderate mitral regurgitation is present based on a calculated   ERO of .32cm2.   The tricuspid valve leaflets are thin and pliable. Moderate (2+)   tricuspid   valve regurgitation is present. Moderate pulmonary hypertension.   Trace pericardial effusion is present.    MEDICATIONS  (STANDING):  sodium chloride 0.9% lock flush 3 milliLiter(s) IV Push every 8 hours  buDESOnide 160 MICROgram(s)/formoterol 4.5 MICROgram(s) Inhaler 1 Puff(s) Inhalation two times a day  heparin  Injectable 5000 Unit(s) SubCutaneous every 8 hours  insulin lispro (HumaLOG) corrective regimen sliding scale   SubCutaneous three times a day before meals  dextrose 5%. 1000 milliLiter(s) (50 mL/Hr) IV Continuous <Continuous>  dextrose 50% Injectable 12.5 Gram(s) IV Push once  spironolactone 25 milliGRAM(s) Oral daily  metoprolol 50 milliGRAM(s) Oral two times a day  amiodarone    Tablet 400 milliGRAM(s) Oral two times a day  atorvastatin 20 milliGRAM(s) Oral at bedtime  sacubitril 24 mG/valsartan 26 mG 1 Tablet(s) Oral two times a day    MEDICATIONS  (PRN):  ALBUTerol    90 MICROgram(s) HFA Inhaler 2 Puff(s) Inhalation every 6 hours PRN Shortness of Breath  acetaminophen   Tablet. 650 milliGRAM(s) Oral every 6 hours PRN Moderate Pain (4 - 6)  ibuprofen  Tablet 400 milliGRAM(s) Oral every 6 hours PRN Mild pain  senna 2 Tablet(s) Oral at bedtime PRN Constipation  zolpidem 5 milliGRAM(s) Oral at bedtime PRN Insomnia  dextrose Gel 1 Dose(s) Oral once PRN Blood Glucose LESS THAN 70 milliGRAM(s)/deciliter  glucagon  Injectable 1 milliGRAM(s) IntraMuscular once PRN Glucose LESS THAN 70 milligrams/deciliter          REVIEW OF SYSTEM:  Pertinent items are noted in HPI.  Constitutional	Negative for chills, fevers, sweats.    Eyes: 	Negative for visual disturbance.  Ears, nose, mouth, throat, and face: Negative for epistaxis, nasal congestion, sore throat and tinnitus.  Neck:	Negative for enlargement, pain and difficulty in swallowing  Respiration : Negative for cough,  pleuritic chest pain and wheezing  Cardiovascular: Negative for chest pain  and palpitations , she has dyspnea on exertion.    Gastrointestinal : Negative for abdominal pain, diarrhea, nausea and vomiting  Genitourinary: Negative for dysuria, frequency and urinary incontinence .  Skin: Negative for  rash, pruritus, swelling, dryness .  	  Hematologic/lymphatic: Negative for bleeding and easy bruising  Musculoskeletal: Negative for arthralgias, back pain and muscle weakness.  Neurological: Negative for dizziness, headaches, seizures and tremors  Behavioral/Psych: Negative for mood change, depression.  Endocrine:	Negative for blurry vision, polydipsia and polyuria, diaphoresis.   Allergic/Immunologic:	Negative for anaphylaxis, angioedema and urticaria.      Vital Signs Last 24 Hrs  T(C): 36.7 (24 Aug 2017 05:03), Max: 36.8 (23 Aug 2017 20:54)  T(F): 98 (24 Aug 2017 05:03), Max: 98.3 (23 Aug 2017 20:54)  HR: 75 (24 Aug 2017 08:28) (70 - 115)  BP: 118/83 (24 Aug 2017 05:03) (97/74 - 141/94)  BP(mean): 91 (24 Aug 2017 05:03) (91 - 91)  RR: 16 (24 Aug 2017 05:03) (15 - 18)  SpO2: 100% (24 Aug 2017 05:03) (96% - 100%)    L      PHYSICAL EXAM  General Appearance: cooperative, no acute distress,   HEENT: PERRL, conjunctiva clear, EOM's intact, non injected pharynx, no exudate.  Neck: Supple, , no adenopathy, thyroid: not enlarged, no carotid bruit or JVD  Back: Symmetric, no  tenderness,no soft tissue tenderness  Lungs: Clear to auscultation bilateral,no adventitious breath sounds, normal   expiratory phase  Heart: Regular rate and rhythm, S1, S2 normal, grade 1/6 holosystolic murmur,  no rub or gallop  Abdomen: Soft, non-tender, bowel sounds active , no hepatosplenomegaly  Extremities: no cyanosis or edema, no joint swelling , right wrist normal  Skin: Skin color, texture normal, no rashes   Neurologic: Alert and oriented X3 , cranial nerves intact, sensory and motor normal,        INTERPRETATION OF TELEMETRY: NSR PVCS          LABS:                          14.2   12.2  )-----------( 223      ( 24 Aug 2017 05:56 )             43.6     08-24    139  |  103  |  26<H>  ----------------------------<  148<H>  4.7   |  27  |  0.86    Ca    9.9      24 Aug 2017 05:56 No

## 2019-03-12 NOTE — PHYSICAL THERAPY INITIAL EVALUATION ADULT - MODALITIES TREATMENT COMMENTS
Addended by: LIBRADO BANGURA on: 3/12/2019 11:56 AM     Modules accepted: Orders     Patient without motor or balance deficits. Patient independent in functional mobility, no skilled need in this setting.  May ambulate per nursing.  Will d/c from PT. RN informed.

## 2019-05-20 NOTE — ED PROVIDER NOTE - LOCATION
[facility] ENCOUNTER  GI OFFICE VISIT PROGRESS  NOTE      PCP: Dahiana Velasco MD    CHIEF COMPLAINT:  Abdominal Pain (RUQ pain off and on for 4 years)       SUBJECTIVE:    Elvira Randolph is a 60 year old female seen today for follow up on RUQ pain, nausea, decreased appetite. Had episode in march after drinking beer and eating high fat food. EGD was normal.     Severe pain returned on May 12th after eating trail mix with peanuts, M&Ms and raisins. She also notes they were out celebrating her daughter and had 4 alcoholic drinks. The pain began acutely early morning in the right shoulder blade and progressed to RUQ. Associated nausea, no emesis. Pain persisted for next 3 days, unable to eat, food made worse. Checked lab and amylase, lipase, cbc, cmp were all normal.  Denies any diarrhea. No melena or hematochezia.     S/p lap aston. No NSAIDs.     MEDICATIONS:    Current Outpatient Medications   Medication Sig Dispense Refill   • XARELTO 20 MG Tab TAKE 1 TABLET BY MOUTH EVERY DAY WITH DINNER 30 tablet 5   • Azelastine HCl 137 MCG/SPRAY Solution SPRAY 2 SPRAYS IN EACH NOSTRIL 2 TIMES DAILY. USE IN EACH NOSTRIL AS DIRECTED 3 Bottle 0   • atorvastatin (LIPITOR) 20 MG tablet TAKE 1 TABLET BY MOUTH EVERY DAY 90 tablet 1   • Magnesium 400 MG Tab Take 2 tablets by mouth daily.      • bimatoprost (LATISSE) 0.03 % topical solution Apply topically nightly. 3 mL 11   • PROAIR  (90 BASE) MCG/ACT inhaler INHALE 2 PUFFS INTO THE LUNGS 4 TIMES DAILY AS NEEDED FOR SHORTNRESS OF BREATH OR WHEEZING 8.5 Inhaler 0   • Probiotic Product (PROBIOTIC DAILY) Cap      • Cholecalciferol (VITAMIN D) 2000 UNITS Cap      • fluticasone (FLONASE) 50 MCG/ACT nasal spray Spray 2 sprays in each nostril daily. (Patient taking differently: Spray 2 sprays in each nostril as needed. ) 16 g 1   • Cyanocobalamin (B-12) 1000 MCG SUBL Place  under the tongue every other day.     • hyoscyamine (LEVSIN SL) 0.125 MG sublingual tablet Place 1 tablet  under the tongue every 4 hours as needed for Cramping. 10 tablet 0   • pantoprazole (PROTONIX) 40 MG tablet Take 1 tablet by mouth daily. 60 tablet 5     No current facility-administered medications for this visit.        PROBLEM LIST:    Patient Active Problem List   Diagnosis   • Seasonal allergies   • Phlebitis and thrombophlebitis of other deep vessels of lower extremities   • Pulmonary embolism (CMS/HCC)   • Lung nodule       HISTORIES:    ALLERGIES:   Allergen Reactions   • Amoxicillin DIARRHEA   • Nickel RASH     Past Medical History:   Diagnosis Date   • Blood clot associated with vein wall inflammation     calf and pulmonary   • Blood clot in vein    • Cervical polyp    • Cystocele    • DVT (deep venous thrombosis) (CMS/HCC)    • Female stress incontinence    • Hemangioma of liver    • Hyperlipidemia    • Menorrhagia    • Migraine headache    • Pulmonary embolism (CMS/HCC)    • Rectocele    • Sleep apnea     uses CPAP   • Thyroid nodule    • Vitamin B 12 deficiency      Past Surgical History:   Procedure Laterality Date   • Cholecystectomy     • Cystocele repair  06/06/2012   • Cystourethroscopy  03/09/2015   • Hysterectomy     • Laparoscopy,lysis of adhesions  06/12/2015   • Repair of rectocele  06/12/2015    Xenform graft   • Service to gastroenterology  2014    egd/colonoscopy   • Sling oper stres incontinence  06/06/2012   • Total laparoscopic hysterect  06/06/2012    with BSO     Social History     Tobacco Use   • Smoking status: Never Smoker   • Smokeless tobacco: Never Used   Substance Use Topics   • Alcohol use: Yes     Alcohol/week: 0.0 oz     Comment: occasioanl beer   • Drug use: No     Family History   Problem Relation Age of Onset   • Kidney disease Mother    • Heart disease Mother    • Hypertension Mother          REVIEW OF SYSTEMS:    All other systems are reviewed and are negative except as documented in the history of present illness.    PHYSICAL EXAM:    Vital Signs: Blood pressure 122/75,  pulse 73, height 5' 5\" (1.651 m), weight 79.9 kg.  General: The patient is well developed, well nourished, in no acute distress, appears stated age.   Psychiatric: Alert. Normal mood and affect, normal speech, no gross tremor.  Skin: Warm and dry, normal turgor.  Head: Normocephalic.  Eyes: Normal conjunctivae and sclerae.  Pupils equal, round.  Extraocular movements intact.  HEENT: Oropharynx clear, moist mucous membranes, external ear and nose are normal to inspection.  Neck: Symmetric without swelling or tenderness. No cervical lymphadenopathy.  Respiratory: Respiratory effort normal.   Clear to auscultation bilaterally.  Cardiovascular: Regular rate and rhythm.   Extremities: No swelling or tenderness.  No edema.  Gastrointestinal:  Soft and nontender.  Normal bowel sounds.  No hepatomegaly or splenomegaly.       LABORATORY DATA:    Lab Results   Component Value Date    WBC 7.9 05/16/2019    HGB 14.2 05/16/2019    HCT 43.4 05/16/2019     05/16/2019    MCV 90.6 05/16/2019    and   Lab Results   Component Value Date    SODIUM 141 05/16/2019    POTASSIUM 3.9 05/16/2019    BUN 23 (H) 05/16/2019    CREATININE 0.90 05/16/2019    CALCIUM 9.0 05/16/2019    ALBUMIN 3.4 (L) 05/16/2019    BILIRUBIN 0.5 05/16/2019    ALKPT 86 05/16/2019    AST 24 05/16/2019    GPT 29 05/16/2019     Lab Results   Component Value Date    LIPA 176 05/16/2019       IMAGING STUDIES:     CT scheduled for next week.     ENDOSCOPY:  EGD: normal, bx normal.     ASSESSMENT:    R10.11 Abdominal pain, RUQ (right upper quadrant)  (primary encounter diagnosis)    PLAN:   Check CT scan  No alcohol or high fat foods   Depending on results may need EUS    Instructions provided as documented in the after visit summary.  The patient indicated understanding of the diagnosis and agreed with the plan of care.     COPY TO:  MD Carmen Norton APNP    SIGNED:  JULIEN Becerril  5/20/2019  2:06 PM         bilat LE

## 2019-12-13 NOTE — ED PROVIDER NOTE - INTERPRETATION
Addended by: ERICH BAINS on: 12/13/2019 08:22 AM     Modules accepted: Orders     pacemaker: good capture, regular rhythm and appropriate intervals.

## 2020-01-07 NOTE — ED PROVIDER NOTE - NS ED NOTE AC HIGH RISK COUNTRIES
I have seen and examined the patient. discussed with  Dr childress and agree with the findings and plan as documented.  Non toxic appearing, no surrouding cellultis, oral abx and fu with gen surgery.   No

## 2020-05-08 NOTE — CDI QUERY NOTE - NSCDIOTHERTXTBX_GEN_ALL_CORE_HH
1) Na+ level on 1/30 = 131  Please clarify if this lab result is indicative of a diagnosis ?    2) Creat= 1.63 > 2.13  GFR= 35 > 25  BUN= 49 > 72    Please clarify if the above lab values are indicative of a diagnosis ?  a) KEYSHA ?  b) KEYSHA on CKD ? ( if hx of CKD please stage )  c) no clinical indication of KEYSHA or CKD ?  d) Other ? Please clarify
You can access the FollowMyHealth Patient Portal offered by [facility] by registering at the following website: [email or web address]. By joining [facility]’s FollowMyHealth portal, you will also be able to view your health information using other applications (apps) compatible with our system.

## 2020-06-28 NOTE — ED PROVIDER NOTE - SOCIAL CONCERNS
[facility] Hospitalist History & Physical   6/27/2020 10:32 PM   Assessment and Plan:        1. Acute Encephalopathy / Visual Hallucinations; Likely 2/2 UTI/acute urinary retention. Less likely polypharmacy, primary psychiatric disease, [place] dementia, stroke, seizures, hypercapnea, hepatic encephalopathy   a. CT head showed: no acute findings. Ammonia wnl. Mixed Blood Gas unremarkable. b. Immediate 1300mL UOP with change of engel  c. UTI management as below, if no resolution of AMS with treatement of UTI, consider MRI. 2. Acute Complicated UTI / BPH with indwelling engel, with acute retenion  a. Frequent UTIs 3-4 since dec 2019  b. UA ([facility]): 2+ bacteria, WBC 21-30, moderate LE, neg Nitrites  c. Resume Finasteride, Tamsulosin. Pend UA/Cx. Ceftriaxone 1g q24hr and PO linezolid (for hx VRE UTI). Change engel. 3. Acute decompensated HFmrEF 45% / CAD s/p MAI LCx (2015)   a. 3-4+ pitting edema BLE (new per daughter), Denies SOB. b. CXR shows prominent interstitium with likely mild pulmonary edema. BNP 3k  c. 80 IV lasix ordered. Resume Bumex 1mg BID, BB. Daily weights. Unclear why no ACEi (consider adding with resolution of EFREN). Pend repeat echo. 4. EFREN on CKD stage 3b: BUN:Cr 17. Likely multifactorial with cardiorenal + obstructive   a. Strict I&Os, monitor with BMP. Diuresis per above. 5. Left scrotal Pain / Bilateral Hydrocele with pseudo septations  a. US scrotum shows prominent amount of hydrocele formation on the right, moderate hydrocele on the left. Fluid on the right appears clear, fluid on the left appears to be markedly complex with multiple pseudo-septations through it.  b. Consult Urology for ? Need for drainage of hydroceles. 6. 3rd Deg Heart Block s/p pacemaker (2010): noted  7. IDDM type 2, controlled: Continue home lantus at reduced dose, medium dose SSI. Goal BG of 140-180 while hospitalized. Hypoglycemia order set. Accucheck ACHS. 8. GERD: Resume PPI   9.  Chronic back pain: Hold flexeril, PRN tramadol while altered. Resume as needed. 10. Left nonhealing diabetic heal ulcer s/p AKA (2017): noted        CC: Altered MS  HPI: Edd Pacheco is an 82year old white male nonsmoker with PMH of BPH (with chronic indwelling engel since 2017), CKD stage 3, HLD, hiatal hernia, CAD s/p MAI to LCx (), IDDM type 2 who presented to [facility] ED via EMS from [facility] on 20 c/o altered mental status x 2 days with associated hallucinations, testicular swelling (seen in the ED on 20), dec UOP per nursing home. He has a hx of frequent UTIs and was started on bactrim for UTI on 20. Per wife/daughter, at baseline he is oriented with no confusion. 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 Family also notes that the LE edema was no present 2 days ago when he was taken to the ED for scrotal pain. On interview, patient is unable to tell me why he is at the hospital and intermittently responds to external stimuli, but is oriented to person, , year, president, month, place. He endorses scrotal pain with. Denies fever, abdominal pain, SOB, CP, lightheadedness. In the ED, vitals show: HR 68, /75, 92% on RA and afebrile. Labs show: wbc 12.4, hgb 10.8, MCV 94.2, plt 298, cr 1.9, BUN 34, BUN: Cr 18, Ca 8.2, LFTs unremarkable, lactic 1.1, UA shows 2+ bacteria, WBC 21-30, moderate LE, neg Nitrites, moderate hematuria, trace proteinuria. CXR shows no acute findings. Stable mild bibasilar atelectasis. US scrotom shows prominent amount of hydrocele formation on the right, moderate hydrocele on the left. Fluid on the right appears clear, fluid on the left appears to be markedly complex with multiple pseudo-septations through it. ROS: A 14 point ROS was   PMH:  Per HPI  SHX:  Never smoked. Rare (yearly) EtOH use. Denies drug use. FHX: Mother: CA. Father: DM. Brother: CA.    Allergies: NKDA  Medications:    Current Facility-Administered Medications   Medication Dose Route Frequency Provider Last Rate Last Dose    aspirin chewable tablet 81 mg  81 mg Oral Daily [facility] [place]        atorvastatin (LIPITOR) tablet 20 mg  20 mg Oral Nightly [employer] [place]        insulin glargine (LANTUS;BASAGLAR) injection pen 75 Units  75 Units Subcutaneous Daily [employer], [place]        bumetanide (BUMEX) tablet 1 mg  1 mg Oral BID [facility] [place]        clopidogrel (PLAVIX) tablet 75 mg  75 mg Oral Daily [employer] [place]        cyclobenzaprine (FLEXERIL) tablet 10 mg  10 mg Oral Daily [employer], [place]        docusate sodium (COLACE) capsule 100 mg  100 mg Oral Daily PRN LORIE Chen        finasteride (PROSCAR) tablet 5 mg  5 mg Oral Daily [employer] [place]        gabapentin (NEURONTIN) capsule 300 mg  300 mg Oral TID [employer] [place]        isosorbide mononitrate (IMDUR) extended release tablet 30 mg  30 mg Oral Daily [employer] [place]        metoprolol succinate (TOPROL XL) extended release tablet 50 mg  50 mg Oral Daily [employer] [place]        tamsulosin (FLOMAX) capsule 0.4 mg  0.4 mg Oral Nightly [employer] [place]        pantoprazole (PROTONIX) tablet 40 mg  40 mg Oral Nightly [employer] [place]        traMADol (ULTRAM) tablet 50 mg  50 mg Oral Q6H PRN [place]        cefTRIAXone (ROCEPHIN) 1 g IVPB in 50 mL D5W minibag  1 g Intravenous Q24H [facility] [place]        0.9 % sodium chloride infusion   Intravenous Continuous [employer] [place] 75 mL/hr at 06/28/20 0108      sodium chloride flush 0.9 % injection 10 mL  10 mL Intravenous 2 times per day [employer] [place]        sodium chloride flush 0.9 % injection 10 mL  10 mL Intravenous PRN [facility] [place]        acetaminophen (TYLENOL) tablet 650 mg  650 mg Oral Q6H PRN Tonia Chen        Or    acetaminophen (TYLENOL) suppository 650 mg  650 mg Rectal Q6H PRN LORIE Chen        promethazine (PHENERGAN) tablet 12.5 mg  12.5 mg Oral Q6H changes to RLE shin with mild erythema and desquamation without warmth. Psych:  Alert and oriented x3. Affect very confused, responding to intermittently responding to stimuli. Lymph:  No supraclavicular or cervical adenopathy. Neurologic: No focal deficits. No Seizures. Data:   Labs:   Results for orders placed or performed during the hospital encounter of 06/27/20   CBC auto differential   Result Value Ref Range    WBC 11.9 (H) 4.8 - 10.8 thou/mm3    RBC 3.61 (L) 4.70 - 6.10 mill/mm3    Hemoglobin 10.8 (L) 14.0 - 18.0 gm/dl    Hematocrit 35.7 (L) 42.0 - 52.0 %    MCV 98.9 (H) 80.0 - 94.0 fL    MCH 29.9 26.0 - 33.0 pg    MCHC 30.3 (L) 32.2 - 35.5 gm/dl    RDW-CV 14.6 (H) 11.5 - 14.5 %    RDW-SD 52.3 (H) 35.0 - 45.0 fL    Platelets 521 674 - 139 thou/mm3    MPV 9.7 9.4 - 12.4 fL    Seg Neutrophils 61.5 %    Lymphocytes 26.2 %    Monocytes 8.0 %    Eosinophils 2.9 %    Basophils 0.6 %    Immature Granulocytes 0.8 %    Segs Absolute 7.3 1.8 - 7.7 thou/mm3    Lymphocytes Absolute 3.1 1.0 - 4.8 thou/mm3    Monocytes Absolute 1.0 0.4 - 1.3 thou/mm3    Eosinophils Absolute 0.3 0.0 - 0.4 thou/mm3    Basophils Absolute 0.1 0.0 - 0.1 thou/mm3    Immature Grans (Abs) 0.09 (H) 0.00 - 0.07 thou/mm3    nRBC 0 /100 wbc   POCT Glucose   Result Value Ref Range    POC Glucose 60 (L) 70 - 108 mg/dl       EKG / Radiology:     EKG:  Reviewed by me: Ventricular paced rhythm    CXR:   Reviewed by me: interstitial infiltrates consistent with CHF     No results found.     Case and Plan discussed with Veena Delgado MD  Electronically signed by Tonia Pandey on 6/27/2020 at 11:32 PM None

## 2020-08-05 NOTE — PATIENT PROFILE ADULT. - NS TRANSFER PATIENT BELONGINGS
Mr Adams came by the office today. He is concerned about his daughter who is having frequent urination.    He told me she goes about 19 times a day.    Mr Adams  asked when you will be able to come out and see her again ?    I gave him a hat and specimen container just in case you wanted to add an order to have her urine checked.    Please advise.  Mr Adams [phone]         Jewelry/Money (specify)/Im[employer]Rx smart phone, iphone plus, purse, hemanth gold watch, hoop earrings, 3 pairs of stud, ring, 3 bracelets, gold necklace, clothes, medications/Other belongings/Wrist Watch/Cell Phone/PDA (specify)/Clothing

## 2021-01-09 NOTE — ED ADULT NURSE NOTE - CHIEF COMPLAINT QUOTE
BIBA c/o SOB after albuterol inhaler with no improvement, c/o cough and chest congestion. HX DM, CHF, pacemaker
room air

## 2021-07-14 NOTE — H&P ADULT - CONSTITUTIONAL DETAILS
obese/no distress/well-developed/well-nourished/well-groomed
Goal: Pt will complete toilet transfer with modified independence in 2-3 sessions.

## 2021-08-09 NOTE — PROCEDURE NOTE - NSANESTHESIA_GEN_A_CORE
1% lidocaine
Presents to ER with complaints of right ring finger pain. PT states he had a machine at work explode and he feels he may have oil under his finger. Pt denies pain, but states his finger is numb.      Sonal Beltran RN  08/09/21 3236
1% lidocaine
2% lidocaine

## 2022-01-03 NOTE — PROCEDURE NOTE - INTERROGATION NOTE: UNDERLYING RHYTHM
SR, Optivol elevated :  Advise low Na diet, daily weights and limit fluid intake to 1500 mls daily
Yes

## 2022-03-28 NOTE — PROGRESS NOTE ADULT - SUBJECTIVE AND OBJECTIVE BOX
ECMO Day# 2    Vital Signs Last 24 Hrs  T(C): 35.6 (31 Mar 2018 08:00), Max: 38.6 (30 Mar 2018 09:30)  T(F): 96.1 (31 Mar 2018 08:00), Max: 101.5 (30 Mar 2018 09:30)  HR: 92 (31 Mar 2018 08:30) (71 - 106)  BP: 117/66 (30 Mar 2018 22:00) (83/51 - 153/92)  BP(mean): 79 (30 Mar 2018 22:00) (56 - 108)  RR: 20 (31 Mar 2018 08:30) (9 - 51)  SpO2: 91% (31 Mar 2018 08:30) (68% - 100%)      Adult Advanced Hemodynamics Last 24 Hrs  CVP(mm Hg): 37 (31 Mar 2018 08:30) (30 - 42)  CVP(cm H2O): --  CO: --  CI: --  PA: --  PA(mean): --  PCWP: --  SVR: --  SVRI: --  PVR: --  PVRI: --    I&O's Summary    30 Mar 2018 07:01  -  31 Mar 2018 07:00  --------------------------------------------------------  IN: 4715.8 mL / OUT: 730 mL / NET: 3985.8 mL    31 Mar 2018 07:01  -  31 Mar 2018 08:57  --------------------------------------------------------  IN: 1005.9 mL / OUT: 60 mL / NET: 945.9 mL          ECMO SETTINGS:  Type:		[ ] Venovenous		[x ] Venoarterial  Cannulation Site(s): Femoral Artery and Vein    Flow: 4.1 LPM 	      RPM: 4000    ABG - ( 31 Mar 2018 06:36 )  pH: 7.06  /  pCO2: 34    /  pO2: 120   / HCO3: 9     / Base Excess: -19.2 /  SaO2: 96                  Oxygenator:	Sweep: 2.5    L/min	FiO2:    100%      VENTILATOR SETTINGS:     Mode: AC/ CMV (Assist Control/ Continuous Mandatory Ventilation)  RR (machine): 16  TV (machine): 400  FiO2: 60  PEEP: 5  ITime: 1  MAP: 10  PIP: 38        LABS:                          9.9    26.9  )-----------( 212      ( 31 Mar 2018 01:41 )             34.2                          03-31    140  |  91<L>  |  58<H>  ----------------------------<  65<L>  5.5<H>   |  5<LL>  |  2.44<H>    Ca    7.5<L>      31 Mar 2018 01:41  Phos  7.5     03-31  Mg     2.3     03-31    TPro  5.4<L>  /  Alb  2.1<L>  /  TBili  4.3<H>  /  DBili  x   /  AST  2595<H>  /  ALT  821<H>  /  AlkPhos  171<H>  03-31      LIVER FUNCTIONS - ( 31 Mar 2018 01:41 )  Alb: 2.1 g/dL / Pro: 5.4 g/dL / ALK PHOS: 171 U/L / ALT: 821 U/L RC / AST: 2595 U/L / GGT: x             PT/INR - ( 31 Mar 2018 07:17 )   PT: 29.0 sec;   INR: 2.61 ratio         PTT - ( 31 Mar 2018 07:17 )  PTT:51.3 sec      Culture - Blood (collected 30 Mar 2018 05:22)  Source: .Blood None  Gram Stain (31 Mar 2018 00:51):    Growth in aerobic bottle: Gram Positive Cocci in Clusters    Growth in anaerobic bottle: Gram Positive Cocci in Clusters  Preliminary Report (31 Mar 2018 00:51):    Growth in aerobic bottle: Gram Positive Cocci in Clusters    "Due to technical problems, Proteus sp. will Not be reported as part of    the BCID panel until further notice"    ***Blood Panel PCR results on this specimen are available    approximately 3 hours after the Gram stain result.***    Gram stain, PCR, and/or culture results may not always    correspond due to difference in methodologies.    ************************************************************    This PCR assay was performed using cicayda.    The following targets are tested for: Enterococcus,    vancomycin resistant enterococci, Listeria monocytogenes,    coagulase negative staphylococci, S. aureus,    methicillin resistant S. aureus, Streptococcus agalactiae    (Group B), S. pneumoniae, S.pyogenes (Group A),    Acinetobacter baumannii, Enterobacter cloacae, E. coli,    Klebsiella oxytoca, K. pneumoniae, Proteus sp.,    Serratia marcescens, Haemophilus influenzae,    Neisseria meningitidis, Pseudomonas aeruginosa, Candida    albicans, C. glabrata, C krusei, C parapsilosis,    C. tropicalis and the KPC resistance gene.    Growth in anaerobic bottle: Gram Positive Cocci in Clusters  Organism: Blood Culture PCR (30 Mar 2018 21:46)  Organism: Blood Culture PCR (30 Mar 2018 21:46)          ACTIVE MEDS:    MEDICATIONS  (STANDING):  amiodarone Infusion 1 mG/Min (33.333 mL/Hr) IV Continuous <Continuous>  dextrose 5% 1000 milliLiter(s) (150 mL/Hr) IV Continuous <Continuous>  DOBUTamine Infusion 5 MICROgram(s)/kG/Min (6.803 mL/Hr) IV Continuous <Continuous>  Epinephrine 16 milliGRAM(s),Sodium Chloride 0.9% 250 milliLiter(s) 16 milliGRAM(s) (85 mL/Hr) IV Continuous <Continuous>  meropenem  IVPB 500 milliGRAM(s) IV Intermittent once  meropenem  IVPB 500 milliGRAM(s) IV Intermittent every 8 hours  meropenem  IVPB      norepinephrine Infusion 0.2 MICROgram(s)/kG/Min (17.006 mL/Hr) IV Continuous <Continuous>  pantoprazole  Injectable 40 milliGRAM(s) IV Push every 12 hours  phenylephrine    Infusion 2 MICROgram(s)/kG/Min (34.013 mL/Hr) IV Continuous <Continuous>  sodium bicarbonate  Injectable 50 milliEquivalent(s) IV Push every 10 minutes  vasopressin Infusion 0.1 Unit(s)/Min (6 mL/Hr) IV Continuous <Continuous>      Assessment/Plan: VA ECMO support with continued high pressor/inotrope requirement, refractory lactic acidosis, critical limb ischemia, and unclear neurologic status. Will consult vascular surgery to assess limb ischemia for possible fasciotomy. Possible portable head CT. Methotrexate Pregnancy And Lactation Text: This medication is Pregnancy Category X and is known to cause fetal harm. This medication is excreted in breast milk.

## 2022-04-15 NOTE — ED PROVIDER NOTE - NORMAL, MLM
justin all pertinent systems normal Topical Retinoid counseling:  Patient advised to apply a pea-sized amount only at bedtime and wait 30 minutes after washing their face before applying.  If too drying, patient may add a non-comedogenic moisturizer. The patient verbalized understanding of the proper use and possible adverse effects of retinoids.  All of the patient's questions and concerns were addressed.

## 2022-07-06 NOTE — PROGRESS NOTE ADULT - SUBJECTIVE AND OBJECTIVE BOX
[facility] DIVISION OF KIDNEY DISEASES AND HYPERTENSION -- FOLLOW UP NOTE  --------------------------------------------------------------------------------  Chief Complaint:  renal failure    24 hour events/subjective:  Pt seen in CTU. Remains on ECMO and CVV. No acute issues with CVV overnight.       PAST HISTORY  --------------------------------------------------------------------------------  No significant changes to PMH, PSH, FHx, SHx, unless otherwise noted    ALLERGIES & MEDICATIONS  --------------------------------------------------------------------------------  Allergies    No Known Allergies    Intolerances      Standing Inpatient Medications  artificial  tears Solution 1 Drop(s) Both EYES every 4 hours  dextrose 20%. 500 milliLiter(s) IV Continuous <Continuous>  dextrose 5% 1000 milliLiter(s) IV Continuous <Continuous>  Epinephrine 16 milliGRAM(s),Sodium Chloride 0.9% 250 milliLiter(s) 16 milliGRAM(s) IV Continuous <Continuous>  meropenem  IVPB 500 milliGRAM(s) IV Intermittent every 8 hours  meropenem  IVPB      norepinephrine Infusion 0.2 MICROgram(s)/kG/Min IV Continuous <Continuous>  pantoprazole  Injectable 40 milliGRAM(s) IV Push every 12 hours  vasopressin Infusion 0.1 Unit(s)/Min IV Continuous <Continuous>    PRN Inpatient Medications      REVIEW OF SYSTEMS  --------------------------------------------------------------------------------  Unable to assess due to clinical condition.      VITALS/PHYSICAL EXAM  --------------------------------------------------------------------------------  T(C): 34.3 (04-03-18 @ 08:00), Max: 36 (04-02-18 @ 16:00)  HR: 83 (04-03-18 @ 11:00) (69 - 118)  BP: --  RR: 13 (04-03-18 @ 11:00) (3 - 24)  SpO2: 97% (04-03-18 @ 08:34) (81% - 100%)  Wt(kg): --        04-02-18 @ 07:01  -  04-03-18 @ 07:00  --------------------------------------------------------  IN: 3279 mL / OUT: 3230 mL / NET: 49 mL    04-03-18 @ 07:01  -  04-03-18 @ 11:17  --------------------------------------------------------  IN: 226 mL / OUT: 239 mL / NET: -13 mL      Physical Exam:  	Gen: NAD, +ECMO, +nitric oxide, anasarca  	HEENT: +ET tube to vent  	Pulm: CTA B/L  	CV: no rub  	Abd: +BS, soft, obese abdomen  	UE: Warm, ++edema  	LE: Warm, +edema  	Neuro: eyes open, but no purposeful movement  	Skin: Warm, mottled  	Vascular access: left IJ non-tunneled HD catheter - no drainage or erythema     LABS/STUDIES  --------------------------------------------------------------------------------              10.2   25.9  >-----------<  62       [04-03-18 @ 02:42]              32.5     136  |  99  |  24  ----------------------------<  61      [04-03-18 @ 02:42]  4.3   |  13  |  1.20        Ca     8.0     [04-03-18 @ 02:42]    TPro  3.4  /  Alb  1.5  /  TBili  13.5  /  DBili  x   /  AST  3570  /  ALT  1751  /  AlkPhos  237  [04-03-18 @ 02:42]    PT/INR: PT 27.2 , INR 2.45       [04-03-18 @ 02:42]  PTT: 55.7       [04-03-18 @ 02:42]    CK 9203      [04-03-18 @ 02:42]  LDH >2250      [04-02-18 @ 01:46]    Creatinine Trend:  SCr 1.20 [04-03 @ 02:42]  SCr 1.71 [04-02 @ 01:46]  SCr 1.79 [04-01 @ 17:32]  SCr 2.32 [04-01 @ 02:08]  SCr 2.19 [03-31 @ 18:32]    Urinalysis - [03-30-18 @ 17:20]      Color Kate / Appearance very cloudy / SG 1.010 / pH 5.0      Gluc Negative / Ketone Trace  / Bili Negative / Urobili 4       Blood Trace / Protein 100 / Leuk Est Trace / Nitrite Negative      RBC 0-2 / WBC 6-10 / Hyaline  / Gran  / Sq Epi  / Non Sq Epi Few / Bacteria Few      HbA1c 7.3      [01-30-18 @ 06:07]  TSH 0.10      [04-01-18 @ 09:34]  Lipid: chol 120, , HDL 18, LDL 78      [03-22-18 @ 05:53] PAST MEDICAL HISTORY:  Aortic valve replaced     Atrial fibrillation     CAD (coronary artery disease)     Carotid arterial disease     Colon cancer     Constipation     Diverticulosis     Endocarditis     High cholesterol     HTN (hypertension)     Hypothyroidism     Internal hemorrhoids     Iron deficiency anemia     PUD (peptic ulcer disease)

## 2022-12-02 NOTE — DISCHARGE NOTE ADULT - PLAN OF CARE
150 resolved same as before stable 2 new prescriptions for you to take 2 new prescriptions for you to take; strict diabetic diet; off metformin for 2 days because of the contrast you received; if glucose elevated>300 come to ER

## 2022-12-29 NOTE — ED ADULT NURSE NOTE - CINV DISCH TEACH PARTICIP
Show Applicator Variable?: Yes Consent: The patient's consent was obtained including but not limited to risks of crusting, scabbing, blistering, scarring, darker or lighter pigmentary change, recurrence, incomplete removal and infection. Detail Level: Detailed Application Tool (Optional): Liquid Nitrogen Sprayer Render Note In Bullet Format When Appropriate: No Number Of Freeze-Thaw Cycles: 1 freeze-thaw cycle Post-Care Instructions: I reviewed with the patient in detail post-care instructions. Patient is to wear sunprotection, and avoid picking at any of the treated lesions. Pt may apply Vaseline to crusted or scabbing areas. Duration Of Freeze Thaw-Cycle (Seconds): 5 Patient

## 2023-01-09 NOTE — PROVIDER CONTACT NOTE (CHANGE IN STATUS NOTIFICATION) - RECOMMENDATIONS
Try Voltaren gel (diclofenac) and/or lidocaine gel/patches. Continue to use ice and heat as needed.    Call [phone] if you don't hear from the GI or urology team.     called asked come see pt.

## 2023-07-15 NOTE — PROGRESS NOTE ADULT - ASSESSMENT
57F.  admitted 02/20/18.  resently admitted to  02/05-02/08 for asthma and HF exacerbation.  presents to ED due to leg swelling.  onset 2 weeks prior to admission.  reports not responding to diuretics.  developed pain and redness in her calves and feet.    PMHx:  HFrEF-AICD, VT;  CAD;  type 2 DM;  HTN;  hyperlipidemia;  asthma.    b/l LE cellulitis.  -resistant and gram negative organisms are possible.  -b/l LE venous dopplers, (-) DVT.  ABX changed to po doxy on2/23 but put back on vanco + rocephin from 2/24 sec to fever and low BP  -ID following, discussed with Dr. Gonsalves 2/24, 2/25      Hypotensive episode + CHF  hold Lasix for SBP less than 100  Hold Toprol for SBP less than 120  d/c Entresto as per dr. Stevens  add Zaroxolyn 5 mg po daily      prolonged QTc.  hx VT.  -c/w telemetry monitoring.  -keep potassium >=4.0 and magnesium >=2.0.; check in am again  -EP interrogated AICD; normal  daily EKG    hypokalemia.  -monitor/supplement; 4.2     mild acute upon chronic HFrEF- AICD.  -2+ LE pitting edema,   -BNP 3.5K.  -CXR, mild pulmonary vascular congestion.  -Lasix changed to 40mg po bid from 2/24 d/t low BP.   -Cardiology following.  add Zaroxolyn    hx type 2 DM.  -hold oral hypoglycemics.  -correction insulin coverage.    hx asthma.  -stable.  -c/w LABA/ICS.    DVT prophylaxis.  -UFH sq.    poc discussed with pt, her  at bedside, team; Dr. Stevens 5

## 2023-10-05 NOTE — ASU PREOP CHECKLIST - BP NONINVASIVE SYSTOLIC (MM HG)
118 Cimzia Counseling:  I discussed with the patient the risks of Cimzia including but not limited to immunosuppression, allergic reactions and infections.  The patient understands that monitoring is required including a PPD at baseline and must alert us or the primary physician if symptoms of infection or other concerning signs are noted.

## 2024-04-08 NOTE — PATIENT PROFILE ADULT. - SURGICAL SITE INCISION
----- Message from Aleida Garza sent at 4/8/2024  8:40 AM CDT -----  Regarding: Nausea  Contact: [phone]  I have tried both.  I have been taking Zofran and I’m nauseous, no appetite.  I haven’t vomited.  Wondering if there’s another option.   no

## 2024-06-28 NOTE — PATIENT PROFILE ADULT. - NS PRO MODE OF ARRIVAL
Called patient to confirm appointment for 7/1/24. Patient verbally confirmed appointment.   stretcher

## 2024-10-30 NOTE — H&P ADULT - FAMILY HISTORY
Mother  Still living? No  Family history of other heart disease, Age at diagnosis: Age Unknown Patient with complaints of chronic pain in the R knee joint, diagnosed with OA, s/p R TKR.

## 2024-11-13 NOTE — ED ADULT NURSE NOTE - PATIENT/CAREGIVER ACCEPTED INTERPRETER SERVICES
Detail Level: Simple yes Topical Sulfur Applications Pregnancy And Lactation Text: This medication is Pregnancy Category C and has an unknown safety profile during pregnancy. It is unknown if this topical medication is excreted in breast milk. Include Pregnancy/Lactation Warning?: No Bactrim Counseling:  I discussed with the patient the risks of sulfa antibiotics including but not limited to GI upset, allergic reaction, drug rash, diarrhea, dizziness, photosensitivity, and yeast infections.  Rarely, more serious reactions can occur including but not limited to aplastic anemia, agranulocytosis, methemoglobinemia, blood dyscrasias, liver or kidney failure, lung infiltrates or desquamative/blistering drug rashes. Azithromycin Counseling:  I discussed with the patient the risks of azithromycin including but not limited to GI upset, allergic reaction, drug rash, diarrhea, and yeast infections. Doxycycline Counseling:  Patient counseled regarding possible photosensitivity and increased risk for sunburn.  Patient instructed to avoid sunlight, if possible.  When exposed to sunlight, patients should wear protective clothing, sunglasses, and sunscreen.  The patient was instructed to call the office immediately if the following severe adverse effects occur:  hearing changes, easy bruising/bleeding, severe headache, or vision changes.  The patient verbalized understanding of the proper use and possible adverse effects of doxycycline.  All of the patient's questions and concerns were addressed. Dapsone Counseling: I discussed with the patient the risks of dapsone including but not limited to hemolytic anemia, agranulocytosis, rashes, methemoglobinemia, kidney failure, peripheral neuropathy, headaches, GI upset, and liver toxicity.  Patients who start dapsone require monitoring including baseline LFTs and weekly CBCs for the first month, then every month thereafter.  The patient verbalized understanding of the proper use and possible adverse effects of dapsone.  All of the patient's questions and concerns were addressed. Isotretinoin Counseling: Patient should get monthly blood tests, not donate blood, not drive at night if vision affected, not share medication, and not undergo elective surgery for 6 months after tx completed. Side effects reviewed, pt to contact office should one occur. Birth Control Pills Counseling: Birth Control Pill Counseling: I discussed with the patient the potential side effects of OCPs including but not limited to increased risk of stroke, heart attack, thrombophlebitis, deep venous thrombosis, hepatic adenomas, breast changes, GI upset, headaches, and depression.  The patient verbalized understanding of the proper use and possible adverse effects of OCPs. All of the patient's questions and concerns were addressed. Spironolactone Pregnancy And Lactation Text: This medication can cause feminization of the male fetus and should be avoided during pregnancy. The active metabolite is also found in breast milk. Doxycycline Pregnancy And Lactation Text: This medication is Pregnancy Category D and not consider safe during pregnancy. It is also excreted in breast milk but is considered safe for shorter treatment courses. Sarecycline Pregnancy And Lactation Text: This medication is Pregnancy Category D and not consider safe during pregnancy. It is also excreted in breast milk. Aklief Pregnancy And Lactation Text: It is unknown if this medication is safe to use during pregnancy.  It is unknown if this medication is excreted in breast milk.  Breastfeeding women should use the topical cream on the smallest area of the skin for the shortest time needed while breastfeeding.  Do not apply to nipple and areola. High Dose Vitamin A Pregnancy And Lactation Text: High dose vitamin A therapy is contraindicated during pregnancy and breast feeding. Azelaic Acid Pregnancy And Lactation Text: This medication is considered safe during pregnancy and breast feeding. Topical Clindamycin Pregnancy And Lactation Text: This medication is Pregnancy Category B and is considered safe during pregnancy. It is unknown if it is excreted in breast milk. Tazorac Pregnancy And Lactation Text: This medication is not safe during pregnancy. It is unknown if this medication is excreted in breast milk. Winlevi Pregnancy And Lactation Text: This medication is considered safe during pregnancy and breastfeeding. Topical Retinoid Pregnancy And Lactation Text: This medication is Pregnancy Category C. It is unknown if this medication is excreted in breast milk. Benzoyl Peroxide Pregnancy And Lactation Text: This medication is Pregnancy Category C. It is unknown if benzoyl peroxide is excreted in breast milk. Winlevi Counseling:  I discussed with the patient the risks of topical clascoterone including but not limited to erythema, scaling, itching, and stinging. Patient voiced their understanding. Topical Sulfur Applications Counseling: Topical Sulfur Counseling: Patient counseled that this medication may cause skin irritation or allergic reactions.  In the event of skin irritation, the patient was advised to reduce the amount of the drug applied or use it less frequently.   The patient verbalized understanding of the proper use and possible adverse effects of topical sulfur application.  All of the patient's questions and concerns were addressed. Bactrim Pregnancy And Lactation Text: This medication is Pregnancy Category D and is known to cause fetal risk.  It is also excreted in breast milk. Azithromycin Pregnancy And Lactation Text: This medication is considered safe during pregnancy and is also secreted in breast milk. Birth Control Pills Pregnancy And Lactation Text: This medication should be avoided if pregnant and for the first 30 days post-partum. Isotretinoin Pregnancy And Lactation Text: This medication is Pregnancy Category X and is considered extremely dangerous during pregnancy. It is unknown if it is excreted in breast milk. Dapsone Pregnancy And Lactation Text: This medication is Pregnancy Category C and is not considered safe during pregnancy or breast feeding. Erythromycin Pregnancy And Lactation Text: This medication is Pregnancy Category B and is considered safe during pregnancy. It is also excreted in breast milk. Erythromycin Counseling:  I discussed with the patient the risks of erythromycin including but not limited to GI upset, allergic reaction, drug rash, diarrhea, increase in liver enzymes, and yeast infections. Spironolactone Counseling: Patient advised regarding risks of diarrhea, abdominal pain, hyperkalemia, birth defects (for female patients), liver toxicity and renal toxicity. The patient may need blood work to monitor liver and kidney function and potassium levels while on therapy. The patient verbalized understanding of the proper use and possible adverse effects of spironolactone.  All of the patient's questions and concerns were addressed. Sarecycline Counseling: Patient advised regarding possible photosensitivity and discoloration of the teeth, skin, lips, tongue and gums.  Patient instructed to avoid sunlight, if possible.  When exposed to sunlight, patients should wear protective clothing, sunglasses, and sunscreen.  The patient was instructed to call the office immediately if the following severe adverse effects occur:  hearing changes, easy bruising/bleeding, severe headache, or vision changes.  The patient verbalized understanding of the proper use and possible adverse effects of sarecycline.  All of the patient's questions and concerns were addressed. Azelaic Acid Counseling: Patient counseled that medicine may cause skin irritation and to avoid applying near the eyes.  In the event of skin irritation, the patient was advised to reduce the amount of the drug applied or use it less frequently.   The patient verbalized understanding of the proper use and possible adverse effects of azelaic acid.  All of the patient's questions and concerns were addressed. Minocycline Counseling: Patient advised regarding possible photosensitivity and discoloration of the teeth, skin, lips, tongue and gums.  Patient instructed to avoid sunlight, if possible.  When exposed to sunlight, patients should wear protective clothing, sunglasses, and sunscreen.  The patient was instructed to call the office immediately if the following severe adverse effects occur:  hearing changes, easy bruising/bleeding, severe headache, or vision changes.  The patient verbalized understanding of the proper use and possible adverse effects of minocycline.  All of the patient's questions and concerns were addressed. High Dose Vitamin A Counseling: Side effects reviewed, pt to contact office should one occur. Tetracycline Counseling: Patient counseled regarding possible photosensitivity and increased risk for sunburn.  Patient instructed to avoid sunlight, if possible.  When exposed to sunlight, patients should wear protective clothing, sunglasses, and sunscreen.  The patient was instructed to call the office immediately if the following severe adverse effects occur:  hearing changes, easy bruising/bleeding, severe headache, or vision changes.  The patient verbalized understanding of the proper use and possible adverse effects of tetracycline.  All of the patient's questions and concerns were addressed. Patient understands to avoid pregnancy while on therapy due to potential birth defects. Detail Level: Detailed Topical Clindamycin Counseling: Patient counseled that this medication may cause skin irritation or allergic reactions.  In the event of skin irritation, the patient was advised to reduce the amount of the drug applied or use it less frequently.   The patient verbalized understanding of the proper use and possible adverse effects of clindamycin.  All of the patient's questions and concerns were addressed. Tazorac Counseling:  Patient advised that medication is irritating and drying.  Patient may need to apply sparingly and wash off after an hour before eventually leaving it on overnight.  The patient verbalized understanding of the proper use and possible adverse effects of tazorac.  All of the patient's questions and concerns were addressed. Aklief counseling:  Patient advised to apply a pea-sized amount only at bedtime and wait 30 minutes after washing their face before applying.  If too drying, patient may add a non-comedogenic moisturizer.  The most commonly reported side effects including irritation, redness, scaling, dryness, stinging, burning, itching, and increased risk of sunburn.  The patient verbalized understanding of the proper use and possible adverse effects of retinoids.  All of the patient's questions and concerns were addressed. Topical Retinoid counseling:  Patient advised to apply a pea-sized amount only at bedtime and wait 30 minutes after washing their face before applying.  If too drying, patient may add a non-comedogenic moisturizer. The patient verbalized understanding of the proper use and possible adverse effects of retinoids.  All of the patient's questions and concerns were addressed. Benzoyl Peroxide Counseling: Patient counseled that medicine may cause skin irritation and bleach clothing.  In the event of skin irritation, the patient was advised to reduce the amount of the drug applied or use it less frequently.   The patient verbalized understanding of the proper use and possible adverse effects of benzoyl peroxide.  All of the patient's questions and concerns were addressed.

## 2025-03-12 NOTE — H&P ADULT - NSHPLANGPREFER_GEN_A_CORE
[place] PAST MEDICAL HISTORY:  Cataract     Congenital deafness     Glaucoma      Fall with Harm Risk

## 2025-05-13 NOTE — PROVIDER CONTACT NOTE (CRITICAL VALUE NOTIFICATION) - SITUATION
UROLOGY NOTE    Contacted earlier this afternoon regarding patient who presented for intractable pain and vomiting.    Patient admitted 5/1 for ureteral stone and underwent left ureteral stent placement with plan for future URS, which is tentatively scheduled for next week, 5/20/25 with Dr. Andrade.    Was evaluated at the ED 5/9/25 for pain. Had been taking only tylenol at home for pain. Was given Norco and patient's pain improved. Urine culture negative and KUB with stent in proper position, persistent ureteral stone.    She returned to ED today for intractable pain and vomiting.  Discussed with EDMD - recommend trial of valium and check PVR to ensure no urinary retention.   Would consider cline placement if pain not improved given severe stent symptoms.    If patient admitted for pain control will see in the AM for formal consult.    Blanca Kenyon PA-C  Urology   pt s/p ECMO placement.  Now in renal failure with fluid overload and increasing K+